# Patient Record
Sex: FEMALE | Race: OTHER | HISPANIC OR LATINO | Employment: FULL TIME | URBAN - METROPOLITAN AREA
[De-identification: names, ages, dates, MRNs, and addresses within clinical notes are randomized per-mention and may not be internally consistent; named-entity substitution may affect disease eponyms.]

---

## 2018-01-17 NOTE — MISCELLANEOUS
Provider Comments  Provider Comments:   PT WAS A NO SHOW      Signatures   Electronically signed by :  Bharathi Ocasio MD; Sep 22 2016  4:14PM EST                       (Author)

## 2018-06-29 PROBLEM — E66.9 OBESITY: Status: ACTIVE | Noted: 2017-07-11

## 2018-08-27 ENCOUNTER — HOSPITAL ENCOUNTER (EMERGENCY)
Facility: HOSPITAL | Age: 33
Discharge: HOME | End: 2018-08-27
Attending: EMERGENCY MEDICINE | Admitting: EMERGENCY MEDICINE
Payer: COMMERCIAL

## 2018-08-27 ENCOUNTER — APPOINTMENT (EMERGENCY)
Dept: RADIOLOGY | Facility: HOSPITAL | Age: 33
End: 2018-08-27
Payer: COMMERCIAL

## 2018-08-27 VITALS
SYSTOLIC BLOOD PRESSURE: 155 MMHG | TEMPERATURE: 97.9 F | RESPIRATION RATE: 16 BRPM | WEIGHT: 188.2 LBS | OXYGEN SATURATION: 99 % | DIASTOLIC BLOOD PRESSURE: 73 MMHG | BODY MASS INDEX: 31.32 KG/M2 | HEART RATE: 65 BPM

## 2018-08-27 DIAGNOSIS — S60.00XA FINGER CONTUSION: Primary | ICD-10-CM

## 2018-08-27 PROCEDURE — 99283 EMERGENCY DEPT VISIT LOW MDM: CPT

## 2018-08-27 PROCEDURE — 73140 X-RAY EXAM OF FINGER(S): CPT

## 2018-08-27 RX ORDER — IBUPROFEN 600 MG/1
600 TABLET ORAL ONCE
Status: COMPLETED | OUTPATIENT
Start: 2018-08-27 | End: 2018-08-27

## 2018-08-27 RX ORDER — MULTIVITAMIN
1 TABLET ORAL DAILY
COMMUNITY

## 2018-08-27 RX ORDER — IBUPROFEN 600 MG/1
600 TABLET ORAL EVERY 6 HOURS PRN
Qty: 30 TABLET | Refills: 0 | Status: SHIPPED | OUTPATIENT
Start: 2018-08-27 | End: 2019-07-22

## 2018-08-27 RX ADMIN — IBUPROFEN 600 MG: 600 TABLET, FILM COATED ORAL at 11:20

## 2018-08-27 NOTE — DISCHARGE INSTRUCTIONS

## 2018-08-27 NOTE — ED PROVIDER NOTES
History  Chief Complaint   Patient presents with    Finger Injury     Pt  reports left index finger pain and swelling x 2 days after jamming it while painting house  Last took motrin yesterday  62-year-old female with past medical history of hypertension, obesity, thyroid, who presents to the emergency department for left finger injury status post crush in a car door 2 days ago  She is right-hand dominant  Complains of 5/10 sharp and aching pain with swelling that is worse with movement and palpation  Denies redness or warmth  Denies numbness, tingling, weakness  Denies fevers or chills  Took Motrin yesterday with minimal relief attained  History provided by:  Patient   used: No        Prior to Admission Medications   Prescriptions Last Dose Informant Patient Reported? Taking? Multiple Vitamin (MULTIVITAMIN) tablet   Yes Yes   Sig: Take 1 tablet by mouth daily   ergocalciferol (ERGOCALCIFEROL) 90661 units capsule   Yes Yes   Sig: take 1 capsule by oral route  every week      Facility-Administered Medications: None       Past Medical History:   Diagnosis Date    Anemia     Hypertension     Obesity     11/14/16    Thyroid disease     Ulcer        Past Surgical History:   Procedure Laterality Date    GASTRIC BYPASS      FOR OBESITY; 11/14/16    TUBAL LIGATION Bilateral     2010       Family History   Problem Relation Age of Onset    Asthma Mother     Coronary artery disease Mother     Diabetes Mother         MELLITUS    Hyperlipidemia Mother     Hypertension Mother      I have reviewed and agree with the history as documented  Social History   Substance Use Topics    Smoking status: Current Every Day Smoker     Types: Cigarettes    Smokeless tobacco: Never Used      Comment: CURRENT SOME DAY SMOKER, TOBACCO USE AS PER [employer]    Alcohol use No        Review of Systems   Constitutional: Negative for chills and fever  HENT: Negative  Eyes: Negative  Respiratory: Negative for cough, chest tightness, shortness of breath and wheezing  Cardiovascular: Negative for chest pain, palpitations and leg swelling  Gastrointestinal: Negative for abdominal pain, constipation, diarrhea, nausea and vomiting  Genitourinary: Negative for dysuria, flank pain, frequency, hematuria and urgency  Musculoskeletal: Positive for arthralgias, joint swelling and myalgias  Negative for back pain, gait problem, neck pain and neck stiffness  Skin: Negative for color change, pallor, rash and wound  Neurological: Negative for dizziness, syncope, weakness, light-headedness, numbness and headaches  Physical Exam  Physical Exam   Constitutional: She is oriented to person, place, and time  She appears well-developed and well-nourished  No distress  HENT:   Head: Normocephalic and atraumatic  Eyes: Conjunctivae and EOM are normal  Pupils are equal, round, and reactive to light  Neck: Normal range of motion  Neck supple  Cardiovascular: Normal rate and intact distal pulses  Pulmonary/Chest: Effort normal  No respiratory distress  Abdominal: Soft  Musculoskeletal: She exhibits edema (Left index finger) and tenderness ( moderate pain with palpation to left index finger  )  Decreased range of motion of left index finger secondary to pain  Neurological: She is alert and oriented to person, place, and time  No cranial nerve deficit or sensory deficit  She exhibits normal muscle tone  Coordination normal    Skin: Skin is warm and dry  Capillary refill takes less than 2 seconds  She is not diaphoretic  Psychiatric: She has a normal mood and affect  Her behavior is normal    Nursing note and vitals reviewed        Vital Signs  ED Triage Vitals [08/27/18 1048]   Temperature Pulse Respirations Blood Pressure SpO2   97 9 °F (36 6 °C) 65 16 155/73 99 %      Temp Source Heart Rate Source Patient Position - Orthostatic VS BP Location FiO2 (%)   Temporal -- -- -- -- Pain Score       5           Vitals:    08/27/18 1048   BP: 155/73   Pulse: 65       Visual Acuity      ED Medications  Medications   ibuprofen (MOTRIN) tablet 600 mg (600 mg Oral Given 8/27/18 1120)       Diagnostic Studies  Results Reviewed     None                 XR finger second digit-index LEFT   ED Interpretation by Kun Buchanan PA-C (08/27 1137)   No acute osseous abnormality  Final Result by Maritza Doss MD (08/27 1159)      No fracture  Workstation performed: [id number]                    Procedures  Orthopedic Injury  Date/Time: 8/27/2018 11:16 AM  Performed by: Christiano Gilbert  Authorized by: Diaz Abrams     Patient Location:  ED  Verbal consent obtained?: Yes    Risks and benefits: Risks, benefits and alternatives were discussed    Consent given by:  Patient  Injury location:  Finger  Location details:  Left index finger  Injury type: Soft tissue  Neurovascular status: Neurovascularly intact    Distal perfusion: normal    Neurological function: normal    Range of motion: reduced    Skeletal traction used?: No    Immobilization:  Splint  Splint type:  Finger splint, static  Supplies used:  Aluminum splint  Neurovascular status: Neurovascularly intact    Distal perfusion: normal    Neurological function: normal    Range of motion: unchanged    Patient tolerance:  Patient tolerated the procedure well with no immediate complications   Splint application: splint was applied, Applied by technician, good position, neurovascular tendon intact, good capillary refill  Evaluated by me prior to discharge  Phone Contacts  ED Phone Contact    ED Course                               MDM  Number of Diagnoses or Management Options  Finger contusion:   Diagnosis management comments: Differential Diagnosis includes but is not limited to: sprain/strain, contusion, fracture/dislocation, musculoskeletal pain  X-ray shows no acute osseous abnormality on my interpretation    Patient placed in finger splint as she experiences increased pain with movement and palpation  Discharge home with primary care and orthopedic follow-up as needed  Motrin given in the emergency department  Amount and/or Complexity of Data Reviewed  Tests in the radiology section of CPT®: ordered and reviewed  Independent visualization of images, tracings, or specimens: yes      CritCare Time    Disposition  Final diagnoses:   Finger contusion     Time reflects when diagnosis was documented in both MDM as applicable and the Disposition within this note     Time User Action Codes Description Comment    8/27/2018 11:38 AM Gonsalo Prashant Add [S60 00XA] Finger contusion       ED Disposition     ED Disposition Condition Comment    Discharge  Adriana Mims discharge to home/self care  Condition at discharge: Good        Follow-up Information     Follow up With Specialties Details Why [address] [facility] [place] [facility] In 1 week As needed [place] [id number]  [phone]          Discharge Medication List as of 8/27/2018 11:39 AM      START taking these medications    Details   ibuprofen (MOTRIN) 600 mg tablet Take 1 tablet (600 mg total) by mouth every 6 (six) hours as needed for mild pain, Starting Mon 8/27/2018, Print         CONTINUE these medications which have NOT CHANGED    Details   ergocalciferol (ERGOCALCIFEROL) 87142 units capsule take 1 capsule by oral route  every week, Historical Med      Multiple Vitamin (MULTIVITAMIN) tablet Take 1 tablet by mouth daily, Historical Med           No discharge procedures on file      ED Provider  Electronically Signed by           Verner Ruby, PA-C  08/27/18 7562

## 2018-11-01 ENCOUNTER — APPOINTMENT (OUTPATIENT)
Dept: URGENT CARE | Age: 33
End: 2018-11-01
Payer: OTHER MISCELLANEOUS

## 2018-11-01 ENCOUNTER — TRANSCRIBE ORDERS (OUTPATIENT)
Dept: URGENT CARE | Age: 33
End: 2018-11-01

## 2018-11-01 ENCOUNTER — APPOINTMENT (OUTPATIENT)
Dept: RADIOLOGY | Age: 33
End: 2018-11-01
Payer: OTHER MISCELLANEOUS

## 2018-11-01 DIAGNOSIS — T14.90XA INJURY: Primary | ICD-10-CM

## 2018-11-01 DIAGNOSIS — T14.90XA INJURY: ICD-10-CM

## 2018-11-01 PROCEDURE — 99283 EMERGENCY DEPT VISIT LOW MDM: CPT

## 2018-11-01 PROCEDURE — G0382 LEV 3 HOSP TYPE B ED VISIT: HCPCS

## 2018-11-01 PROCEDURE — 73140 X-RAY EXAM OF FINGER(S): CPT

## 2018-11-06 PROBLEM — Z98.84 S/P GASTRIC BYPASS: Status: ACTIVE | Noted: 2018-11-06

## 2018-11-06 PROBLEM — K91.2 POSTSURGICAL MALABSORPTION: Status: ACTIVE | Noted: 2018-11-06

## 2018-11-06 NOTE — ASSESSMENT & PLAN NOTE
-At risk for malabsorption of vitamins/minerals secondary to malabsorption and restriction of intake from bariatric surgery  -NOT Currently taking adequate postop bariatric surgery vitamin supplementation: OTC MVI, calcium citrate BID, vitamin D 1000IU, B12  -No recent bariatric labs  -Recommended [facility] bariatrics  -Next set of bariatric labs ordered for approximately 2 weeks  -Patient received education about the importance of adhering to a lifelong supplementation regimen to avoid vitamin/mineral deficiencies

## 2018-11-06 NOTE — PROGRESS NOTES
Assessment/Plan:    S/P gastric bypass  -s/p Jaycee-En-Y Gastric Bypass with Dr Byron Dubon on 11/14/16  · EWL is 83%, she has been maintaining her weight s/p surgery; greater than expected weight loss! · The patient attempts to eat protein first, but is not always getting 60g of protein daily  Willing to f/u with RD for additional support  · Reports following the 30/60 minute rule with liquids  Drinking 64oz  daily  · Ulcer Risk assessment:  · The patient is avoiding NSAIDS, smoking about 2 cigarettes daily, denies alcohol  Discussion about strict AVOIDANCE of alcohol, NSAIDS, and nicotine of all forms  Patient has the patch from PCP and ready to quit  High ulcer risk - see below plan  · Goes to the gym several times a week  Discussion about increasing exercise and physical activity as tolerated  Epigastric pain  -Abdominal pain, mid-epigastric that is worse with food that began about 2 months ago  Reports black/tarry stools for about 1 month  Hx of duodenal ulcers x 2 - unsure of time frame; apparently was in 2015 before surgery   Smokes about 2 cigarettes daily, denies NSAIDS, alcohol    -Absolute smoking cessation - patient ready to quit, has patches  -Protonix BID and carafate QID  -EGD  -Advised patient if develops severe pain, increased bloody stools, fevers, chills, sweats to report to ED    Postsurgical malabsorption  -At risk for malabsorption of vitamins/minerals secondary to malabsorption and restriction of intake from bariatric surgery  -NOT Currently taking adequate postop bariatric surgery vitamin supplementation: OTC MVI, calcium citrate BID, vitamin D 1000IU, B12  -No recent bariatric labs  -Recommended [facility] bariatrics  -Next set of bariatric labs ordered for approximately 2 weeks  -Patient received education about the importance of adhering to a lifelong supplementation regimen to avoid vitamin/mineral deficiencies     Benign essential hypertension  -Slightly elevated BP  -No longer on medications  -Advised her to follow up with PCP       Diagnoses and all orders for this visit:    S/P gastric bypass  -     CBC and differential; Future  -     Comprehensive metabolic panel; Future  -     Copper Level; Future  -     Ferritin; Future  -     Folate; Future  -     Iron; Future  -     Iron Saturation %; Future  -     PTH, intact; Future  -     Vitamin A; Future  -     Vitamin B1, whole blood; Future  -     Vitamin B12; Future  -     Vitamin D 25 hydroxy; Future  -     Zinc; Future  -     Lipid panel; Future  -     pantoprazole (PROTONIX) 40 mg tablet; Take 1 tablet (40 mg total) by mouth daily  -     sucralfate (CARAFATE) 1 g/10 mL suspension; Take 10 mL (1 g total) by mouth 4 (four) times a day    Postsurgical malabsorption  -     CBC and differential; Future  -     Comprehensive metabolic panel; Future  -     Copper Level; Future  -     Ferritin; Future  -     Folate; Future  -     Iron; Future  -     Iron Saturation %; Future  -     PTH, intact; Future  -     Vitamin A; Future  -     Vitamin B1, whole blood; Future  -     Vitamin B12; Future  -     Vitamin D 25 hydroxy; Future  -     Zinc; Future  -     Lipid panel; Future  -     pantoprazole (PROTONIX) 40 mg tablet; Take 1 tablet (40 mg total) by mouth daily  -     sucralfate (CARAFATE) 1 g/10 mL suspension; Take 10 mL (1 g total) by mouth 4 (four) times a day    Benign essential hypertension    Hyperlipidemia, unspecified hyperlipidemia type    Epigastric pain  -     pantoprazole (PROTONIX) 40 mg tablet; Take 1 tablet (40 mg total) by mouth daily  -     sucralfate (CARAFATE) 1 g/10 mL suspension; Take 10 mL (1 g total) by mouth 4 (four) times a day    Other orders  -     cyanocobalamin (VITAMIN B-12) 100 mcg tablet; Take by mouth daily  -     calcium carbonate 1250 MG capsule; Take 1,250 mg by mouth 2 (two) times a day with meals          Subjective:      Patient ID: José Miguel Plasencia is a 29 y o  female      -s/p Jaycee-En-Y Gastric Bypass with   [facility] on 11/14/16  Presents to the office today for routine follow up  C/o mid-epigastric pain that is worse with food and black tarry stools about 2 months ago  Smokes 2 cigarettes daily  Initial: 339 6lbs  Current: 178 5lbs  EWL: 83% (Weight loss is ahead of schedule at this post surgical period )  Isidoro: current  Current BMI is Body mass index is 30 64 kg/m²  The following portions of the patient's history were reviewed and updated as appropriate: allergies, current medications, past family history, past medical history, past social history, past surgical history and problem list     Review of Systems   Constitutional: Negative for chills and fever  Unexpected weight change: planned weight loss  HENT: Negative for trouble swallowing  Respiratory: Negative for cough and shortness of breath  Cardiovascular: Negative for chest pain and palpitations  Gastrointestinal: Positive for abdominal pain and constipation  Negative for diarrhea, nausea and vomiting  Dark/tarry stools   Neurological: Negative for dizziness  Psychiatric/Behavioral:        Denies anxiety and depression         Objective:      /92 (BP Location: Left arm, Patient Position: Sitting, Cuff Size: Large)   Pulse 72   Resp 16   Ht 5' 4" (1 626 m)   Wt 81 kg (178 lb 8 oz)   BMI 30 64 kg/m²          Physical Exam   Constitutional: She is oriented to person, place, and time  She appears well-developed and well-nourished  No distress  HENT:   Head: Normocephalic and atraumatic  Eyes: Pupils are equal, round, and reactive to light  No scleral icterus  Cardiovascular: Normal rate, regular rhythm and normal heart sounds  Pulmonary/Chest: Effort normal and breath sounds normal  No respiratory distress  Abdominal: Soft  Bowel sounds are normal  She exhibits no distension  There is no tenderness  No incisional hernias appreciated   Musculoskeletal: She exhibits no edema     Neurological: She is alert and oriented to person, place, and time  Skin: Skin is warm and dry  Psychiatric: She has a normal mood and affect  Nursing note and vitals reviewed  BARRIERS: none identified    GOALS:   · Maintain healthy weight loss with good nutrition intakes  · Adequate hydration with at least 64oz  fluid intake  · Normal vitamin and mineral levels  · Exercise as tolerated  · Start protonix twice a day and carafate four times a day  · EGD   · STOP SMOKING!  · Follow up RD and keep food records    · Follow-up in 3 months  We kindly ask that your arrive 15 minutes before your scheduled appointment time with your provider to allow our staff to room you, get your vital signs and update your chart  · Follow diet as discussed  · Get lab work done in the next 2 weeks  You have been given a lab slip today  Please call the office if you need a replacement  It is recommended to check with your insurance BEFORE getting labs done to make sure they are covered by your policy  Also, please check with your PCP and other providers before getting labs to avoid duplicate labs  Make sure to HOLD any multivitamins that may contain biotin and any biotin supplements FOR 5 DAYS before any labs since it can affect the results  · Follow vitamin and mineral recommendations as reviewed with you  · Call our office if you have any problems with abdominal pain especially associated with fever, chills, nausea, vomiting or any other concerns  · All  Post-bariatric surgery patients should be aware that very small quantities of any alcohol can cause impairment and it is very possible not to feel the effect  The effect can be in the system for several hours  It is also a stomach irritant  · It is advised to AVOID alcohol, Nonsteroidal antiinflammatory drugs (NSAIDS) and nicotine of all forms   Any of these can cause stomach irritation/pain

## 2018-11-06 NOTE — ASSESSMENT & PLAN NOTE
-s/p Jaycee-En-Y Gastric Bypass with Dr Pedro Pablo Maradiaga on 11/14/16  · EWL is 83%, she has been maintaining her weight s/p surgery; greater than expected weight loss! · The patient attempts to eat protein first, but is not always getting 60g of protein daily  Willing to f/u with RD for additional support  · Reports following the 30/60 minute rule with liquids  Drinking 64oz  daily  · Ulcer Risk assessment:  · The patient is avoiding NSAIDS, smoking about 2 cigarettes daily, denies alcohol  Discussion about strict AVOIDANCE of alcohol, NSAIDS, and nicotine of all forms  Patient has the patch from PCP and ready to quit  High ulcer risk - see below plan  · Goes to the gym several times a week  Discussion about increasing exercise and physical activity as tolerated

## 2018-11-07 ENCOUNTER — OFFICE VISIT (OUTPATIENT)
Dept: BARIATRICS | Facility: CLINIC | Age: 33
End: 2018-11-07
Payer: COMMERCIAL

## 2018-11-07 VITALS
WEIGHT: 178.5 LBS | RESPIRATION RATE: 16 BRPM | HEART RATE: 72 BPM | BODY MASS INDEX: 30.48 KG/M2 | SYSTOLIC BLOOD PRESSURE: 132 MMHG | HEIGHT: 64 IN | DIASTOLIC BLOOD PRESSURE: 92 MMHG

## 2018-11-07 DIAGNOSIS — Z98.84 S/P GASTRIC BYPASS: Primary | ICD-10-CM

## 2018-11-07 DIAGNOSIS — R10.13 EPIGASTRIC PAIN: ICD-10-CM

## 2018-11-07 DIAGNOSIS — K91.2 POSTSURGICAL MALABSORPTION: ICD-10-CM

## 2018-11-07 DIAGNOSIS — Z98.84 S/P GASTRIC BYPASS: ICD-10-CM

## 2018-11-07 DIAGNOSIS — I10 BENIGN ESSENTIAL HYPERTENSION: ICD-10-CM

## 2018-11-07 DIAGNOSIS — E78.5 HYPERLIPIDEMIA, UNSPECIFIED HYPERLIPIDEMIA TYPE: ICD-10-CM

## 2018-11-07 PROCEDURE — 99214 OFFICE O/P EST MOD 30 MIN: CPT | Performed by: PHYSICIAN ASSISTANT

## 2018-11-07 RX ORDER — UBIDECARENONE 75 MG
CAPSULE ORAL DAILY
COMMUNITY
End: 2021-10-27 | Stop reason: ALTCHOICE

## 2018-11-07 RX ORDER — PANTOPRAZOLE SODIUM 40 MG/1
40 TABLET, DELAYED RELEASE ORAL DAILY
Qty: 60 TABLET | Refills: 2 | Status: SHIPPED | OUTPATIENT
Start: 2018-11-07 | End: 2018-11-08 | Stop reason: CLARIF

## 2018-11-07 RX ORDER — SUCRALFATE ORAL 1 G/10ML
1 SUSPENSION ORAL 4 TIMES DAILY
Qty: 1200 ML | Refills: 2 | Status: SHIPPED | OUTPATIENT
Start: 2018-11-07 | End: 2019-07-22

## 2018-11-07 NOTE — ASSESSMENT & PLAN NOTE
-Abdominal pain, mid-epigastric that is worse with food that began about 2 months ago  Reports black/tarry stools for about 1 month  Hx of duodenal ulcers x 2 - unsure of time frame; apparently was in 2015 before surgery   Smokes about 2 cigarettes daily, denies NSAIDS, alcohol    -Absolute smoking cessation - patient ready to quit, has patches  -Protonix BID and carafate QID  -EGD  -Advised patient if develops severe pain, increased bloody stools, fevers, chills, sweats to report to ED

## 2018-11-07 NOTE — PATIENT INSTRUCTIONS
GOALS:   · Maintain healthy weight loss with good nutrition intakes  · Adequate hydration with at least 64oz  fluid intake  · Normal vitamin and mineral levels  · Exercise as tolerated  · Start protonix twice a day and carafate four times a day  · EGD   · STOP SMOKING! · Look into [facility] vitamins  · Follow up with the RD for additional support - keep food records    · Follow-up in 3 months  We kindly ask that your arrive 15 minutes before your scheduled appointment time with your provider to allow our staff to room you, get your vital signs and update your chart  · Follow diet as discussed  · Get lab work done in the next 2 weeks  You have been given a lab slip today  Please call the office if you need a replacement  It is recommended to check with your insurance BEFORE getting labs done to make sure they are covered by your policy  Also, please check with your PCP and other providers before getting labs to avoid duplicate labs  Make sure to HOLD any multivitamins that may contain biotin and any biotin supplements FOR 5 DAYS before any labs since it can affect the results  · Follow vitamin and mineral recommendations as reviewed with you  · Call our office if you have any problems with abdominal pain especially associated with fever, chills, nausea, vomiting or any other concerns  · All  Post-bariatric surgery patients should be aware that very small quantities of any alcohol can cause impairment and it is very possible not to feel the effect  The effect can be in the system for several hours  It is also a stomach irritant  · It is advised to AVOID alcohol, Nonsteroidal antiinflammatory drugs (NSAIDS) and nicotine of all forms   Any of these can cause stomach irritation/pain

## 2018-11-08 RX ORDER — PANTOPRAZOLE SODIUM 40 MG/1
TABLET, DELAYED RELEASE ORAL
Qty: 90 TABLET | Refills: 2 | OUTPATIENT
Start: 2018-11-08

## 2018-11-08 RX ORDER — PANTOPRAZOLE SODIUM 40 MG/1
40 TABLET, DELAYED RELEASE ORAL 2 TIMES DAILY
Qty: 60 TABLET | Refills: 2 | Status: SHIPPED | OUTPATIENT
Start: 2018-11-08 | End: 2019-07-22

## 2018-11-12 ENCOUNTER — TRANSCRIBE ORDERS (OUTPATIENT)
Dept: ADMINISTRATIVE | Facility: HOSPITAL | Age: 33
End: 2018-11-12

## 2018-11-12 ENCOUNTER — APPOINTMENT (OUTPATIENT)
Dept: LAB | Facility: HOSPITAL | Age: 33
End: 2018-11-12
Payer: COMMERCIAL

## 2018-11-12 ENCOUNTER — TELEPHONE (OUTPATIENT)
Dept: BARIATRICS | Facility: CLINIC | Age: 33
End: 2018-11-12

## 2018-11-12 DIAGNOSIS — Z98.84 S/P GASTRIC BYPASS: ICD-10-CM

## 2018-11-12 DIAGNOSIS — D50.9 IRON DEFICIENCY ANEMIA, UNSPECIFIED IRON DEFICIENCY ANEMIA TYPE: ICD-10-CM

## 2018-11-12 DIAGNOSIS — E55.9 VITAMIN D DEFICIENCY: Primary | ICD-10-CM

## 2018-11-12 DIAGNOSIS — K91.2 POSTSURGICAL MALABSORPTION: ICD-10-CM

## 2018-11-12 LAB
25(OH)D3 SERPL-MCNC: 16.4 NG/ML (ref 30–100)
ALBUMIN SERPL BCP-MCNC: 4.5 G/DL (ref 3–5.2)
ALP SERPL-CCNC: 75 U/L (ref 43–122)
ALT SERPL W P-5'-P-CCNC: 26 U/L (ref 9–52)
ANION GAP SERPL CALCULATED.3IONS-SCNC: 9 MMOL/L (ref 5–14)
AST SERPL W P-5'-P-CCNC: 26 U/L (ref 14–36)
BASOPHILS # BLD AUTO: 0 THOUSANDS/ΜL (ref 0–0.1)
BASOPHILS NFR BLD AUTO: 0 % (ref 0–1)
BILIRUB SERPL-MCNC: 0.6 MG/DL
BUN SERPL-MCNC: 13 MG/DL (ref 5–25)
CALCIUM SERPL-MCNC: 9 MG/DL (ref 8.4–10.2)
CHLORIDE SERPL-SCNC: 105 MMOL/L (ref 97–108)
CHOLEST SERPL-MCNC: 168 MG/DL
CO2 SERPL-SCNC: 24 MMOL/L (ref 22–30)
CREAT SERPL-MCNC: 0.52 MG/DL (ref 0.6–1.2)
EOSINOPHIL # BLD AUTO: 0.1 THOUSAND/ΜL (ref 0–0.4)
EOSINOPHIL NFR BLD AUTO: 2 % (ref 0–6)
ERYTHROCYTE [DISTWIDTH] IN BLOOD BY AUTOMATED COUNT: 15.7 %
FERRITIN SERPL-MCNC: 5 NG/ML (ref 8–388)
FOLATE SERPL-MCNC: 15.3 NG/ML (ref 3.1–17.5)
GFR SERPL CREATININE-BSD FRML MDRD: 127 ML/MIN/1.73SQ M
GLUCOSE P FAST SERPL-MCNC: 94 MG/DL (ref 70–99)
HCT VFR BLD AUTO: 33.9 % (ref 36–46)
HDLC SERPL-MCNC: 52 MG/DL (ref 40–59)
HGB BLD-MCNC: 10.9 G/DL (ref 12–16)
IRON SATN MFR SERPL: 23 %
IRON SERPL-MCNC: 118 UG/DL (ref 50–170)
LDLC SERPL CALC-MCNC: 100 MG/DL
LYMPHOCYTES # BLD AUTO: 1.8 THOUSANDS/ΜL (ref 0.5–4)
LYMPHOCYTES NFR BLD AUTO: 33 % (ref 20–50)
MCH RBC QN AUTO: 27.2 PG (ref 26–34)
MCHC RBC AUTO-ENTMCNC: 32.1 G/DL (ref 31–36)
MCV RBC AUTO: 85 FL (ref 80–100)
MONOCYTES # BLD AUTO: 0.4 THOUSAND/ΜL (ref 0.2–0.9)
MONOCYTES NFR BLD AUTO: 7 % (ref 1–10)
NEUTROPHILS # BLD AUTO: 3.1 THOUSANDS/ΜL (ref 1.8–7.8)
NEUTS SEG NFR BLD AUTO: 57 % (ref 45–65)
NONHDLC SERPL-MCNC: 116 MG/DL
PLATELET # BLD AUTO: 286 THOUSANDS/UL (ref 150–450)
PMV BLD AUTO: 9.2 FL (ref 8.9–12.7)
POTASSIUM SERPL-SCNC: 4.1 MMOL/L (ref 3.6–5)
PROT SERPL-MCNC: 8.1 G/DL (ref 5.9–8.4)
PTH-INTACT SERPL-MCNC: 76.4 PG/ML (ref 16.7–78.9)
RBC # BLD AUTO: 4 MILLION/UL (ref 4–5.2)
SODIUM SERPL-SCNC: 138 MMOL/L (ref 137–147)
TIBC SERPL-MCNC: 505 UG/DL (ref 250–450)
TRIGL SERPL-MCNC: 79 MG/DL
VIT B12 SERPL-MCNC: 296 PG/ML (ref 100–900)
WBC # BLD AUTO: 5.5 THOUSAND/UL (ref 4.5–11)

## 2018-11-12 PROCEDURE — 80053 COMPREHEN METABOLIC PANEL: CPT

## 2018-11-12 PROCEDURE — 82306 VITAMIN D 25 HYDROXY: CPT

## 2018-11-12 PROCEDURE — 84630 ASSAY OF ZINC: CPT

## 2018-11-12 PROCEDURE — 84425 ASSAY OF VITAMIN B-1: CPT

## 2018-11-12 PROCEDURE — 83970 ASSAY OF PARATHORMONE: CPT

## 2018-11-12 PROCEDURE — 84590 ASSAY OF VITAMIN A: CPT

## 2018-11-12 PROCEDURE — 36415 COLL VENOUS BLD VENIPUNCTURE: CPT

## 2018-11-12 PROCEDURE — 85025 COMPLETE CBC W/AUTO DIFF WBC: CPT

## 2018-11-12 PROCEDURE — 82607 VITAMIN B-12: CPT

## 2018-11-12 PROCEDURE — 82746 ASSAY OF FOLIC ACID SERUM: CPT

## 2018-11-12 PROCEDURE — 82525 ASSAY OF COPPER: CPT

## 2018-11-12 PROCEDURE — 80061 LIPID PANEL: CPT

## 2018-11-12 PROCEDURE — 83540 ASSAY OF IRON: CPT

## 2018-11-12 PROCEDURE — 83550 IRON BINDING TEST: CPT

## 2018-11-12 PROCEDURE — 82728 ASSAY OF FERRITIN: CPT

## 2018-11-12 RX ORDER — ERGOCALCIFEROL 1.25 MG/1
50000 CAPSULE ORAL
Qty: 24 CAPSULE | Refills: 0 | Status: SHIPPED | OUTPATIENT
Start: 2018-11-12 | End: 2019-11-08

## 2018-11-12 NOTE — Clinical Note
Please notify patient of hematology referral  Have her contact me in the office if possible, thanks!

## 2018-11-13 ENCOUNTER — TELEPHONE (OUTPATIENT)
Dept: BARIATRICS | Facility: CLINIC | Age: 33
End: 2018-11-13

## 2018-11-13 NOTE — TELEPHONE ENCOUNTER
Pt called worried about her blood test results would like to speak to doctor and will be waiting for call  Pt stated picked up vit d and wanted to be sure she is supposed to take 2 twice wkly or is that to much  Please contact patient she will be stopping by office tomorrow to sight consent forms

## 2018-11-14 ENCOUNTER — TELEPHONE (OUTPATIENT)
Dept: BARIATRICS | Facility: CLINIC | Age: 33
End: 2018-11-14

## 2018-11-14 LAB
COPPER SERPL-MCNC: 102 UG/DL (ref 72–166)
ZINC SERPL-MCNC: 75 UG/DL (ref 56–134)

## 2018-11-14 NOTE — TELEPHONE ENCOUNTER
Left message - advised patient to take Vitamin D 2x/week with meals for 12 weeks, 1000mcg B12 daily, hematology referral for iron deficiency anemia  I will contact her when remainder of labs results are completed

## 2018-11-15 DIAGNOSIS — E55.9 VITAMIN D DEFICIENCY: Primary | ICD-10-CM

## 2018-11-15 LAB
VIT A SERPL-MCNC: 30 UG/DL (ref 31.2–89.1)
VIT B1 BLD-SCNC: 101.2 NMOL/L (ref 66.5–200)

## 2018-11-20 ENCOUNTER — TELEPHONE (OUTPATIENT)
Dept: BARIATRICS | Facility: CLINIC | Age: 33
End: 2018-11-20

## 2018-11-27 ENCOUNTER — TELEPHONE (OUTPATIENT)
Dept: BARIATRICS | Facility: CLINIC | Age: 33
End: 2018-11-27

## 2018-11-28 ENCOUNTER — TELEPHONE (OUTPATIENT)
Dept: HEMATOLOGY ONCOLOGY | Facility: CLINIC | Age: 33
End: 2018-11-28

## 2018-11-28 NOTE — TELEPHONE ENCOUNTER
Called new patient as she has missed her apt  Call went to voicemail, and left message to call the office to reschedule

## 2018-11-28 NOTE — TELEPHONE ENCOUNTER
Called patient for a third time regarding missed new pt apt today at 10:20 a m , and call went to voicemail  Left message for patient to call the office to reschedule

## 2018-11-28 NOTE — TELEPHONE ENCOUNTER
Called new patient for a second time regarding missed apt  Left message for patient to call the office to reschedule

## 2018-12-18 PROBLEM — Z98.84 BARIATRIC SURGERY STATUS: Status: ACTIVE | Noted: 2018-12-18

## 2019-06-10 ENCOUNTER — APPOINTMENT (EMERGENCY)
Dept: RADIOLOGY | Facility: HOSPITAL | Age: 34
End: 2019-06-10
Payer: COMMERCIAL

## 2019-06-10 ENCOUNTER — HOSPITAL ENCOUNTER (EMERGENCY)
Facility: HOSPITAL | Age: 34
Discharge: HOME | End: 2019-06-10
Attending: EMERGENCY MEDICINE | Admitting: EMERGENCY MEDICINE
Payer: COMMERCIAL

## 2019-06-10 VITALS
TEMPERATURE: 98.4 F | BODY MASS INDEX: 32.01 KG/M2 | WEIGHT: 186.51 LBS | RESPIRATION RATE: 14 BRPM | SYSTOLIC BLOOD PRESSURE: 139 MMHG | OXYGEN SATURATION: 100 % | HEART RATE: 65 BPM | DIASTOLIC BLOOD PRESSURE: 81 MMHG

## 2019-06-10 DIAGNOSIS — R07.9 CHEST PAIN: Primary | ICD-10-CM

## 2019-06-10 LAB
ANION GAP SERPL CALCULATED.3IONS-SCNC: 8 MMOL/L (ref 4–13)
ATRIAL RATE: 57 BPM
ATRIAL RATE: 69 BPM
BACTERIA UR QL AUTO: ABNORMAL /HPF
BASOPHILS # BLD AUTO: 0.01 THOUSANDS/ΜL (ref 0–0.1)
BASOPHILS NFR BLD AUTO: 0 % (ref 0–1)
BILIRUB UR QL STRIP: NEGATIVE
BUN SERPL-MCNC: 11 MG/DL (ref 5–25)
CALCIUM SERPL-MCNC: 8.5 MG/DL (ref 8.3–10.1)
CHLORIDE SERPL-SCNC: 108 MMOL/L (ref 100–108)
CLARITY UR: CLEAR
CO2 SERPL-SCNC: 26 MMOL/L (ref 21–32)
COLOR UR: YELLOW
COLOR, POC: YELLOW
CREAT SERPL-MCNC: 0.69 MG/DL (ref 0.6–1.3)
EOSINOPHIL # BLD AUTO: 0.07 THOUSAND/ΜL (ref 0–0.61)
EOSINOPHIL NFR BLD AUTO: 1 % (ref 0–6)
ERYTHROCYTE [DISTWIDTH] IN BLOOD BY AUTOMATED COUNT: 16.3 % (ref 11.6–15.1)
EXT PREG TEST URINE: NEGATIVE
GFR SERPL CREATININE-BSD FRML MDRD: 115 ML/MIN/1.73SQ M
GLUCOSE SERPL-MCNC: 94 MG/DL (ref 65–140)
GLUCOSE UR STRIP-MCNC: NEGATIVE MG/DL
HCT VFR BLD AUTO: 28.2 % (ref 34.8–46.1)
HGB BLD-MCNC: 8.5 G/DL (ref 11.5–15.4)
HGB UR QL STRIP.AUTO: NEGATIVE
IMM GRANULOCYTES # BLD AUTO: 0.02 THOUSAND/UL (ref 0–0.2)
IMM GRANULOCYTES NFR BLD AUTO: 0 % (ref 0–2)
KETONES UR STRIP-MCNC: NEGATIVE MG/DL
LEUKOCYTE ESTERASE UR QL STRIP: ABNORMAL
LYMPHOCYTES # BLD AUTO: 1.84 THOUSANDS/ΜL (ref 0.6–4.47)
LYMPHOCYTES NFR BLD AUTO: 32 % (ref 14–44)
MCH RBC QN AUTO: 24.9 PG (ref 26.8–34.3)
MCHC RBC AUTO-ENTMCNC: 30.1 G/DL (ref 31.4–37.4)
MCV RBC AUTO: 83 FL (ref 82–98)
MONOCYTES # BLD AUTO: 0.56 THOUSAND/ΜL (ref 0.17–1.22)
MONOCYTES NFR BLD AUTO: 10 % (ref 4–12)
NEUTROPHILS # BLD AUTO: 3.26 THOUSANDS/ΜL (ref 1.85–7.62)
NEUTS SEG NFR BLD AUTO: 57 % (ref 43–75)
NITRITE UR QL STRIP: NEGATIVE
NON-SQ EPI CELLS URNS QL MICRO: ABNORMAL /HPF
NRBC BLD AUTO-RTO: 0 /100 WBCS
P AXIS: 34 DEGREES
PH UR STRIP.AUTO: 7.5 [PH] (ref 4.5–8)
PLATELET # BLD AUTO: 293 THOUSANDS/UL (ref 149–390)
PMV BLD AUTO: 10.2 FL (ref 8.9–12.7)
POTASSIUM SERPL-SCNC: 4.4 MMOL/L (ref 3.5–5.3)
PR INTERVAL: 126 MS
PROT UR STRIP-MCNC: ABNORMAL MG/DL
QRS AXIS: 55 DEGREES
QRS AXIS: 61 DEGREES
QRSD INTERVAL: 82 MS
QRSD INTERVAL: 88 MS
QT INTERVAL: 390 MS
QT INTERVAL: 424 MS
QTC INTERVAL: 405 MS
QTC INTERVAL: 412 MS
RBC # BLD AUTO: 3.42 MILLION/UL (ref 3.81–5.12)
RBC #/AREA URNS AUTO: ABNORMAL /HPF
SODIUM SERPL-SCNC: 142 MMOL/L (ref 136–145)
SP GR UR STRIP.AUTO: 1.02 (ref 1–1.03)
T WAVE AXIS: 41 DEGREES
T WAVE AXIS: 47 DEGREES
TROPONIN I SERPL-MCNC: 0.02 NG/ML
TROPONIN I SERPL-MCNC: <0.02 NG/ML
UROBILINOGEN UR QL STRIP.AUTO: 0.2 E.U./DL
VENTRICULAR RATE: 57 BPM
VENTRICULAR RATE: 65 BPM
WBC # BLD AUTO: 5.76 THOUSAND/UL (ref 4.31–10.16)
WBC #/AREA URNS AUTO: ABNORMAL /HPF

## 2019-06-10 PROCEDURE — 99285 EMERGENCY DEPT VISIT HI MDM: CPT

## 2019-06-10 PROCEDURE — 93010 ELECTROCARDIOGRAM REPORT: CPT | Performed by: INTERNAL MEDICINE

## 2019-06-10 PROCEDURE — 80048 BASIC METABOLIC PNL TOTAL CA: CPT | Performed by: EMERGENCY MEDICINE

## 2019-06-10 PROCEDURE — 85025 COMPLETE CBC W/AUTO DIFF WBC: CPT | Performed by: EMERGENCY MEDICINE

## 2019-06-10 PROCEDURE — 93005 ELECTROCARDIOGRAM TRACING: CPT

## 2019-06-10 PROCEDURE — 84484 ASSAY OF TROPONIN QUANT: CPT | Performed by: EMERGENCY MEDICINE

## 2019-06-10 PROCEDURE — 99284 EMERGENCY DEPT VISIT MOD MDM: CPT | Performed by: EMERGENCY MEDICINE

## 2019-06-10 PROCEDURE — 71046 X-RAY EXAM CHEST 2 VIEWS: CPT

## 2019-06-10 PROCEDURE — 81001 URINALYSIS AUTO W/SCOPE: CPT

## 2019-06-10 PROCEDURE — 81025 URINE PREGNANCY TEST: CPT | Performed by: EMERGENCY MEDICINE

## 2019-06-10 PROCEDURE — 96376 TX/PRO/DX INJ SAME DRUG ADON: CPT

## 2019-06-10 PROCEDURE — 36415 COLL VENOUS BLD VENIPUNCTURE: CPT | Performed by: EMERGENCY MEDICINE

## 2019-06-10 PROCEDURE — 96374 THER/PROPH/DIAG INJ IV PUSH: CPT

## 2019-06-10 RX ORDER — KETOROLAC TROMETHAMINE 30 MG/ML
15 INJECTION, SOLUTION INTRAMUSCULAR; INTRAVENOUS ONCE
Status: COMPLETED | OUTPATIENT
Start: 2019-06-10 | End: 2019-06-10

## 2019-06-10 RX ADMIN — KETOROLAC TROMETHAMINE 15 MG: 30 INJECTION, SOLUTION INTRAMUSCULAR; INTRAVENOUS at 11:23

## 2019-06-10 RX ADMIN — KETOROLAC TROMETHAMINE 15 MG: 30 INJECTION, SOLUTION INTRAMUSCULAR; INTRAVENOUS at 09:26

## 2019-07-22 ENCOUNTER — APPOINTMENT (EMERGENCY)
Dept: CT IMAGING | Facility: HOSPITAL | Age: 34
End: 2019-07-22
Payer: COMMERCIAL

## 2019-07-22 ENCOUNTER — HOSPITAL ENCOUNTER (EMERGENCY)
Facility: HOSPITAL | Age: 34
Discharge: HOME | End: 2019-07-22
Attending: EMERGENCY MEDICINE | Admitting: EMERGENCY MEDICINE
Payer: COMMERCIAL

## 2019-07-22 VITALS
HEART RATE: 54 BPM | TEMPERATURE: 97.1 F | DIASTOLIC BLOOD PRESSURE: 89 MMHG | RESPIRATION RATE: 16 BRPM | BODY MASS INDEX: 32.79 KG/M2 | SYSTOLIC BLOOD PRESSURE: 124 MMHG | WEIGHT: 191 LBS | OXYGEN SATURATION: 100 %

## 2019-07-22 DIAGNOSIS — R51.9 HEADACHE: Primary | ICD-10-CM

## 2019-07-22 DIAGNOSIS — D64.9 ANEMIA: ICD-10-CM

## 2019-07-22 LAB
ALBUMIN SERPL BCP-MCNC: 4.2 G/DL (ref 3–5.2)
ALP SERPL-CCNC: 76 U/L (ref 43–122)
ALT SERPL W P-5'-P-CCNC: 18 U/L (ref 9–52)
ANION GAP SERPL CALCULATED.3IONS-SCNC: 6 MMOL/L (ref 5–14)
ANISOCYTOSIS BLD QL SMEAR: PRESENT
AST SERPL W P-5'-P-CCNC: 24 U/L (ref 14–36)
ATRIAL RATE: 58 BPM
BACTERIA UR QL AUTO: ABNORMAL /HPF
BASOPHILS # BLD AUTO: 0 THOUSANDS/ΜL (ref 0–0.1)
BASOPHILS NFR BLD AUTO: 1 % (ref 0–1)
BILIRUB SERPL-MCNC: 0.4 MG/DL
BILIRUB UR QL STRIP: NEGATIVE
BUN SERPL-MCNC: 10 MG/DL (ref 5–25)
CALCIUM SERPL-MCNC: 8.9 MG/DL (ref 8.4–10.2)
CHLORIDE SERPL-SCNC: 107 MMOL/L (ref 97–108)
CLARITY UR: CLEAR
CO2 SERPL-SCNC: 28 MMOL/L (ref 22–30)
COLOR UR: YELLOW
CREAT SERPL-MCNC: 0.54 MG/DL (ref 0.6–1.2)
EOSINOPHIL # BLD AUTO: 0.1 THOUSAND/ΜL (ref 0–0.4)
EOSINOPHIL NFR BLD AUTO: 1 % (ref 0–6)
ERYTHROCYTE [DISTWIDTH] IN BLOOD BY AUTOMATED COUNT: 16.7 %
EXT PREG TEST URINE: NEGATIVE
EXT. CONTROL ED NAV: NORMAL
GFR SERPL CREATININE-BSD FRML MDRD: 124 ML/MIN/1.73SQ M
GLUCOSE SERPL-MCNC: 92 MG/DL (ref 70–99)
GLUCOSE UR STRIP-MCNC: NEGATIVE MG/DL
HCT VFR BLD AUTO: 27.3 % (ref 36–46)
HGB BLD-MCNC: 8.5 G/DL (ref 12–16)
HGB UR QL STRIP.AUTO: NEGATIVE
HYPERCHROMIA BLD QL SMEAR: PRESENT
KETONES UR STRIP-MCNC: NEGATIVE MG/DL
LEUKOCYTE ESTERASE UR QL STRIP: 25
LYMPHOCYTES # BLD AUTO: 1.5 THOUSANDS/ΜL (ref 0.5–4)
LYMPHOCYTES NFR BLD AUTO: 22 % (ref 25–45)
MCH RBC QN AUTO: 23.8 PG (ref 26–34)
MCHC RBC AUTO-ENTMCNC: 31 G/DL (ref 31–36)
MCV RBC AUTO: 77 FL (ref 80–100)
MICROCYTES BLD QL AUTO: PRESENT
MONOCYTES # BLD AUTO: 0.5 THOUSAND/ΜL (ref 0.2–0.9)
MONOCYTES NFR BLD AUTO: 7 % (ref 1–10)
NEUTROPHILS # BLD AUTO: 4.7 THOUSANDS/ΜL (ref 1.8–7.8)
NEUTS SEG NFR BLD AUTO: 69 % (ref 45–65)
NITRITE UR QL STRIP: NEGATIVE
NON-SQ EPI CELLS URNS QL MICRO: ABNORMAL /HPF
P AXIS: 14 DEGREES
PH UR STRIP.AUTO: 7 [PH]
PLATELET # BLD AUTO: 353 THOUSANDS/UL (ref 150–450)
PLATELET BLD QL SMEAR: ADEQUATE
PMV BLD AUTO: 8.1 FL (ref 8.9–12.7)
POTASSIUM SERPL-SCNC: 4.2 MMOL/L (ref 3.6–5)
PR INTERVAL: 124 MS
PROT SERPL-MCNC: 7.7 G/DL (ref 5.9–8.4)
PROT UR STRIP-MCNC: NEGATIVE MG/DL
QRS AXIS: 13 DEGREES
QRSD INTERVAL: 90 MS
QT INTERVAL: 410 MS
QTC INTERVAL: 402 MS
RBC # BLD AUTO: 3.56 MILLION/UL (ref 4–5.2)
RBC #/AREA URNS AUTO: ABNORMAL /HPF
RBC MORPH BLD: NORMAL
SCHISTOCYTES BLD QL SMEAR: PRESENT
SODIUM SERPL-SCNC: 141 MMOL/L (ref 137–147)
SP GR UR STRIP.AUTO: 1.01 (ref 1–1.04)
T WAVE AXIS: 22 DEGREES
TARGETS BLD QL SMEAR: PRESENT
UROBILINOGEN UA: NEGATIVE MG/DL
VENTRICULAR RATE: 58 BPM
WBC # BLD AUTO: 6.8 THOUSAND/UL (ref 4.5–11)
WBC #/AREA URNS AUTO: ABNORMAL /HPF

## 2019-07-22 PROCEDURE — 85025 COMPLETE CBC W/AUTO DIFF WBC: CPT | Performed by: EMERGENCY MEDICINE

## 2019-07-22 PROCEDURE — 36415 COLL VENOUS BLD VENIPUNCTURE: CPT | Performed by: EMERGENCY MEDICINE

## 2019-07-22 PROCEDURE — 96374 THER/PROPH/DIAG INJ IV PUSH: CPT

## 2019-07-22 PROCEDURE — 99284 EMERGENCY DEPT VISIT MOD MDM: CPT | Performed by: EMERGENCY MEDICINE

## 2019-07-22 PROCEDURE — 93005 ELECTROCARDIOGRAM TRACING: CPT

## 2019-07-22 PROCEDURE — 96361 HYDRATE IV INFUSION ADD-ON: CPT

## 2019-07-22 PROCEDURE — 81001 URINALYSIS AUTO W/SCOPE: CPT | Performed by: EMERGENCY MEDICINE

## 2019-07-22 PROCEDURE — 93010 ELECTROCARDIOGRAM REPORT: CPT | Performed by: INTERNAL MEDICINE

## 2019-07-22 PROCEDURE — 81025 URINE PREGNANCY TEST: CPT | Performed by: EMERGENCY MEDICINE

## 2019-07-22 PROCEDURE — 80053 COMPREHEN METABOLIC PANEL: CPT | Performed by: EMERGENCY MEDICINE

## 2019-07-22 PROCEDURE — 99284 EMERGENCY DEPT VISIT MOD MDM: CPT

## 2019-07-22 PROCEDURE — 96375 TX/PRO/DX INJ NEW DRUG ADDON: CPT

## 2019-07-22 PROCEDURE — 70450 CT HEAD/BRAIN W/O DYE: CPT

## 2019-07-22 RX ORDER — KETOROLAC TROMETHAMINE 30 MG/ML
30 INJECTION, SOLUTION INTRAMUSCULAR; INTRAVENOUS ONCE
Status: COMPLETED | OUTPATIENT
Start: 2019-07-22 | End: 2019-07-22

## 2019-07-22 RX ORDER — METOCLOPRAMIDE HYDROCHLORIDE 5 MG/ML
10 INJECTION INTRAMUSCULAR; INTRAVENOUS ONCE
Status: COMPLETED | OUTPATIENT
Start: 2019-07-22 | End: 2019-07-22

## 2019-07-22 RX ORDER — DIPHENHYDRAMINE HYDROCHLORIDE 50 MG/ML
25 INJECTION INTRAMUSCULAR; INTRAVENOUS ONCE
Status: COMPLETED | OUTPATIENT
Start: 2019-07-22 | End: 2019-07-22

## 2019-07-22 RX ORDER — FERROUS SULFATE 325(65) MG
325 TABLET ORAL DAILY
COMMUNITY
Start: 2016-02-18 | End: 2019-11-21 | Stop reason: ALTCHOICE

## 2019-07-22 RX ADMIN — DIPHENHYDRAMINE HYDROCHLORIDE 25 MG: 50 INJECTION, SOLUTION INTRAMUSCULAR; INTRAVENOUS at 08:00

## 2019-07-22 RX ADMIN — SODIUM CHLORIDE 1000 ML: 0.9 INJECTION, SOLUTION INTRAVENOUS at 08:00

## 2019-07-22 RX ADMIN — KETOROLAC TROMETHAMINE 30 MG: 30 INJECTION, SOLUTION INTRAMUSCULAR; INTRAVENOUS at 08:01

## 2019-07-22 RX ADMIN — METOCLOPRAMIDE 10 MG: 5 INJECTION, SOLUTION INTRAMUSCULAR; INTRAVENOUS at 08:03

## 2019-07-22 NOTE — ED PROVIDER NOTES
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  This has been ongoing for quite some time and she has follow-up with her eye doctor tomorrow  She is concerned as there is a past history of brain aneurysms in her family and she has never been screened for this  She denies any other acute issues or concerns at this time  Headache   Pain location:  R parietal  Quality:  Dull  Radiates to:  Does not radiate  Onset quality:  Gradual  Timing:  Constant  Chronicity:  Recurrent  Relieved by:  Acetaminophen  Worsened by:  Nothing  Associated symptoms: dizziness    Associated symptoms: no abdominal pain, no back pain, no blurred vision, no congestion, no cough, no diarrhea, no drainage, no ear pain, no eye pain, no facial pain, no fatigue, no fever, no focal weakness, no hearing loss, no loss of balance, no myalgias, no nausea, no near-syncope, no neck pain, no neck stiffness, no numbness, no paresthesias, no photophobia, no seizures, no sinus pressure, no sore throat, no swollen glands, no syncope, no tingling, no URI, no visual change, no vomiting and no weakness        Prior to Admission Medications   Prescriptions Last Dose Informant Patient Reported? Taking?    Multiple Vitamin (MULTIVITAMIN) tablet   Yes No   Sig: Take 1 tablet by mouth daily   calcium carbonate 1250 MG capsule  Self Yes No   Sig: Take 1,250 mg by mouth 2 (two) times a day with meals   cyanocobalamin (VITAMIN B-12) 100 mcg tablet  Self Yes No Sig: Take by mouth daily   ergocalciferol (VITAMIN D2) 50,000 units   No No   Sig: Take 1 capsule (50,000 Units total) by mouth 2 (two) times a week with meals   ferrous sulfate 325 (65 Fe) mg tablet   Yes Yes   Sig: Take 325 mg by mouth daily       Facility-Administered Medications: None       Past Medical History:   Diagnosis Date    Anemia     Hypertension     Obesity     16    Thyroid disease     Ulcer        Past Surgical History:   Procedure Laterality Date    LITO-EN-Y PROCEDURE  2016    Gastric Bypass by Dr Ag Severe Weight 339 6,nw    TUBAL LIGATION Bilateral         WISDOM TOOTH EXTRACTION         Family History   Problem Relation Age of Onset    Asthma Mother     Coronary artery disease Mother     Diabetes Mother         MELLITUS    Hyperlipidemia Mother     Hypertension Mother     Aneurysm Mother         2018 just diagnosed with two    No Known Problems Father          when she was 1 months old    Aneurysm Maternal Grandmother     Aneurysm Maternal Grandfather           of a ruptured abdominal aneurysm     I have reviewed and agree with the history as documented  Social History     Tobacco Use    Smoking status: Former Smoker     Types: Cigarettes    Smokeless tobacco: Never Used    Tobacco comment: CURRENT SOME DAY SMOKER, TOBACCO USE AS PER [employer]   Substance Use Topics    Alcohol use: No    Drug use: No        Review of Systems   Constitutional: Negative for appetite change, chills, fatigue and fever  HENT: Negative for congestion, ear pain, hearing loss, postnasal drip, sinus pressure, sinus pain, sore throat and trouble swallowing  Eyes: Negative for blurred vision, photophobia, pain, redness and itching  Respiratory: Negative for cough, chest tightness, shortness of breath and wheezing  Cardiovascular: Negative for chest pain, leg swelling, syncope and near-syncope     Gastrointestinal: Negative for abdominal pain, constipation, diarrhea, nausea and vomiting  Endocrine: Negative  Genitourinary: Negative for difficulty urinating and dysuria  Musculoskeletal: Negative for back pain, myalgias, neck pain and neck stiffness  Skin: Negative for rash  Allergic/Immunologic: Negative  Neurological: Positive for dizziness and headaches  Negative for focal weakness, seizures, weakness, numbness, paresthesias and loss of balance  Hematological: Negative  Psychiatric/Behavioral: Negative  Physical Exam  Physical Exam   Constitutional: She is oriented to person, place, and time  She appears well-developed and well-nourished  HENT:   Head: Normocephalic and atraumatic  Nose: Nose normal    Mouth/Throat: Oropharynx is clear and moist    Eyes: Pupils are equal, round, and reactive to light  Conjunctivae and EOM are normal    Left eye 20/10; Right eye 20/15   Neck: Normal range of motion  Neck supple  Cardiovascular: Normal rate, regular rhythm and normal heart sounds  Pulmonary/Chest: Effort normal and breath sounds normal  No respiratory distress  She has no wheezes  Abdominal: Soft  Bowel sounds are normal  There is no tenderness  There is no guarding  Musculoskeletal: She exhibits no edema, tenderness or deformity  Neurological: She is alert and oriented to person, place, and time  She has normal strength  No cranial nerve deficit or sensory deficit  She exhibits normal muscle tone  Coordination and gait normal  GCS eye subscore is 4  GCS verbal subscore is 5  GCS motor subscore is 6  Skin: Skin is warm and dry  Capillary refill takes less than 2 seconds  No rash noted  Psychiatric: She has a normal mood and affect  Her behavior is normal    Nursing note and vitals reviewed        Vital Signs  ED Triage Vitals   Temperature Pulse Respirations Blood Pressure SpO2   07/22/19 0721 07/22/19 0721 07/22/19 0721 07/22/19 0721 07/22/19 0721   (!) 97 1 °F (36 2 °C) 62 16 140/82 100 %      Temp Source Heart Rate Source Patient Position - Orthostatic VS BP Location FiO2 (%)   07/22/19 0721 07/22/19 0721 07/22/19 0721 07/22/19 0721 --   Tympanic Monitor Sitting Left arm       Pain Score       07/22/19 0735       5           Vitals:    07/22/19 0721 07/22/19 0830 07/22/19 0945   BP: 140/82 120/69 124/89   Pulse: 62 61 (!) 54   Patient Position - Orthostatic VS: Sitting Lying          Visual Acuity  Visual Acuity      Most Recent Value   L Pupil Size (mm)  3   R Pupil Size (mm)  3          ED Medications  Medications   sodium chloride 0 9 % bolus 1,000 mL (1,000 mL Intravenous New Bag 7/22/19 0800)   diphenhydrAMINE (BENADRYL) injection 25 mg (25 mg Intravenous Given 7/22/19 0800)   metoclopramide (REGLAN) injection 10 mg (10 mg Intravenous Given 7/22/19 0803)   ketorolac (TORADOL) injection 30 mg (30 mg Intravenous Given 7/22/19 0801)       Diagnostic Studies  Results Reviewed     Procedure Component Value Units Date/Time    Urine Microscopic [[id number]]  (Abnormal) Collected:  07/22/19 0752    Lab Status:  Final result Specimen:  Urine, Clean Catch Updated:  07/22/19 0813     RBC, UA 0-1 /hpf      WBC, UA 4-10 /hpf      Epithelial Cells Innumerable /hpf      Bacteria, UA Occasional /hpf     Comprehensive metabolic panel [[id number]]  (Abnormal) Collected:  07/22/19 0752    Lab Status:  Final result Specimen:  Blood from Arm, Left Updated:  07/22/19 0809     Sodium 141 mmol/L      Potassium 4 2 mmol/L      Chloride 107 mmol/L      CO2 28 mmol/L      ANION GAP 6 mmol/L      BUN 10 mg/dL      Creatinine 0 54 mg/dL      Glucose 92 mg/dL      Calcium 8 9 mg/dL      AST 24 U/L      ALT 18 U/L      Alkaline Phosphatase 76 U/L      Total Protein 7 7 g/dL      Albumin 4 2 g/dL      Total Bilirubin 0 40 mg/dL      eGFR 124 ml/min/1 73sq m     Narrative:       Mary guidelines for Chronic Kidney Disease (CKD):     Stage 1 with normal or high GFR (GFR > 90 mL/min/1 73 square meters)    Stage 2 Mild CKD (GFR = 60-89 mL/min/1 73 square meters)    Stage 3A Moderate CKD (GFR = 45-59 mL/min/1 73 square meters)    Stage 3B Moderate CKD (GFR = 30-44 mL/min/1 73 square meters)    Stage 4 Severe CKD (GFR = 15-29 mL/min/1 73 square meters)    Stage 5 End Stage CKD (GFR <15 mL/min/1 73 square meters)  Note: GFR calculation is accurate only with a steady state creatinine    UA w Reflex to Microscopic [[id number]]  (Abnormal) Collected:  07/22/19 0752    Lab Status:  Final result Specimen:  Urine, Clean Catch Updated:  07/22/19 0801     Color, UA Yellow     Clarity, UA Clear     Specific Gravity, UA 1 010     pH, UA 7 0     Leukocytes, UA 25 0     Nitrite, UA Negative     Protein, UA Negative mg/dl      Glucose, UA Negative mg/dl      Ketones, UA Negative mg/dl      Bilirubin, UA Negative     Blood, UA Negative     UROBILINOGEN UA Negative mg/dL     CBC and differential [[id number]]  (Abnormal) Collected:  07/22/19 0752    Lab Status:  Final result Specimen:  Blood from Arm, Left Updated:  07/22/19 0801     WBC 6 80 Thousand/uL      RBC 3 56 Million/uL      Hemoglobin 8 5 g/dL      Hematocrit 27 3 %      MCV 77 fL      MCH 23 8 pg      MCHC 31 0 g/dL      RDW 16 7 %      MPV 8 1 fL      Platelets 071 Thousands/uL      Neutrophils Relative 69 %      Lymphocytes Relative 22 %      Monocytes Relative 7 %      Eosinophils Relative 1 %      Basophils Relative 1 %      Neutrophils Absolute 4 70 Thousands/µL      Lymphocytes Absolute 1 50 Thousands/µL      Monocytes Absolute 0 50 Thousand/µL      Eosinophils Absolute 0 10 Thousand/µL      Basophils Absolute 0 00 Thousands/µL     POCT pregnancy, urine [[id number]]  (Normal) Resulted:  07/22/19 0752    Lab Status:  Final result Updated:  07/22/19 0752     EXT PREG TEST UR (Ref: Negative) Negative     Control Valid                 CT head without contrast   Final Result by Perico Lugo DO (07/22 1005)   No acute intracranial abnormality                    Workstation performed: [id number]                    Procedures  ECG 12 Lead Documentation Only  Date/Time: 7/22/2019 8:14 AM  Performed by: Ha Harley DO  Authorized by: Ha Harley DO     ECG reviewed by me, the ED Provider: yes    Patient location:  ED  Rate:     ECG rate:  58    ECG rate assessment: normal    Rhythm:     Rhythm: sinus bradycardia    Ectopy:     Ectopy: none    QRS:     QRS axis:  Normal  Conduction:     Conduction: normal    ST segments:     ST segments:  Normal  T waves:     T waves: non-specific             ED Course                               MDM  Number of Diagnoses or Management Options  Diagnosis management comments: 61-year-old female presents with complaint of right-sided headache  She has a normal neurological examination  After receiving medications and fluids, her symptoms resolved  Her anemia is at her baseline level which she reports being seven or eight something  She will be following up closely with her primary care clinician  Amount and/or Complexity of Data Reviewed  Clinical lab tests: ordered and reviewed  Tests in the radiology section of CPT®: ordered and reviewed  Tests in the medicine section of CPT®: ordered and reviewed  Independent visualization of images, tracings, or specimens: yes        Disposition  Final diagnoses:   Headache   Anemia     Time reflects when diagnosis was documented in both MDM as applicable and the Disposition within this note     Time User Action Codes Description Comment    7/22/2019 10:18 AM [employer] Add [R51] Headache     7/22/2019 10:18 AM [employer] Add [D64 9] Anemia       ED Disposition     ED Disposition Condition Date/Time Comment    Discharge Stable Mon Jul 22, 2019 10:18 AM Cindy Zhao discharge to home/self care              Follow-up Information     Follow up With Specialties Details Why Contact Info    Ana Cooper MD [facility]   [address]  [address] [facility]  [place] [facility] [address]  Patient's Medications   Discharge Prescriptions    No medications on file     No discharge procedures on file      ED Provider  Electronically Signed by           Bozena Sidhu DO  07/22/19 101

## 2019-07-31 ENCOUNTER — OFFICE VISIT (OUTPATIENT)
Dept: FAMILY MEDICINE CLINIC | Facility: CLINIC | Age: 34
End: 2019-07-31

## 2019-07-31 VITALS
HEART RATE: 80 BPM | RESPIRATION RATE: 16 BRPM | OXYGEN SATURATION: 98 % | TEMPERATURE: 97.3 F | WEIGHT: 187 LBS | BODY MASS INDEX: 32.1 KG/M2 | SYSTOLIC BLOOD PRESSURE: 136 MMHG | DIASTOLIC BLOOD PRESSURE: 92 MMHG

## 2019-07-31 DIAGNOSIS — H10.33 ACUTE BACTERIAL CONJUNCTIVITIS OF BOTH EYES: Primary | ICD-10-CM

## 2019-07-31 PROBLEM — E66.9 OBESITY: Status: RESOLVED | Noted: 2017-07-11 | Resolved: 2019-07-31

## 2019-07-31 PROCEDURE — 99213 OFFICE O/P EST LOW 20 MIN: CPT | Performed by: FAMILY MEDICINE

## 2019-07-31 PROCEDURE — 3725F SCREEN DEPRESSION PERFORMED: CPT | Performed by: FAMILY MEDICINE

## 2019-07-31 RX ORDER — POLYMYXIN B SULFATE AND TRIMETHOPRIM 1; 10000 MG/ML; [USP'U]/ML
1 SOLUTION OPHTHALMIC EVERY 4 HOURS
Qty: 10 ML | Refills: 0 | Status: SHIPPED | OUTPATIENT
Start: 2019-07-31 | End: 2019-08-26

## 2019-07-31 NOTE — PROGRESS NOTES
Assessment/Plan:    Acute bacterial conjunctivitis of both eyes  Normal eye exam   However due to concerning history, will prescribe antibiotic eyedrops  Reassess on next visit  All questions were answered  Diagnoses and all orders for this visit:    Acute bacterial conjunctivitis of both eyes  -     polymyxin b-trimethoprim (POLYTRIM) ophthalmic solution; Administer 1 drop to both eyes every 4 (four) hours          Subjective:      Patient ID: Jocelyn Mohan is a 36 y o  female  34F s/p gastric bypass (2017 done by Dr Diane Guevara), headache (Fhx of brain anneurysms), iron deficiency anemia presents for sick visit  She is reporting 1 day history of itching to both eyes accompanied with greenish discharge  States this morning when she woke up, eyes were stuck  States she visited her friend whose daughter also had similar symptoms 3 days ago  Currently denies any history of allergies, runny nose, cough  Denies photophobia, pain on movement of eyes  Denies changes in vision  Denies wearing contact lenses  The following portions of the patient's history were reviewed and updated as appropriate: allergies, current medications, past family history, past medical history, past social history, past surgical history and problem list     Review of Systems   Constitutional: Negative for chills and fever  Eyes: Positive for discharge and itching  Negative for photophobia, pain, redness and visual disturbance  Respiratory: Negative for cough and shortness of breath  Cardiovascular: Negative for chest pain and palpitations  Endocrine: Negative for polyphagia and polyuria  Musculoskeletal: Negative for back pain  Neurological: Negative for light-headedness, numbness and headaches           Objective:      /92 (BP Location: Right arm, Patient Position: Sitting, Cuff Size: Standard)   Pulse 80   Temp (!) 97 3 °F (36 3 °C) (Temporal)   Resp 16   Wt 84 8 kg (187 lb)   LMP 07/15/2019 (Approximate)   SpO2 98%   BMI 32 10 kg/m²          Physical Exam   Constitutional: She appears well-developed and well-nourished  HENT:   Head: Normocephalic and atraumatic  Eyes: Pupils are equal, round, and reactive to light  Conjunctivae, EOM and lids are normal  Right eye exhibits no chemosis, no discharge, no exudate and no hordeolum  No foreign body present in the right eye  Left eye exhibits no chemosis, no discharge, no exudate and no hordeolum  No foreign body present in the left eye  No scleral icterus  Normal funduscopic exam bilaterally  Minimal swelling noted to the left upper eyelid  Neck: Normal range of motion  Neck supple  Cardiovascular: Normal rate and normal heart sounds  Pulmonary/Chest: Effort normal and breath sounds normal  No respiratory distress  Abdominal: Soft  Bowel sounds are normal  She exhibits no distension  Neurological: She is alert  Skin: Skin is warm and dry  Psychiatric: She has a normal mood and affect

## 2019-07-31 NOTE — ASSESSMENT & PLAN NOTE
Normal eye exam   However due to concerning history, will prescribe antibiotic eyedrops  Reassess on next visit  All questions were answered

## 2019-08-24 NOTE — PROGRESS NOTES
Assessment/Plan:    Postsurgical malabsorption  Discussed with patient that lab work was ordered previously and she needs to go for blood work for us to assess whether or not her anemia is worsening  Blood work ordered again today  In the interim, advised patient to remain compliant with ferrous sulfate 1 tab by mouth every other day along with vitamin-C to assist with absorption  Advised to continue with daily vitamin tablet as well as vitamin-D  Morbid obesity ([address] )  Will screen for diabetes  Lipid panel completed in November, 2018 was within normal limits  Diagnoses and all orders for this visit:    Vitamin D deficiency  -     CBC and differential; Future  -     Vitamin D 25 hydroxy; Future  -     HEMOGLOBIN A1C W/ EAG ESTIMATION; Future    Postsurgical malabsorption  -     CBC and differential; Future  -     Vitamin D 25 hydroxy; Future  -     HEMOGLOBIN A1C W/ EAG ESTIMATION; Future    Morbid obesity (HCC)          Subjective:      Patient ID: Sharan Leal is a 36 y o  female  34F s/p gastric bypass (2017 done by Dr Trish Barrios), headache (Fhx of brain anneurysms), iron deficiency anemia  Reports he is doing well however states that she is feeling a little bit more tired than usual   She is wondering if her iron is low  States she is compliant with ferrous sulfate 325 mg currently takes 1 tab by mouth daily along with vitamin-C  Denies heavy bleeding with periods  Denies chest pain, palpitations, shortness of breath or [place]  Blood work from July, 2019 reviewed with patient in depth  Normal CMP, CBC with hemoglobin of 8 5, hematocrit 27 3 - no significant change in hemoglobin and hematocrit from labs completed in June, 2019  Bariatric labs were ordered however they were not completed by patient      Health Maintenance:  -Cervical cancer screening:   She is not up-to-date with cervical cancer screening   -Smoking cessation: Denies   -Alcohol use: Denies   -Drug use: Denies Review of Systems   Constitutional: Positive for fatigue  Negative for chills and fever  Respiratory: Negative for cough and shortness of breath  Cardiovascular: Negative for chest pain and palpitations  Endocrine: Negative for polyphagia and polyuria  Musculoskeletal: Negative for back pain  Neurological: Negative for light-headedness, numbness and headaches  Objective:      /72 (BP Location: Right arm, Patient Position: Sitting, Cuff Size: Standard)   Pulse 72   Temp 97 6 °F (36 4 °C) (Temporal)   Resp 18   Ht 5' 6" (1 676 m)   Wt 87 5 kg (193 lb)   LMP 08/19/2019   SpO2 97%   BMI 31 15 kg/m²          Physical Exam   Constitutional: She appears well-developed and well-nourished  HENT:   Head: Normocephalic and atraumatic  Eyes: EOM are normal    Neck: Normal range of motion  Neck supple  Cardiovascular: Normal rate and normal heart sounds  Pulmonary/Chest: Effort normal and breath sounds normal  No respiratory distress  Abdominal: Soft  Bowel sounds are normal  She exhibits no distension  Neurological: She is alert  Skin: Skin is warm and dry  Psychiatric: She has a normal mood and affect

## 2019-08-26 ENCOUNTER — TRANSCRIBE ORDERS (OUTPATIENT)
Dept: LAB | Facility: CLINIC | Age: 34
End: 2019-08-26

## 2019-08-26 ENCOUNTER — APPOINTMENT (OUTPATIENT)
Dept: LAB | Facility: CLINIC | Age: 34
End: 2019-08-26
Payer: COMMERCIAL

## 2019-08-26 ENCOUNTER — OFFICE VISIT (OUTPATIENT)
Dept: FAMILY MEDICINE CLINIC | Facility: CLINIC | Age: 34
End: 2019-08-26

## 2019-08-26 VITALS
HEIGHT: 66 IN | DIASTOLIC BLOOD PRESSURE: 72 MMHG | WEIGHT: 193 LBS | BODY MASS INDEX: 31.02 KG/M2 | HEART RATE: 72 BPM | SYSTOLIC BLOOD PRESSURE: 118 MMHG | TEMPERATURE: 97.6 F | OXYGEN SATURATION: 97 % | RESPIRATION RATE: 18 BRPM

## 2019-08-26 DIAGNOSIS — E66.01 MORBID OBESITY (HCC): ICD-10-CM

## 2019-08-26 DIAGNOSIS — K91.2 POSTSURGICAL MALABSORPTION: ICD-10-CM

## 2019-08-26 DIAGNOSIS — E55.9 VITAMIN D DEFICIENCY: ICD-10-CM

## 2019-08-26 DIAGNOSIS — E55.9 VITAMIN D DEFICIENCY: Primary | ICD-10-CM

## 2019-08-26 PROBLEM — Z98.84 BARIATRIC SURGERY STATUS: Status: RESOLVED | Noted: 2018-12-18 | Resolved: 2019-08-26

## 2019-08-26 PROBLEM — R10.13 EPIGASTRIC PAIN: Status: RESOLVED | Noted: 2018-11-07 | Resolved: 2019-08-26

## 2019-08-26 PROBLEM — H10.33 ACUTE BACTERIAL CONJUNCTIVITIS OF BOTH EYES: Status: RESOLVED | Noted: 2019-07-31 | Resolved: 2019-08-26

## 2019-08-26 LAB
25(OH)D3 SERPL-MCNC: 15.2 NG/ML (ref 30–100)
BASOPHILS # BLD AUTO: 0.02 THOUSANDS/ΜL (ref 0–0.1)
BASOPHILS NFR BLD AUTO: 0 % (ref 0–1)
EOSINOPHIL # BLD AUTO: 0.13 THOUSAND/ΜL (ref 0–0.61)
EOSINOPHIL NFR BLD AUTO: 2 % (ref 0–6)
ERYTHROCYTE [DISTWIDTH] IN BLOOD BY AUTOMATED COUNT: 17.2 % (ref 11.6–15.1)
EST. AVERAGE GLUCOSE BLD GHB EST-MCNC: 103 MG/DL
HBA1C MFR BLD: 5.2 % (ref 4.2–6.3)
HCT VFR BLD AUTO: 28.5 % (ref 34.8–46.1)
HGB BLD-MCNC: 8.3 G/DL (ref 11.5–15.4)
IMM GRANULOCYTES # BLD AUTO: 0.03 THOUSAND/UL (ref 0–0.2)
IMM GRANULOCYTES NFR BLD AUTO: 0 % (ref 0–2)
LYMPHOCYTES # BLD AUTO: 2.15 THOUSANDS/ΜL (ref 0.6–4.47)
LYMPHOCYTES NFR BLD AUTO: 30 % (ref 14–44)
MCH RBC QN AUTO: 23.2 PG (ref 26.8–34.3)
MCHC RBC AUTO-ENTMCNC: 29.1 G/DL (ref 31.4–37.4)
MCV RBC AUTO: 80 FL (ref 82–98)
MONOCYTES # BLD AUTO: 0.52 THOUSAND/ΜL (ref 0.17–1.22)
MONOCYTES NFR BLD AUTO: 7 % (ref 4–12)
NEUTROPHILS # BLD AUTO: 4.45 THOUSANDS/ΜL (ref 1.85–7.62)
NEUTS SEG NFR BLD AUTO: 61 % (ref 43–75)
NRBC BLD AUTO-RTO: 0 /100 WBCS
PLATELET # BLD AUTO: 365 THOUSANDS/UL (ref 149–390)
PMV BLD AUTO: 11.2 FL (ref 8.9–12.7)
RBC # BLD AUTO: 3.58 MILLION/UL (ref 3.81–5.12)
WBC # BLD AUTO: 7.3 THOUSAND/UL (ref 4.31–10.16)

## 2019-08-26 PROCEDURE — 99213 OFFICE O/P EST LOW 20 MIN: CPT | Performed by: FAMILY MEDICINE

## 2019-08-26 PROCEDURE — 85025 COMPLETE CBC W/AUTO DIFF WBC: CPT

## 2019-08-26 PROCEDURE — 83036 HEMOGLOBIN GLYCOSYLATED A1C: CPT

## 2019-08-26 PROCEDURE — 36415 COLL VENOUS BLD VENIPUNCTURE: CPT

## 2019-08-26 PROCEDURE — 82306 VITAMIN D 25 HYDROXY: CPT

## 2019-08-26 NOTE — ASSESSMENT & PLAN NOTE
Discussed with patient that lab work was ordered previously and she needs to go for blood work for us to assess whether or not her anemia is worsening  Blood work ordered again today  In the interim, advised patient to remain compliant with ferrous sulfate 1 tab by mouth every other day along with vitamin-C to assist with absorption    Advised to continue with daily vitamin tablet as well as vitamin-D

## 2019-10-11 ENCOUNTER — OFFICE VISIT (OUTPATIENT)
Dept: BARIATRICS | Facility: CLINIC | Age: 34
End: 2019-10-11
Payer: COMMERCIAL

## 2019-10-11 VITALS
WEIGHT: 193 LBS | RESPIRATION RATE: 16 BRPM | BODY MASS INDEX: 32.95 KG/M2 | TEMPERATURE: 98 F | SYSTOLIC BLOOD PRESSURE: 146 MMHG | DIASTOLIC BLOOD PRESSURE: 96 MMHG | HEIGHT: 64 IN | HEART RATE: 77 BPM

## 2019-10-11 DIAGNOSIS — R10.13 EPIGASTRIC PAIN: ICD-10-CM

## 2019-10-11 DIAGNOSIS — Z72.0 TOBACCO USE: ICD-10-CM

## 2019-10-11 DIAGNOSIS — Z98.84 BARIATRIC SURGERY STATUS: ICD-10-CM

## 2019-10-11 DIAGNOSIS — K91.2 POSTSURGICAL MALABSORPTION: ICD-10-CM

## 2019-10-11 DIAGNOSIS — D50.9 IRON DEFICIENCY ANEMIA: ICD-10-CM

## 2019-10-11 DIAGNOSIS — Z98.84 BARIATRIC SURGERY STATUS: Primary | ICD-10-CM

## 2019-10-11 PROCEDURE — 99214 OFFICE O/P EST MOD 30 MIN: CPT | Performed by: PHYSICIAN ASSISTANT

## 2019-10-11 RX ORDER — OMEPRAZOLE 20 MG/1
CAPSULE, DELAYED RELEASE ORAL
Qty: 90 CAPSULE | Refills: 1 | Status: SHIPPED | OUTPATIENT
Start: 2019-10-11 | End: 2020-06-16

## 2019-10-11 RX ORDER — SUCRALFATE 1 G/1
1 TABLET ORAL 4 TIMES DAILY
Qty: 120 TABLET | Refills: 1 | Status: SHIPPED | OUTPATIENT
Start: 2019-10-11 | End: 2019-10-11 | Stop reason: SDUPTHER

## 2019-10-11 RX ORDER — OMEPRAZOLE 20 MG/1
20 CAPSULE, DELAYED RELEASE ORAL DAILY
Qty: 30 CAPSULE | Refills: 1 | Status: SHIPPED | OUTPATIENT
Start: 2019-10-11 | End: 2019-10-11 | Stop reason: SDUPTHER

## 2019-10-11 RX ORDER — SUCRALFATE 1 G/1
TABLET ORAL
Qty: 360 TABLET | Refills: 1 | Status: SHIPPED | OUTPATIENT
Start: 2019-10-11 | End: 2020-06-16

## 2019-10-11 NOTE — H&P (VIEW-ONLY)
Assessment/Plan:  Patient seen at [facility]      Patient ID: Sherin Ganser is a 36 y o  female  Bariatric Surgery Status  - s/p RNYGB with Dr Jeremy Mckeon in 2016  Overall doing very well from a weight loss standpoint with EWL 76%  Tolerating a regular diet  Reports epigastric pain and "burning" however denies N/V  Also with low iron c/o fatigue and pica  · Continued/Maintain healthy weight loss with good nutrition intakes  · Adequate hydration with at least 64oz  fluid intake  · Follow diet as discussed  · Follow vitamin and mineral recommendations as reviewed with you  · Exercise as tolerated  · Follow-up after EGD  We kindly ask that your arrive 15 minutes before your scheduled appointment time with your provider to allow our staff to room you, get your vital signs and update your chart  · Get lab work done prior to next visit  Please call the office if you need a script  It is recommended to check with your insurance BEFORE getting labs done to make sure they are covered by your policy  · Call our office if you have any problems with abdominal pain especially associated with fever, chills, nausea, vomiting or any other concerns  · All  Post-bariatric surgery patients should be aware that very small quantities of any alcohol can cause impairment and it is very possible not to feel the effect  The effect can be in the system for several hours  It is also a stomach irritant  · It is advised to AVOID alcohol, Nonsteroidal antiinflammatory drugs (NSAIDS) and nicotine of all forms   Any of these can cause stomach irritation/pain  · Discussed the effects of alcohol on a bariatric patient and the increased impairment risk  · Keep up the good work! Postsurgical Malabsorption   -At risk for malabsorption of vitamins/minerals secondary to malabsorption and restriction of intake from bariatric surgery  -Currently taking multivitamin, oral iron, calcium daily   Per patient, not taking vitamin D currently as she has completed her last course of 50,000 IU twice weekly   -Last blood work per PCP showed low Hgb as well as vit D  Patient currently taking oral iron however remains symptomatic (fatigue, pica)  -Next set of bariatric labs ordered for approximately 2 weeks   - Patient given samples of Procare at today's visit  Was told she needs to take additional calcium 500-600 mg TID with Procare daily    -Patient received education about the importance of adhering to a lifelong supplementation regimen to avoid vitamin/mineral deficiencies     Epigastric pain  - patient c/o epigastric pain and "burning" for several weeks  Has history of marginal ulcers in the past  Also has restarted smoking, 1-2 cigs daily x2 months    - EGD to r/o ulcers, follow up thereafter  PPI + Carafate for ulcer prophylaxis   -     omeprazole (PriLOSEC) 20 mg delayed release capsule; Take 1 capsule (20 mg total) by mouth daily  -     sucralfate (CARAFATE) 1 g tablet; Take 1 tablet (1 g total) by mouth 4 (four) times a day  · Ulcer Risk assessment:  ? Discussion about strict AVOIDANCE of alcohol, NSAIDS, and nicotine of all forms  Tobacco Use  - Long discussion was had about the importance of smoking cessation  Stressed the risk of developing/recurrent marginal ulcers associated with smoking as well as the risk of those ulcers perforating    - patient states she is trying her best to wean herself off of the cigarettes, will also discuss cessation options with PCP at next visit  She has tried nicotine patches in the past however had to stop using them due to skin reaction    Iron deficiency anemia   - last Hgb in August 8 3; patient admits to continued fatigue, worsening pica   - repeat labs pending  - will refer to hematology for further evaluation/IV iron infusions        Diagnoses and all orders for this visit:    Bariatric surgery status  -     omeprazole (PriLOSEC) 20 mg delayed release capsule;  Take 1 capsule (20 mg total) by mouth daily  -     sucralfate (CARAFATE) 1 g tablet; Take 1 tablet (1 g total) by mouth 4 (four) times a day    Epigastric pain  -     omeprazole (PriLOSEC) 20 mg delayed release capsule; Take 1 capsule (20 mg total) by mouth daily  -     sucralfate (CARAFATE) 1 g tablet; Take 1 tablet (1 g total) by mouth 4 (four) times a day     Postsurgical malabsorption     Iron deficiency anemia     Tobacco use    Subjective:      Patient ID: Beni Lynch is a 36 y o  female  -s/p Jaycee-En-Y Gastric Bypass with Dr Missy Hassan in 2016  Presents to the office today for routine follow up  Tolerating diet without issues; denies N/V, dysphagia, reflux  Overall doing Well  Complains this visit of persistent fatigue and increased pica  Currently taking oral iron as per PCP as last Hgb check indicated Hgb of 8 3 however feels no improvement  Also reports she has restarted smoking 2 months ago due to some life stress, smokes about 1-2 cigs daily  She states she wants to quit and is trying to wean herself off  Reports she has developed epigastric pain and burning since she restarted smoking  She has a history of marginal ulcers in the past      Patient with low vitamin D on last check  Reports she was prescribed 50,000 IU x12 weeks by [employer] of which she has completed the course  Currently not taking additional vitamin D  Initial:339 6  Current:193  EWL: (Weight loss is ahead of schedule at this post surgical period )  Isidoro: 178 5  Current BMI is Body mass index is 33 13 kg/m²      · Tolerating a regular diet-yes  · Eating at least 60 grams of protein per day-yes  · Following 30/60 minute rule with liquids-yes  · Drinking at least 64 ounces of fluid per day-yes  · Drinking carbonated beverages-no  · Sufficient exercise-yes  · Using NSAIDs regularly-no  · Using nicotine-yes, 1-2 cigarettes per day   · Using alcohol-no  · Supplements: calcium ,vit D, iron multivitamin     · EWL is 76%, which places the patient ahead of schedule for expected post surgical weight loss at this time  The following portions of the patient's history were reviewed and updated as appropriate: allergies, current medications, past family history, past medical history, past social history, past surgical history and problem list     Review of Systems   Constitutional: Negative  HENT: Negative  Respiratory: Negative  Cardiovascular: Negative  Gastrointestinal: Positive for abdominal pain (epigastric ) and constipation (due to PO iron )  Negative for blood in stool, nausea and vomiting  Skin: Negative for rash  Neurological: Positive for dizziness and light-headedness  Negative for syncope  Psychiatric/Behavioral: Negative for dysphoric mood  Objective:    /96 (BP Location: Left arm, Patient Position: Sitting, Cuff Size: Large)   Pulse 77   Temp 98 °F (36 7 °C) (Temporal)   Resp 16   Ht 5' 4" (1 626 m)   Wt 87 5 kg (193 lb)   BMI 33 13 kg/m²      Physical Exam   Constitutional: She is oriented to person, place, and time  She appears well-developed and well-nourished  HENT:   Head: Normocephalic and atraumatic  Eyes:   Conjunctival pallor    Neck: Normal range of motion  Cardiovascular: Normal rate and regular rhythm  Pulmonary/Chest: Effort normal and breath sounds normal  No respiratory distress  She has no wheezes  Abdominal: Soft  Bowel sounds are normal  There is tenderness (epigastric )  Musculoskeletal: Normal range of motion  Neurological: She is alert and oriented to person, place, and time  Skin: Skin is warm and dry  Psychiatric: She has a normal mood and affect  Nursing note and vitals reviewed

## 2019-10-11 NOTE — PROGRESS NOTES
Assessment/Plan:  Patient seen at [facility]      Patient ID: Florentino Brian is a 36 y o  female  Bariatric Surgery Status  - s/p RNYGB with Dr Gómez Gonzalez in 2016  Overall doing very well from a weight loss standpoint with EWL 76%  Tolerating a regular diet  Reports epigastric pain and "burning" however denies N/V  Also with low iron c/o fatigue and pica  · Continued/Maintain healthy weight loss with good nutrition intakes  · Adequate hydration with at least 64oz  fluid intake  · Follow diet as discussed  · Follow vitamin and mineral recommendations as reviewed with you  · Exercise as tolerated  · Follow-up after EGD  We kindly ask that your arrive 15 minutes before your scheduled appointment time with your provider to allow our staff to room you, get your vital signs and update your chart  · Get lab work done prior to next visit  Please call the office if you need a script  It is recommended to check with your insurance BEFORE getting labs done to make sure they are covered by your policy  · Call our office if you have any problems with abdominal pain especially associated with fever, chills, nausea, vomiting or any other concerns  · All  Post-bariatric surgery patients should be aware that very small quantities of any alcohol can cause impairment and it is very possible not to feel the effect  The effect can be in the system for several hours  It is also a stomach irritant  · It is advised to AVOID alcohol, Nonsteroidal antiinflammatory drugs (NSAIDS) and nicotine of all forms   Any of these can cause stomach irritation/pain  · Discussed the effects of alcohol on a bariatric patient and the increased impairment risk  · Keep up the good work! Postsurgical Malabsorption   -At risk for malabsorption of vitamins/minerals secondary to malabsorption and restriction of intake from bariatric surgery  -Currently taking multivitamin, oral iron, calcium daily   Per patient, not taking vitamin D currently as she has completed her last course of 50,000 IU twice weekly   -Last blood work per PCP showed low Hgb as well as vit D  Patient currently taking oral iron however remains symptomatic (fatigue, pica)  -Next set of bariatric labs ordered for approximately 2 weeks   - Patient given samples of Procare at today's visit  Was told she needs to take additional calcium 500-600 mg TID with Procare daily    -Patient received education about the importance of adhering to a lifelong supplementation regimen to avoid vitamin/mineral deficiencies     Epigastric pain  - patient c/o epigastric pain and "burning" for several weeks  Has history of marginal ulcers in the past  Also has restarted smoking, 1-2 cigs daily x2 months    - EGD to r/o ulcers, follow up thereafter  PPI + Carafate for ulcer prophylaxis   -     omeprazole (PriLOSEC) 20 mg delayed release capsule; Take 1 capsule (20 mg total) by mouth daily  -     sucralfate (CARAFATE) 1 g tablet; Take 1 tablet (1 g total) by mouth 4 (four) times a day  · Ulcer Risk assessment:  ? Discussion about strict AVOIDANCE of alcohol, NSAIDS, and nicotine of all forms  Tobacco Use  - Long discussion was had about the importance of smoking cessation  Stressed the risk of developing/recurrent marginal ulcers associated with smoking as well as the risk of those ulcers perforating    - patient states she is trying her best to wean herself off of the cigarettes, will also discuss cessation options with PCP at next visit  She has tried nicotine patches in the past however had to stop using them due to skin reaction    Iron deficiency anemia   - last Hgb in August 8 3; patient admits to continued fatigue, worsening pica   - repeat labs pending  - will refer to hematology for further evaluation/IV iron infusions        Diagnoses and all orders for this visit:    Bariatric surgery status  -     omeprazole (PriLOSEC) 20 mg delayed release capsule;  Take 1 capsule (20 mg total) by mouth daily  -     sucralfate (CARAFATE) 1 g tablet; Take 1 tablet (1 g total) by mouth 4 (four) times a day    Epigastric pain  -     omeprazole (PriLOSEC) 20 mg delayed release capsule; Take 1 capsule (20 mg total) by mouth daily  -     sucralfate (CARAFATE) 1 g tablet; Take 1 tablet (1 g total) by mouth 4 (four) times a day     Postsurgical malabsorption     Iron deficiency anemia     Tobacco use    Subjective:      Patient ID: Valentino Moreno is a 36 y o  female  -s/p Jaycee-En-Y Gastric Bypass with Dr Valentin Epperson in 2016  Presents to the office today for routine follow up  Tolerating diet without issues; denies N/V, dysphagia, reflux  Overall doing Well  Complains this visit of persistent fatigue and increased pica  Currently taking oral iron as per PCP as last Hgb check indicated Hgb of 8 3 however feels no improvement  Also reports she has restarted smoking 2 months ago due to some life stress, smokes about 1-2 cigs daily  She states she wants to quit and is trying to wean herself off  Reports she has developed epigastric pain and burning since she restarted smoking  She has a history of marginal ulcers in the past      Patient with low vitamin D on last check  Reports she was prescribed 50,000 IU x12 weeks by [employer] of which she has completed the course  Currently not taking additional vitamin D  Initial:339 6  Current:193  EWL: (Weight loss is ahead of schedule at this post surgical period )  Isidoro: 178 5  Current BMI is Body mass index is 33 13 kg/m²      · Tolerating a regular diet-yes  · Eating at least 60 grams of protein per day-yes  · Following 30/60 minute rule with liquids-yes  · Drinking at least 64 ounces of fluid per day-yes  · Drinking carbonated beverages-no  · Sufficient exercise-yes  · Using NSAIDs regularly-no  · Using nicotine-yes, 1-2 cigarettes per day   · Using alcohol-no  · Supplements: calcium ,vit D, iron multivitamin     · EWL is 76%, which places the patient ahead of schedule for expected post surgical weight loss at this time  The following portions of the patient's history were reviewed and updated as appropriate: allergies, current medications, past family history, past medical history, past social history, past surgical history and problem list     Review of Systems   Constitutional: Negative  HENT: Negative  Respiratory: Negative  Cardiovascular: Negative  Gastrointestinal: Positive for abdominal pain (epigastric ) and constipation (due to PO iron )  Negative for blood in stool, nausea and vomiting  Skin: Negative for rash  Neurological: Positive for dizziness and light-headedness  Negative for syncope  Psychiatric/Behavioral: Negative for dysphoric mood  Objective:    /96 (BP Location: Left arm, Patient Position: Sitting, Cuff Size: Large)   Pulse 77   Temp 98 °F (36 7 °C) (Temporal)   Resp 16   Ht 5' 4" (1 626 m)   Wt 87 5 kg (193 lb)   BMI 33 13 kg/m²      Physical Exam   Constitutional: She is oriented to person, place, and time  She appears well-developed and well-nourished  HENT:   Head: Normocephalic and atraumatic  Eyes:   Conjunctival pallor    Neck: Normal range of motion  Cardiovascular: Normal rate and regular rhythm  Pulmonary/Chest: Effort normal and breath sounds normal  No respiratory distress  She has no wheezes  Abdominal: Soft  Bowel sounds are normal  There is tenderness (epigastric )  Musculoskeletal: Normal range of motion  Neurological: She is alert and oriented to person, place, and time  Skin: Skin is warm and dry  Psychiatric: She has a normal mood and affect  Nursing note and vitals reviewed

## 2019-10-23 ENCOUNTER — DOCUMENTATION (OUTPATIENT)
Dept: FAMILY MEDICINE CLINIC | Facility: CLINIC | Age: 34
End: 2019-10-23

## 2019-10-29 ENCOUNTER — ANESTHESIA EVENT (OUTPATIENT)
Dept: GASTROENTEROLOGY | Facility: HOSPITAL | Age: 34
End: 2019-10-29

## 2019-10-30 ENCOUNTER — ANESTHESIA (OUTPATIENT)
Dept: GASTROENTEROLOGY | Facility: HOSPITAL | Age: 34
End: 2019-10-30

## 2019-10-30 ENCOUNTER — HOSPITAL ENCOUNTER (OUTPATIENT)
Dept: GASTROENTEROLOGY | Facility: HOSPITAL | Age: 34
Setting detail: OUTPATIENT SURGERY
Discharge: HOME | End: 2019-10-30
Attending: SURGERY | Admitting: SURGERY
Payer: COMMERCIAL

## 2019-10-30 VITALS
BODY MASS INDEX: 32.93 KG/M2 | RESPIRATION RATE: 20 BRPM | DIASTOLIC BLOOD PRESSURE: 84 MMHG | HEART RATE: 63 BPM | TEMPERATURE: 99 F | HEIGHT: 64 IN | SYSTOLIC BLOOD PRESSURE: 134 MMHG | WEIGHT: 192.9 LBS | OXYGEN SATURATION: 100 %

## 2019-10-30 DIAGNOSIS — Z98.84 BARIATRIC SURGERY STATUS: ICD-10-CM

## 2019-10-30 LAB
EXT PREGNANCY TEST URINE: NEGATIVE
EXT. CONTROL: NORMAL

## 2019-10-30 PROCEDURE — 81025 URINE PREGNANCY TEST: CPT | Performed by: ANESTHESIOLOGY

## 2019-10-30 PROCEDURE — 43239 EGD BIOPSY SINGLE/MULTIPLE: CPT | Performed by: SURGERY

## 2019-10-30 PROCEDURE — 88305 TISSUE EXAM BY PATHOLOGIST: CPT | Performed by: PATHOLOGY

## 2019-10-30 RX ORDER — PROPOFOL 10 MG/ML
INJECTION, EMULSION INTRAVENOUS AS NEEDED
Status: DISCONTINUED | OUTPATIENT
Start: 2019-10-30 | End: 2019-10-30 | Stop reason: SURG

## 2019-10-30 RX ORDER — SODIUM CHLORIDE 9 MG/ML
125 INJECTION, SOLUTION INTRAVENOUS CONTINUOUS
Status: DISCONTINUED | OUTPATIENT
Start: 2019-10-30 | End: 2019-11-03 | Stop reason: HOSPADM

## 2019-10-30 RX ADMIN — SODIUM CHLORIDE 125 ML/HR: 0.9 INJECTION, SOLUTION INTRAVENOUS at 09:44

## 2019-10-30 RX ADMIN — LIDOCAINE HYDROCHLORIDE 100 MG: 20 INJECTION, SOLUTION INTRAVENOUS at 10:09

## 2019-10-30 RX ADMIN — PROPOFOL 150 MG: 10 INJECTION, EMULSION INTRAVENOUS at 10:09

## 2019-10-30 RX ADMIN — PROPOFOL 20 MG: 10 INJECTION, EMULSION INTRAVENOUS at 10:12

## 2019-10-30 RX ADMIN — PROPOFOL 30 MG: 10 INJECTION, EMULSION INTRAVENOUS at 10:11

## 2019-10-30 NOTE — DISCHARGE INSTRUCTIONS
Upper Endoscopy   WHAT YOU NEED TO KNOW:   An upper endoscopy is also called an upper gastrointestinal (GI) endoscopy, or an esophagogastroduodenoscopy (EGD)  You may feel bloated, gassy, or have some abdominal discomfort after your procedure  Your throat may be sore for 24 to 36 hours  You may burp or pass gas from air that is still inside your body  DISCHARGE INSTRUCTIONS:   Call 911 if:   · You have sudden chest pain or trouble breathing  Seek care immediately if:   · You feel dizzy or faint  · You have trouble swallowing  · You have severe throat pain  · Your bowel movements are very dark or black  · Your abdomen is hard and firm and you have severe pain  · You vomit blood  Contact your healthcare provider if:   · You feel full or bloated and cannot burp or pass gas  · You have not had a bowel movement for 3 days after your procedure  · You have neck pain  · You have a fever or chills  · You have nausea or are vomiting  · You have a rash or hives  · You have questions or concerns about your endoscopy  Relieve a sore throat:  Suck on throat lozenges or crushed ice  Gargle with a small amount of warm salt water  Mix 1 teaspoon of salt and 1 cup of warm water to make salt water  Relieve gas and discomfort from bloating:  Lie on your right side with a heating pad on your abdomen  Take short walks to help pass gas  Eat small meals until bloating is relieved  Rest after your procedure:  Do not drive or make important decisions until the day after your procedure  Return to your normal activity as directed  You can usually return to work the day after your procedure  Follow up with your healthcare provider as directed:  Write down your questions so you remember to ask them during your visits  © 2017 [employer]  Information is for End User's use only and may not be sold, redistributed or otherwise used for commercial purposes   All illustrations and images included in [employer] are the copyrighted property of [employer]  or Rishabh Williamson  The above information is an  only  It is not intended as medical advice for individual conditions or treatments  Talk to your doctor, nurse or pharmacist before following any medical regimen to see if it is safe and effective for you

## 2019-10-30 NOTE — INTERVAL H&P NOTE
H&P reviewed  After examining the patient I find no changes in the patients condition since the H&P had been written      Vitals:    10/30/19 0930   BP: 144/92   Pulse: 72   Resp: 18   Temp: 99 °F (37 2 °C)   SpO2: 100%

## 2019-10-30 NOTE — PROGRESS NOTES
D/C instructions given and pt verbalizes understanding  OOB to ambilate, gait steady denies dizzines

## 2019-10-30 NOTE — ANESTHESIA PREPROCEDURE EVALUATION
Review of Systems/Medical History  Patient summary reviewed  Chart reviewed  No history of anesthetic complications     Cardiovascular  Hyperlipidemia, Hypertension ,    Pulmonary  Smoker ex-smoker  ,        GI/Hepatic    PUD, Bariatric surgery,             Endo/Other  History of thyroid disease , hypothyroidism,   Obesity    GYN       Hematology  Anemia ,     Musculoskeletal       Neurology  Negative neurology ROS      Psychology   Negative psychology ROS              Physical Exam    Airway    Mallampati score: I  TM Distance: >3 FB  Neck ROM: full     Dental   No notable dental hx     Cardiovascular  Rhythm: regular, Rate: normal, Cardiovascular exam normal    Pulmonary  Pulmonary exam normal Breath sounds clear to auscultation,     Other Findings        Anesthesia Plan  ASA Score- 3     Anesthesia Type- general with ASA Monitors  Additional Monitors:   Airway Plan:         Plan Factors-    Induction- intravenous  Postoperative Plan-     Informed Consent- Anesthetic plan and risks discussed with patient

## 2019-11-08 ENCOUNTER — OFFICE VISIT (OUTPATIENT)
Dept: BARIATRICS | Facility: CLINIC | Age: 34
End: 2019-11-08
Payer: COMMERCIAL

## 2019-11-08 ENCOUNTER — APPOINTMENT (OUTPATIENT)
Dept: LAB | Facility: HOSPITAL | Age: 34
End: 2019-11-08
Payer: COMMERCIAL

## 2019-11-08 VITALS
HEIGHT: 64 IN | TEMPERATURE: 98.1 F | HEART RATE: 78 BPM | DIASTOLIC BLOOD PRESSURE: 86 MMHG | WEIGHT: 197.6 LBS | SYSTOLIC BLOOD PRESSURE: 148 MMHG | RESPIRATION RATE: 18 BRPM | BODY MASS INDEX: 33.73 KG/M2

## 2019-11-08 DIAGNOSIS — E55.9 VITAMIN D DEFICIENCY: Primary | ICD-10-CM

## 2019-11-08 DIAGNOSIS — K91.2 POSTSURGICAL MALABSORPTION: ICD-10-CM

## 2019-11-08 DIAGNOSIS — E66.9 OBESITY, CLASS I, BMI 30-34.9: ICD-10-CM

## 2019-11-08 DIAGNOSIS — R10.9 ABDOMINAL PAIN: Primary | ICD-10-CM

## 2019-11-08 DIAGNOSIS — Z98.84 BARIATRIC SURGERY STATUS: ICD-10-CM

## 2019-11-08 DIAGNOSIS — E55.9 VITAMIN D DEFICIENCY: ICD-10-CM

## 2019-11-08 LAB
25(OH)D3 SERPL-MCNC: 17.6 NG/ML (ref 30–100)
ALBUMIN SERPL BCP-MCNC: 4.1 G/DL (ref 3–5.2)
ALP SERPL-CCNC: 79 U/L (ref 43–122)
ALT SERPL W P-5'-P-CCNC: 19 U/L (ref 9–52)
ANION GAP SERPL CALCULATED.3IONS-SCNC: 6 MMOL/L (ref 5–14)
AST SERPL W P-5'-P-CCNC: 25 U/L (ref 14–36)
BILIRUB SERPL-MCNC: 0.3 MG/DL
BUN SERPL-MCNC: 11 MG/DL (ref 5–25)
CALCIUM SERPL-MCNC: 8.5 MG/DL (ref 8.4–10.2)
CHLORIDE SERPL-SCNC: 106 MMOL/L (ref 97–108)
CO2 SERPL-SCNC: 27 MMOL/L (ref 22–30)
CREAT SERPL-MCNC: 0.58 MG/DL (ref 0.6–1.2)
ERYTHROCYTE [DISTWIDTH] IN BLOOD BY AUTOMATED COUNT: 17.8 %
FERRITIN SERPL-MCNC: 3 NG/ML (ref 8–388)
FOLATE SERPL-MCNC: 15.8 NG/ML (ref 3.1–17.5)
GFR SERPL CREATININE-BSD FRML MDRD: 121 ML/MIN/1.73SQ M
GLUCOSE P FAST SERPL-MCNC: 96 MG/DL (ref 70–99)
HCT VFR BLD AUTO: 27.5 % (ref 36–46)
HGB BLD-MCNC: 8.6 G/DL (ref 12–16)
IRON SATN MFR SERPL: 4 %
IRON SERPL-MCNC: 19 UG/DL (ref 50–170)
MCH RBC QN AUTO: 22.6 PG (ref 26–34)
MCHC RBC AUTO-ENTMCNC: 31.1 G/DL (ref 31–36)
MCV RBC AUTO: 73 FL (ref 80–100)
PLATELET # BLD AUTO: 393 THOUSANDS/UL (ref 150–450)
PMV BLD AUTO: 8.4 FL (ref 8.9–12.7)
POTASSIUM SERPL-SCNC: 4.3 MMOL/L (ref 3.6–5)
PROT SERPL-MCNC: 7.6 G/DL (ref 5.9–8.4)
PTH-INTACT SERPL-MCNC: 135.2 PG/ML (ref 16.7–78.9)
RBC # BLD AUTO: 3.79 MILLION/UL (ref 4–5.2)
SODIUM SERPL-SCNC: 139 MMOL/L (ref 137–147)
TIBC SERPL-MCNC: 482 UG/DL (ref 250–450)
VIT B12 SERPL-MCNC: 214 PG/ML (ref 100–900)
WBC # BLD AUTO: 6.1 THOUSAND/UL (ref 4.5–11)

## 2019-11-08 PROCEDURE — 82746 ASSAY OF FOLIC ACID SERUM: CPT

## 2019-11-08 PROCEDURE — 83970 ASSAY OF PARATHORMONE: CPT

## 2019-11-08 PROCEDURE — 84590 ASSAY OF VITAMIN A: CPT

## 2019-11-08 PROCEDURE — 84425 ASSAY OF VITAMIN B-1: CPT

## 2019-11-08 PROCEDURE — 84630 ASSAY OF ZINC: CPT

## 2019-11-08 PROCEDURE — 82525 ASSAY OF COPPER: CPT

## 2019-11-08 PROCEDURE — 80053 COMPREHEN METABOLIC PANEL: CPT

## 2019-11-08 PROCEDURE — 83540 ASSAY OF IRON: CPT

## 2019-11-08 PROCEDURE — 82607 VITAMIN B-12: CPT

## 2019-11-08 PROCEDURE — 82306 VITAMIN D 25 HYDROXY: CPT

## 2019-11-08 PROCEDURE — 36415 COLL VENOUS BLD VENIPUNCTURE: CPT

## 2019-11-08 PROCEDURE — 83550 IRON BINDING TEST: CPT

## 2019-11-08 PROCEDURE — 99214 OFFICE O/P EST MOD 30 MIN: CPT | Performed by: PHYSICIAN ASSISTANT

## 2019-11-08 PROCEDURE — 85027 COMPLETE CBC AUTOMATED: CPT

## 2019-11-08 PROCEDURE — 82728 ASSAY OF FERRITIN: CPT

## 2019-11-08 RX ORDER — ERGOCALCIFEROL 1.25 MG/1
50000 CAPSULE ORAL
Qty: 24 CAPSULE | Refills: 0 | Status: SHIPPED | OUTPATIENT
Start: 2019-11-11 | End: 2019-11-08 | Stop reason: SDUPTHER

## 2019-11-08 RX ORDER — ERGOCALCIFEROL 1.25 MG/1
CAPSULE ORAL
Qty: 25 CAPSULE | Refills: 0 | Status: SHIPPED | OUTPATIENT
Start: 2019-11-08 | End: 2020-06-17 | Stop reason: SDUPTHER

## 2019-11-08 NOTE — PROGRESS NOTES
Assessment/Plan:  Patient seen at [facility]      Diagnoses and all orders for this visit:    Abdominal pain  -Patient reports her abdominal pain has improved and she has been taking PPI and Carafate daily  She states has also stopped smoking 2 weeks ago  She reports she still has occasional abdominal "burning" sensation which improves with food       -Will refer to gastroenterology regarding continued burning complaint given unremarkable from bariatric standpoint  In the meantime, patient may take OTC Pepcid as needed for symptoms and states she still has Prilosec at home if no relief with Pepcid  Obesity, Class I, BMI 30-34 9  - c/o weight regain; has gained 4 lbs since last visit 1 month ago; EWL is 74%   - EGD with unremarkable gastric bypass  - will provide bariatric manual to reinstate the basics   - follow up with dietician; recommend continued diet/lifestyle changes, exercise, following 30/60 min rule, etc  - follow up in 3 months, if continues to gain weight may consider referral to [facility] or back to basics program     Postsurgical malabsorption   -Patient had blood work completed this morning  I will contact her with results as they return  - She was referred to hematology at last visit however states she was not contacted by them  Will provide number for her to call as she continues to complain of fatigue and pica  She denies any rectal bleeding or hematemesis  She reports heavy periods for which I recommended she make appt with her GYN for follow up  - continue daily vitamins and supplements     Bariatric surgery status    Subjective:      Patient ID: Zander Huddleston is a 36 y o  female  HPI: s/p RNYGB with Dr Robert Granda 2016 here for follow up on EGD and abdominal pain       The following portions of the patient's history were reviewed and updated as appropriate: allergies, current medications, past family history, past medical history, past social history, past surgical history and problem list     Review of Systems   Constitutional: Positive for fatigue  HENT: Negative  Respiratory: Negative  Cardiovascular: Negative  Gastrointestinal: Positive for abdominal pain (occasional "burning", improves with food)  Skin: Negative  Neurological: Negative  Psychiatric/Behavioral: Negative  Objective:      /86   Pulse 78   Temp 98 1 °F (36 7 °C)   Resp 18   Ht 5' 4" (1 626 m)   Wt 89 6 kg (197 lb 9 6 oz)   LMP 10/10/2019 (Approximate)   BMI 33 92 kg/m²          Physical Exam   Constitutional: She is oriented to person, place, and time  She appears well-developed and well-nourished  HENT:   Head: Normocephalic and atraumatic  Neck: Normal range of motion  Cardiovascular: Normal rate and regular rhythm  Pulmonary/Chest: Effort normal and breath sounds normal  No respiratory distress  She has no wheezes  Abdominal: Soft  Bowel sounds are normal    Musculoskeletal: Normal range of motion  Neurological: She is alert and oriented to person, place, and time  Skin: Skin is warm and dry  Psychiatric: She has a normal mood and affect  Nursing note and vitals reviewed

## 2019-11-08 NOTE — PROGRESS NOTES
Patient vit D is low at 16  Will prescribe Vitamin D 50,000 IU twice weekly x12 weeks and send to her pharmacy  She verbalizes understanding

## 2019-11-12 LAB
COPPER SERPL-MCNC: 125 UG/DL (ref 72–166)
VIT A SERPL-MCNC: 25.3 UG/DL (ref 18.9–57.3)
VIT B1 BLD-SCNC: 105.5 NMOL/L (ref 66.5–200)
ZINC SERPL-MCNC: 66 UG/DL (ref 56–134)

## 2019-11-14 DIAGNOSIS — Z98.84 BARIATRIC SURGERY STATUS: ICD-10-CM

## 2019-11-14 DIAGNOSIS — K91.2 POSTSURGICAL MALABSORPTION: Primary | ICD-10-CM

## 2019-11-21 ENCOUNTER — OFFICE VISIT (OUTPATIENT)
Dept: FAMILY MEDICINE CLINIC | Facility: CLINIC | Age: 34
End: 2019-11-21

## 2019-11-21 VITALS
HEART RATE: 90 BPM | WEIGHT: 196 LBS | SYSTOLIC BLOOD PRESSURE: 144 MMHG | OXYGEN SATURATION: 100 % | BODY MASS INDEX: 33.64 KG/M2 | TEMPERATURE: 98 F | DIASTOLIC BLOOD PRESSURE: 90 MMHG | RESPIRATION RATE: 16 BRPM

## 2019-11-21 DIAGNOSIS — D50.9 IRON DEFICIENCY ANEMIA, UNSPECIFIED IRON DEFICIENCY ANEMIA TYPE: Primary | ICD-10-CM

## 2019-11-21 DIAGNOSIS — E55.9 VITAMIN D DEFICIENCY: ICD-10-CM

## 2019-11-21 PROCEDURE — 99213 OFFICE O/P EST LOW 20 MIN: CPT | Performed by: FAMILY MEDICINE

## 2019-11-21 PROCEDURE — 1036F TOBACCO NON-USER: CPT | Performed by: FAMILY MEDICINE

## 2019-11-21 RX ORDER — MELATONIN
1000 DAILY
Qty: 30 TABLET | Refills: 0 | Status: SHIPPED | OUTPATIENT
Start: 2019-11-21 | End: 2022-01-03 | Stop reason: SDUPTHER

## 2019-11-21 NOTE — PROGRESS NOTES
Assessment/Plan:    Anemia  Patient has iron-deficiency anemia status post history of bariatric surgery  She is not chair reading over-the-counter iron  Will send for ferrous fumarate along with vitamin C for better absorption  Also to eat her white meds along with the biggest meal for the day  I discussed with her and stressed the need to be compliant with multivitamins in view of her bariatric surgery in the past    Vitamin D deficiency  Patient has low vitamin-D level and has already started taking 03099 units of vitamin-D  She will get the repeat vitamin-D level in 8 weeks and I also gave her a script to continue with 1000 international units every day after that  Patient expressed understanding and will see her PCP for follow-up       Diagnoses and all orders for this visit:    Iron deficiency anemia, unspecified iron deficiency anemia type  -     ferrous [id number] C-folic acid (FOLTRIN) capsule; Take 1 capsule by mouth daily    Vitamin D deficiency  -     cholecalciferol (VITAMIN D3) 1,000 units tablet; Take 1 tablet (1,000 Units total) by mouth daily          Subjective:      Patient ID: Francine Montalvo is a 36 y o  female  Patient is here for follow-up of lab values  She has already started taking 82149 units of vitamin-D  Upon further questioning she still complains of fatigue and some bone pains  This has been going on for last few months however has been worse in the last couple of days  Denies any fever or sore throat   HPI    The following portions of the patient's history were reviewed and updated as appropriate: past family history, past medical history, past social history and past surgical history  Review of Systems   Constitutional: Negative for chills, fatigue and fever  HENT: Negative for hearing loss  Respiratory: Negative for chest tightness and shortness of breath  Cardiovascular: Negative for chest pain, palpitations and leg swelling  Gastrointestinal: Negative for abdominal pain  Endocrine: Negative for polyuria  Musculoskeletal: Negative for arthralgias  Skin: Negative for color change  Neurological: Negative for dizziness  Psychiatric/Behavioral: Negative for agitation  Objective:      /90 (BP Location: Left arm, Patient Position: Sitting, Cuff Size: Standard)   Pulse 90   Temp 98 °F (36 7 °C) (Temporal)   Resp 16   Wt 88 9 kg (196 lb)   SpO2 100%   BMI 33 64 kg/m²          Physical Exam   Constitutional: She is oriented to person, place, and time  She appears well-developed  HENT:   Head: Normocephalic  Eyes: Pupils are equal, round, and reactive to light  Conjunctivae are normal    Neck: Normal range of motion  Neck supple  Cardiovascular: Normal rate, regular rhythm and normal heart sounds  Pulmonary/Chest: Effort normal and breath sounds normal    Abdominal: Soft  Bowel sounds are normal    Musculoskeletal: She exhibits no edema or tenderness  Neurological: She is alert and oriented to person, place, and time  Skin: Skin is warm and dry

## 2019-11-21 NOTE — ASSESSMENT & PLAN NOTE
Patient has iron-deficiency anemia status post history of bariatric surgery  She is not chair reading over-the-counter iron  Will send for ferrous fumarate along with vitamin C for better absorption  Also to eat her white meds along with the biggest meal for the day    I discussed with her and stressed the need to be compliant with multivitamins in view of her bariatric surgery in the past

## 2019-11-21 NOTE — ASSESSMENT & PLAN NOTE
Patient has low vitamin-D level and has already started taking 68789 units of vitamin-D  She will get the repeat vitamin-D level in 8 weeks and I also gave her a script to continue with 1000 international units every day after that    Patient expressed understanding and will see her PCP for follow-up

## 2020-06-11 ENCOUNTER — OFFICE VISIT (OUTPATIENT)
Dept: FAMILY MEDICINE CLINIC | Facility: CLINIC | Age: 35
End: 2020-06-11

## 2020-06-11 VITALS
HEART RATE: 71 BPM | RESPIRATION RATE: 12 BRPM | BODY MASS INDEX: 34.33 KG/M2 | OXYGEN SATURATION: 99 % | TEMPERATURE: 96.6 F | SYSTOLIC BLOOD PRESSURE: 124 MMHG | DIASTOLIC BLOOD PRESSURE: 86 MMHG | WEIGHT: 200 LBS

## 2020-06-11 DIAGNOSIS — D50.9 IRON DEFICIENCY ANEMIA, UNSPECIFIED IRON DEFICIENCY ANEMIA TYPE: ICD-10-CM

## 2020-06-11 DIAGNOSIS — E66.09 CLASS 1 OBESITY DUE TO EXCESS CALORIES WITH SERIOUS COMORBIDITY AND BODY MASS INDEX (BMI) OF 34.0 TO 34.9 IN ADULT: ICD-10-CM

## 2020-06-11 DIAGNOSIS — Z11.4 ENCOUNTER FOR SCREENING FOR HIV: ICD-10-CM

## 2020-06-11 DIAGNOSIS — I72.9 ANEURYSM (HCC): Primary | ICD-10-CM

## 2020-06-11 PROCEDURE — 3074F SYST BP LT 130 MM HG: CPT | Performed by: FAMILY MEDICINE

## 2020-06-11 PROCEDURE — 3079F DIAST BP 80-89 MM HG: CPT | Performed by: FAMILY MEDICINE

## 2020-06-11 PROCEDURE — 1036F TOBACCO NON-USER: CPT | Performed by: FAMILY MEDICINE

## 2020-06-11 PROCEDURE — 99213 OFFICE O/P EST LOW 20 MIN: CPT | Performed by: FAMILY MEDICINE

## 2020-06-12 ENCOUNTER — APPOINTMENT (OUTPATIENT)
Dept: LAB | Facility: CLINIC | Age: 35
End: 2020-06-12
Payer: COMMERCIAL

## 2020-06-12 DIAGNOSIS — Z11.4 ENCOUNTER FOR SCREENING FOR HIV: ICD-10-CM

## 2020-06-12 DIAGNOSIS — D50.9 IRON DEFICIENCY ANEMIA, UNSPECIFIED IRON DEFICIENCY ANEMIA TYPE: ICD-10-CM

## 2020-06-12 DIAGNOSIS — K91.2 POSTSURGICAL MALABSORPTION: ICD-10-CM

## 2020-06-12 DIAGNOSIS — Z98.84 BARIATRIC SURGERY STATUS: ICD-10-CM

## 2020-06-12 DIAGNOSIS — E66.09 CLASS 1 OBESITY DUE TO EXCESS CALORIES WITH SERIOUS COMORBIDITY AND BODY MASS INDEX (BMI) OF 34.0 TO 34.9 IN ADULT: ICD-10-CM

## 2020-06-12 DIAGNOSIS — I72.9 ANEURYSM (HCC): ICD-10-CM

## 2020-06-12 LAB
25(OH)D3 SERPL-MCNC: 21.1 NG/ML (ref 30–100)
ANION GAP SERPL CALCULATED.3IONS-SCNC: 7 MMOL/L (ref 4–13)
BASOPHILS # BLD AUTO: 0.02 THOUSANDS/ΜL (ref 0–0.1)
BASOPHILS NFR BLD AUTO: 0 % (ref 0–1)
BUN SERPL-MCNC: 12 MG/DL (ref 5–25)
CALCIUM SERPL-MCNC: 8.2 MG/DL (ref 8.3–10.1)
CHLORIDE SERPL-SCNC: 108 MMOL/L (ref 100–108)
CHOLEST SERPL-MCNC: 154 MG/DL (ref 50–200)
CO2 SERPL-SCNC: 25 MMOL/L (ref 21–32)
CREAT SERPL-MCNC: 0.59 MG/DL (ref 0.6–1.3)
EOSINOPHIL # BLD AUTO: 0.07 THOUSAND/ΜL (ref 0–0.61)
EOSINOPHIL NFR BLD AUTO: 1 % (ref 0–6)
ERYTHROCYTE [DISTWIDTH] IN BLOOD BY AUTOMATED COUNT: 19.5 % (ref 11.6–15.1)
EST. AVERAGE GLUCOSE BLD GHB EST-MCNC: 114 MG/DL
GFR SERPL CREATININE-BSD FRML MDRD: 138 ML/MIN/1.73SQ M
GLUCOSE P FAST SERPL-MCNC: 87 MG/DL (ref 65–99)
HBA1C MFR BLD: 5.6 %
HCT VFR BLD AUTO: 27.9 % (ref 34.8–46.1)
HDLC SERPL-MCNC: 52 MG/DL
HGB BLD-MCNC: 7.9 G/DL (ref 11.5–15.4)
IMM GRANULOCYTES # BLD AUTO: 0.02 THOUSAND/UL (ref 0–0.2)
IMM GRANULOCYTES NFR BLD AUTO: 0 % (ref 0–2)
LDLC SERPL CALC-MCNC: 90 MG/DL (ref 0–100)
LYMPHOCYTES # BLD AUTO: 1.73 THOUSANDS/ΜL (ref 0.6–4.47)
LYMPHOCYTES NFR BLD AUTO: 27 % (ref 14–44)
MCH RBC QN AUTO: 22 PG (ref 26.8–34.3)
MCHC RBC AUTO-ENTMCNC: 28.3 G/DL (ref 31.4–37.4)
MCV RBC AUTO: 78 FL (ref 82–98)
MONOCYTES # BLD AUTO: 0.59 THOUSAND/ΜL (ref 0.17–1.22)
MONOCYTES NFR BLD AUTO: 9 % (ref 4–12)
NEUTROPHILS # BLD AUTO: 4.04 THOUSANDS/ΜL (ref 1.85–7.62)
NEUTS SEG NFR BLD AUTO: 63 % (ref 43–75)
NRBC BLD AUTO-RTO: 0 /100 WBCS
PLATELET # BLD AUTO: 493 THOUSANDS/UL (ref 149–390)
PMV BLD AUTO: 10.5 FL (ref 8.9–12.7)
POTASSIUM SERPL-SCNC: 4.6 MMOL/L (ref 3.5–5.3)
PTH-INTACT SERPL-MCNC: 130.7 PG/ML (ref 18.4–80.1)
RBC # BLD AUTO: 3.59 MILLION/UL (ref 3.81–5.12)
SODIUM SERPL-SCNC: 140 MMOL/L (ref 136–145)
TRIGL SERPL-MCNC: 61 MG/DL
WBC # BLD AUTO: 6.47 THOUSAND/UL (ref 4.31–10.16)

## 2020-06-12 PROCEDURE — 80061 LIPID PANEL: CPT

## 2020-06-12 PROCEDURE — 36415 COLL VENOUS BLD VENIPUNCTURE: CPT

## 2020-06-12 PROCEDURE — 82306 VITAMIN D 25 HYDROXY: CPT

## 2020-06-12 PROCEDURE — 83970 ASSAY OF PARATHORMONE: CPT

## 2020-06-12 PROCEDURE — 83036 HEMOGLOBIN GLYCOSYLATED A1C: CPT

## 2020-06-12 PROCEDURE — 80048 BASIC METABOLIC PNL TOTAL CA: CPT

## 2020-06-12 PROCEDURE — 85025 COMPLETE CBC W/AUTO DIFF WBC: CPT

## 2020-06-12 PROCEDURE — 87389 HIV-1 AG W/HIV-1&-2 AB AG IA: CPT

## 2020-06-14 LAB — HIV 1+2 AB+HIV1 P24 AG SERPL QL IA: NORMAL

## 2020-06-16 ENCOUNTER — TELEPHONE (OUTPATIENT)
Dept: FAMILY MEDICINE CLINIC | Facility: CLINIC | Age: 35
End: 2020-06-16

## 2020-06-16 ENCOUNTER — TELEMEDICINE (OUTPATIENT)
Dept: BARIATRICS | Facility: CLINIC | Age: 35
End: 2020-06-16
Payer: COMMERCIAL

## 2020-06-16 VITALS — WEIGHT: 200 LBS | BODY MASS INDEX: 34.15 KG/M2 | HEIGHT: 64 IN

## 2020-06-16 DIAGNOSIS — Z98.84 BARIATRIC SURGERY STATUS: ICD-10-CM

## 2020-06-16 DIAGNOSIS — R10.9 ABDOMINAL PAIN: ICD-10-CM

## 2020-06-16 DIAGNOSIS — E66.9 OBESITY, CLASS I, BMI 30-34.9: ICD-10-CM

## 2020-06-16 DIAGNOSIS — D50.9 IRON DEFICIENCY ANEMIA: ICD-10-CM

## 2020-06-16 DIAGNOSIS — K91.2 POSTSURGICAL MALABSORPTION: ICD-10-CM

## 2020-06-16 DIAGNOSIS — Z09 FOLLOW UP: Primary | ICD-10-CM

## 2020-06-16 PROCEDURE — 99214 OFFICE O/P EST MOD 30 MIN: CPT | Performed by: PHYSICIAN ASSISTANT

## 2020-06-16 RX ORDER — SUCRALFATE 1 G/1
1 TABLET ORAL 4 TIMES DAILY
Qty: 360 TABLET | Refills: 0 | Status: SHIPPED | OUTPATIENT
Start: 2020-06-16 | End: 2020-08-10 | Stop reason: SDUPTHER

## 2020-06-16 RX ORDER — OMEPRAZOLE 20 MG/1
20 CAPSULE, DELAYED RELEASE ORAL DAILY
Qty: 30 CAPSULE | Refills: 2 | Status: SHIPPED | OUTPATIENT
Start: 2020-06-16 | End: 2020-06-16

## 2020-06-16 RX ORDER — OMEPRAZOLE 20 MG/1
CAPSULE, DELAYED RELEASE ORAL
Qty: 90 CAPSULE | Refills: 0 | Status: SHIPPED | OUTPATIENT
Start: 2020-06-16 | End: 2020-08-10 | Stop reason: SDUPTHER

## 2020-06-17 DIAGNOSIS — E55.9 VITAMIN D DEFICIENCY: ICD-10-CM

## 2020-06-17 DIAGNOSIS — K91.2 POSTSURGICAL MALABSORPTION: ICD-10-CM

## 2020-06-17 RX ORDER — ERGOCALCIFEROL 1.25 MG/1
50000 CAPSULE ORAL WEEKLY
Qty: 6 CAPSULE | Refills: 0 | Status: SHIPPED | OUTPATIENT
Start: 2020-06-17 | End: 2020-08-20 | Stop reason: ALTCHOICE

## 2020-06-18 ENCOUNTER — TELEPHONE (OUTPATIENT)
Dept: HEMATOLOGY ONCOLOGY | Facility: CLINIC | Age: 35
End: 2020-06-18

## 2020-06-18 ENCOUNTER — TELEPHONE (OUTPATIENT)
Dept: BARIATRICS | Facility: CLINIC | Age: 35
End: 2020-06-18

## 2020-06-18 DIAGNOSIS — R10.9 ABDOMINAL PAIN: ICD-10-CM

## 2020-06-18 DIAGNOSIS — Z98.84 BARIATRIC SURGERY STATUS: Primary | ICD-10-CM

## 2020-06-22 ENCOUNTER — CONSULT (OUTPATIENT)
Dept: HEMATOLOGY ONCOLOGY | Facility: CLINIC | Age: 35
End: 2020-06-22
Payer: COMMERCIAL

## 2020-06-22 VITALS
OXYGEN SATURATION: 99 % | SYSTOLIC BLOOD PRESSURE: 136 MMHG | TEMPERATURE: 98.2 F | BODY MASS INDEX: 33.19 KG/M2 | WEIGHT: 199.2 LBS | HEIGHT: 65 IN | DIASTOLIC BLOOD PRESSURE: 80 MMHG | RESPIRATION RATE: 16 BRPM | HEART RATE: 81 BPM

## 2020-06-22 DIAGNOSIS — E53.8 B12 DEFICIENCY: ICD-10-CM

## 2020-06-22 DIAGNOSIS — Z98.84 BARIATRIC SURGERY STATUS: ICD-10-CM

## 2020-06-22 DIAGNOSIS — K91.2 POSTSURGICAL MALABSORPTION: ICD-10-CM

## 2020-06-22 DIAGNOSIS — D50.9 IRON DEFICIENCY ANEMIA: ICD-10-CM

## 2020-06-22 DIAGNOSIS — D75.838 REACTIVE THROMBOCYTOSIS: ICD-10-CM

## 2020-06-22 DIAGNOSIS — D50.9 MICROCYTIC ANEMIA: Primary | ICD-10-CM

## 2020-06-22 PROBLEM — D50.8 IRON DEFICIENCY ANEMIA SECONDARY TO INADEQUATE DIETARY IRON INTAKE: Status: ACTIVE | Noted: 2020-06-22

## 2020-06-22 PROCEDURE — 3075F SYST BP GE 130 - 139MM HG: CPT | Performed by: NURSE PRACTITIONER

## 2020-06-22 PROCEDURE — 99244 OFF/OP CNSLTJ NEW/EST MOD 40: CPT | Performed by: NURSE PRACTITIONER

## 2020-06-22 PROCEDURE — 3079F DIAST BP 80-89 MM HG: CPT | Performed by: NURSE PRACTITIONER

## 2020-06-22 RX ORDER — CYANOCOBALAMIN 1000 UG/ML
1000 INJECTION INTRAMUSCULAR; SUBCUTANEOUS ONCE
Status: CANCELLED | OUTPATIENT
Start: 2020-06-29

## 2020-06-22 RX ORDER — SODIUM CHLORIDE 9 MG/ML
20 INJECTION, SOLUTION INTRAVENOUS ONCE
Status: CANCELLED | OUTPATIENT
Start: 2020-06-29

## 2020-06-23 ENCOUNTER — TELEPHONE (OUTPATIENT)
Dept: HEMATOLOGY ONCOLOGY | Facility: CLINIC | Age: 35
End: 2020-06-23

## 2020-06-23 ENCOUNTER — APPOINTMENT (OUTPATIENT)
Dept: LAB | Facility: HOSPITAL | Age: 35
End: 2020-06-23
Payer: COMMERCIAL

## 2020-06-23 DIAGNOSIS — D50.9 MICROCYTIC ANEMIA: ICD-10-CM

## 2020-06-23 LAB
ALBUMIN SERPL BCP-MCNC: 4.2 G/DL (ref 3–5.2)
ALP SERPL-CCNC: 84 U/L (ref 43–122)
ALT SERPL W P-5'-P-CCNC: 18 U/L (ref 9–52)
ANION GAP SERPL CALCULATED.3IONS-SCNC: 8 MMOL/L (ref 5–14)
ANISOCYTOSIS BLD QL SMEAR: PRESENT
AST SERPL W P-5'-P-CCNC: 26 U/L (ref 14–36)
BASOPHILS # BLD AUTO: 0 THOUSANDS/ΜL (ref 0–0.1)
BASOPHILS NFR BLD AUTO: 0 % (ref 0–1)
BILIRUB SERPL-MCNC: 0.3 MG/DL
BUN SERPL-MCNC: 11 MG/DL (ref 5–25)
CALCIUM SERPL-MCNC: 8.7 MG/DL (ref 8.4–10.2)
CHLORIDE SERPL-SCNC: 103 MMOL/L (ref 97–108)
CO2 SERPL-SCNC: 26 MMOL/L (ref 22–30)
CREAT SERPL-MCNC: 0.53 MG/DL (ref 0.6–1.2)
CRP SERPL QL: <5 MG/L
DAT POLY-SP REAG RBC QL: NEGATIVE
EOSINOPHIL # BLD AUTO: 0.1 THOUSAND/ΜL (ref 0–0.4)
EOSINOPHIL NFR BLD AUTO: 1 % (ref 0–6)
ERYTHROCYTE [DISTWIDTH] IN BLOOD BY AUTOMATED COUNT: 19.4 %
ERYTHROCYTE [SEDIMENTATION RATE] IN BLOOD: 38 MM/HOUR (ref 1–20)
FERRITIN SERPL-MCNC: 4 NG/ML (ref 8–388)
FOLATE SERPL-MCNC: >20 NG/ML (ref 3.1–17.5)
GFR SERPL CREATININE-BSD FRML MDRD: 143 ML/MIN/1.73SQ M
GLUCOSE P FAST SERPL-MCNC: 91 MG/DL (ref 70–99)
HCT VFR BLD AUTO: 27.2 % (ref 36–46)
HGB BLD-MCNC: 8.5 G/DL (ref 12–16)
HYPERCHROMIA BLD QL SMEAR: PRESENT
IGA SERPL-MCNC: 261 MG/DL (ref 70–400)
IGG SERPL-MCNC: 1400 MG/DL (ref 700–1600)
IGM SERPL-MCNC: 185 MG/DL (ref 40–230)
IRON SATN MFR SERPL: 4 %
IRON SERPL-MCNC: 20 UG/DL (ref 50–170)
LDH SERPL-CCNC: 344 U/L (ref 313–618)
LG PLATELETS BLD QL SMEAR: PRESENT
LYMPHOCYTES # BLD AUTO: 2.1 THOUSANDS/ΜL (ref 0.5–4)
LYMPHOCYTES NFR BLD AUTO: 28 % (ref 25–45)
MCH RBC QN AUTO: 22.9 PG (ref 26–34)
MCHC RBC AUTO-ENTMCNC: 31.2 G/DL (ref 31–36)
MCV RBC AUTO: 73 FL (ref 80–100)
MICROCYTES BLD QL AUTO: PRESENT
MONOCYTES # BLD AUTO: 0.6 THOUSAND/ΜL (ref 0.2–0.9)
MONOCYTES NFR BLD AUTO: 8 % (ref 1–10)
NEUTROPHILS # BLD AUTO: 4.6 THOUSANDS/ΜL (ref 1.8–7.8)
NEUTS SEG NFR BLD AUTO: 62 % (ref 45–65)
PLATELET # BLD AUTO: 389 THOUSANDS/UL (ref 150–450)
PLATELET BLD QL SMEAR: ADEQUATE
PMV BLD AUTO: 8.5 FL (ref 8.9–12.7)
POTASSIUM SERPL-SCNC: 4 MMOL/L (ref 3.6–5)
PROT SERPL-MCNC: 8 G/DL (ref 5.9–8.4)
RBC # BLD AUTO: 3.72 MILLION/UL (ref 4–5.2)
RBC MORPH BLD: NORMAL
RETICS # CALC: 1.18 % (ref 0.87–2.63)
SODIUM SERPL-SCNC: 137 MMOL/L (ref 137–147)
TIBC SERPL-MCNC: 522 UG/DL (ref 250–450)
VIT B12 SERPL-MCNC: 253 PG/ML (ref 100–900)
WBC # BLD AUTO: 7.4 THOUSAND/UL (ref 4.5–11)

## 2020-06-23 PROCEDURE — 85652 RBC SED RATE AUTOMATED: CPT

## 2020-06-23 PROCEDURE — 80053 COMPREHEN METABOLIC PANEL: CPT

## 2020-06-23 PROCEDURE — 82728 ASSAY OF FERRITIN: CPT

## 2020-06-23 PROCEDURE — 83540 ASSAY OF IRON: CPT

## 2020-06-23 PROCEDURE — 82607 VITAMIN B-12: CPT

## 2020-06-23 PROCEDURE — 85025 COMPLETE CBC W/AUTO DIFF WBC: CPT

## 2020-06-23 PROCEDURE — 86140 C-REACTIVE PROTEIN: CPT

## 2020-06-23 PROCEDURE — 83883 ASSAY NEPHELOMETRY NOT SPEC: CPT

## 2020-06-23 PROCEDURE — 83918 ORGANIC ACIDS TOTAL QUANT: CPT

## 2020-06-23 PROCEDURE — 83020 HEMOGLOBIN ELECTROPHORESIS: CPT

## 2020-06-23 PROCEDURE — 83615 LACTATE (LD) (LDH) ENZYME: CPT

## 2020-06-23 PROCEDURE — 82784 ASSAY IGA/IGD/IGG/IGM EACH: CPT

## 2020-06-23 PROCEDURE — 83010 ASSAY OF HAPTOGLOBIN QUANT: CPT

## 2020-06-23 PROCEDURE — 84165 PROTEIN E-PHORESIS SERUM: CPT

## 2020-06-23 PROCEDURE — 86880 COOMBS TEST DIRECT: CPT

## 2020-06-23 PROCEDURE — 85045 AUTOMATED RETICULOCYTE COUNT: CPT

## 2020-06-23 PROCEDURE — 84165 PROTEIN E-PHORESIS SERUM: CPT | Performed by: PATHOLOGY

## 2020-06-23 PROCEDURE — 83550 IRON BINDING TEST: CPT

## 2020-06-23 PROCEDURE — 36415 COLL VENOUS BLD VENIPUNCTURE: CPT

## 2020-06-23 PROCEDURE — 82746 ASSAY OF FOLIC ACID SERUM: CPT

## 2020-06-24 LAB
ALBUMIN SERPL ELPH-MCNC: 4.1 G/DL (ref 3.5–5)
ALBUMIN SERPL ELPH-MCNC: 54.6 % (ref 52–65)
ALPHA1 GLOB SERPL ELPH-MCNC: 0.32 G/DL (ref 0.1–0.4)
ALPHA1 GLOB SERPL ELPH-MCNC: 4.2 % (ref 2.5–5)
ALPHA2 GLOB SERPL ELPH-MCNC: 0.68 G/DL (ref 0.4–1.2)
ALPHA2 GLOB SERPL ELPH-MCNC: 9.1 % (ref 7–13)
BETA GLOB ABNORMAL SERPL ELPH-MCNC: 0.55 G/DL (ref 0.4–0.8)
BETA1 GLOB SERPL ELPH-MCNC: 7.3 % (ref 5–13)
BETA2 GLOB SERPL ELPH-MCNC: 4.9 % (ref 2–8)
BETA2+GAMMA GLOB SERPL ELPH-MCNC: 0.37 G/DL (ref 0.2–0.5)
GAMMA GLOB ABNORMAL SERPL ELPH-MCNC: 1.49 G/DL (ref 0.5–1.6)
GAMMA GLOB SERPL ELPH-MCNC: 19.9 % (ref 12–22)
HAPTOGLOB SERPL-MCNC: 116 MG/DL (ref 33–278)
IGG/ALB SER: 1.2 {RATIO} (ref 1.1–1.8)
KAPPA LC FREE SER-MCNC: 30 MG/L (ref 3.3–19.4)
KAPPA LC FREE/LAMBDA FREE SER: 1.18 {RATIO} (ref 0.26–1.65)
LAMBDA LC FREE SERPL-MCNC: 25.4 MG/L (ref 5.7–26.3)
PROT PATTERN SERPL ELPH-IMP: NORMAL
PROT SERPL-MCNC: 7.5 G/DL (ref 6.4–8.2)

## 2020-06-24 RX ORDER — CYANOCOBALAMIN 1000 UG/ML
1000 INJECTION INTRAMUSCULAR; SUBCUTANEOUS ONCE
Status: CANCELLED | OUTPATIENT
Start: 2020-08-13

## 2020-06-25 ENCOUNTER — HOSPITAL ENCOUNTER (OUTPATIENT)
Dept: INFUSION CENTER | Facility: HOSPITAL | Age: 35
Discharge: HOME | End: 2020-06-25
Attending: INTERNAL MEDICINE

## 2020-06-26 LAB
DEPRECATED HGB OTHER BLD-IMP: 0 %
HGB A MFR BLD: 1.6 % (ref 1.8–3.2)
HGB A MFR BLD: 98.4 % (ref 96.4–98.8)
HGB C MFR BLD: 0 %
HGB F MFR BLD: 0 % (ref 0–2)
HGB FRACT BLD-IMP: ABNORMAL
HGB S BLD QL SOLY: NEGATIVE
HGB S MFR BLD: 0 %

## 2020-06-28 LAB
METHYLMALONATE SERPL-SCNC: 139 NMOL/L (ref 0–378)
SL AMB DISCLAIMER: NORMAL

## 2020-06-29 ENCOUNTER — ANNUAL EXAM (OUTPATIENT)
Dept: FAMILY MEDICINE CLINIC | Facility: CLINIC | Age: 35
End: 2020-06-29

## 2020-06-29 VITALS
DIASTOLIC BLOOD PRESSURE: 86 MMHG | BODY MASS INDEX: 33.49 KG/M2 | RESPIRATION RATE: 16 BRPM | SYSTOLIC BLOOD PRESSURE: 132 MMHG | WEIGHT: 201 LBS | HEART RATE: 80 BPM | OXYGEN SATURATION: 98 % | HEIGHT: 65 IN | TEMPERATURE: 98.6 F

## 2020-06-29 DIAGNOSIS — Z02.4 DRIVER'S PERMIT PE (PHYSICAL EXAMINATION): ICD-10-CM

## 2020-06-29 DIAGNOSIS — D50.9 IRON DEFICIENCY ANEMIA, UNSPECIFIED IRON DEFICIENCY ANEMIA TYPE: ICD-10-CM

## 2020-06-29 DIAGNOSIS — Z01.419 WELL WOMAN EXAM WITH ROUTINE GYNECOLOGICAL EXAM: Primary | ICD-10-CM

## 2020-06-29 PROCEDURE — 87624 HPV HI-RISK TYP POOLED RSLT: CPT | Performed by: FAMILY MEDICINE

## 2020-06-29 PROCEDURE — G0145 SCR C/V CYTO,THINLAYER,RESCR: HCPCS | Performed by: FAMILY MEDICINE

## 2020-06-29 PROCEDURE — 99395 PREV VISIT EST AGE 18-39: CPT | Performed by: FAMILY MEDICINE

## 2020-06-29 PROCEDURE — G0124 SCREEN C/V THIN LAYER BY MD: HCPCS | Performed by: FAMILY MEDICINE

## 2020-06-30 LAB
HPV HR 12 DNA CVX QL NAA+PROBE: NEGATIVE
HPV16 DNA CVX QL NAA+PROBE: NEGATIVE
HPV18 DNA CVX QL NAA+PROBE: NEGATIVE

## 2020-07-02 LAB
LAB AP GYN PRIMARY INTERPRETATION: NORMAL
Lab: NORMAL

## 2020-07-07 ENCOUNTER — HOSPITAL ENCOUNTER (OUTPATIENT)
Dept: INFUSION CENTER | Facility: HOSPITAL | Age: 35
Discharge: HOME | End: 2020-07-07
Attending: INTERNAL MEDICINE
Payer: COMMERCIAL

## 2020-07-07 VITALS
HEART RATE: 65 BPM | SYSTOLIC BLOOD PRESSURE: 139 MMHG | RESPIRATION RATE: 18 BRPM | DIASTOLIC BLOOD PRESSURE: 81 MMHG | TEMPERATURE: 97.8 F

## 2020-07-07 DIAGNOSIS — D50.8 IRON DEFICIENCY ANEMIA SECONDARY TO INADEQUATE DIETARY IRON INTAKE: Primary | ICD-10-CM

## 2020-07-07 DIAGNOSIS — E53.8 B12 DEFICIENCY: ICD-10-CM

## 2020-07-07 DIAGNOSIS — Z98.84 S/P GASTRIC BYPASS: ICD-10-CM

## 2020-07-07 PROCEDURE — 96372 THER/PROPH/DIAG INJ SC/IM: CPT

## 2020-07-07 PROCEDURE — 96365 THER/PROPH/DIAG IV INF INIT: CPT

## 2020-07-07 RX ORDER — SODIUM CHLORIDE 9 MG/ML
20 INJECTION, SOLUTION INTRAVENOUS ONCE
Status: COMPLETED | OUTPATIENT
Start: 2020-07-07 | End: 2020-07-07

## 2020-07-07 RX ORDER — CYANOCOBALAMIN 1000 UG/ML
1000 INJECTION INTRAMUSCULAR; SUBCUTANEOUS ONCE
Status: CANCELLED | OUTPATIENT
Start: 2020-07-07

## 2020-07-07 RX ORDER — CYANOCOBALAMIN 1000 UG/ML
1000 INJECTION INTRAMUSCULAR; SUBCUTANEOUS ONCE
Status: COMPLETED | OUTPATIENT
Start: 2020-07-07 | End: 2020-07-07

## 2020-07-07 RX ORDER — SODIUM CHLORIDE 9 MG/ML
20 INJECTION, SOLUTION INTRAVENOUS ONCE
Status: CANCELLED | OUTPATIENT
Start: 2020-07-07

## 2020-07-07 RX ADMIN — CYANOCOBALAMIN 1000 MCG: 1000 INJECTION INTRAMUSCULAR; SUBCUTANEOUS at 09:25

## 2020-07-07 RX ADMIN — IRON SUCROSE 200 MG: 20 INJECTION, SOLUTION INTRAVENOUS at 09:14

## 2020-07-07 RX ADMIN — SODIUM CHLORIDE 20 ML/HR: 0.9 INJECTION, SOLUTION INTRAVENOUS at 09:13

## 2020-07-07 NOTE — PROGRESS NOTES
Pt tolerated treatment today with no adverse reactions  AVS printed and next apt confirmed  Left unit ambulatory with a steady gait

## 2020-07-09 ENCOUNTER — TELEPHONE (OUTPATIENT)
Dept: HEMATOLOGY ONCOLOGY | Facility: CLINIC | Age: 35
End: 2020-07-09

## 2020-07-09 ENCOUNTER — HOSPITAL ENCOUNTER (OUTPATIENT)
Dept: INFUSION CENTER | Facility: HOSPITAL | Age: 35
Discharge: HOME | End: 2020-07-09
Attending: INTERNAL MEDICINE
Payer: COMMERCIAL

## 2020-07-09 VITALS
RESPIRATION RATE: 18 BRPM | SYSTOLIC BLOOD PRESSURE: 126 MMHG | TEMPERATURE: 98.1 F | HEART RATE: 70 BPM | DIASTOLIC BLOOD PRESSURE: 78 MMHG

## 2020-07-09 DIAGNOSIS — Z98.84 S/P GASTRIC BYPASS: ICD-10-CM

## 2020-07-09 DIAGNOSIS — E53.8 B12 DEFICIENCY: ICD-10-CM

## 2020-07-09 DIAGNOSIS — D50.8 IRON DEFICIENCY ANEMIA SECONDARY TO INADEQUATE DIETARY IRON INTAKE: Primary | ICD-10-CM

## 2020-07-09 PROCEDURE — 96372 THER/PROPH/DIAG INJ SC/IM: CPT

## 2020-07-09 PROCEDURE — 96374 THER/PROPH/DIAG INJ IV PUSH: CPT

## 2020-07-09 PROCEDURE — 96375 TX/PRO/DX INJ NEW DRUG ADDON: CPT

## 2020-07-09 RX ORDER — CYANOCOBALAMIN 1000 UG/ML
1000 INJECTION INTRAMUSCULAR; SUBCUTANEOUS ONCE
Status: CANCELLED | OUTPATIENT
Start: 2020-07-09

## 2020-07-09 RX ORDER — SODIUM CHLORIDE 9 MG/ML
20 INJECTION, SOLUTION INTRAVENOUS ONCE
Status: COMPLETED | OUTPATIENT
Start: 2020-07-09 | End: 2020-07-09

## 2020-07-09 RX ORDER — CYANOCOBALAMIN 1000 UG/ML
1000 INJECTION INTRAMUSCULAR; SUBCUTANEOUS ONCE
Status: COMPLETED | OUTPATIENT
Start: 2020-07-09 | End: 2020-07-09

## 2020-07-09 RX ORDER — SODIUM CHLORIDE 9 MG/ML
20 INJECTION, SOLUTION INTRAVENOUS ONCE
Status: CANCELLED | OUTPATIENT
Start: 2020-07-09

## 2020-07-09 RX ADMIN — CYANOCOBALAMIN 1000 MCG: 1000 INJECTION INTRAMUSCULAR; SUBCUTANEOUS at 09:01

## 2020-07-09 RX ADMIN — DEXAMETHASONE SODIUM PHOSPHATE 8 MG: 10 INJECTION, SOLUTION INTRAMUSCULAR; INTRAVENOUS at 09:44

## 2020-07-09 RX ADMIN — IRON SUCROSE 200 MG: 20 INJECTION, SOLUTION INTRAVENOUS at 09:01

## 2020-07-09 RX ADMIN — SODIUM CHLORIDE 20 ML/HR: 9 INJECTION, SOLUTION INTRAVENOUS at 08:51

## 2020-07-09 NOTE — PROGRESS NOTES
Pt admitted to infusion center for venofer and b12  venofer was infusing for 6 minutes when she stated "ya know, now that I think of it I had a little chest tightness when I layed down to sleep the night of my infusion  Just like when I took a deep breath it felt a little funny " venofer placed on hold and saline opened  Tc to Ramón Kruse RN/Dr Carolina Busch  Orders expected to be placed for benadryl, dex, and pepcid  Pt is aware that we will medicate her prior to restarting venofer  No symptoms during last infusion nor in the time today that venofer was infusing  Call bell in reach

## 2020-07-09 NOTE — PROGRESS NOTES
Return call from Dr Irwin Smith  Pt is to have all venofer cancelled and listed as an allergy  Office will reach out to insurance to see if fereheme is an option for her  Peripheral iv discontinued jelco intact  Vs baseline  No current reactuion symptoms  Pt instructed to go to ED if she re- developes any issues  Agreeable  Discharged ambulatory

## 2020-07-09 NOTE — PROGRESS NOTES
Pt complained of racing heartbeat 7 cc into dexamethasone  HR(*  bp elevated to 158/80  Monitored  Tc to Nav Petersen RN at Dr Hilton Mendez office  Held dexamethasone  nss line opened (new)  Symptoms of elevated Hr now gone but pt states her vision is now blurry

## 2020-07-09 NOTE — PROGRESS NOTES
Pt states her racing heartbeat and blurred vision have now subsided  Her vs are back to baseline (hr 77 bp 122/70)  Pt is now stating she does not want the benadryl or pepcid because she is nervous to react to something else  TC to Bailey Doty RN /Dr Alhaji Menard to see how to procede  Awaiting return call  Pt is resting comfortably and snacking at present

## 2020-07-09 NOTE — TELEPHONE ENCOUNTER
Received a call from Marquise Godinez, RN to report that pt had her first dose of Venofer 6/25 and when she was at home she had chest tightness while lying down at night  Today pt is in for second dose and pre meds added to her plan Decadron 8 mg Benadryl 25 mg and Pepcid 20 mg Venofer was already running for 8 mins  Stopped and then decadron was hung before decadron reached pt she c/o chest tightness, BP went up and had some blurry vision  After 20 mins pts VS back to baseline but she is declining any pre meds and per Dr Castillo Fulling no more Venofer and this to be marked as an allergy  We can try to obtain some feraheme but will require a prior auth as [facility] uses Venofer as their drug of [facility] for IV iron  We will reach out to pt once we get the auth or final decision form her ins company

## 2020-07-10 ENCOUNTER — TELEMEDICINE (OUTPATIENT)
Dept: FAMILY MEDICINE CLINIC | Facility: CLINIC | Age: 35
End: 2020-07-10

## 2020-07-10 DIAGNOSIS — D50.9 IRON DEFICIENCY ANEMIA, UNSPECIFIED IRON DEFICIENCY ANEMIA TYPE: Primary | ICD-10-CM

## 2020-07-10 PROCEDURE — 99213 OFFICE O/P EST LOW 20 MIN: CPT | Performed by: FAMILY MEDICINE

## 2020-07-10 PROCEDURE — 3074F SYST BP LT 130 MM HG: CPT | Performed by: FAMILY MEDICINE

## 2020-07-10 PROCEDURE — 3078F DIAST BP <80 MM HG: CPT | Performed by: FAMILY MEDICINE

## 2020-07-10 PROCEDURE — 1036F TOBACCO NON-USER: CPT | Performed by: FAMILY MEDICINE

## 2020-07-10 NOTE — PROGRESS NOTES
Virtual Brief Visit    Assessment/Plan:    Problem List Items Addressed This Visit        Other    Anemia - Primary     -Hx of bariatric surgery 2016; developed iron deficiency anemia afterwards   -recently evaluated by Hematology, yesterday went to receive 1st iron infusion threapy and developed allergic reaction to the infusion due to which treatment had to be stopped  Pending further iron infusion treatment plan by Hematology   -She has been experiencing dizziness, palpitation since last night, associated with episode of chest pain described as a 'slight pressure' when she went to sleep   -Today, chest pain has resolved  She remains with symptoms of dizzines, tiredness, and slight blurry vision  Had 1 episode of dark stool   -will repeat Hemoglobin and hematocrit, to be done as soon as possible to assess for blood loss   -given strict ED precautions if worsening of chest pain or blood loss  -encouraged to continue daily supplements of iron + vitamin C  Relevant Orders    Hemoglobin and hematocrit, blood                Reason for visit is   Chief Complaint   Patient presents with    Virtual Brief Visit        Encounter provider Rylan Robles MD    Provider located at [address] [place] [id number] [phone]    Recent Visits  No visits were found meeting these conditions  Showing recent visits within past 7 days and meeting all other requirements     Today's Visits  Date Type Provider Dept   07/10/20 Telemedicine Rylan Robles MD  [facility]   Showing today's visits and meeting all other requirements     Future Appointments  Date Type Provider Dept   07/10/20 Telemedicine MD Clarke Guan [facility]   Showing future appointments within next 150 days and meeting all other requirements        After connecting through telephone, the patient was identified by name and date of birth   Robert Phalen was informed that this is a telemedicine visit and that the visit is being conducted through telephone  My office door was closed  No one else was in the room  She acknowledged consent and understanding of privacy and security of the platform  The patient has agreed to participate and understands she can discontinue the visit at any time  Patient is aware this is a billable service  It was my intent to perform this visit via video technology but the patient was not able to do a video connection so the visit was completed via audio telephone only  Subjective    Giulia Morrison is a 30 y o  female who was evaluated via televisit due to dizziness  Case of 31 y/o female who was called via televisit due to symptoms of dizziness  Patient states that yesterday she went to receive her 1st iron infusion treatment for iron deficiency anemia and she developed an allergic reaction to the infusion, associated with tachycardia and elevated blood pressure due to which the infusion had to be stopped  Last night, she started with symptoms of dizziness, tiredness, sleepiness associated with chest pain described as 'slight pressure' which relieved spontaneously  Today, she has not had recurrence of chest pain but still remains with dizziness, tiredness and sleepiness   She also had a bowel movement today which she notices it was more 'dark colored' than usual         Past Medical History:   Diagnosis Date    Anemia     Dizziness     Fatigue     Obesity     11/14/16    Thyroid disease     Ulcer        Past Surgical History:   Procedure Laterality Date    LITO-EN-Y PROCEDURE  11/14/2016    Gastric Bypass by Dr Lizz Granda Weight 339 6,nw    TUBAL LIGATION Bilateral     2010    WISDOM TOOTH EXTRACTION         Current Outpatient Medications   Medication Sig Dispense Refill    calcium carbonate 1250 MG capsule Take 1,250 mg by mouth 2 (two) times a day with meals      cholecalciferol (VITAMIN D3) 1,000 units tablet Take 1 tablet (1,000 Units total) by mouth daily 30 tablet 0    cyanocobalamin (VITAMIN B-12) 100 mcg tablet Take by mouth daily      ergocalciferol (VITAMIN D2) 50,000 units Take 1 capsule (50,000 Units total) by mouth once a week for 6 doses 6 capsule 0    ferrous [id number] C-folic acid (FOLTRIN) capsule Take 1 capsule by mouth daily 60 capsule 0    Multiple Vitamin (MULTIVITAMIN) tablet Take 1 tablet by mouth daily      omeprazole (PriLOSEC) 20 mg delayed release capsule TAKE ONE CAPSULE BY MOUTH EVERY DAY (Patient not taking: Reported on 6/22/2020) 90 capsule 0    sucralfate (CARAFATE) 1 g tablet Take 1 tablet (1 g total) by mouth 4 (four) times a day (Patient not taking: Reported on 6/29/2020) 360 tablet 0     No current facility-administered medications for this visit  Facility-Administered Medications Ordered in Other Visits   Medication Dose Route Frequency Provider Last Rate Last Dose    diphenhydrAMINE (BENADRYL) 25 mg in sodium chloride 0 9 % 50 mL IVPB  25 mg Intravenous Once Sivan Mcclure MD        famotidine (PEPCID) 20 mg in sodium chloride 0 9 % 50 mL IVPB  20 mg Intravenous Once Sivan Mcclure MD            Allergies   Allergen Reactions    [place] Oil Shortness Of Breath    Venofer [Iron Sucrose]      Chest pressure after 1st dose  Tachycardia and blurred vision after 8 minutes of dose #2  Review of Systems   Constitutional: Positive for fatigue  Negative for appetite change and fever  Eyes: Positive for visual disturbance  Respiratory: Negative for cough and shortness of breath  Cardiovascular: Positive for chest pain and palpitations  Gastrointestinal: Negative for nausea and vomiting  Dark colored stool   Neurological: Positive for dizziness and weakness  Negative for headaches  Psychiatric/Behavioral: Negative for confusion  The patient is not nervous/anxious  There were no vitals filed for this visit        I spent 20 minutes directly with the patient during this visit    VIRTUAL VISIT DISCLAIMER    Joannelucy Tavares acknowledges that she has consented to an online visit or consultation  She understands that the online visit is based solely on information provided by her, and that, in the absence of a face-to-face physical evaluation by the physician, the diagnosis she receives is both limited and provisional in terms of accuracy and completeness  This is not intended to replace a full medical face-to-face evaluation by the physician  Joanne Tavares understands and accepts these terms

## 2020-07-10 NOTE — ASSESSMENT & PLAN NOTE
-Hx of bariatric surgery 2016; developed iron deficiency anemia afterwards   -recently evaluated by Hematology, yesterday went to receive 1st iron infusion threapy and developed allergic reaction to the infusion due to which treatment had to be stopped  Pending further iron infusion treatment plan by Hematology   -She has been experiencing dizziness, palpitation since last night, associated with episode of chest pain described as a 'slight pressure' when she went to sleep   -Today, chest pain has resolved  She remains with symptoms of dizzines, tiredness, and slight blurry vision  Had 1 episode of dark stool   -will repeat Hemoglobin and hematocrit, to be done as soon as possible to assess for blood loss   -given strict ED precautions if worsening of chest pain or blood loss     -encouraged to continue daily supplements of iron + vitamin C

## 2020-07-11 ENCOUNTER — APPOINTMENT (OUTPATIENT)
Dept: LAB | Facility: HOSPITAL | Age: 35
End: 2020-07-11
Payer: COMMERCIAL

## 2020-07-11 DIAGNOSIS — D50.9 IRON DEFICIENCY ANEMIA, UNSPECIFIED IRON DEFICIENCY ANEMIA TYPE: ICD-10-CM

## 2020-07-11 LAB
HCT VFR BLD AUTO: 28 % (ref 34.8–46.1)
HGB BLD-MCNC: 8.1 G/DL (ref 11.5–15.4)

## 2020-07-11 PROCEDURE — 85018 HEMOGLOBIN: CPT

## 2020-07-11 PROCEDURE — 85014 HEMATOCRIT: CPT

## 2020-07-11 PROCEDURE — 36415 COLL VENOUS BLD VENIPUNCTURE: CPT

## 2020-07-13 ENCOUNTER — TELEPHONE (OUTPATIENT)
Dept: OTHER | Facility: HOSPITAL | Age: 35
End: 2020-07-13

## 2020-07-13 ENCOUNTER — TRANSCRIBE ORDERS (OUTPATIENT)
Dept: URGENT CARE | Facility: MEDICAL CENTER | Age: 35
End: 2020-07-13

## 2020-07-13 ENCOUNTER — APPOINTMENT (OUTPATIENT)
Dept: URGENT CARE | Facility: MEDICAL CENTER | Age: 35
End: 2020-07-13

## 2020-07-13 ENCOUNTER — APPOINTMENT (OUTPATIENT)
Dept: LAB | Facility: MEDICAL CENTER | Age: 35
End: 2020-07-13

## 2020-07-13 DIAGNOSIS — Z11.1 ENCOUNTER FOR PPD TEST: ICD-10-CM

## 2020-07-13 DIAGNOSIS — Z11.1 ENCOUNTER FOR PPD SKIN TEST READING: Primary | ICD-10-CM

## 2020-07-13 PROCEDURE — 36415 COLL VENOUS BLD VENIPUNCTURE: CPT

## 2020-07-13 PROCEDURE — 86480 TB TEST CELL IMMUN MEASURE: CPT

## 2020-07-13 NOTE — TELEPHONE ENCOUNTER
Called patient to notify about hemoglobin lab results  Discussed with patient lab results of Hgb 8 1 on 7/11/2020, her baseline 8 3-8 5 and lowest recorded on chart 7 9  She states that she has been continuously feeling tired over the weekend and that she took a nap today and woke up with headache for which she just took at Tylenol prior to speaking with me on the phone  She says to be feeling tired and occasionally with some mild shortness of breath either while sitting or moving at home  She is still awaiting for Hematology arrangements for other options for alteranative iron infusions therapy upon approval of her medical insurance  I discussed with the patient to continue taking her iron and vitamin C supplements at home, to reach out to Hematology office to let them know about recent lab results and provided strict ED precautions if worsening of symptoms  Patient verbalized understanding and all questions were answered

## 2020-07-15 LAB
GAMMA INTERFERON BACKGROUND BLD IA-ACNC: 0.02 IU/ML
M TB IFN-G BLD-IMP: NEGATIVE
M TB IFN-G CD4+ BCKGRND COR BLD-ACNC: 0 IU/ML
M TB IFN-G CD4+ BCKGRND COR BLD-ACNC: 0.01 IU/ML
MITOGEN IGNF BCKGRD COR BLD-ACNC: >10 IU/ML

## 2020-07-24 ENCOUNTER — TELEPHONE (OUTPATIENT)
Dept: BARIATRICS | Facility: CLINIC | Age: 35
End: 2020-07-24

## 2020-08-04 ENCOUNTER — ANESTHESIA EVENT (OUTPATIENT)
Dept: GASTROENTEROLOGY | Facility: HOSPITAL | Age: 35
End: 2020-08-04

## 2020-08-05 ENCOUNTER — ANESTHESIA (OUTPATIENT)
Dept: GASTROENTEROLOGY | Facility: HOSPITAL | Age: 35
End: 2020-08-05

## 2020-08-05 ENCOUNTER — HOSPITAL ENCOUNTER (OUTPATIENT)
Dept: GASTROENTEROLOGY | Facility: HOSPITAL | Age: 35
Setting detail: OUTPATIENT SURGERY
Discharge: HOME | End: 2020-08-05
Attending: SURGERY | Admitting: SURGERY
Payer: COMMERCIAL

## 2020-08-05 VITALS
OXYGEN SATURATION: 100 % | SYSTOLIC BLOOD PRESSURE: 127 MMHG | TEMPERATURE: 97.9 F | RESPIRATION RATE: 16 BRPM | WEIGHT: 201 LBS | HEIGHT: 65 IN | HEART RATE: 90 BPM | DIASTOLIC BLOOD PRESSURE: 61 MMHG | BODY MASS INDEX: 33.49 KG/M2

## 2020-08-05 DIAGNOSIS — Z98.84 BARIATRIC SURGERY STATUS: ICD-10-CM

## 2020-08-05 LAB
EXT PREGNANCY TEST URINE: NEGATIVE
EXT. CONTROL: NORMAL

## 2020-08-05 PROCEDURE — 81025 URINE PREGNANCY TEST: CPT | Performed by: ANESTHESIOLOGY

## 2020-08-05 PROCEDURE — 43235 EGD DIAGNOSTIC BRUSH WASH: CPT | Performed by: SURGERY

## 2020-08-05 RX ORDER — LIDOCAINE HYDROCHLORIDE 20 MG/ML
INJECTION, SOLUTION EPIDURAL; INFILTRATION; INTRACAUDAL; PERINEURAL AS NEEDED
Status: DISCONTINUED | OUTPATIENT
Start: 2020-08-05 | End: 2020-08-05

## 2020-08-05 RX ORDER — SODIUM CHLORIDE 9 MG/ML
125 INJECTION, SOLUTION INTRAVENOUS CONTINUOUS
Status: DISCONTINUED | OUTPATIENT
Start: 2020-08-05 | End: 2020-08-09 | Stop reason: HOSPADM

## 2020-08-05 RX ORDER — PROPOFOL 10 MG/ML
INJECTION, EMULSION INTRAVENOUS AS NEEDED
Status: DISCONTINUED | OUTPATIENT
Start: 2020-08-05 | End: 2020-08-05

## 2020-08-05 RX ADMIN — PROPOFOL 200 MG: 10 INJECTION, EMULSION INTRAVENOUS at 11:00

## 2020-08-05 RX ADMIN — SODIUM CHLORIDE 125 ML/HR: 0.9 INJECTION, SOLUTION INTRAVENOUS at 10:43

## 2020-08-05 RX ADMIN — LIDOCAINE HYDROCHLORIDE 5 ML: 20 INJECTION, SOLUTION EPIDURAL; INFILTRATION; INTRACAUDAL; PERINEURAL at 11:00

## 2020-08-05 NOTE — ANESTHESIA PREPROCEDURE EVALUATION
Review of Systems/Medical History  Patient summary reviewed  Chart reviewed  No history of anesthetic complications     Cardiovascular  Hyperlipidemia, Hypertension controlled,    Pulmonary  Smoker ex-smoker  ,        GI/Hepatic    PUD, Bariatric surgery,             Endo/Other  History of thyroid disease , hypothyroidism,   Obesity    GYN       Hematology  Anemia ,     Musculoskeletal       Neurology  Negative neurology ROS      Psychology   Negative psychology ROS              Physical Exam    Airway    Mallampati score: I  TM Distance: >3 FB  Neck ROM: full     Dental   No notable dental hx     Cardiovascular  Rhythm: regular, Rate: normal, Cardiovascular exam normal    Pulmonary  Pulmonary exam normal Breath sounds clear to auscultation,     Other Findings        Anesthesia Plan  ASA Score- 2     Anesthesia Type- general with ASA Monitors  Additional Monitors:   Airway Plan:           Plan Factors-    Chart reviewed  Patient summary reviewed  Induction- intravenous  Postoperative Plan-     Informed Consent- Anesthetic plan and risks discussed with patient

## 2020-08-05 NOTE — H&P
H&P EXAM - Outpatient Endoscopy  [facility] [address] [place] [address] GI LAB INTRA   Paulino Zhao 30 y o  female MRN: [id number]  Unit/Bed#:  Encounter: [id number]        Impression: Morbid obesity with epigastric pain and burning s/p Jaycee en Y Gastric Bypass    Plan:Upper endoscopy rule out marginal ulcer    Chief Complaint: pain and burning in epigastric region    Physical Exam: Normal not in acute distress   Chest: Clear to auscultation   Heart: Normal S1 and S2

## 2020-08-08 ENCOUNTER — HOSPITAL ENCOUNTER (EMERGENCY)
Facility: HOSPITAL | Age: 35
Discharge: HOME | End: 2020-08-09
Attending: EMERGENCY MEDICINE | Admitting: EMERGENCY MEDICINE
Payer: COMMERCIAL

## 2020-08-08 VITALS
RESPIRATION RATE: 20 BRPM | HEART RATE: 86 BPM | OXYGEN SATURATION: 100 % | TEMPERATURE: 98.6 F | DIASTOLIC BLOOD PRESSURE: 86 MMHG | SYSTOLIC BLOOD PRESSURE: 141 MMHG

## 2020-08-08 DIAGNOSIS — R00.2 PALPITATIONS WITH REGULAR CARDIAC RHYTHM: Primary | ICD-10-CM

## 2020-08-08 LAB
BASOPHILS # BLD AUTO: 0.03 THOUSANDS/ΜL (ref 0–0.1)
BASOPHILS NFR BLD AUTO: 0 % (ref 0–1)
EOSINOPHIL # BLD AUTO: 0.09 THOUSAND/ΜL (ref 0–0.61)
EOSINOPHIL NFR BLD AUTO: 1 % (ref 0–6)
ERYTHROCYTE [DISTWIDTH] IN BLOOD BY AUTOMATED COUNT: 20.7 % (ref 11.6–15.1)
HCT VFR BLD AUTO: 30.7 % (ref 34.8–46.1)
HGB BLD-MCNC: 9 G/DL (ref 11.5–15.4)
IMM GRANULOCYTES # BLD AUTO: 0.03 THOUSAND/UL (ref 0–0.2)
IMM GRANULOCYTES NFR BLD AUTO: 0 % (ref 0–2)
LYMPHOCYTES # BLD AUTO: 2.3 THOUSANDS/ΜL (ref 0.6–4.47)
LYMPHOCYTES NFR BLD AUTO: 24 % (ref 14–44)
MCH RBC QN AUTO: 23.7 PG (ref 26.8–34.3)
MCHC RBC AUTO-ENTMCNC: 29.3 G/DL (ref 31.4–37.4)
MCV RBC AUTO: 81 FL (ref 82–98)
MONOCYTES # BLD AUTO: 0.77 THOUSAND/ΜL (ref 0.17–1.22)
MONOCYTES NFR BLD AUTO: 8 % (ref 4–12)
NEUTROPHILS # BLD AUTO: 6.44 THOUSANDS/ΜL (ref 1.85–7.62)
NEUTS SEG NFR BLD AUTO: 67 % (ref 43–75)
NRBC BLD AUTO-RTO: 0 /100 WBCS
PLATELET # BLD AUTO: 341 THOUSANDS/UL (ref 149–390)
PMV BLD AUTO: 9.9 FL (ref 8.9–12.7)
RBC # BLD AUTO: 3.8 MILLION/UL (ref 3.81–5.12)
WBC # BLD AUTO: 9.66 THOUSAND/UL (ref 4.31–10.16)

## 2020-08-08 PROCEDURE — 80048 BASIC METABOLIC PNL TOTAL CA: CPT | Performed by: EMERGENCY MEDICINE

## 2020-08-08 PROCEDURE — 99285 EMERGENCY DEPT VISIT HI MDM: CPT

## 2020-08-08 PROCEDURE — 93005 ELECTROCARDIOGRAM TRACING: CPT

## 2020-08-08 PROCEDURE — 99285 EMERGENCY DEPT VISIT HI MDM: CPT | Performed by: EMERGENCY MEDICINE

## 2020-08-08 PROCEDURE — 96360 HYDRATION IV INFUSION INIT: CPT

## 2020-08-08 PROCEDURE — 84484 ASSAY OF TROPONIN QUANT: CPT | Performed by: EMERGENCY MEDICINE

## 2020-08-08 PROCEDURE — 85025 COMPLETE CBC W/AUTO DIFF WBC: CPT | Performed by: EMERGENCY MEDICINE

## 2020-08-08 PROCEDURE — 36415 COLL VENOUS BLD VENIPUNCTURE: CPT | Performed by: EMERGENCY MEDICINE

## 2020-08-08 RX ADMIN — SODIUM CHLORIDE 500 ML: 0.9 INJECTION, SOLUTION INTRAVENOUS at 23:46

## 2020-08-09 LAB
ANION GAP SERPL CALCULATED.3IONS-SCNC: 8 MMOL/L (ref 4–13)
ATRIAL RATE: 80 BPM
BUN SERPL-MCNC: 14 MG/DL (ref 5–25)
CALCIUM SERPL-MCNC: 8.3 MG/DL (ref 8.3–10.1)
CHLORIDE SERPL-SCNC: 107 MMOL/L (ref 100–108)
CO2 SERPL-SCNC: 26 MMOL/L (ref 21–32)
CREAT SERPL-MCNC: 0.86 MG/DL (ref 0.6–1.3)
GFR SERPL CREATININE-BSD FRML MDRD: 102 ML/MIN/1.73SQ M
GLUCOSE SERPL-MCNC: 110 MG/DL (ref 65–140)
P AXIS: 51 DEGREES
POTASSIUM SERPL-SCNC: 3.9 MMOL/L (ref 3.5–5.3)
PR INTERVAL: 108 MS
QRS AXIS: 56 DEGREES
QRSD INTERVAL: 84 MS
QT INTERVAL: 354 MS
QTC INTERVAL: 408 MS
SODIUM SERPL-SCNC: 141 MMOL/L (ref 136–145)
T WAVE AXIS: 12 DEGREES
TROPONIN I SERPL-MCNC: <0.02 NG/ML
VENTRICULAR RATE: 80 BPM

## 2020-08-09 PROCEDURE — 93010 ELECTROCARDIOGRAM REPORT: CPT | Performed by: INTERNAL MEDICINE

## 2020-08-10 ENCOUNTER — TELEMEDICINE (OUTPATIENT)
Dept: FAMILY MEDICINE CLINIC | Facility: CLINIC | Age: 35
End: 2020-08-10

## 2020-08-10 DIAGNOSIS — K44.9 HIATAL HERNIA: ICD-10-CM

## 2020-08-10 PROCEDURE — 99213 OFFICE O/P EST LOW 20 MIN: CPT | Performed by: FAMILY MEDICINE

## 2020-08-10 PROCEDURE — 1036F TOBACCO NON-USER: CPT | Performed by: FAMILY MEDICINE

## 2020-08-10 RX ORDER — SUCRALFATE 1 G/1
1 TABLET ORAL 4 TIMES DAILY
Qty: 360 TABLET | Refills: 0 | Status: SHIPPED | OUTPATIENT
Start: 2020-08-10 | End: 2020-12-08 | Stop reason: ALTCHOICE

## 2020-08-10 RX ORDER — OMEPRAZOLE 40 MG/1
40 CAPSULE, DELAYED RELEASE ORAL DAILY
Qty: 90 CAPSULE | Refills: 1 | Status: SHIPPED | OUTPATIENT
Start: 2020-08-10 | End: 2020-12-08 | Stop reason: ALTCHOICE

## 2020-08-10 NOTE — PROGRESS NOTES
Virtual Brief Visit    Assessment/Plan:    Problem List Items Addressed This Visit        Other    Hiatal hernia     - Small hiatal hernia with accompanying reflux disease   - At this time will increase Omeprazole to 40 mg daily to see if this will help patient's symptoms and continue Carafate 1g 4 times daily  If symptoms persist may consider adding a H2 blocker to patient's medication regimen   - Patient advised to keep follow up appointment with bariatric surgery scheduled 8/20    - Reviewed the causes of heartburn and discussed the importance of diet and lifestyle modifications to control GERD symptoms     - Counseled on the importance of avoiding things which worsen heartburn such as caffeine, tomato based products, spicy foods, tobacco, alcohol, obesity, tight fitting clothing   - Discussed importance of eating small, frequent meals instead of large meals; elevating the head of the bed and not laying down 2-3 hours following a meal                 Relevant Medications    omeprazole (PriLOSEC) 40 MG capsule    sucralfate (CARAFATE) 1 g tablet                Reason for visit is   Chief Complaint   Patient presents with    Virtual Brief Visit        Encounter provider Quang Bates MD    Provider located at [address] [place] [id number] [phone]    Recent Visits  No visits were found meeting these conditions  Showing recent visits within past 7 days and meeting all other requirements     Today's Visits  Date Type Provider Dept   08/10/20 Telemedicine Quang Bates MD [facility]   Showing today's visits and meeting all other requirements     Future Appointments  No visits were found meeting these conditions  Showing future appointments within next 150 days and meeting all other requirements        After connecting through telephone, the patient was identified by name and date of birth   Chiquis Kendrick was informed that this is a telemedicine visit and that the visit is being conducted through telephone  My office door was closed  No one else was in the room  She acknowledged consent and understanding of privacy and security of the platform  The patient has agreed to participate and understands she can discontinue the visit at any time  It was my intent to perform this visit via video technology but the patient was not able to do a video connection so the visit was completed via audio telephone only  Patient is aware this is a billable service  Subjective  HPI     Bam Tovar is a 30 y o  female with a past medical history of obesity with history of shahriar-en-y procedure in 2016 and iron deficiency anemia who presents today for a telemedicine visit for evaluation of a hernia  Patient has been following with bariatric surgery and recently underwent an EGD which revealed a small hiatal hernia  Patient is currently on Omeprazole 20 mg and Carafate which has only been providing minimal relief  Patient denies any nausea, vomiting or diarrhea  Patient does have an upcoming appointment with bariatrics scheduled 8/20       Past Medical History:   Diagnosis Date    Anemia     Dizziness     Fatigue     Obesity     11/14/16    Thyroid disease     Ulcer        Past Surgical History:   Procedure Laterality Date    SHAHRIAR-EN-Y PROCEDURE  11/14/2016    Gastric Bypass by Dr Sarahi Calhoun Weight 339 6,nw    TUBAL LIGATION Bilateral     2010    WISDOM TOOTH EXTRACTION         Current Outpatient Medications   Medication Sig Dispense Refill    calcium carbonate 1250 MG capsule Take 1,250 mg by mouth 2 (two) times a day with meals      cholecalciferol (VITAMIN D3) 1,000 units tablet Take 1 tablet (1,000 Units total) by mouth daily 30 tablet 0    cyanocobalamin (VITAMIN B-12) 100 mcg tablet Take by mouth daily      ergocalciferol (VITAMIN D2) 50,000 units Take 1 capsule (50,000 Units total) by mouth once a week for 6 doses 6 capsule 0    ferrous [id number] C-folic acid (FOLTRIN) capsule Take 1 capsule by mouth daily 60 capsule 0    Multiple Vitamin (MULTIVITAMIN) tablet Take 1 tablet by mouth daily      omeprazole (PriLOSEC) 40 MG capsule Take 1 capsule (40 mg total) by mouth daily 90 capsule 1    sucralfate (CARAFATE) 1 g tablet Take 1 tablet (1 g total) by mouth 4 (four) times a day 360 tablet 0     No current facility-administered medications for this visit  Allergies   Allergen Reactions    [place] Oil Shortness Of Breath    Venofer [Iron Sucrose]      Chest pressure after 1st dose  Tachycardia and blurred vision after 8 minutes of dose #2  Review of Systems   Constitutional: Negative  HENT: Negative  Eyes: Negative  Respiratory: Negative  Cardiovascular: Negative  Gastrointestinal: Positive for abdominal pain  Genitourinary: Negative  Musculoskeletal: Negative  Skin: Negative  Neurological: Negative  Psychiatric/Behavioral: Negative  There were no vitals filed for this visit  I spent 15 minutes directly with the patient during this visit    VIRTUAL VISIT DISCLAIMER    Robert Phalen acknowledges that she has consented to an online visit or consultation  She understands that the online visit is based solely on information provided by her, and that, in the absence of a face-to-face physical evaluation by the physician, the diagnosis she receives is both limited and provisional in terms of accuracy and completeness  This is not intended to replace a full medical face-to-face evaluation by the physician  Robert Phalen understands and accepts these terms

## 2020-08-10 NOTE — ASSESSMENT & PLAN NOTE
- Small hiatal hernia with accompanying reflux disease   - At this time will increase Omeprazole to 40 mg daily to see if this will help patient's symptoms and continue Carafate 1g 4 times daily   If symptoms persist may consider adding a H2 blocker to patient's medication regimen   - Patient advised to keep follow up appointment with bariatric surgery scheduled 8/20    - Reviewed the causes of heartburn and discussed the importance of diet and lifestyle modifications to control GERD symptoms     - Counseled on the importance of avoiding things which worsen heartburn such as caffeine, tomato based products, spicy foods, tobacco, alcohol, obesity, tight fitting clothing   - Discussed importance of eating small, frequent meals instead of large meals; elevating the head of the bed and not laying down 2-3 hours following a meal

## 2020-08-20 ENCOUNTER — TELEMEDICINE (OUTPATIENT)
Dept: BARIATRICS | Facility: CLINIC | Age: 35
End: 2020-08-20
Payer: COMMERCIAL

## 2020-08-20 VITALS — BODY MASS INDEX: 33.66 KG/M2 | WEIGHT: 202 LBS | HEIGHT: 65 IN

## 2020-08-20 DIAGNOSIS — K31.1 GASTRIC OUTLET OBSTRUCTION: Primary | ICD-10-CM

## 2020-08-20 PROCEDURE — 3008F BODY MASS INDEX DOCD: CPT | Performed by: FAMILY MEDICINE

## 2020-08-20 PROCEDURE — 3008F BODY MASS INDEX DOCD: CPT | Performed by: SURGERY

## 2020-08-20 PROCEDURE — 3077F SYST BP >= 140 MM HG: CPT | Performed by: SURGERY

## 2020-08-20 PROCEDURE — 1036F TOBACCO NON-USER: CPT | Performed by: SURGERY

## 2020-08-20 PROCEDURE — 3079F DIAST BP 80-89 MM HG: CPT | Performed by: SURGERY

## 2020-08-20 PROCEDURE — 99214 OFFICE O/P EST MOD 30 MIN: CPT | Performed by: SURGERY

## 2020-08-20 NOTE — PROGRESS NOTES
Virtual Regular Visit      Assessment/Plan:    Problem List Items Addressed This Visit     None               Reason for visit is   Chief Complaint   Patient presents with    Virtual Regular Visit        Encounter provider Ben Billy MD    Provider located at [address] [place] [id number]      Recent Visits  No visits were found meeting these conditions  Showing recent visits within past 7 days and meeting all other requirements     Future Appointments  No visits were found meeting these conditions  Showing future appointments within next 150 days and meeting all other requirements        The patient was identified by name and date of birth  Robert Phalen was informed that this is a telemedicine visit and that the visit is being conducted through [employer]  My office door was closed  No one else was in the room  She acknowledged consent and understanding of privacy and security of the video platform  The patient has agreed to participate and understands they can discontinue the visit at any time  Patient is aware this is a billable service  Subjective  Robert Phalen is a 30 y o  female status post laparoscopic Jaycee-en-Y gastric bypass at an outside institution with chronic nausea and vomiting   A recent upper endoscopy showed evidence of what appears to be sharp angulation and 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 which explains her chronic recurrent symptoms, after long discussion with the patient regarding the findings I recommended that she undergoes a revision of her anastomosis which time the gastrojejunostomy will be taken down and done again  Risks and benefits of the procedure including the increased perioperative complication rates given the revision nature of the operation were explained to the patient in details    I will schedule the patient to meet with my dietitian and  before proceeding      HPI     Past Medical History:   Diagnosis Date    Anemia     Dizziness     Fatigue     Obesity     11/14/16    Thyroid disease     Ulcer        Past Surgical History:   Procedure Laterality Date    LITO-EN-Y PROCEDURE  11/14/2016    Gastric Bypass by Dr Abdon Rodrigues Weight 339 6,nw    TUBAL LIGATION Bilateral     2010    WISDOM TOOTH EXTRACTION         Current Outpatient Medications   Medication Sig Dispense Refill    calcium carbonate 1250 MG capsule Take 1,250 mg by mouth 2 (two) times a day with meals      cholecalciferol (VITAMIN D3) 1,000 units tablet Take 1 tablet (1,000 Units total) by mouth daily 30 tablet 0    cyanocobalamin (VITAMIN B-12) 100 mcg tablet Take by mouth daily      ergocalciferol (VITAMIN D2) 50,000 units Take 1 capsule (50,000 Units total) by mouth once a week for 6 doses 6 capsule 0    ferrous [id number] C-folic acid (FOLTRIN) capsule Take 1 capsule by mouth daily 60 capsule 0    Multiple Vitamin (MULTIVITAMIN) tablet Take 1 tablet by mouth daily      omeprazole (PriLOSEC) 40 MG capsule Take 1 capsule (40 mg total) by mouth daily 90 capsule 1    sucralfate (CARAFATE) 1 g tablet Take 1 tablet (1 g total) by mouth 4 (four) times a day 360 tablet 0     No current facility-administered medications for this visit  Allergies   Allergen Reactions    [address] Oil Shortness Of Breath    Venofer [Iron Sucrose]      Chest pressure after 1st dose  Tachycardia and blurred vision after 8 minutes of dose #2  Review of Systems    Video Exam    There were no vitals filed for this visit  Physical Exam     I spent 20 minutes directly with the patient during this visit      VIRTUAL VISIT DISCLAIMER    Krysta Long acknowledges that she has consented to an online visit or consultation   She understands that the online visit is based solely on information provided by her, and that, in the absence of a face-to-face physical evaluation by the physician, the diagnosis she receives is both limited and provisional in terms of accuracy and completeness  This is not intended to replace a full medical face-to-face evaluation by the physician  Senthil Southgate understands and accepts these terms

## 2020-08-22 ENCOUNTER — TELEPHONE (OUTPATIENT)
Dept: OTHER | Facility: OTHER | Age: 35
End: 2020-08-22

## 2020-08-22 NOTE — TELEPHONE ENCOUNTER
I called the patient back  She has been recently complaining of abdominal pain, nausea and vomiting  A recent EGD showed a sharp angulation and her GJ  Dr Geovanni Willis recommended a revision of her anastomosis  She called today because of her pain was worse than usual and associated with one episode of diarrhea  It happened after eating  I told her to take tylenol and continue her PPI and carafate  I told her to come to the ED tomorrow if the pain does not improve

## 2020-08-28 ENCOUNTER — TELEPHONE (OUTPATIENT)
Dept: FAMILY MEDICINE CLINIC | Facility: CLINIC | Age: 35
End: 2020-08-28

## 2020-08-28 DIAGNOSIS — K44.9 HIATAL HERNIA: ICD-10-CM

## 2020-08-28 DIAGNOSIS — K44.9 HIATAL HERNIA: Primary | ICD-10-CM

## 2020-08-28 RX ORDER — FAMOTIDINE 20 MG/1
20 TABLET, FILM COATED ORAL 2 TIMES DAILY
Qty: 60 TABLET | Refills: 1 | Status: SHIPPED | OUTPATIENT
Start: 2020-08-28 | End: 2020-08-28 | Stop reason: SDUPTHER

## 2020-08-28 RX ORDER — FAMOTIDINE 20 MG/1
TABLET, FILM COATED ORAL
Qty: 180 TABLET | Refills: 0 | Status: SHIPPED | OUTPATIENT
Start: 2020-08-28 | End: 2020-12-08 | Stop reason: ALTCHOICE

## 2020-08-28 RX ORDER — ONDANSETRON 4 MG/1
4 TABLET, ORALLY DISINTEGRATING ORAL EVERY 6 HOURS PRN
Qty: 30 TABLET | Refills: 1 | Status: SHIPPED | OUTPATIENT
Start: 2020-08-28 | End: 2020-12-08 | Stop reason: ALTCHOICE

## 2020-08-28 NOTE — TELEPHONE ENCOUNTER
I did recommend she call them but her pain isn't the concern from what I gathered from our conversation, it's that she's not eating on top of taking Tylenol for pain

## 2020-08-28 NOTE — TELEPHONE ENCOUNTER
Pt states that she is in pain that she was told that she was told by the gastro doc that she needs surgery  She would like to get a call back from provider    Please advise

## 2020-08-28 NOTE — TELEPHONE ENCOUNTER
Pt tells me that she went to specialist and they told her she needs surgery  However, she has not been eating all this time and she tells me they gave her no instructions other than to take tylenol for the pain   I put her on the schedule for virtual f/u Monday

## 2020-08-28 NOTE — TELEPHONE ENCOUNTER
Has she called and spoken to the specialist about this? I saw she called them on the 22nd and they told her to come to the office the next day if the pain didn't improve

## 2020-08-31 ENCOUNTER — TELEPHONE (OUTPATIENT)
Dept: FAMILY MEDICINE CLINIC | Facility: CLINIC | Age: 35
End: 2020-08-31

## 2020-08-31 DIAGNOSIS — K59.09 OTHER CONSTIPATION: Primary | ICD-10-CM

## 2020-08-31 RX ORDER — SENNA AND DOCUSATE SODIUM 50; 8.6 MG/1; MG/1
1 TABLET, FILM COATED ORAL DAILY
Qty: 7 TABLET | Refills: 0 | Status: SHIPPED | OUTPATIENT
Start: 2020-08-31 | End: 2020-12-08 | Stop reason: ALTCHOICE

## 2020-09-01 NOTE — TELEPHONE ENCOUNTER
Called and spoke with patient who is having persistent abdominal pain and is not able to eat due to the pain  Patient has only been able to tolerate water and soup  Patient does have an earlier appointment with bariatrics scheduled however as she is in so much pain patient advised to the go to the Emergency department for further evaluation  Patient also states that she is constipated and has not had a bowel movement in 4 days, Senokot-S sent to the pharmacy

## 2020-09-03 ENCOUNTER — TELEPHONE (OUTPATIENT)
Dept: BARIATRICS | Facility: CLINIC | Age: 35
End: 2020-09-03

## 2020-09-09 ENCOUNTER — TELEMEDICINE (OUTPATIENT)
Dept: BARIATRICS | Facility: CLINIC | Age: 35
End: 2020-09-09
Payer: COMMERCIAL

## 2020-09-09 VITALS — HEIGHT: 64 IN | WEIGHT: 202 LBS | BODY MASS INDEX: 34.49 KG/M2

## 2020-09-09 DIAGNOSIS — K31.1 GASTRIC OUTLET OBSTRUCTION: Primary | ICD-10-CM

## 2020-09-09 PROCEDURE — G2012 BRIEF CHECK IN BY MD/QHP: HCPCS | Performed by: SURGERY

## 2020-09-09 NOTE — PROGRESS NOTES
Virtual Brief Visit    Assessment/Plan:    Problem List Items Addressed This Visit     None                Reason for visit is   Chief Complaint   Patient presents with    Follow-up     abd pain     Virtual Brief Visit        Encounter provider Krystin Trimble MD    Provider located at [address] [place] [address] [address] [facility] [id number]    Recent Visits  Date Type Provider Dept   09/03/20 Telephone [address] [place] [address] [facility]   Showing recent visits within past 7 days and meeting all other requirements     Today's Visits  Date Type Provider Dept   09/09/20 Telemedicine Krystin Trimble MD [facility]   Showing today's visits and meeting all other requirements     Future Appointments  No visits were found meeting these conditions  Showing future appointments within next 150 days and meeting all other requirements        After connecting through telephone, the patient was identified by name and date of birth  Alberto Montesinos was informed that this is a telemedicine visit and that the visit is being conducted through telephone  My office door was closed  No one else was in the room  She acknowledged consent and understanding of privacy and security of the platform  The patient has agreed to participate and understands she can discontinue the visit at any time  Patient is aware this is a billable service       Subjective    Alberto Montesinos is a 30 y o  female  S/P LRYGB with 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, I will schedule the patient to be seen by my dietitian and  and then submit the case for pre-approval for a revision of the gastrojejunostomy anastomosis  HPI     Past Medical History:   Diagnosis Date    Anemia     Dizziness     Fatigue     Obesity     11/14/16    Thyroid disease     Ulcer        Past Surgical History:   Procedure Laterality Date    LITO-EN-Y PROCEDURE 11/14/2016    Gastric Bypass by Dr Ulysses Picking Weight 339 6,nw    TUBAL LIGATION Bilateral     2010    WISDOM TOOTH EXTRACTION         Current Outpatient Medications   Medication Sig Dispense Refill    calcium carbonate 1250 MG capsule Take 1,250 mg by mouth 2 (two) times a day with meals      cholecalciferol (VITAMIN D3) 1,000 units tablet Take 1 tablet (1,000 Units total) by mouth daily 30 tablet 0    cyanocobalamin (VITAMIN B-12) 100 mcg tablet Take by mouth daily      famotidine (PEPCID) 20 mg tablet TAKE 1 TABLET BY MOUTH TWICE DAILY 180 tablet 0    ferrous [id number] C-folic acid (FOLTRIN) capsule Take 1 capsule by mouth daily 60 capsule 0    Multiple Vitamin (MULTIVITAMIN) tablet Take 1 tablet by mouth daily      omeprazole (PriLOSEC) 40 MG capsule Take 1 capsule (40 mg total) by mouth daily 90 capsule 1    ondansetron (ZOFRAN-ODT) 4 mg disintegrating tablet Take 1 tablet (4 mg total) by mouth every 6 (six) hours as needed for nausea or vomiting 30 tablet 1    senna-docusate sodium (SENOKOT-S) 8 6-50 mg per tablet Take 1 tablet by mouth daily 7 tablet 0    sucralfate (CARAFATE) 1 g tablet Take 1 tablet (1 g total) by mouth 4 (four) times a day 360 tablet 0     No current facility-administered medications for this visit  Allergies   Allergen Reactions    [place] Oil Shortness Of Breath    Venofer [Iron Sucrose]      Chest pressure after 1st dose  Tachycardia and blurred vision after 8 minutes of dose #2  Review of Systems    Vitals:    09/09/20 1425   Weight: 91 6 kg (202 lb)   Height: 5' 4" (1 626 m)         I spent 15 minutes directly with the patient during this visit    VIRTUAL VISIT DISCLAIMER    Kiah Ribeiro acknowledges that she has consented to an online visit or consultation   She understands that the online visit is based solely on information provided by her, and that, in the absence of a face-to-face physical evaluation by the physician, the diagnosis she receives is both limited and provisional in terms of accuracy and completeness  This is not intended to replace a full medical face-to-face evaluation by the physician  Lee Cavanaugh understands and accepts these terms

## 2020-09-14 ENCOUNTER — TELEPHONE (OUTPATIENT)
Dept: BARIATRICS | Facility: CLINIC | Age: 35
End: 2020-09-14

## 2020-09-14 NOTE — TELEPHONE ENCOUNTER
Patient called stated she was in pain and she saw blood when she went to the bathroom and that she would be going to the ER

## 2020-09-15 ENCOUNTER — TELEPHONE (OUTPATIENT)
Dept: HEMATOLOGY ONCOLOGY | Facility: CLINIC | Age: 35
End: 2020-09-15

## 2020-09-18 ENCOUNTER — TELEPHONE (OUTPATIENT)
Dept: HEMATOLOGY ONCOLOGY | Facility: CLINIC | Age: 35
End: 2020-09-18

## 2020-09-18 NOTE — TELEPHONE ENCOUNTER
LVM to remind the patient to have labs done prior to appt on 9/21/2020  This is the 2nd message that I left

## 2020-09-21 ENCOUNTER — TELEPHONE (OUTPATIENT)
Dept: HEMATOLOGY ONCOLOGY | Facility: CLINIC | Age: 35
End: 2020-09-21

## 2020-09-21 NOTE — TELEPHONE ENCOUNTER
Patient missed 11:20 a m  f/u apt today Monday 9/21/2020 w/Dr Demetrice Loera at [address] [facility]  Called pt and left message asking pt to please complete lab work first, and then call the office to reschedule apt, [phone]  Following labs must be completed for apt to be scheduled- CBC w/Diff, Iron Panel, Vitamin B12, Ferritin, and Occult Blood  If labs are done at [facility] Ferritin doesn't need to be completed as it is included in the Iron Panel

## 2020-10-06 ENCOUNTER — CLINICAL SUPPORT (OUTPATIENT)
Dept: BARIATRICS | Facility: CLINIC | Age: 35
End: 2020-10-06

## 2020-10-06 VITALS — WEIGHT: 204.5 LBS | TEMPERATURE: 98.1 F | HEIGHT: 64 IN | BODY MASS INDEX: 34.91 KG/M2

## 2020-10-06 DIAGNOSIS — K31.1 GASTRIC OUTLET OBSTRUCTION: ICD-10-CM

## 2020-10-06 DIAGNOSIS — Z98.84 S/P GASTRIC BYPASS: Primary | ICD-10-CM

## 2020-10-06 DIAGNOSIS — K91.2 POSTSURGICAL MALABSORPTION: ICD-10-CM

## 2020-10-06 DIAGNOSIS — K31.1 GASTRIC OUTLET OBSTRUCTION: Primary | ICD-10-CM

## 2020-10-06 PROCEDURE — RECHECK

## 2020-10-14 ENCOUNTER — HOSPITAL ENCOUNTER (EMERGENCY)
Facility: HOSPITAL | Age: 35
Discharge: HOME | End: 2020-10-15
Attending: EMERGENCY MEDICINE
Payer: COMMERCIAL

## 2020-10-14 VITALS
DIASTOLIC BLOOD PRESSURE: 82 MMHG | WEIGHT: 210.76 LBS | RESPIRATION RATE: 18 BRPM | BODY MASS INDEX: 36.18 KG/M2 | TEMPERATURE: 98 F | OXYGEN SATURATION: 100 % | SYSTOLIC BLOOD PRESSURE: 135 MMHG | HEART RATE: 65 BPM

## 2020-10-14 DIAGNOSIS — R10.9 ABDOMINAL PAIN: Primary | ICD-10-CM

## 2020-10-14 DIAGNOSIS — R11.2 NAUSEA AND VOMITING: ICD-10-CM

## 2020-10-14 LAB
ALBUMIN SERPL BCP-MCNC: 3.4 G/DL (ref 3.5–5)
ALP SERPL-CCNC: 87 U/L (ref 46–116)
ALT SERPL W P-5'-P-CCNC: 24 U/L (ref 12–78)
ANION GAP SERPL CALCULATED.3IONS-SCNC: 7 MMOL/L (ref 4–13)
AST SERPL W P-5'-P-CCNC: 18 U/L (ref 5–45)
BACTERIA UR QL AUTO: ABNORMAL /HPF
BASOPHILS # BLD AUTO: 0.02 THOUSANDS/ΜL (ref 0–0.1)
BASOPHILS NFR BLD AUTO: 0 % (ref 0–1)
BILIRUB SERPL-MCNC: 0.19 MG/DL (ref 0.2–1)
BILIRUB UR QL STRIP: NEGATIVE
BUN SERPL-MCNC: 17 MG/DL (ref 5–25)
CALCIUM ALBUM COR SERPL-MCNC: 8.9 MG/DL (ref 8.3–10.1)
CALCIUM SERPL-MCNC: 8.4 MG/DL (ref 8.3–10.1)
CHLORIDE SERPL-SCNC: 108 MMOL/L (ref 100–108)
CLARITY UR: CLEAR
CLARITY, POC: CLEAR
CO2 SERPL-SCNC: 24 MMOL/L (ref 21–32)
COLOR UR: YELLOW
COLOR, POC: YELLOW
CREAT SERPL-MCNC: 0.74 MG/DL (ref 0.6–1.3)
EOSINOPHIL # BLD AUTO: 0.13 THOUSAND/ΜL (ref 0–0.61)
EOSINOPHIL NFR BLD AUTO: 2 % (ref 0–6)
ERYTHROCYTE [DISTWIDTH] IN BLOOD BY AUTOMATED COUNT: 19.4 % (ref 11.6–15.1)
EXT PREG TEST URINE: NEGATIVE
EXT. CONTROL ED NAV: NORMAL
GFR SERPL CREATININE-BSD FRML MDRD: 106 ML/MIN/1.73SQ M
GLUCOSE SERPL-MCNC: 103 MG/DL (ref 65–140)
GLUCOSE UR STRIP-MCNC: NEGATIVE MG/DL
HCT VFR BLD AUTO: 29.9 % (ref 34.8–46.1)
HGB BLD-MCNC: 9 G/DL (ref 11.5–15.4)
HGB UR QL STRIP.AUTO: NEGATIVE
IMM GRANULOCYTES # BLD AUTO: 0.03 THOUSAND/UL (ref 0–0.2)
IMM GRANULOCYTES NFR BLD AUTO: 0 % (ref 0–2)
KETONES UR STRIP-MCNC: NEGATIVE MG/DL
LACTATE SERPL-SCNC: 0.4 MMOL/L (ref 0.5–2)
LEUKOCYTE ESTERASE UR QL STRIP: ABNORMAL
LIPASE SERPL-CCNC: 93 U/L (ref 73–393)
LYMPHOCYTES # BLD AUTO: 1.88 THOUSANDS/ΜL (ref 0.6–4.47)
LYMPHOCYTES NFR BLD AUTO: 22 % (ref 14–44)
MCH RBC QN AUTO: 24.3 PG (ref 26.8–34.3)
MCHC RBC AUTO-ENTMCNC: 30.1 G/DL (ref 31.4–37.4)
MCV RBC AUTO: 81 FL (ref 82–98)
MONOCYTES # BLD AUTO: 0.62 THOUSAND/ΜL (ref 0.17–1.22)
MONOCYTES NFR BLD AUTO: 7 % (ref 4–12)
NEUTROPHILS # BLD AUTO: 5.99 THOUSANDS/ΜL (ref 1.85–7.62)
NEUTS SEG NFR BLD AUTO: 69 % (ref 43–75)
NITRITE UR QL STRIP: NEGATIVE
NON-SQ EPI CELLS URNS QL MICRO: ABNORMAL /HPF
NRBC BLD AUTO-RTO: 0 /100 WBCS
PH UR STRIP.AUTO: 5.5 [PH] (ref 4.5–8)
PLATELET # BLD AUTO: 371 THOUSANDS/UL (ref 149–390)
PMV BLD AUTO: 9.7 FL (ref 8.9–12.7)
POTASSIUM SERPL-SCNC: 4.1 MMOL/L (ref 3.5–5.3)
PROT SERPL-MCNC: 7.3 G/DL (ref 6.4–8.2)
PROT UR STRIP-MCNC: NEGATIVE MG/DL
RBC # BLD AUTO: 3.7 MILLION/UL (ref 3.81–5.12)
RBC #/AREA URNS AUTO: ABNORMAL /HPF
SODIUM SERPL-SCNC: 139 MMOL/L (ref 136–145)
SP GR UR STRIP.AUTO: 1.01 (ref 1–1.03)
UROBILINOGEN UR QL STRIP.AUTO: 0.2 E.U./DL
WBC # BLD AUTO: 8.67 THOUSAND/UL (ref 4.31–10.16)
WBC #/AREA URNS AUTO: ABNORMAL /HPF

## 2020-10-14 PROCEDURE — 83605 ASSAY OF LACTIC ACID: CPT | Performed by: EMERGENCY MEDICINE

## 2020-10-14 PROCEDURE — 80053 COMPREHEN METABOLIC PANEL: CPT | Performed by: EMERGENCY MEDICINE

## 2020-10-14 PROCEDURE — 81025 URINE PREGNANCY TEST: CPT | Performed by: EMERGENCY MEDICINE

## 2020-10-14 PROCEDURE — 99285 EMERGENCY DEPT VISIT HI MDM: CPT | Performed by: EMERGENCY MEDICINE

## 2020-10-14 PROCEDURE — 36415 COLL VENOUS BLD VENIPUNCTURE: CPT | Performed by: EMERGENCY MEDICINE

## 2020-10-14 PROCEDURE — 99284 EMERGENCY DEPT VISIT MOD MDM: CPT

## 2020-10-14 PROCEDURE — 96361 HYDRATE IV INFUSION ADD-ON: CPT

## 2020-10-14 PROCEDURE — 96374 THER/PROPH/DIAG INJ IV PUSH: CPT

## 2020-10-14 PROCEDURE — 81001 URINALYSIS AUTO W/SCOPE: CPT

## 2020-10-14 PROCEDURE — 83690 ASSAY OF LIPASE: CPT | Performed by: EMERGENCY MEDICINE

## 2020-10-14 PROCEDURE — 85025 COMPLETE CBC W/AUTO DIFF WBC: CPT | Performed by: EMERGENCY MEDICINE

## 2020-10-14 RX ORDER — MORPHINE SULFATE 4 MG/ML
4 INJECTION, SOLUTION INTRAMUSCULAR; INTRAVENOUS ONCE
Status: COMPLETED | OUTPATIENT
Start: 2020-10-14 | End: 2020-10-14

## 2020-10-14 RX ADMIN — SODIUM CHLORIDE 1000 ML: 0.9 INJECTION, SOLUTION INTRAVENOUS at 22:23

## 2020-10-14 RX ADMIN — MORPHINE SULFATE 4 MG: 4 INJECTION INTRAVENOUS at 22:24

## 2020-10-15 ENCOUNTER — APPOINTMENT (EMERGENCY)
Dept: CT IMAGING | Facility: HOSPITAL | Age: 35
End: 2020-10-15
Payer: COMMERCIAL

## 2020-10-15 PROCEDURE — 74177 CT ABD & PELVIS W/CONTRAST: CPT

## 2020-10-15 PROCEDURE — G1004 CDSM NDSC: HCPCS

## 2020-10-15 RX ORDER — DICYCLOMINE HCL 20 MG
20 TABLET ORAL 2 TIMES DAILY
Qty: 20 TABLET | Refills: 0 | Status: SHIPPED | OUTPATIENT
Start: 2020-10-15 | End: 2020-12-08 | Stop reason: ALTCHOICE

## 2020-10-15 RX ORDER — ONDANSETRON 4 MG/1
4 TABLET, ORALLY DISINTEGRATING ORAL EVERY 8 HOURS PRN
Qty: 20 TABLET | Refills: 0 | Status: SHIPPED | OUTPATIENT
Start: 2020-10-15 | End: 2020-12-08 | Stop reason: ALTCHOICE

## 2020-10-15 RX ADMIN — IOHEXOL 10 ML: 240 INJECTION, SOLUTION INTRATHECAL; INTRAVASCULAR; INTRAVENOUS; ORAL at 00:20

## 2020-10-15 RX ADMIN — IOHEXOL 100 ML: 350 INJECTION, SOLUTION INTRAVENOUS at 00:20

## 2020-10-16 ENCOUNTER — OFFICE VISIT (OUTPATIENT)
Dept: BARIATRICS | Facility: CLINIC | Age: 35
End: 2020-10-16
Payer: COMMERCIAL

## 2020-10-16 VITALS
TEMPERATURE: 97.3 F | BODY MASS INDEX: 35.77 KG/M2 | SYSTOLIC BLOOD PRESSURE: 124 MMHG | HEART RATE: 66 BPM | WEIGHT: 209.5 LBS | DIASTOLIC BLOOD PRESSURE: 70 MMHG | HEIGHT: 64 IN

## 2020-10-16 DIAGNOSIS — Z98.84 S/P GASTRIC BYPASS: Primary | ICD-10-CM

## 2020-10-16 PROCEDURE — 3078F DIAST BP <80 MM HG: CPT | Performed by: SURGERY

## 2020-10-16 PROCEDURE — 1036F TOBACCO NON-USER: CPT | Performed by: SURGERY

## 2020-10-16 PROCEDURE — 99213 OFFICE O/P EST LOW 20 MIN: CPT | Performed by: SURGERY

## 2020-10-16 PROCEDURE — 3074F SYST BP LT 130 MM HG: CPT | Performed by: SURGERY

## 2020-10-27 ENCOUNTER — CONSULT (OUTPATIENT)
Dept: CARDIOLOGY CLINIC | Facility: CLINIC | Age: 35
End: 2020-10-27
Payer: COMMERCIAL

## 2020-10-27 VITALS — DIASTOLIC BLOOD PRESSURE: 89 MMHG | SYSTOLIC BLOOD PRESSURE: 138 MMHG | HEART RATE: 78 BPM

## 2020-10-27 DIAGNOSIS — Z01.810 PREOPERATIVE CARDIOVASCULAR EXAMINATION: Primary | ICD-10-CM

## 2020-10-27 DIAGNOSIS — E78.5 HYPERLIPIDEMIA, UNSPECIFIED HYPERLIPIDEMIA TYPE: ICD-10-CM

## 2020-10-27 DIAGNOSIS — K31.1 GASTRIC OUTLET OBSTRUCTION: ICD-10-CM

## 2020-10-27 DIAGNOSIS — D50.8 IRON DEFICIENCY ANEMIA SECONDARY TO INADEQUATE DIETARY IRON INTAKE: ICD-10-CM

## 2020-10-27 PROCEDURE — 99243 OFF/OP CNSLTJ NEW/EST LOW 30: CPT | Performed by: INTERNAL MEDICINE

## 2020-10-27 PROCEDURE — 93000 ELECTROCARDIOGRAM COMPLETE: CPT | Performed by: INTERNAL MEDICINE

## 2020-11-01 ENCOUNTER — HOSPITAL ENCOUNTER (EMERGENCY)
Facility: HOSPITAL | Age: 35
Discharge: HOME | End: 2020-11-01
Attending: EMERGENCY MEDICINE
Payer: COMMERCIAL

## 2020-11-01 VITALS
WEIGHT: 204.81 LBS | RESPIRATION RATE: 18 BRPM | OXYGEN SATURATION: 100 % | BODY MASS INDEX: 35.16 KG/M2 | DIASTOLIC BLOOD PRESSURE: 86 MMHG | TEMPERATURE: 97.7 F | HEART RATE: 90 BPM | SYSTOLIC BLOOD PRESSURE: 159 MMHG

## 2020-11-01 DIAGNOSIS — R19.7 DIARRHEA: Primary | ICD-10-CM

## 2020-11-01 LAB
ALBUMIN SERPL BCP-MCNC: 4.1 G/DL (ref 3–5.2)
ALP SERPL-CCNC: 59 U/L (ref 43–122)
ALT SERPL W P-5'-P-CCNC: 16 U/L (ref 9–52)
ANION GAP SERPL CALCULATED.3IONS-SCNC: 7 MMOL/L (ref 5–14)
ANISOCYTOSIS BLD QL SMEAR: PRESENT
AST SERPL W P-5'-P-CCNC: 21 U/L (ref 14–36)
ATRIAL RATE: 71 BPM
BASOPHILS # BLD AUTO: 0 THOUSANDS/ΜL (ref 0–0.1)
BASOPHILS NFR BLD AUTO: 0 % (ref 0–1)
BILIRUB SERPL-MCNC: 0.5 MG/DL
BUN SERPL-MCNC: 13 MG/DL (ref 5–25)
CALCIUM SERPL-MCNC: 8.5 MG/DL (ref 8.4–10.2)
CHLORIDE SERPL-SCNC: 107 MMOL/L (ref 97–108)
CO2 SERPL-SCNC: 23 MMOL/L (ref 22–30)
CREAT SERPL-MCNC: 0.62 MG/DL (ref 0.6–1.2)
EOSINOPHIL # BLD AUTO: 0.1 THOUSAND/ΜL (ref 0–0.4)
EOSINOPHIL NFR BLD AUTO: 1 % (ref 0–6)
ERYTHROCYTE [DISTWIDTH] IN BLOOD BY AUTOMATED COUNT: 19.9 %
GFR SERPL CREATININE-BSD FRML MDRD: 118 ML/MIN/1.73SQ M
GLUCOSE SERPL-MCNC: 96 MG/DL (ref 70–99)
HCT VFR BLD AUTO: 31.8 % (ref 36–46)
HGB BLD-MCNC: 10.2 G/DL (ref 12–16)
HYPERCHROMIA BLD QL SMEAR: PRESENT
LG PLATELETS BLD QL SMEAR: PRESENT
LYMPHOCYTES # BLD AUTO: 1.4 THOUSANDS/ΜL (ref 0.5–4)
LYMPHOCYTES NFR BLD AUTO: 16 % (ref 25–45)
MCH RBC QN AUTO: 25.5 PG (ref 26–34)
MCHC RBC AUTO-ENTMCNC: 32.1 G/DL (ref 31–36)
MCV RBC AUTO: 79 FL (ref 80–100)
MONOCYTES # BLD AUTO: 0.9 THOUSAND/ΜL (ref 0.2–0.9)
MONOCYTES NFR BLD AUTO: 11 % (ref 1–10)
NEUTROPHILS # BLD AUTO: 6.3 THOUSANDS/ΜL (ref 1.8–7.8)
NEUTS SEG NFR BLD AUTO: 72 % (ref 45–65)
P AXIS: 27 DEGREES
PLATELET # BLD AUTO: 258 THOUSANDS/UL (ref 150–450)
PLATELET BLD QL SMEAR: ADEQUATE
PMV BLD AUTO: 8.2 FL (ref 8.9–12.7)
POTASSIUM SERPL-SCNC: 4.3 MMOL/L (ref 3.6–5)
PR INTERVAL: 126 MS
PROT SERPL-MCNC: 7.7 G/DL (ref 5.9–8.4)
QRS AXIS: 14 DEGREES
QRSD INTERVAL: 84 MS
QT INTERVAL: 376 MS
QTC INTERVAL: 408 MS
RBC # BLD AUTO: 4.02 MILLION/UL (ref 4–5.2)
RBC MORPH BLD: NORMAL
SODIUM SERPL-SCNC: 137 MMOL/L (ref 137–147)
T WAVE AXIS: 12 DEGREES
VENTRICULAR RATE: 71 BPM
WBC # BLD AUTO: 8.7 THOUSAND/UL (ref 4.5–11)

## 2020-11-01 PROCEDURE — 93010 ELECTROCARDIOGRAM REPORT: CPT

## 2020-11-01 PROCEDURE — 93005 ELECTROCARDIOGRAM TRACING: CPT

## 2020-11-01 PROCEDURE — 85025 COMPLETE CBC W/AUTO DIFF WBC: CPT | Performed by: EMERGENCY MEDICINE

## 2020-11-01 PROCEDURE — 36415 COLL VENOUS BLD VENIPUNCTURE: CPT | Performed by: EMERGENCY MEDICINE

## 2020-11-01 PROCEDURE — 80053 COMPREHEN METABOLIC PANEL: CPT | Performed by: EMERGENCY MEDICINE

## 2020-11-01 PROCEDURE — 99284 EMERGENCY DEPT VISIT MOD MDM: CPT

## 2020-11-01 PROCEDURE — 99285 EMERGENCY DEPT VISIT HI MDM: CPT | Performed by: EMERGENCY MEDICINE

## 2020-11-05 ENCOUNTER — TELEPHONE (OUTPATIENT)
Dept: OTHER | Facility: OTHER | Age: 35
End: 2020-11-05

## 2020-11-21 ENCOUNTER — HOSPITAL ENCOUNTER (EMERGENCY)
Facility: HOSPITAL | Age: 35
Discharge: HOME | End: 2020-11-21
Attending: EMERGENCY MEDICINE | Admitting: EMERGENCY MEDICINE
Payer: COMMERCIAL

## 2020-11-21 ENCOUNTER — APPOINTMENT (EMERGENCY)
Dept: CT IMAGING | Facility: HOSPITAL | Age: 35
End: 2020-11-21
Payer: COMMERCIAL

## 2020-11-21 VITALS
HEART RATE: 60 BPM | WEIGHT: 211.42 LBS | BODY MASS INDEX: 36.29 KG/M2 | RESPIRATION RATE: 18 BRPM | TEMPERATURE: 98.2 F | OXYGEN SATURATION: 100 % | SYSTOLIC BLOOD PRESSURE: 148 MMHG | DIASTOLIC BLOOD PRESSURE: 81 MMHG

## 2020-11-21 DIAGNOSIS — R10.2 PELVIC PAIN: Primary | ICD-10-CM

## 2020-11-21 DIAGNOSIS — R51.9 HEADACHE: ICD-10-CM

## 2020-11-21 LAB
ALBUMIN SERPL BCP-MCNC: 3.3 G/DL (ref 3.5–5)
ALP SERPL-CCNC: 67 U/L (ref 46–116)
ALT SERPL W P-5'-P-CCNC: 14 U/L (ref 12–78)
ANION GAP SERPL CALCULATED.3IONS-SCNC: 3 MMOL/L (ref 4–13)
AST SERPL W P-5'-P-CCNC: 11 U/L (ref 5–45)
BASOPHILS # BLD AUTO: 0.02 THOUSANDS/ΜL (ref 0–0.1)
BASOPHILS NFR BLD AUTO: 0 % (ref 0–1)
BILIRUB SERPL-MCNC: 0.29 MG/DL (ref 0.2–1)
BILIRUB UR QL STRIP: NEGATIVE
BUN SERPL-MCNC: 8 MG/DL (ref 5–25)
CALCIUM ALBUM COR SERPL-MCNC: 9.1 MG/DL (ref 8.3–10.1)
CALCIUM SERPL-MCNC: 8.5 MG/DL (ref 8.3–10.1)
CHLORIDE SERPL-SCNC: 106 MMOL/L (ref 100–108)
CLARITY UR: CLEAR
CLARITY, POC: CLEAR
CO2 SERPL-SCNC: 28 MMOL/L (ref 21–32)
COLOR UR: YELLOW
COLOR, POC: YELLOW
CREAT SERPL-MCNC: 0.61 MG/DL (ref 0.6–1.3)
EOSINOPHIL # BLD AUTO: 0.1 THOUSAND/ΜL (ref 0–0.61)
EOSINOPHIL NFR BLD AUTO: 2 % (ref 0–6)
ERYTHROCYTE [DISTWIDTH] IN BLOOD BY AUTOMATED COUNT: 18.2 % (ref 11.6–15.1)
EXT PREG TEST URINE: NEGATIVE
EXT. CONTROL ED NAV: NORMAL
GFR SERPL CREATININE-BSD FRML MDRD: 119 ML/MIN/1.73SQ M
GLUCOSE SERPL-MCNC: 82 MG/DL (ref 65–140)
GLUCOSE UR STRIP-MCNC: NEGATIVE MG/DL
HCT VFR BLD AUTO: 31.9 % (ref 34.8–46.1)
HGB BLD-MCNC: 9.7 G/DL (ref 11.5–15.4)
HGB UR QL STRIP.AUTO: NEGATIVE
IMM GRANULOCYTES # BLD AUTO: 0.02 THOUSAND/UL (ref 0–0.2)
IMM GRANULOCYTES NFR BLD AUTO: 0 % (ref 0–2)
KETONES UR STRIP-MCNC: NEGATIVE MG/DL
LEUKOCYTE ESTERASE UR QL STRIP: NEGATIVE
LIPASE SERPL-CCNC: 79 U/L (ref 73–393)
LYMPHOCYTES # BLD AUTO: 1.41 THOUSANDS/ΜL (ref 0.6–4.47)
LYMPHOCYTES NFR BLD AUTO: 24 % (ref 14–44)
MAGNESIUM SERPL-MCNC: 2.2 MG/DL (ref 1.6–2.6)
MCH RBC QN AUTO: 25.6 PG (ref 26.8–34.3)
MCHC RBC AUTO-ENTMCNC: 30.4 G/DL (ref 31.4–37.4)
MCV RBC AUTO: 84 FL (ref 82–98)
MONOCYTES # BLD AUTO: 0.61 THOUSAND/ΜL (ref 0.17–1.22)
MONOCYTES NFR BLD AUTO: 10 % (ref 4–12)
NEUTROPHILS # BLD AUTO: 3.71 THOUSANDS/ΜL (ref 1.85–7.62)
NEUTS SEG NFR BLD AUTO: 64 % (ref 43–75)
NITRITE UR QL STRIP: NEGATIVE
NRBC BLD AUTO-RTO: 0 /100 WBCS
PH UR STRIP.AUTO: 7 [PH] (ref 4.5–8)
PLATELET # BLD AUTO: 329 THOUSANDS/UL (ref 149–390)
PMV BLD AUTO: 10.4 FL (ref 8.9–12.7)
POTASSIUM SERPL-SCNC: 4.6 MMOL/L (ref 3.5–5.3)
PROT SERPL-MCNC: 7.2 G/DL (ref 6.4–8.2)
PROT UR STRIP-MCNC: NEGATIVE MG/DL
RBC # BLD AUTO: 3.79 MILLION/UL (ref 3.81–5.12)
SODIUM SERPL-SCNC: 137 MMOL/L (ref 136–145)
SP GR UR STRIP.AUTO: 1.02 (ref 1–1.03)
UROBILINOGEN UR QL STRIP.AUTO: 0.2 E.U./DL
WBC # BLD AUTO: 5.87 THOUSAND/UL (ref 4.31–10.16)

## 2020-11-21 PROCEDURE — 81003 URINALYSIS AUTO W/O SCOPE: CPT

## 2020-11-21 PROCEDURE — 74177 CT ABD & PELVIS W/CONTRAST: CPT

## 2020-11-21 PROCEDURE — 80053 COMPREHEN METABOLIC PANEL: CPT | Performed by: EMERGENCY MEDICINE

## 2020-11-21 PROCEDURE — 70496 CT ANGIOGRAPHY HEAD: CPT

## 2020-11-21 PROCEDURE — 96361 HYDRATE IV INFUSION ADD-ON: CPT

## 2020-11-21 PROCEDURE — 36415 COLL VENOUS BLD VENIPUNCTURE: CPT | Performed by: EMERGENCY MEDICINE

## 2020-11-21 PROCEDURE — 99284 EMERGENCY DEPT VISIT MOD MDM: CPT

## 2020-11-21 PROCEDURE — 83690 ASSAY OF LIPASE: CPT | Performed by: EMERGENCY MEDICINE

## 2020-11-21 PROCEDURE — 96375 TX/PRO/DX INJ NEW DRUG ADDON: CPT

## 2020-11-21 PROCEDURE — 83735 ASSAY OF MAGNESIUM: CPT | Performed by: EMERGENCY MEDICINE

## 2020-11-21 PROCEDURE — 96374 THER/PROPH/DIAG INJ IV PUSH: CPT

## 2020-11-21 PROCEDURE — 85025 COMPLETE CBC W/AUTO DIFF WBC: CPT | Performed by: EMERGENCY MEDICINE

## 2020-11-21 PROCEDURE — G1004 CDSM NDSC: HCPCS

## 2020-11-21 PROCEDURE — 99284 EMERGENCY DEPT VISIT MOD MDM: CPT | Performed by: EMERGENCY MEDICINE

## 2020-11-21 PROCEDURE — 81025 URINE PREGNANCY TEST: CPT | Performed by: EMERGENCY MEDICINE

## 2020-11-21 RX ORDER — METOCLOPRAMIDE HYDROCHLORIDE 5 MG/ML
10 INJECTION INTRAMUSCULAR; INTRAVENOUS ONCE
Status: COMPLETED | OUTPATIENT
Start: 2020-11-21 | End: 2020-11-21

## 2020-11-21 RX ORDER — DIPHENHYDRAMINE HYDROCHLORIDE 50 MG/ML
12.5 INJECTION INTRAMUSCULAR; INTRAVENOUS ONCE
Status: COMPLETED | OUTPATIENT
Start: 2020-11-21 | End: 2020-11-21

## 2020-11-21 RX ORDER — KETOROLAC TROMETHAMINE 30 MG/ML
15 INJECTION, SOLUTION INTRAMUSCULAR; INTRAVENOUS ONCE
Status: COMPLETED | OUTPATIENT
Start: 2020-11-21 | End: 2020-11-21

## 2020-11-21 RX ADMIN — METOCLOPRAMIDE 10 MG: 5 INJECTION, SOLUTION INTRAMUSCULAR; INTRAVENOUS at 10:54

## 2020-11-21 RX ADMIN — DIPHENHYDRAMINE HYDROCHLORIDE 12.5 MG: 50 INJECTION, SOLUTION INTRAMUSCULAR; INTRAVENOUS at 10:54

## 2020-11-21 RX ADMIN — KETOROLAC TROMETHAMINE 15 MG: 30 INJECTION, SOLUTION INTRAMUSCULAR at 10:54

## 2020-11-21 RX ADMIN — SODIUM CHLORIDE 1000 ML: 0.9 INJECTION, SOLUTION INTRAVENOUS at 10:54

## 2020-11-21 RX ADMIN — IOHEXOL 50 ML: 240 INJECTION, SOLUTION INTRATHECAL; INTRAVASCULAR; INTRAVENOUS; ORAL at 12:25

## 2020-11-21 RX ADMIN — IOHEXOL 100 ML: 350 INJECTION, SOLUTION INTRAVENOUS at 12:25

## 2020-11-24 ENCOUNTER — OFFICE VISIT (OUTPATIENT)
Dept: OBGYN CLINIC | Facility: CLINIC | Age: 35
End: 2020-11-24

## 2020-11-24 VITALS — WEIGHT: 214.2 LBS | HEART RATE: 69 BPM | BODY MASS INDEX: 36.57 KG/M2 | HEIGHT: 64 IN

## 2020-11-24 DIAGNOSIS — N64.4 BREAST PAIN, RIGHT: Primary | ICD-10-CM

## 2020-11-24 PROCEDURE — 99202 OFFICE O/P NEW SF 15 MIN: CPT | Performed by: OBSTETRICS & GYNECOLOGY

## 2020-11-24 PROCEDURE — 3008F BODY MASS INDEX DOCD: CPT | Performed by: INTERNAL MEDICINE

## 2020-11-24 PROCEDURE — 1036F TOBACCO NON-USER: CPT | Performed by: OBSTETRICS & GYNECOLOGY

## 2020-11-24 PROCEDURE — 3008F BODY MASS INDEX DOCD: CPT | Performed by: OBSTETRICS & GYNECOLOGY

## 2020-12-03 ENCOUNTER — OFFICE VISIT (OUTPATIENT)
Dept: BARIATRICS | Facility: CLINIC | Age: 35
End: 2020-12-03
Payer: COMMERCIAL

## 2020-12-03 ENCOUNTER — CLINICAL SUPPORT (OUTPATIENT)
Dept: BARIATRICS | Facility: CLINIC | Age: 35
End: 2020-12-03

## 2020-12-03 VITALS
BODY MASS INDEX: 35.51 KG/M2 | WEIGHT: 208 LBS | RESPIRATION RATE: 20 BRPM | HEIGHT: 64 IN | SYSTOLIC BLOOD PRESSURE: 138 MMHG | DIASTOLIC BLOOD PRESSURE: 86 MMHG | TEMPERATURE: 98.2 F | HEART RATE: 74 BPM

## 2020-12-03 DIAGNOSIS — Z98.84 S/P GASTRIC BYPASS: ICD-10-CM

## 2020-12-03 DIAGNOSIS — K91.2 POSTSURGICAL MALABSORPTION: Primary | ICD-10-CM

## 2020-12-03 DIAGNOSIS — E66.01 MORBID OBESITY (HCC): ICD-10-CM

## 2020-12-03 DIAGNOSIS — R10.13 EPIGASTRIC PAIN: Primary | ICD-10-CM

## 2020-12-03 PROCEDURE — RECHECK: Performed by: DIETITIAN, REGISTERED

## 2020-12-03 PROCEDURE — 99214 OFFICE O/P EST MOD 30 MIN: CPT | Performed by: SURGERY

## 2020-12-04 DIAGNOSIS — E66.01 MORBID OBESITY (HCC): Primary | ICD-10-CM

## 2020-12-04 RX ORDER — OXYCODONE HYDROCHLORIDE 5 MG/1
5 TABLET ORAL EVERY 4 HOURS PRN
Qty: 10 TABLET | Refills: 0 | Status: SHIPPED | OUTPATIENT
Start: 2020-12-15 | End: 2020-12-08 | Stop reason: ALTCHOICE

## 2020-12-04 RX ORDER — OMEPRAZOLE 20 MG/1
20 CAPSULE, DELAYED RELEASE ORAL DAILY
Qty: 30 CAPSULE | Refills: 3 | Status: SHIPPED | OUTPATIENT
Start: 2020-12-04 | End: 2020-12-08 | Stop reason: ALTCHOICE

## 2020-12-08 ENCOUNTER — OFFICE VISIT (OUTPATIENT)
Dept: FAMILY MEDICINE CLINIC | Facility: CLINIC | Age: 35
End: 2020-12-08
Payer: COMMERCIAL

## 2020-12-08 VITALS
WEIGHT: 205 LBS | HEART RATE: 93 BPM | BODY MASS INDEX: 36.32 KG/M2 | SYSTOLIC BLOOD PRESSURE: 138 MMHG | DIASTOLIC BLOOD PRESSURE: 84 MMHG | HEIGHT: 63 IN | OXYGEN SATURATION: 100 %

## 2020-12-08 DIAGNOSIS — E53.8 B12 DEFICIENCY: ICD-10-CM

## 2020-12-08 DIAGNOSIS — D51.8 OTHER VITAMIN B12 DEFICIENCY ANEMIA: ICD-10-CM

## 2020-12-08 DIAGNOSIS — R10.2 PELVIC PAIN: Primary | ICD-10-CM

## 2020-12-08 DIAGNOSIS — Z11.59 SPECIAL SCREENING EXAMINATION FOR VIRAL DISEASE: ICD-10-CM

## 2020-12-08 DIAGNOSIS — E55.9 VITAMIN D DEFICIENCY: ICD-10-CM

## 2020-12-08 DIAGNOSIS — R71.0 DROP IN HEMATOCRIT: ICD-10-CM

## 2020-12-08 DIAGNOSIS — G44.89 OTHER HEADACHE SYNDROME: ICD-10-CM

## 2020-12-08 DIAGNOSIS — R71.0 DROP IN HEMOGLOBIN: ICD-10-CM

## 2020-12-08 DIAGNOSIS — E78.2 MIXED HYPERLIPIDEMIA: ICD-10-CM

## 2020-12-08 DIAGNOSIS — D50.8 IRON DEFICIENCY ANEMIA SECONDARY TO INADEQUATE DIETARY IRON INTAKE: ICD-10-CM

## 2020-12-08 LAB
BACTERIA UR QL AUTO: ABNORMAL /HPF
BILIRUB UR QL STRIP: NEGATIVE
CLARITY UR: CLEAR
COLOR UR: YELLOW
GLUCOSE UR STRIP-MCNC: NEGATIVE MG/DL
HGB UR QL STRIP.AUTO: NEGATIVE
HYALINE CASTS #/AREA URNS LPF: ABNORMAL /LPF
KETONES UR STRIP-MCNC: NEGATIVE MG/DL
LEUKOCYTE ESTERASE UR QL STRIP: ABNORMAL
NITRITE UR QL STRIP: NEGATIVE
NON-SQ EPI CELLS URNS QL MICRO: ABNORMAL /HPF
PH UR STRIP.AUTO: 6 [PH]
PROT UR STRIP-MCNC: NEGATIVE MG/DL
RBC #/AREA URNS AUTO: ABNORMAL /HPF
SL AMB POCT URINE HCG: NEGATIVE
SP GR UR STRIP.AUTO: 1.02 (ref 1–1.03)
UROBILINOGEN UR QL STRIP.AUTO: 1 E.U./DL
WBC #/AREA URNS AUTO: ABNORMAL /HPF

## 2020-12-08 PROCEDURE — 81001 URINALYSIS AUTO W/SCOPE: CPT | Performed by: FAMILY MEDICINE

## 2020-12-08 PROCEDURE — 3008F BODY MASS INDEX DOCD: CPT | Performed by: FAMILY MEDICINE

## 2020-12-08 PROCEDURE — 99204 OFFICE O/P NEW MOD 45 MIN: CPT | Performed by: FAMILY MEDICINE

## 2020-12-08 PROCEDURE — 87086 URINE CULTURE/COLONY COUNT: CPT | Performed by: FAMILY MEDICINE

## 2020-12-08 PROCEDURE — U0003 INFECTIOUS AGENT DETECTION BY NUCLEIC ACID (DNA OR RNA); SEVERE ACUTE RESPIRATORY SYNDROME CORONAVIRUS 2 (SARS-COV-2) (CORONAVIRUS DISEASE [COVID-19]), AMPLIFIED PROBE TECHNIQUE, MAKING USE OF HIGH THROUGHPUT TECHNOLOGIES AS DESCRIBED BY CMS-2020-01-R: HCPCS | Performed by: SURGERY

## 2020-12-08 PROCEDURE — 1036F TOBACCO NON-USER: CPT | Performed by: FAMILY MEDICINE

## 2020-12-08 PROCEDURE — 81025 URINE PREGNANCY TEST: CPT | Performed by: FAMILY MEDICINE

## 2020-12-08 PROCEDURE — 87210 SMEAR WET MOUNT SALINE/INK: CPT | Performed by: FAMILY MEDICINE

## 2020-12-08 PROCEDURE — 3008F BODY MASS INDEX DOCD: CPT | Performed by: OBSTETRICS & GYNECOLOGY

## 2020-12-09 ENCOUNTER — TELEPHONE (OUTPATIENT)
Dept: BARIATRICS | Facility: CLINIC | Age: 35
End: 2020-12-09

## 2020-12-09 LAB
BACTERIA UR CULT: NORMAL
SARS-COV-2 RNA SPEC QL NAA+PROBE: NOT DETECTED

## 2020-12-10 LAB
BV WHIFF TEST VAG QL: NORMAL
CLUE CELLS SPEC QL WET PREP: NORMAL
PH SMN: NORMAL [PH]
SL AMB POCT WET MOUNT: NORMAL
T VAGINALIS VAG QL WET PREP: NORMAL
YEAST VAG QL WET PREP: NORMAL

## 2020-12-14 ENCOUNTER — ANESTHESIA EVENT (OUTPATIENT)
Dept: PERIOP | Facility: HOSPITAL | Age: 35
DRG: 220 | End: 2020-12-14
Payer: COMMERCIAL

## 2020-12-15 ENCOUNTER — HOSPITAL ENCOUNTER (INPATIENT)
Facility: HOSPITAL | Age: 35
LOS: 1 days | Discharge: HOME | DRG: 220 | End: 2020-12-16
Attending: SURGERY | Admitting: SURGERY
Payer: COMMERCIAL

## 2020-12-15 ENCOUNTER — ANESTHESIA (OUTPATIENT)
Dept: PERIOP | Facility: HOSPITAL | Age: 35
DRG: 220 | End: 2020-12-15
Payer: COMMERCIAL

## 2020-12-15 VITALS — HEART RATE: 89 BPM

## 2020-12-15 DIAGNOSIS — K31.1 GASTRIC OUTLET OBSTRUCTION: ICD-10-CM

## 2020-12-15 DIAGNOSIS — E66.01 MORBID OBESITY (HCC): Primary | ICD-10-CM

## 2020-12-15 DIAGNOSIS — Z98.84 STATUS POST BARIATRIC SURGERY: ICD-10-CM

## 2020-12-15 DIAGNOSIS — R11.2 NAUSEA AND VOMITING: ICD-10-CM

## 2020-12-15 DIAGNOSIS — R10.9 ABDOMINAL PAIN: ICD-10-CM

## 2020-12-15 LAB
EXT PREGNANCY TEST URINE: NEGATIVE
EXT. CONTROL: NORMAL

## 2020-12-15 PROCEDURE — 0BQT4ZZ REPAIR DIAPHRAGM, PERCUTANEOUS ENDOSCOPIC APPROACH: ICD-10-PCS | Performed by: SURGERY

## 2020-12-15 PROCEDURE — 0FN24ZZ RELEASE LEFT LOBE LIVER, PERCUTANEOUS ENDOSCOPIC APPROACH: ICD-10-PCS | Performed by: SURGERY

## 2020-12-15 PROCEDURE — 0DJ08ZZ INSPECTION OF UPPER INTESTINAL TRACT, VIA NATURAL OR ARTIFICIAL OPENING ENDOSCOPIC: ICD-10-PCS | Performed by: SURGERY

## 2020-12-15 PROCEDURE — 88305 TISSUE EXAM BY PATHOLOGIST: CPT | Performed by: PATHOLOGY

## 2020-12-15 PROCEDURE — 88307 TISSUE EXAM BY PATHOLOGIST: CPT | Performed by: PATHOLOGY

## 2020-12-15 PROCEDURE — 43282 LAP PARAESOPH HER RPR W/MESH: CPT | Performed by: STUDENT IN AN ORGANIZED HEALTH CARE EDUCATION/TRAINING PROGRAM

## 2020-12-15 PROCEDURE — 43865 REVJ GSTR/JJ ANAST W/VGTMY: CPT | Performed by: SURGERY

## 2020-12-15 PROCEDURE — 43282 LAP PARAESOPH HER RPR W/MESH: CPT | Performed by: SURGERY

## 2020-12-15 PROCEDURE — 008Q4ZZ DIVISION OF VAGUS NERVE, PERCUTANEOUS ENDOSCOPIC APPROACH: ICD-10-PCS | Performed by: SURGERY

## 2020-12-15 PROCEDURE — C9290 INJ, BUPIVACAINE LIPOSOME: HCPCS | Performed by: STUDENT IN AN ORGANIZED HEALTH CARE EDUCATION/TRAINING PROGRAM

## 2020-12-15 PROCEDURE — 0D164ZA BYPASS STOMACH TO JEJUNUM, PERCUTANEOUS ENDOSCOPIC APPROACH: ICD-10-PCS | Performed by: SURGERY

## 2020-12-15 PROCEDURE — 8E0W4CZ ROBOTIC ASSISTED PROCEDURE OF TRUNK REGION, PERCUTANEOUS ENDOSCOPIC APPROACH: ICD-10-PCS | Performed by: SURGERY

## 2020-12-15 PROCEDURE — 81025 URINE PREGNANCY TEST: CPT | Performed by: STUDENT IN AN ORGANIZED HEALTH CARE EDUCATION/TRAINING PROGRAM

## 2020-12-15 PROCEDURE — C1781 MESH (IMPLANTABLE): HCPCS | Performed by: SURGERY

## 2020-12-15 PROCEDURE — 43865 REVJ GSTR/JJ ANAST W/VGTMY: CPT | Performed by: STUDENT IN AN ORGANIZED HEALTH CARE EDUCATION/TRAINING PROGRAM

## 2020-12-15 DEVICE — SEAMGUARD STPL REINF ENDO GIA ULTRA UNV 60 BLACK: Type: IMPLANTABLE DEVICE | Site: ABDOMEN | Status: FUNCTIONAL

## 2020-12-15 RX ORDER — HEPARIN SODIUM 5000 [USP'U]/ML
5000 INJECTION, SOLUTION INTRAVENOUS; SUBCUTANEOUS
Status: COMPLETED | OUTPATIENT
Start: 2020-12-15 | End: 2020-12-15

## 2020-12-15 RX ORDER — SODIUM CHLORIDE, SODIUM LACTATE, POTASSIUM CHLORIDE, CALCIUM CHLORIDE 600; 310; 30; 20 MG/100ML; MG/100ML; MG/100ML; MG/100ML
125 INJECTION, SOLUTION INTRAVENOUS CONTINUOUS
Status: DISCONTINUED | OUTPATIENT
Start: 2020-12-15 | End: 2020-12-16 | Stop reason: HOSPADM

## 2020-12-15 RX ORDER — FENTANYL CITRATE/PF 50 MCG/ML
25 SYRINGE (ML) INJECTION
Status: COMPLETED | OUTPATIENT
Start: 2020-12-15 | End: 2020-12-15

## 2020-12-15 RX ORDER — OXYCODONE HCL 5 MG/5 ML
10 SOLUTION, ORAL ORAL EVERY 4 HOURS PRN
Status: DISCONTINUED | OUTPATIENT
Start: 2020-12-15 | End: 2020-12-16 | Stop reason: HOSPADM

## 2020-12-15 RX ORDER — METOCLOPRAMIDE HYDROCHLORIDE 5 MG/ML
10 INJECTION INTRAMUSCULAR; INTRAVENOUS EVERY 6 HOURS PRN
Status: DISCONTINUED | OUTPATIENT
Start: 2020-12-15 | End: 2020-12-16 | Stop reason: HOSPADM

## 2020-12-15 RX ORDER — MIDAZOLAM HYDROCHLORIDE 2 MG/2ML
INJECTION, SOLUTION INTRAMUSCULAR; INTRAVENOUS AS NEEDED
Status: DISCONTINUED | OUTPATIENT
Start: 2020-12-15 | End: 2020-12-15

## 2020-12-15 RX ORDER — SCOLOPAMINE TRANSDERMAL SYSTEM 1 MG/1
1 PATCH, EXTENDED RELEASE TRANSDERMAL ONCE
Status: DISCONTINUED | OUTPATIENT
Start: 2020-12-15 | End: 2020-12-16 | Stop reason: HOSPADM

## 2020-12-15 RX ORDER — DEXAMETHASONE SODIUM PHOSPHATE 4 MG/ML
INJECTION, SOLUTION INTRA-ARTICULAR; INTRALESIONAL; INTRAMUSCULAR; INTRAVENOUS; SOFT TISSUE AS NEEDED
Status: DISCONTINUED | OUTPATIENT
Start: 2020-12-15 | End: 2020-12-15

## 2020-12-15 RX ORDER — ROCURONIUM BROMIDE 10 MG/ML
INJECTION, SOLUTION INTRAVENOUS AS NEEDED
Status: DISCONTINUED | OUTPATIENT
Start: 2020-12-15 | End: 2020-12-15

## 2020-12-15 RX ORDER — GABAPENTIN 300 MG/1
600 CAPSULE ORAL ONCE
Status: COMPLETED | OUTPATIENT
Start: 2020-12-15 | End: 2020-12-15

## 2020-12-15 RX ORDER — GABAPENTIN 300 MG/1
300 CAPSULE ORAL EVERY 8 HOURS
Status: DISCONTINUED | OUTPATIENT
Start: 2020-12-15 | End: 2020-12-16 | Stop reason: HOSPADM

## 2020-12-15 RX ORDER — MAGNESIUM HYDROXIDE 1200 MG/15ML
LIQUID ORAL AS NEEDED
Status: DISCONTINUED | OUTPATIENT
Start: 2020-12-15 | End: 2020-12-15 | Stop reason: HOSPADM

## 2020-12-15 RX ORDER — ONDANSETRON 2 MG/ML
4 INJECTION INTRAMUSCULAR; INTRAVENOUS ONCE AS NEEDED
Status: DISCONTINUED | OUTPATIENT
Start: 2020-12-15 | End: 2020-12-15 | Stop reason: HOSPADM

## 2020-12-15 RX ORDER — ONDANSETRON 2 MG/ML
4 INJECTION INTRAMUSCULAR; INTRAVENOUS EVERY 6 HOURS PRN
Status: DISCONTINUED | OUTPATIENT
Start: 2020-12-15 | End: 2020-12-16 | Stop reason: HOSPADM

## 2020-12-15 RX ORDER — HYDROMORPHONE HCL/PF 1 MG/ML
SYRINGE (ML) INJECTION AS NEEDED
Status: DISCONTINUED | OUTPATIENT
Start: 2020-12-15 | End: 2020-12-15

## 2020-12-15 RX ORDER — OXYCODONE HCL 5 MG/5 ML
5 SOLUTION, ORAL ORAL EVERY 4 HOURS PRN
Status: DISCONTINUED | OUTPATIENT
Start: 2020-12-15 | End: 2020-12-16 | Stop reason: HOSPADM

## 2020-12-15 RX ORDER — SODIUM CHLORIDE 9 MG/ML
125 INJECTION, SOLUTION INTRAVENOUS CONTINUOUS
Status: DISCONTINUED | OUTPATIENT
Start: 2020-12-15 | End: 2020-12-16 | Stop reason: HOSPADM

## 2020-12-15 RX ORDER — ACETAMINOPHEN 325 MG/1
975 TABLET ORAL ONCE
Status: COMPLETED | OUTPATIENT
Start: 2020-12-15 | End: 2020-12-15

## 2020-12-15 RX ORDER — MORPHINE SULFATE 4 MG/ML
4 INJECTION, SOLUTION INTRAMUSCULAR; INTRAVENOUS EVERY 4 HOURS PRN
Status: DISCONTINUED | OUTPATIENT
Start: 2020-12-15 | End: 2020-12-16 | Stop reason: HOSPADM

## 2020-12-15 RX ORDER — PROPOFOL 10 MG/ML
INJECTION, EMULSION INTRAVENOUS AS NEEDED
Status: DISCONTINUED | OUTPATIENT
Start: 2020-12-15 | End: 2020-12-15

## 2020-12-15 RX ORDER — HYDROMORPHONE HCL/PF 1 MG/ML
0.5 SYRINGE (ML) INJECTION
Status: DISCONTINUED | OUTPATIENT
Start: 2020-12-15 | End: 2020-12-15 | Stop reason: HOSPADM

## 2020-12-15 RX ORDER — PROMETHAZINE HYDROCHLORIDE 25 MG/ML
25 INJECTION, SOLUTION INTRAMUSCULAR; INTRAVENOUS EVERY 6 HOURS PRN
Status: DISCONTINUED | OUTPATIENT
Start: 2020-12-15 | End: 2020-12-16 | Stop reason: HOSPADM

## 2020-12-15 RX ORDER — FENTANYL CITRATE 50 UG/ML
INJECTION, SOLUTION INTRAMUSCULAR; INTRAVENOUS AS NEEDED
Status: DISCONTINUED | OUTPATIENT
Start: 2020-12-15 | End: 2020-12-15

## 2020-12-15 RX ORDER — CELECOXIB 200 MG/1
200 CAPSULE ORAL ONCE
Status: COMPLETED | OUTPATIENT
Start: 2020-12-15 | End: 2020-12-15

## 2020-12-15 RX ORDER — INDOCYANINE GREEN AND WATER 25 MG
KIT INJECTION AS NEEDED
Status: DISCONTINUED | OUTPATIENT
Start: 2020-12-15 | End: 2020-12-15

## 2020-12-15 RX ORDER — ACETAMINOPHEN 160 MG/5ML
975 SUSPENSION, ORAL (FINAL DOSE FORM) ORAL EVERY 8 HOURS
Status: DISCONTINUED | OUTPATIENT
Start: 2020-12-15 | End: 2020-12-16 | Stop reason: HOSPADM

## 2020-12-15 RX ORDER — NEOSTIGMINE METHYLSULFATE 1 MG/ML
INJECTION INTRAVENOUS AS NEEDED
Status: DISCONTINUED | OUTPATIENT
Start: 2020-12-15 | End: 2020-12-15

## 2020-12-15 RX ORDER — ONDANSETRON 2 MG/ML
INJECTION INTRAMUSCULAR; INTRAVENOUS AS NEEDED
Status: DISCONTINUED | OUTPATIENT
Start: 2020-12-15 | End: 2020-12-15

## 2020-12-15 RX ORDER — SIMETHICONE 80 MG
80 TABLET,CHEWABLE ORAL 4 TIMES DAILY PRN
Status: DISCONTINUED | OUTPATIENT
Start: 2020-12-15 | End: 2020-12-16 | Stop reason: HOSPADM

## 2020-12-15 RX ORDER — BUPIVACAINE HYDROCHLORIDE 5 MG/ML
INJECTION, SOLUTION PERINEURAL AS NEEDED
Status: DISCONTINUED | OUTPATIENT
Start: 2020-12-15 | End: 2020-12-15 | Stop reason: HOSPADM

## 2020-12-15 RX ORDER — CEFAZOLIN SODIUM 2 G/50ML
2000 SOLUTION INTRAVENOUS ONCE
Status: COMPLETED | OUTPATIENT
Start: 2020-12-15 | End: 2020-12-15

## 2020-12-15 RX ORDER — ACETAMINOPHEN 325 MG/1
975 TABLET ORAL EVERY 8 HOURS
Status: DISCONTINUED | OUTPATIENT
Start: 2020-12-15 | End: 2020-12-16 | Stop reason: HOSPADM

## 2020-12-15 RX ORDER — CEFAZOLIN SODIUM 2 G/50ML
2000 SOLUTION INTRAVENOUS EVERY 8 HOURS
Status: COMPLETED | OUTPATIENT
Start: 2020-12-15 | End: 2020-12-16

## 2020-12-15 RX ADMIN — FENTANYL CITRATE 25 MCG: 50 INJECTION INTRAMUSCULAR; INTRAVENOUS at 14:53

## 2020-12-15 RX ADMIN — FENTANYL CITRATE 25 MCG: 50 INJECTION INTRAMUSCULAR; INTRAVENOUS at 15:08

## 2020-12-15 RX ADMIN — HEPARIN SODIUM 5000 UNITS: 5000 INJECTION INTRAVENOUS; SUBCUTANEOUS at 10:25

## 2020-12-15 RX ADMIN — SCOPALAMINE 1 PATCH: 1 PATCH, EXTENDED RELEASE TRANSDERMAL at 10:25

## 2020-12-15 RX ADMIN — FENTANYL CITRATE 25 MCG: 50 INJECTION INTRAMUSCULAR; INTRAVENOUS at 14:12

## 2020-12-15 RX ADMIN — DEXAMETHASONE SODIUM PHOSPHATE 4 MG: 4 INJECTION INTRA-ARTICULAR; INTRALESIONAL; INTRAMUSCULAR; INTRAVENOUS; SOFT TISSUE at 11:27

## 2020-12-15 RX ADMIN — CEFAZOLIN SODIUM 2000 MG: 2 SOLUTION INTRAVENOUS at 10:55

## 2020-12-15 RX ADMIN — ROCURONIUM BROMIDE 20 MG: 10 INJECTION, SOLUTION INTRAVENOUS at 11:58

## 2020-12-15 RX ADMIN — INDOCYANINE GREEN AND WATER 12.5 MG: KIT at 12:42

## 2020-12-15 RX ADMIN — ONDANSETRON 4 MG: 2 INJECTION INTRAMUSCULAR; INTRAVENOUS at 13:24

## 2020-12-15 RX ADMIN — OXYCODONE HYDROCHLORIDE 5 MG: 5 SOLUTION ORAL at 20:20

## 2020-12-15 RX ADMIN — ROCURONIUM BROMIDE 50 MG: 10 INJECTION, SOLUTION INTRAVENOUS at 11:15

## 2020-12-15 RX ADMIN — ACETAMINOPHEN 975 MG: 325 SUSPENSION ORAL at 16:11

## 2020-12-15 RX ADMIN — NEOSTIGMINE METHYLSULFATE 3 MG: 1 INJECTION, SOLUTION INTRAVENOUS at 13:30

## 2020-12-15 RX ADMIN — HYDROMORPHONE HYDROCHLORIDE 0.5 MG: 1 INJECTION, SOLUTION INTRAMUSCULAR; INTRAVENOUS; SUBCUTANEOUS at 12:14

## 2020-12-15 RX ADMIN — ROCURONIUM BROMIDE 15 MG: 10 INJECTION, SOLUTION INTRAVENOUS at 12:22

## 2020-12-15 RX ADMIN — ACETAMINOPHEN 975 MG: 325 SUSPENSION ORAL at 23:23

## 2020-12-15 RX ADMIN — GABAPENTIN 300 MG: 300 CAPSULE ORAL at 16:16

## 2020-12-15 RX ADMIN — SODIUM CHLORIDE, SODIUM LACTATE, POTASSIUM CHLORIDE, AND CALCIUM CHLORIDE 125 ML/HR: .6; .31; .03; .02 INJECTION, SOLUTION INTRAVENOUS at 15:19

## 2020-12-15 RX ADMIN — FENTANYL CITRATE 25 MCG: 50 INJECTION INTRAMUSCULAR; INTRAVENOUS at 14:29

## 2020-12-15 RX ADMIN — FAMOTIDINE 20 MG: 10 INJECTION INTRAVENOUS at 16:16

## 2020-12-15 RX ADMIN — MIDAZOLAM 2 MG: 1 INJECTION INTRAMUSCULAR; INTRAVENOUS at 11:10

## 2020-12-15 RX ADMIN — SODIUM CHLORIDE 125 ML/HR: 0.9 INJECTION, SOLUTION INTRAVENOUS at 10:27

## 2020-12-15 RX ADMIN — GABAPENTIN 600 MG: 300 CAPSULE ORAL at 10:24

## 2020-12-15 RX ADMIN — SODIUM CHLORIDE, SODIUM LACTATE, POTASSIUM CHLORIDE, AND CALCIUM CHLORIDE 125 ML/HR: .6; .31; .03; .02 INJECTION, SOLUTION INTRAVENOUS at 23:24

## 2020-12-15 RX ADMIN — HYDROMORPHONE HYDROCHLORIDE 0.5 MG: 1 INJECTION, SOLUTION INTRAMUSCULAR; INTRAVENOUS; SUBCUTANEOUS at 12:07

## 2020-12-15 RX ADMIN — PHENYLEPHRINE HYDROCHLORIDE 100 MCG: 10 INJECTION INTRAVENOUS at 11:56

## 2020-12-15 RX ADMIN — ACETAMINOPHEN 975 MG: 325 TABLET ORAL at 10:25

## 2020-12-15 RX ADMIN — FENTANYL CITRATE 50 MCG: 50 INJECTION, SOLUTION INTRAMUSCULAR; INTRAVENOUS at 11:42

## 2020-12-15 RX ADMIN — FAMOTIDINE 20 MG: 10 INJECTION INTRAVENOUS at 20:13

## 2020-12-15 RX ADMIN — SODIUM CHLORIDE: 0.9 INJECTION, SOLUTION INTRAVENOUS at 11:38

## 2020-12-15 RX ADMIN — CELECOXIB 200 MG: 200 CAPSULE ORAL at 10:25

## 2020-12-15 RX ADMIN — MORPHINE SULFATE 2 MG: 2 INJECTION, SOLUTION INTRAMUSCULAR; INTRAVENOUS at 17:30

## 2020-12-15 RX ADMIN — SODIUM CHLORIDE: 0.9 INJECTION, SOLUTION INTRAVENOUS at 13:27

## 2020-12-15 RX ADMIN — FENTANYL CITRATE 50 MCG: 50 INJECTION, SOLUTION INTRAMUSCULAR; INTRAVENOUS at 11:13

## 2020-12-15 RX ADMIN — CEFAZOLIN SODIUM 2000 MG: 2 SOLUTION INTRAVENOUS at 20:04

## 2020-12-15 RX ADMIN — SODIUM CHLORIDE: 0.9 INJECTION, SOLUTION INTRAVENOUS at 11:28

## 2020-12-15 RX ADMIN — PROPOFOL 200 MG: 10 INJECTION, EMULSION INTRAVENOUS at 11:13

## 2020-12-16 ENCOUNTER — APPOINTMENT (INPATIENT)
Dept: RADIOLOGY | Facility: HOSPITAL | Age: 35
DRG: 220 | End: 2020-12-16
Payer: COMMERCIAL

## 2020-12-16 VITALS
DIASTOLIC BLOOD PRESSURE: 91 MMHG | BODY MASS INDEX: 36.52 KG/M2 | HEART RATE: 82 BPM | OXYGEN SATURATION: 97 % | HEIGHT: 63 IN | RESPIRATION RATE: 18 BRPM | SYSTOLIC BLOOD PRESSURE: 141 MMHG | WEIGHT: 206.13 LBS | TEMPERATURE: 98.5 F

## 2020-12-16 LAB
ANION GAP SERPL CALCULATED.3IONS-SCNC: 6 MMOL/L (ref 4–13)
BUN SERPL-MCNC: 8 MG/DL (ref 5–25)
CALCIUM SERPL-MCNC: 8 MG/DL (ref 8.3–10.1)
CHLORIDE SERPL-SCNC: 108 MMOL/L (ref 100–108)
CO2 SERPL-SCNC: 27 MMOL/L (ref 21–32)
CREAT SERPL-MCNC: 0.64 MG/DL (ref 0.6–1.3)
ERYTHROCYTE [DISTWIDTH] IN BLOOD BY AUTOMATED COUNT: 18.3 % (ref 11.6–15.1)
GFR SERPL CREATININE-BSD FRML MDRD: 117 ML/MIN/1.73SQ M
GLUCOSE SERPL-MCNC: 101 MG/DL (ref 65–140)
HCT VFR BLD AUTO: 27.7 % (ref 34.8–46.1)
HCT VFR BLD AUTO: 28.1 % (ref 34.8–46.1)
HGB BLD-MCNC: 8.3 G/DL (ref 11.5–15.4)
HGB BLD-MCNC: 8.6 G/DL (ref 11.5–15.4)
MCH RBC QN AUTO: 26.1 PG (ref 26.8–34.3)
MCHC RBC AUTO-ENTMCNC: 30 G/DL (ref 31.4–37.4)
MCV RBC AUTO: 87 FL (ref 82–98)
PLATELET # BLD AUTO: 238 THOUSANDS/UL (ref 149–390)
PMV BLD AUTO: 11 FL (ref 8.9–12.7)
POTASSIUM SERPL-SCNC: 4.7 MMOL/L (ref 3.5–5.3)
RBC # BLD AUTO: 3.18 MILLION/UL (ref 3.81–5.12)
SODIUM SERPL-SCNC: 141 MMOL/L (ref 136–145)
WBC # BLD AUTO: 6.29 THOUSAND/UL (ref 4.31–10.16)

## 2020-12-16 PROCEDURE — 85027 COMPLETE CBC AUTOMATED: CPT | Performed by: STUDENT IN AN ORGANIZED HEALTH CARE EDUCATION/TRAINING PROGRAM

## 2020-12-16 PROCEDURE — 80048 BASIC METABOLIC PNL TOTAL CA: CPT | Performed by: STUDENT IN AN ORGANIZED HEALTH CARE EDUCATION/TRAINING PROGRAM

## 2020-12-16 PROCEDURE — 99024 POSTOP FOLLOW-UP VISIT: CPT | Performed by: STUDENT IN AN ORGANIZED HEALTH CARE EDUCATION/TRAINING PROGRAM

## 2020-12-16 PROCEDURE — 85018 HEMOGLOBIN: CPT | Performed by: STUDENT IN AN ORGANIZED HEALTH CARE EDUCATION/TRAINING PROGRAM

## 2020-12-16 PROCEDURE — 85014 HEMATOCRIT: CPT | Performed by: STUDENT IN AN ORGANIZED HEALTH CARE EDUCATION/TRAINING PROGRAM

## 2020-12-16 PROCEDURE — 74240 X-RAY XM UPR GI TRC 1CNTRST: CPT

## 2020-12-16 PROCEDURE — NC001 PR NO CHARGE: Performed by: STUDENT IN AN ORGANIZED HEALTH CARE EDUCATION/TRAINING PROGRAM

## 2020-12-16 RX ORDER — ACETAMINOPHEN 325 MG/1
975 TABLET ORAL EVERY 8 HOURS
Qty: 63 TABLET | Refills: 0 | Status: SHIPPED | OUTPATIENT
Start: 2020-12-16 | End: 2020-12-21 | Stop reason: ALTCHOICE

## 2020-12-16 RX ORDER — OMEPRAZOLE 20 MG/1
20 CAPSULE, DELAYED RELEASE ORAL DAILY
Qty: 60 CAPSULE | Refills: 1 | Status: SHIPPED | OUTPATIENT
Start: 2020-12-16 | End: 2021-01-15 | Stop reason: SDUPTHER

## 2020-12-16 RX ORDER — OXYCODONE HYDROCHLORIDE 5 MG/1
5 TABLET ORAL EVERY 4 HOURS PRN
Qty: 12 TABLET | Refills: 0 | Status: SHIPPED | OUTPATIENT
Start: 2020-12-16 | End: 2020-12-21 | Stop reason: ALTCHOICE

## 2020-12-16 RX ORDER — SIMETHICONE 80 MG
80 TABLET,CHEWABLE ORAL 4 TIMES DAILY PRN
Qty: 30 TABLET | Refills: 0 | Status: SHIPPED | OUTPATIENT
Start: 2020-12-16 | End: 2021-01-15 | Stop reason: SDUPTHER

## 2020-12-16 RX ADMIN — IOHEXOL 30 ML: 350 INJECTION, SOLUTION INTRAVENOUS at 11:35

## 2020-12-16 RX ADMIN — MORPHINE SULFATE 2 MG: 2 INJECTION, SOLUTION INTRAMUSCULAR; INTRAVENOUS at 05:50

## 2020-12-16 RX ADMIN — OXYCODONE HYDROCHLORIDE 10 MG: 5 SOLUTION ORAL at 07:48

## 2020-12-16 RX ADMIN — GABAPENTIN 300 MG: 300 CAPSULE ORAL at 09:40

## 2020-12-16 RX ADMIN — ACETAMINOPHEN 975 MG: 325 TABLET ORAL at 09:40

## 2020-12-16 RX ADMIN — OXYCODONE HYDROCHLORIDE 10 MG: 5 SOLUTION ORAL at 13:02

## 2020-12-16 RX ADMIN — FAMOTIDINE 20 MG: 10 INJECTION INTRAVENOUS at 09:40

## 2020-12-16 RX ADMIN — CEFAZOLIN SODIUM 2000 MG: 2 SOLUTION INTRAVENOUS at 02:33

## 2020-12-17 ENCOUNTER — TRANSITIONAL CARE MANAGEMENT (OUTPATIENT)
Dept: FAMILY MEDICINE CLINIC | Facility: CLINIC | Age: 35
End: 2020-12-17

## 2020-12-17 ENCOUNTER — TELEPHONE (OUTPATIENT)
Dept: BARIATRICS | Facility: CLINIC | Age: 35
End: 2020-12-17

## 2020-12-17 ENCOUNTER — TELEPHONE (OUTPATIENT)
Dept: FAMILY MEDICINE CLINIC | Facility: CLINIC | Age: 35
End: 2020-12-17

## 2020-12-18 ENCOUNTER — TELEPHONE (OUTPATIENT)
Dept: BARIATRICS | Facility: CLINIC | Age: 35
End: 2020-12-18

## 2020-12-21 ENCOUNTER — TELEMEDICINE (OUTPATIENT)
Dept: FAMILY MEDICINE CLINIC | Facility: CLINIC | Age: 35
End: 2020-12-21
Payer: COMMERCIAL

## 2020-12-21 DIAGNOSIS — R05.9 COUGH: Primary | ICD-10-CM

## 2020-12-21 DIAGNOSIS — R05.9 COUGH: ICD-10-CM

## 2020-12-21 PROCEDURE — U0003 INFECTIOUS AGENT DETECTION BY NUCLEIC ACID (DNA OR RNA); SEVERE ACUTE RESPIRATORY SYNDROME CORONAVIRUS 2 (SARS-COV-2) (CORONAVIRUS DISEASE [COVID-19]), AMPLIFIED PROBE TECHNIQUE, MAKING USE OF HIGH THROUGHPUT TECHNOLOGIES AS DESCRIBED BY CMS-2020-01-R: HCPCS | Performed by: NURSE PRACTITIONER

## 2020-12-21 PROCEDURE — 99214 OFFICE O/P EST MOD 30 MIN: CPT | Performed by: NURSE PRACTITIONER

## 2020-12-21 PROCEDURE — 3725F SCREEN DEPRESSION PERFORMED: CPT | Performed by: NURSE PRACTITIONER

## 2020-12-21 PROCEDURE — 1111F DSCHRG MED/CURRENT MED MERGE: CPT | Performed by: NURSE PRACTITIONER

## 2020-12-23 ENCOUNTER — TELEMEDICINE (OUTPATIENT)
Dept: FAMILY MEDICINE CLINIC | Facility: CLINIC | Age: 35
End: 2020-12-23
Payer: COMMERCIAL

## 2020-12-23 ENCOUNTER — TELEPHONE (OUTPATIENT)
Dept: BARIATRICS | Facility: CLINIC | Age: 35
End: 2020-12-23

## 2020-12-23 ENCOUNTER — TELEPHONE (OUTPATIENT)
Dept: FAMILY MEDICINE CLINIC | Facility: CLINIC | Age: 35
End: 2020-12-23

## 2020-12-23 ENCOUNTER — TELEMEDICINE (OUTPATIENT)
Dept: BARIATRICS | Facility: CLINIC | Age: 35
End: 2020-12-23

## 2020-12-23 ENCOUNTER — OFFICE VISIT (OUTPATIENT)
Dept: BARIATRICS | Facility: CLINIC | Age: 35
End: 2020-12-23

## 2020-12-23 VITALS — BODY MASS INDEX: 34.55 KG/M2 | WEIGHT: 195 LBS | HEIGHT: 63 IN

## 2020-12-23 VITALS — BODY MASS INDEX: 33.66 KG/M2 | WEIGHT: 190 LBS | HEIGHT: 63 IN

## 2020-12-23 VITALS — TEMPERATURE: 98.4 F

## 2020-12-23 DIAGNOSIS — U07.1 COVID-19 VIRUS INFECTION: Primary | ICD-10-CM

## 2020-12-23 DIAGNOSIS — Z98.84 BARIATRIC SURGERY STATUS: Primary | ICD-10-CM

## 2020-12-23 DIAGNOSIS — E66.9 OBESITY, CLASS II, BMI 35-39.9: Primary | ICD-10-CM

## 2020-12-23 PROBLEM — E66.812 OBESITY, CLASS II, BMI 35-39.9: Status: ACTIVE | Noted: 2020-12-23

## 2020-12-23 LAB — SARS-COV-2 RNA SPEC QL NAA+PROBE: DETECTED

## 2020-12-23 PROCEDURE — 99024 POSTOP FOLLOW-UP VISIT: CPT | Performed by: SURGERY

## 2020-12-23 PROCEDURE — RECHECK: Performed by: DIETITIAN, REGISTERED

## 2020-12-23 PROCEDURE — 99213 OFFICE O/P EST LOW 20 MIN: CPT | Performed by: NURSE PRACTITIONER

## 2020-12-23 PROCEDURE — 1111F DSCHRG MED/CURRENT MED MERGE: CPT | Performed by: NURSE PRACTITIONER

## 2020-12-23 PROCEDURE — 3008F BODY MASS INDEX DOCD: CPT | Performed by: OBSTETRICS & GYNECOLOGY

## 2020-12-23 PROCEDURE — 3008F BODY MASS INDEX DOCD: CPT | Performed by: NURSE PRACTITIONER

## 2020-12-24 ENCOUNTER — TELEPHONE (OUTPATIENT)
Dept: OTHER | Facility: OTHER | Age: 35
End: 2020-12-24

## 2020-12-26 ENCOUNTER — TELEMEDICINE (OUTPATIENT)
Dept: FAMILY MEDICINE CLINIC | Facility: CLINIC | Age: 35
End: 2020-12-26
Payer: COMMERCIAL

## 2020-12-26 DIAGNOSIS — R05.9 COUGH: ICD-10-CM

## 2020-12-26 DIAGNOSIS — U07.1 COVID-19 VIRUS INFECTION: ICD-10-CM

## 2020-12-26 DIAGNOSIS — R06.02 SHORTNESS OF BREATH: Primary | ICD-10-CM

## 2020-12-26 PROCEDURE — 99214 OFFICE O/P EST MOD 30 MIN: CPT | Performed by: FAMILY MEDICINE

## 2020-12-26 PROCEDURE — 1111F DSCHRG MED/CURRENT MED MERGE: CPT | Performed by: FAMILY MEDICINE

## 2020-12-28 ENCOUNTER — TELEPHONE (OUTPATIENT)
Dept: BARIATRICS | Facility: CLINIC | Age: 35
End: 2020-12-28

## 2020-12-28 ENCOUNTER — HOSPITAL ENCOUNTER (EMERGENCY)
Facility: HOSPITAL | Age: 35
Discharge: HOME | End: 2020-12-28
Attending: EMERGENCY MEDICINE
Payer: COMMERCIAL

## 2020-12-28 ENCOUNTER — APPOINTMENT (EMERGENCY)
Dept: RADIOLOGY | Facility: HOSPITAL | Age: 35
End: 2020-12-28
Payer: COMMERCIAL

## 2020-12-28 ENCOUNTER — TELEMEDICINE (OUTPATIENT)
Dept: FAMILY MEDICINE CLINIC | Facility: CLINIC | Age: 35
End: 2020-12-28
Payer: COMMERCIAL

## 2020-12-28 VITALS
RESPIRATION RATE: 16 BRPM | DIASTOLIC BLOOD PRESSURE: 90 MMHG | BODY MASS INDEX: 32.1 KG/M2 | TEMPERATURE: 97 F | OXYGEN SATURATION: 100 % | HEART RATE: 93 BPM | SYSTOLIC BLOOD PRESSURE: 156 MMHG | WEIGHT: 181.22 LBS

## 2020-12-28 VITALS — TEMPERATURE: 97.1 F

## 2020-12-28 DIAGNOSIS — K44.9 HIATAL HERNIA: ICD-10-CM

## 2020-12-28 DIAGNOSIS — K31.1 GASTRIC OUTLET OBSTRUCTION: Primary | ICD-10-CM

## 2020-12-28 DIAGNOSIS — Z98.84 S/P GASTRIC BYPASS: ICD-10-CM

## 2020-12-28 DIAGNOSIS — K91.2 POSTSURGICAL MALABSORPTION: ICD-10-CM

## 2020-12-28 DIAGNOSIS — M54.50 LOW BACK PAIN: ICD-10-CM

## 2020-12-28 DIAGNOSIS — U07.1 COVID-19: Primary | ICD-10-CM

## 2020-12-28 LAB
BACTERIA UR QL AUTO: ABNORMAL /HPF
BILIRUB UR QL STRIP: NEGATIVE
CLARITY UR: CLEAR
COLOR UR: ABNORMAL
EXT PREG TEST URINE: NEGATIVE
EXT. CONTROL ED NAV: NORMAL
GLUCOSE UR STRIP-MCNC: NEGATIVE MG/DL
HGB UR QL STRIP.AUTO: NEGATIVE
KETONES UR STRIP-MCNC: ABNORMAL MG/DL
LEUKOCYTE ESTERASE UR QL STRIP: NEGATIVE
MUCOUS THREADS UR QL AUTO: ABNORMAL
NITRITE UR QL STRIP: NEGATIVE
NON-SQ EPI CELLS URNS QL MICRO: ABNORMAL /HPF
PH UR STRIP.AUTO: 5 [PH]
PROT UR STRIP-MCNC: ABNORMAL MG/DL
RBC #/AREA URNS AUTO: ABNORMAL /HPF
SP GR UR STRIP.AUTO: 1.02 (ref 1–1.04)
UROBILINOGEN UA: NEGATIVE MG/DL
WBC #/AREA URNS AUTO: ABNORMAL /HPF

## 2020-12-28 PROCEDURE — 81001 URINALYSIS AUTO W/SCOPE: CPT | Performed by: PHYSICIAN ASSISTANT

## 2020-12-28 PROCEDURE — 99284 EMERGENCY DEPT VISIT MOD MDM: CPT

## 2020-12-28 PROCEDURE — 81025 URINE PREGNANCY TEST: CPT | Performed by: PHYSICIAN ASSISTANT

## 2020-12-28 PROCEDURE — 99284 EMERGENCY DEPT VISIT MOD MDM: CPT | Performed by: PHYSICIAN ASSISTANT

## 2020-12-28 PROCEDURE — 99495 TRANSJ CARE MGMT MOD F2F 14D: CPT | Performed by: FAMILY MEDICINE

## 2020-12-28 PROCEDURE — 71045 X-RAY EXAM CHEST 1 VIEW: CPT

## 2020-12-28 PROCEDURE — 1111F DSCHRG MED/CURRENT MED MERGE: CPT | Performed by: FAMILY MEDICINE

## 2020-12-28 RX ORDER — METHOCARBAMOL 500 MG/1
500 TABLET, FILM COATED ORAL 2 TIMES DAILY
Qty: 20 TABLET | Refills: 0 | Status: SHIPPED | OUTPATIENT
Start: 2020-12-28 | End: 2021-01-12 | Stop reason: ALTCHOICE

## 2020-12-28 RX ORDER — FLUTICASONE PROPIONATE 50 MCG
1 SPRAY, SUSPENSION (ML) NASAL DAILY
Status: DISCONTINUED | OUTPATIENT
Start: 2020-12-28 | End: 2020-12-28 | Stop reason: HOSPADM

## 2020-12-28 RX ORDER — ALBUTEROL SULFATE 90 UG/1
2 AEROSOL, METERED RESPIRATORY (INHALATION) EVERY 4 HOURS PRN
Qty: 1 INHALER | Refills: 0 | Status: SHIPPED | OUTPATIENT
Start: 2020-12-28 | End: 2021-01-12 | Stop reason: SDUPTHER

## 2020-12-28 RX ORDER — KETOROLAC TROMETHAMINE 30 MG/ML
15 INJECTION, SOLUTION INTRAMUSCULAR; INTRAVENOUS ONCE
Status: DISCONTINUED | OUTPATIENT
Start: 2020-12-28 | End: 2020-12-28 | Stop reason: HOSPADM

## 2020-12-28 RX ORDER — LIDOCAINE 50 MG/G
1 PATCH TOPICAL ONCE
Status: DISCONTINUED | OUTPATIENT
Start: 2020-12-28 | End: 2020-12-28 | Stop reason: HOSPADM

## 2020-12-28 RX ORDER — GUAIFENESIN/DEXTROMETHORPHAN 100-10MG/5
10 SYRUP ORAL ONCE
Status: COMPLETED | OUTPATIENT
Start: 2020-12-28 | End: 2020-12-28

## 2020-12-28 RX ORDER — GUAIFENESIN/DEXTROMETHORPHAN 100-10MG/5
5 SYRUP ORAL 3 TIMES DAILY PRN
Qty: 118 ML | Refills: 0 | Status: SHIPPED | OUTPATIENT
Start: 2020-12-28 | End: 2021-01-07

## 2020-12-28 RX ORDER — ACETAMINOPHEN 500 MG
500 TABLET ORAL EVERY 6 HOURS PRN
Qty: 30 TABLET | Refills: 0 | Status: SHIPPED | OUTPATIENT
Start: 2020-12-28 | End: 2022-01-18 | Stop reason: ALTCHOICE

## 2020-12-28 RX ORDER — FLUTICASONE PROPIONATE 50 MCG
1 SPRAY, SUSPENSION (ML) NASAL DAILY
Qty: 16 G | Refills: 0 | Status: SHIPPED | OUTPATIENT
Start: 2020-12-28 | End: 2021-03-15 | Stop reason: ALTCHOICE

## 2020-12-28 RX ADMIN — LIDOCAINE 1 PATCH: 50 PATCH TOPICAL at 10:09

## 2020-12-28 RX ADMIN — FLUTICASONE PROPIONATE 1 SPRAY: 50 SPRAY, METERED NASAL at 09:44

## 2020-12-28 RX ADMIN — GUAIFENESIN AND DEXTROMETHORPHAN 10 ML: 100; 10 SYRUP ORAL at 09:43

## 2020-12-29 ENCOUNTER — TELEMEDICINE (OUTPATIENT)
Dept: FAMILY MEDICINE CLINIC | Facility: CLINIC | Age: 35
End: 2020-12-29
Payer: COMMERCIAL

## 2020-12-29 VITALS — TEMPERATURE: 97.2 F

## 2020-12-29 DIAGNOSIS — U07.1 COVID-19 VIRUS INFECTION: Primary | ICD-10-CM

## 2020-12-29 PROCEDURE — 99213 OFFICE O/P EST LOW 20 MIN: CPT | Performed by: FAMILY MEDICINE

## 2020-12-30 ENCOUNTER — TELEMEDICINE (OUTPATIENT)
Dept: FAMILY MEDICINE CLINIC | Facility: CLINIC | Age: 35
End: 2020-12-30
Payer: COMMERCIAL

## 2020-12-30 ENCOUNTER — TELEPHONE (OUTPATIENT)
Dept: BARIATRICS | Facility: CLINIC | Age: 35
End: 2020-12-30

## 2020-12-30 DIAGNOSIS — R19.7 DIARRHEA, UNSPECIFIED TYPE: Primary | ICD-10-CM

## 2020-12-30 PROCEDURE — 1111F DSCHRG MED/CURRENT MED MERGE: CPT | Performed by: NURSE PRACTITIONER

## 2020-12-30 PROCEDURE — 1036F TOBACCO NON-USER: CPT | Performed by: NURSE PRACTITIONER

## 2020-12-30 PROCEDURE — 99213 OFFICE O/P EST LOW 20 MIN: CPT | Performed by: NURSE PRACTITIONER

## 2020-12-31 PROBLEM — K31.1 GASTRIC OUTLET OBSTRUCTION: Status: ACTIVE | Noted: 2020-12-28

## 2020-12-31 PROBLEM — K31.1 GASTRIC OUTLET OBSTRUCTION: Status: ACTIVE | Noted: 2020-12-31

## 2021-01-04 ENCOUNTER — TELEPHONE (OUTPATIENT)
Dept: BARIATRICS | Facility: CLINIC | Age: 36
End: 2021-01-04

## 2021-01-04 NOTE — TELEPHONE ENCOUNTER
Returned phone call and reviewed soft diet with patient  She is starting to feel better from her Covid infection and wants to eat  Patient also purchased a protein supplement  Patient will start soft diet today  Patient also requested one on one session for 5 /6 week follow up appointment    Scheduled one on one with HARJEET

## 2021-01-11 ENCOUNTER — NURSE TRIAGE (OUTPATIENT)
Dept: OTHER | Facility: OTHER | Age: 36
End: 2021-01-11

## 2021-01-11 ENCOUNTER — HOSPITAL ENCOUNTER (EMERGENCY)
Facility: HOSPITAL | Age: 36
Discharge: HOME | End: 2021-01-11
Attending: EMERGENCY MEDICINE | Admitting: EMERGENCY MEDICINE
Payer: COMMERCIAL

## 2021-01-11 VITALS
DIASTOLIC BLOOD PRESSURE: 78 MMHG | RESPIRATION RATE: 20 BRPM | OXYGEN SATURATION: 100 % | SYSTOLIC BLOOD PRESSURE: 153 MMHG | TEMPERATURE: 98.9 F | WEIGHT: 181.22 LBS | HEART RATE: 75 BPM | BODY MASS INDEX: 32.1 KG/M2

## 2021-01-11 DIAGNOSIS — R42 VERTIGO: Primary | ICD-10-CM

## 2021-01-11 LAB
AMORPH URATE CRY URNS QL MICRO: ABNORMAL /HPF
ANION GAP SERPL CALCULATED.3IONS-SCNC: 7 MMOL/L (ref 4–13)
BACTERIA UR QL AUTO: ABNORMAL /HPF
BASOPHILS # BLD AUTO: 0.03 THOUSANDS/ΜL (ref 0–0.1)
BASOPHILS NFR BLD AUTO: 0 % (ref 0–1)
BILIRUB UR QL STRIP: NEGATIVE
BUN SERPL-MCNC: 16 MG/DL (ref 5–25)
CALCIUM SERPL-MCNC: 9.1 MG/DL (ref 8.3–10.1)
CHLORIDE SERPL-SCNC: 106 MMOL/L (ref 100–108)
CLARITY UR: CLEAR
CO2 SERPL-SCNC: 27 MMOL/L (ref 21–32)
COLOR UR: YELLOW
CREAT SERPL-MCNC: 0.58 MG/DL (ref 0.6–1.3)
EOSINOPHIL # BLD AUTO: 0.1 THOUSAND/ΜL (ref 0–0.61)
EOSINOPHIL NFR BLD AUTO: 1 % (ref 0–6)
ERYTHROCYTE [DISTWIDTH] IN BLOOD BY AUTOMATED COUNT: 18.7 % (ref 11.6–15.1)
EXT PREG TEST URINE: NEGATIVE
EXT. CONTROL ED NAV: NORMAL
GFR SERPL CREATININE-BSD FRML MDRD: 121 ML/MIN/1.73SQ M
GLUCOSE SERPL-MCNC: 100 MG/DL (ref 65–140)
GLUCOSE UR STRIP-MCNC: NEGATIVE MG/DL
HCT VFR BLD AUTO: 33.7 % (ref 34.8–46.1)
HGB BLD-MCNC: 10.4 G/DL (ref 11.5–15.4)
HGB UR QL STRIP.AUTO: NEGATIVE
IMM GRANULOCYTES # BLD AUTO: 0.05 THOUSAND/UL (ref 0–0.2)
IMM GRANULOCYTES NFR BLD AUTO: 0 % (ref 0–2)
KETONES UR STRIP-MCNC: NEGATIVE MG/DL
LEUKOCYTE ESTERASE UR QL STRIP: ABNORMAL
LYMPHOCYTES # BLD AUTO: 1.53 THOUSANDS/ΜL (ref 0.6–4.47)
LYMPHOCYTES NFR BLD AUTO: 13 % (ref 14–44)
MCH RBC QN AUTO: 27.4 PG (ref 26.8–34.3)
MCHC RBC AUTO-ENTMCNC: 30.9 G/DL (ref 31.4–37.4)
MCV RBC AUTO: 89 FL (ref 82–98)
MONOCYTES # BLD AUTO: 0.77 THOUSAND/ΜL (ref 0.17–1.22)
MONOCYTES NFR BLD AUTO: 7 % (ref 4–12)
NEUTROPHILS # BLD AUTO: 9.32 THOUSANDS/ΜL (ref 1.85–7.62)
NEUTS SEG NFR BLD AUTO: 79 % (ref 43–75)
NITRITE UR QL STRIP: NEGATIVE
NON-SQ EPI CELLS URNS QL MICRO: ABNORMAL /HPF
NRBC BLD AUTO-RTO: 0 /100 WBCS
PH UR STRIP.AUTO: 5.5 [PH] (ref 4.5–8)
PLATELET # BLD AUTO: 361 THOUSANDS/UL (ref 149–390)
PMV BLD AUTO: 10.9 FL (ref 8.9–12.7)
POTASSIUM SERPL-SCNC: 4.5 MMOL/L (ref 3.5–5.3)
PROT UR STRIP-MCNC: NEGATIVE MG/DL
RBC # BLD AUTO: 3.8 MILLION/UL (ref 3.81–5.12)
RBC #/AREA URNS AUTO: ABNORMAL /HPF
SODIUM SERPL-SCNC: 140 MMOL/L (ref 136–145)
SP GR UR STRIP.AUTO: 1.01 (ref 1–1.03)
UROBILINOGEN UR QL STRIP.AUTO: 0.2 E.U./DL
WBC # BLD AUTO: 11.8 THOUSAND/UL (ref 4.31–10.16)
WBC #/AREA URNS AUTO: ABNORMAL /HPF

## 2021-01-11 PROCEDURE — 81025 URINE PREGNANCY TEST: CPT | Performed by: EMERGENCY MEDICINE

## 2021-01-11 PROCEDURE — 99284 EMERGENCY DEPT VISIT MOD MDM: CPT

## 2021-01-11 PROCEDURE — 85025 COMPLETE CBC W/AUTO DIFF WBC: CPT | Performed by: EMERGENCY MEDICINE

## 2021-01-11 PROCEDURE — 81001 URINALYSIS AUTO W/SCOPE: CPT

## 2021-01-11 PROCEDURE — 99282 EMERGENCY DEPT VISIT SF MDM: CPT | Performed by: EMERGENCY MEDICINE

## 2021-01-11 PROCEDURE — 36415 COLL VENOUS BLD VENIPUNCTURE: CPT | Performed by: EMERGENCY MEDICINE

## 2021-01-11 PROCEDURE — 80048 BASIC METABOLIC PNL TOTAL CA: CPT | Performed by: EMERGENCY MEDICINE

## 2021-01-11 RX ORDER — MECLIZINE HYDROCHLORIDE 25 MG/1
25 TABLET ORAL EVERY 8 HOURS PRN
Qty: 15 TABLET | Refills: 0 | Status: SHIPPED | OUTPATIENT
Start: 2021-01-11 | End: 2021-03-15 | Stop reason: ALTCHOICE

## 2021-01-11 RX ORDER — ONDANSETRON 4 MG/1
4 TABLET, ORALLY DISINTEGRATING ORAL EVERY 8 HOURS PRN
Qty: 10 TABLET | Refills: 0 | Status: SHIPPED | OUTPATIENT
Start: 2021-01-11 | End: 2021-01-12 | Stop reason: ALTCHOICE

## 2021-01-11 NOTE — ED PROVIDER NOTES
History  Chief Complaint   Patient presents with    Dizziness     pt reports that she dizziness that started today; patient recenrtly had gastric bypass on 12/15/20; patient states that she has hx of anemia;      67-year-old female with history of anemia, hypertension, gastric ulcers presents with dizziness that started today  Patient describes it as everything is spinning when she closes her eyes  She also feels pressure in both of her ears and complains of photophobia  No headache  She has occasional nausea  No trouble with ambulation  She is recently recovered from COVID-19 infection  She had redo of her Jaycee-en-Y bariatric surgery in December and has been recovering well from that standpoint  History provided by:  Patient   used: No    Dizziness  Quality:  Vertigo  Severity:  Unable to specify  Onset quality:  Gradual  Duration:  1 day  Timing:  Intermittent  Chronicity:  New  Context: head movement and inactivity    Context: not with ear pain, not with eye movement and not with loss of consciousness    Relieved by:  Being still  Worsened by:  Closing eyes and turning head  Ineffective treatments:  None tried  Associated symptoms: nausea    Associated symptoms: no blood in stool, no chest pain, no diarrhea, no headaches, no hearing loss, no palpitations, no shortness of breath, no syncope, no tinnitus, no vision changes, no vomiting and no weakness    Risk factors: anemia    Risk factors: no heart disease, no hx of stroke, no hx of vertigo, no Meniere's disease, no multiple medications and no new medications        Prior to Admission Medications   Prescriptions Last Dose Informant Patient Reported? Taking?    Multiple Vitamin (MULTIVITAMIN) tablet  Self Yes No   Sig: Take 1 tablet by mouth daily   acetaminophen (TYLENOL) 500 mg tablet   No No   Sig: Take 1 tablet (500 mg total) by mouth every 6 (six) hours as needed for mild pain   albuterol (PROVENTIL HFA,VENTOLIN HFA) 90 mcg/act inhaler   No No   Sig: Inhale 2 puffs every 4 (four) hours as needed for wheezing   calcium carbonate 1250 MG capsule  Self Yes No   Sig: Take 1,250 mg by mouth 2 (two) times a day with meals   cholecalciferol (VITAMIN D3) 1,000 units tablet  Self No No   Sig: Take 1 tablet (1,000 Units total) by mouth daily   cyanocobalamin (VITAMIN B-12) 100 mcg tablet  Self Yes No   Sig: Take by mouth daily   fluticasone (FLONASE) 50 mcg/act nasal spray   No No   Si spray into each nostril daily   menthol-cetylpyridinium (CEPACOL) 3 MG lozenge   No No   Sig: Take 1 lozenge (3 mg total) by mouth as needed for sore throat   methocarbamol (ROBAXIN) 500 mg tablet   No No   Sig: Take 1 tablet (500 mg total) by mouth 2 (two) times a day   omeprazole (PriLOSEC) 20 mg delayed release capsule  Self No No   Sig: Take 1 capsule (20 mg total) by mouth daily   simethicone (MYLICON) 80 mg chewable tablet  Self No No   Sig: Chew 1 tablet (80 mg total) 4 (four) times a day as needed for flatulence      Facility-Administered Medications: None       Past Medical History:   Diagnosis Date    Anemia     Disease of thyroid gland     Dizziness     due to anemia, last episode 2020, no falls    Fatigue     History of transfusion     anemic - loss of blood due to bleeding ulcers, no reaction    Hyperlipidemia     Hypertension     Multiple gastric ulcers     Obesity     16    Thyroid disease     took synthroid but not currently taking    Ulcer        Past Surgical History:   Procedure Laterality Date    GASTRIC BYPASS      GASTRIC BYPASS LAPAROSCOPIC N/A 12/15/2020    Procedure: Robotic lysis of adhesions, small-bowel resection, partial gastrectomy, paraesophageal hernia repair, bilateral truncal vagotomy; Robotic gastrojejunostomy anastomosis with intraop endoscopy;  Surgeon: Autumn Cobb MD;  Location: [facility];  Service: Bariatrics    LITO-EN-Y PROCEDURE  2016    Gastric Bypass by Dr Bria Chance Weight 339 6,nw    TUBAL LIGATION Bilateral         WISDOM TOOTH EXTRACTION         Family History   Problem Relation Age of Onset    Asthma Mother     Coronary artery disease Mother     Diabetes Mother         MELLITUS    Hyperlipidemia Mother     Hypertension Mother     Aneurysm Mother         2018 just diagnosed with two    No Known Problems Father          when she was 1 months old    Aneurysm Maternal Grandmother     Aneurysm Maternal Grandfather           of a ruptured abdominal aneurysm     I have reviewed and agree with the history as documented  E-Cigarette/Vaping    E-Cigarette Use Never User      E-Cigarette/Vaping Substances    Nicotine No     THC No     CBD No     Flavoring No     Other No     Unknown No      Social History     Tobacco Use    Smoking status: Former Smoker     Types: Cigarettes     Quit date:      Years since quitting: 3 0    Smokeless tobacco: Former User    Tobacco comment: former   Substance Use Topics    Alcohol use: No    Drug use: No       Review of Systems   Constitutional: Negative  Negative for chills, diaphoresis, fatigue and fever  HENT: Negative  Negative for congestion, ear pain, hearing loss, rhinorrhea, sore throat and tinnitus  Eyes: Negative  Negative for discharge, redness and itching  Respiratory: Negative  Negative for apnea, cough, chest tightness, shortness of breath and wheezing  Cardiovascular: Negative for chest pain, palpitations, leg swelling and syncope  Gastrointestinal: Positive for nausea  Negative for abdominal pain, blood in stool, diarrhea and vomiting  Endocrine: Negative  Genitourinary: Negative  Negative for flank pain, frequency and urgency  Musculoskeletal: Negative  Negative for back pain  Skin: Negative  Allergic/Immunologic: Negative  Neurological: Positive for dizziness   Negative for tremors, seizures, syncope, facial asymmetry, speech difficulty, weakness, light-headedness, numbness and headaches  Hematological: Negative  All other systems reviewed and are negative  Physical Exam  Physical Exam  Vitals signs and nursing note reviewed  Constitutional:       General: She is awake  She is not in acute distress  Appearance: Normal appearance  She is well-developed and overweight  She is not ill-appearing, toxic-appearing or diaphoretic  Comments: Observed patient ambulating from triage to room 27 without difficulty   HENT:      Head: Normocephalic and atraumatic  Right Ear: Tympanic membrane, ear canal and external ear normal       Left Ear: Tympanic membrane, ear canal and external ear normal       Nose: Nose normal       Mouth/Throat:      Pharynx: No oropharyngeal exudate  Eyes:      General: Lids are normal  No scleral icterus  Right eye: No discharge  Left eye: No discharge  Extraocular Movements: Extraocular movements intact  Right eye: No nystagmus  Left eye: No nystagmus  Conjunctiva/sclera: Conjunctivae normal       Pupils: Pupils are equal, round, and reactive to light  Neck:      Musculoskeletal: Normal range of motion and neck supple  Thyroid: No thyromegaly  Vascular: No JVD  Trachea: No tracheal deviation  Cardiovascular:      Rate and Rhythm: Normal rate and regular rhythm  Heart sounds: Normal heart sounds  No murmur  No friction rub  No gallop  Pulmonary:      Effort: Pulmonary effort is normal  No respiratory distress  Breath sounds: Normal breath sounds  No stridor  No wheezing, rhonchi or rales  Chest:      Chest wall: No tenderness  Abdominal:      General: Bowel sounds are normal  There is no distension  Palpations: Abdomen is soft  There is no mass  Tenderness: There is no abdominal tenderness  Hernia: No hernia is present  Musculoskeletal: Normal range of motion  General: No tenderness or deformity     Lymphadenopathy: Cervical: No cervical adenopathy  Skin:     General: Skin is warm and dry  Coloration: Skin is not jaundiced or pale  Findings: No bruising, erythema, lesion or rash  Neurological:      General: No focal deficit present  Mental Status: She is alert and oriented to person, place, and time  Cranial Nerves: No cranial nerve deficit  Motor: No weakness or abnormal muscle tone  Coordination: Coordination normal       Gait: Gait normal       Deep Tendon Reflexes: Reflexes are normal and symmetric  Reflexes normal    Psychiatric:         Mood and Affect: Mood normal          Behavior: Behavior is cooperative  Vital Signs  ED Triage Vitals [01/11/21 1814]   Temperature Pulse Respirations Blood Pressure SpO2   98 9 °F (37 2 °C) 75 20 153/78 100 %      Temp src Heart Rate Source Patient Position - Orthostatic VS BP Location FiO2 (%)   -- Monitor -- -- --      Pain Score       --           Vitals:    01/11/21 1814   BP: 153/78   Pulse: 75         Visual Acuity      ED Medications  Medications - No data to display    Diagnostic Studies  Results Reviewed     Procedure Component Value Units Date/Time    CBC and differential [[id number]]     Lab Status: No result Specimen: Blood     Basic metabolic panel [[id number]]     Lab Status: No result Specimen: Blood     POCT pregnancy, urine [[id number]]     Lab Status: No result     POCT urinalysis dipstick [[id number]]     Lab Status: No result Specimen: Urine                  No orders to display              Procedures  Procedures         ED Course                                           MDM  Number of Diagnoses or Management Options  Diagnosis management comments: 60-year-old female presents with a 1 day history of intermittent episodes of vertigo described as everything spinning when she closes her eyes  She sometimes gets nausea and photophobia  No headaches or trouble ambulating  She has recently recovered from COVID-19 infection    She also had redo of her Jaycee-en-Y gastric bypass in December and has been recovering well  On exam she is alert and ambulates normally  She is sitting upright in the bed without difficulty  Her exam is otherwise normal   She is concerned because she has a history of anemia so will check CBC, BMP to rule out any electrolyte abnormality  Will check urine to rule out UTI or pregnancy  I suspect her vertigo is peripheral in nature and most likely secondary to recent URI  Amount and/or Complexity of Data Reviewed  Clinical lab tests: ordered and reviewed        Disposition  Final diagnoses:   None     ED Disposition     None      Follow-up Information    None         Patient's Medications   Discharge Prescriptions    No medications on file     No discharge procedures on file      PDMP Review     None          ED Provider  Electronically Signed by           Lula Britt DO  01/11/21 7800

## 2021-01-11 NOTE — Clinical Note
Ben Madison was seen and treated in our emergency department on 1/11/2021  Diagnosis:     Martín Graff  may return to work on return date  She may return on this date: 01/12/2021         If you have any questions or concerns, please don't hesitate to call        Truong Galarza RN    ______________________________           _______________          _______________  Hospital Representative                              Date                                Time

## 2021-01-11 NOTE — TELEPHONE ENCOUNTER
Regarding: Anemia - Dizziness and lightheaded   ----- Message from Dioni Thomson sent at 1/11/2021  5:04 PM EST -----  "I had a surgery 12/15 and I'm feeling really dizzy and lightheaded now  And I am anemic as well"

## 2021-01-11 NOTE — TELEPHONE ENCOUNTER
Reason for Disposition   [1] Dizziness (vertigo) present now AND [2] one or more stroke risk factors (i e , hypertension, diabetes, prior stroke/TIA, heart attack)  (Exception: prior physician evaluation for this AND no different/worse than usual)    Answer Assessment - Initial Assessment Questions  1  DESCRIPTION: "Describe your dizziness "      Patient stated that she "feels like everything is spinning around and patient hears popping sound when swallowing"  2  VERTIGO: "Do you feel like either you or the room is spinning or tilting?"        Yes   3  LIGHTHEADED: "Do you feel lightheaded?" (e g , somewhat faint, woozy, weak upon standing)      Patient feels lightheaded   4  SEVERITY: "How bad is it?"  "Can you walk?"    - MILD - Feels unsteady but walking normally  - MODERATE - Feels very unsteady when walking, but not falling; interferes with normal activities (e g , school, work)   - SEVERE - Unable to walk without falling (requires assistance)  Moderate   5  ONSET:  "When did the dizziness begin?"      Today  Half an hour ago   6  AGGRAVATING FACTORS: "Does anything make it worse?" (e g , standing, change in head position)       Standing up, changing the head position   7  CAUSE: "What do you think is causing the dizziness?"      Patient has some pressure in her sinus area, patient was diagnosed with anemia   8  RECURRENT SYMPTOM: "Have you had dizziness before?" If so, ask: "When was the last time?" "What happened that time?"      Recurrent, but today the dizziness is worse   9  OTHER SYMPTOMS: "Do you have any other symptoms?" (e g , headache, weakness, numbness, vomiting, earache)       Mild earache, patient stated that "her veins are pumping out and she feels very thirsty even she has been drinking water and Gatorade all day"    10  PREGNANCY: "Is there any chance you are pregnant?" "When was your last menstrual period?"         LMP a month ago      Protocols used: DIZZINESS - VERTIGO-ADULT-AH

## 2021-01-12 ENCOUNTER — OFFICE VISIT (OUTPATIENT)
Dept: FAMILY MEDICINE CLINIC | Facility: CLINIC | Age: 36
End: 2021-01-12
Payer: COMMERCIAL

## 2021-01-12 VITALS
WEIGHT: 192 LBS | TEMPERATURE: 98.2 F | SYSTOLIC BLOOD PRESSURE: 142 MMHG | OXYGEN SATURATION: 100 % | BODY MASS INDEX: 32.78 KG/M2 | DIASTOLIC BLOOD PRESSURE: 89 MMHG | HEART RATE: 70 BPM | HEIGHT: 64 IN

## 2021-01-12 DIAGNOSIS — U07.1 COVID-19: ICD-10-CM

## 2021-01-12 DIAGNOSIS — I10 ESSENTIAL HYPERTENSION: ICD-10-CM

## 2021-01-12 DIAGNOSIS — R42 VERTIGO: Primary | ICD-10-CM

## 2021-01-12 DIAGNOSIS — D72.828 OTHER ELEVATED WHITE BLOOD CELL (WBC) COUNT: ICD-10-CM

## 2021-01-12 PROBLEM — D72.829 LEUCOCYTOSIS: Status: ACTIVE | Noted: 2021-01-12

## 2021-01-12 PROCEDURE — 3725F SCREEN DEPRESSION PERFORMED: CPT | Performed by: FAMILY MEDICINE

## 2021-01-12 PROCEDURE — 93000 ELECTROCARDIOGRAM COMPLETE: CPT | Performed by: FAMILY MEDICINE

## 2021-01-12 PROCEDURE — 1036F TOBACCO NON-USER: CPT | Performed by: FAMILY MEDICINE

## 2021-01-12 PROCEDURE — 99214 OFFICE O/P EST MOD 30 MIN: CPT | Performed by: FAMILY MEDICINE

## 2021-01-12 PROCEDURE — 1111F DSCHRG MED/CURRENT MED MERGE: CPT | Performed by: FAMILY MEDICINE

## 2021-01-12 RX ORDER — ALBUTEROL SULFATE 90 UG/1
2 AEROSOL, METERED RESPIRATORY (INHALATION) EVERY 4 HOURS PRN
Qty: 1 INHALER | Refills: 2 | Status: SHIPPED | OUTPATIENT
Start: 2021-01-12

## 2021-01-12 RX ORDER — AMLODIPINE BESYLATE 5 MG/1
5 TABLET ORAL DAILY
Qty: 90 TABLET | Refills: 0 | Status: SHIPPED | OUTPATIENT
Start: 2021-01-12 | End: 2021-03-23 | Stop reason: SDUPTHER

## 2021-01-12 RX ORDER — AMLODIPINE BESYLATE 5 MG/1
5 TABLET ORAL DAILY
Qty: 30 TABLET | Refills: 2 | Status: SHIPPED | OUTPATIENT
Start: 2021-01-12 | End: 2021-01-12 | Stop reason: SDUPTHER

## 2021-01-12 NOTE — ASSESSMENT & PLAN NOTE
New diagnosis recommend  Meclizine 25 mg po q hs ,proper use and possible side effect discuss with patient   Low salt low caffeine diet review with patient

## 2021-01-12 NOTE — ASSESSMENT & PLAN NOTE
Uncontrol Asymptomatic plan to start Norvasc 5 mg po/day proper use discuss and possible side effect discuss with patient   Low salt diet and important lose weight

## 2021-01-12 NOTE — PROGRESS NOTES
BMI Counseling: Body mass index is 32 96 kg/m²  The BMI is above normal  Nutrition recommendations include decreasing portion sizes, consuming healthier snacks and limiting drinks that contain sugar  Exercise recommendations include exercising 3-5 times per week  Subjective:   Chief Complaint   Patient presents with    Dizziness        Patient ID: Mumtaz Lewis is a 30 y o  female  Dizziness  This is a new problem  The current episode started in the past 7 days  The problem occurs intermittently  The problem has been unchanged  Associated symptoms include nausea and vertigo  Pertinent negatives include no abdominal pain, arthralgias, change in bowel habit, chest pain, congestion, coughing, fatigue, fever, headaches, joint swelling, neck pain, numbness, rash, sore throat, swollen glands, urinary symptoms, visual change or weakness  The symptoms are aggravated by twisting  She has tried nothing for the symptoms  The treatment provided no relief  patient was in ER yesterday record review with patient    The following portions of the patient's history were reviewed and updated as appropriate: allergies, current medications, past family history, past medical history, past social history, past surgical history and problem list     Review of Systems   Constitutional: Negative for activity change, appetite change, fatigue and fever  HENT: Negative for congestion, ear pain, sinus pressure, sinus pain and sore throat  Eyes: Negative for pain, discharge, redness and itching  Respiratory: Negative for cough, chest tightness, shortness of breath and stridor  Cardiovascular: Negative for chest pain, palpitations and leg swelling  Gastrointestinal: Positive for nausea  Negative for abdominal pain, blood in stool, change in bowel habit, constipation and diarrhea  Genitourinary: Negative for dysuria, flank pain, frequency and hematuria     Musculoskeletal: Negative for arthralgias, back pain, joint swelling and neck pain  Skin: Negative for pallor and rash  Neurological: Positive for dizziness and vertigo  Negative for tremors, syncope, speech difficulty, weakness, light-headedness, numbness and headaches  Hematological: Does not bruise/bleed easily  Objective:  Vitals:    01/12/21 0905   BP: 142/89   Pulse: 70   Temp: 98 2 °F (36 8 °C)   TempSrc: Tympanic   SpO2: 100%   Weight: 87 1 kg (192 lb)   Height: 5' 4" (1 626 m)      Physical Exam  Vitals signs and nursing note reviewed  Constitutional:       General: She is not in acute distress  Appearance: She is well-developed  She is not diaphoretic  HENT:      Head: Normocephalic  Right Ear: Tympanic membrane, ear canal and external ear normal       Left Ear: Tympanic membrane, ear canal and external ear normal       Nose: Nose normal  No congestion or rhinorrhea  Mouth/Throat:      Mouth: Mucous membranes are moist       Pharynx: Oropharynx is clear  No oropharyngeal exudate or posterior oropharyngeal erythema  Eyes:      General:         Right eye: No discharge  Left eye: No discharge  Conjunctiva/sclera: Conjunctivae normal       Pupils: Pupils are equal, round, and reactive to light  Neck:      Musculoskeletal: Normal range of motion and neck supple  Vascular: No JVD  Cardiovascular:      Rate and Rhythm: Normal rate and regular rhythm  Heart sounds: Normal heart sounds  No murmur  No gallop  Pulmonary:      Effort: Pulmonary effort is normal  No respiratory distress  Breath sounds: Normal breath sounds  No stridor  No wheezing or rales  Chest:      Chest wall: No tenderness  Abdominal:      General: There is no distension  Palpations: Abdomen is soft  There is no mass  Tenderness: There is no abdominal tenderness  There is no rebound  Musculoskeletal:         General: No tenderness  Lymphadenopathy:      Cervical: No cervical adenopathy     Skin:     General: Skin is warm  Findings: No erythema or rash  Neurological:      Mental Status: She is alert and oriented to person, place, and time  Sensory: No sensory deficit  Motor: No weakness  Gait: Gait normal       Deep Tendon Reflexes: Reflexes normal    Psychiatric:         Mood and Affect: Mood normal            Assessment/Plan:    Vertigo  New diagnosis recommend  Meclizine 25 mg po q hs ,proper use and possible side effect discuss with patient   Low salt low caffeine diet review with patient    Leucocytosis  New finding on recent blood work in ER recomened to repeat CBC in 4 w    BMI 32 0-32 9,adult  S/P gastric bypass ,recomened to continue Bariatric diet   Increase physical activity    Essential hypertension  Uncontrol Asymptomatic plan to start Norvasc 5 mg po/day proper use discuss and possible side effect discuss with patient   Low salt diet and important lose weight       Diagnoses and all orders for this visit:    Vertigo  -     POCT ECG    Essential hypertension  -     Discontinue: amLODIPine (NORVASC) 5 mg tablet; Take 1 tablet (5 mg total) by mouth daily  -     POCT ECG    Other elevated white blood cell (WBC) count  -     CBC and differential; Future    BMI 32 0-32 9,adult    COVID-19  -     albuterol (PROVENTIL HFA,VENTOLIN HFA) 90 mcg/act inhaler;  Inhale 2 puffs every 4 (four) hours as needed for wheezing

## 2021-01-14 DIAGNOSIS — Z23 ENCOUNTER FOR IMMUNIZATION: ICD-10-CM

## 2021-01-15 ENCOUNTER — TELEPHONE (OUTPATIENT)
Dept: BARIATRICS | Facility: CLINIC | Age: 36
End: 2021-01-15

## 2021-01-15 DIAGNOSIS — E66.01 MORBID OBESITY (HCC): ICD-10-CM

## 2021-01-15 DIAGNOSIS — R10.9 ABDOMINAL PAIN: ICD-10-CM

## 2021-01-15 DIAGNOSIS — R11.10 VOMITING: ICD-10-CM

## 2021-01-15 DIAGNOSIS — Z98.84 BARIATRIC SURGERY STATUS: Primary | ICD-10-CM

## 2021-01-15 DIAGNOSIS — Z98.84 BARIATRIC SURGERY STATUS: ICD-10-CM

## 2021-01-15 RX ORDER — SIMETHICONE 80 MG
80 TABLET,CHEWABLE ORAL 4 TIMES DAILY PRN
Qty: 30 TABLET | Refills: 0 | Status: SHIPPED | OUTPATIENT
Start: 2021-01-15 | End: 2021-03-13

## 2021-01-15 RX ORDER — SUCRALFATE ORAL 1 G/10ML
1 SUSPENSION ORAL
Qty: 1200 ML | Refills: 0 | Status: SHIPPED | OUTPATIENT
Start: 2021-01-15 | End: 2021-01-18

## 2021-01-15 RX ORDER — OMEPRAZOLE 20 MG/1
20 CAPSULE, DELAYED RELEASE ORAL 2 TIMES DAILY
Qty: 60 CAPSULE | Refills: 1 | Status: SHIPPED | OUTPATIENT
Start: 2021-01-15 | End: 2021-01-18

## 2021-01-15 NOTE — TELEPHONE ENCOUNTER
Will refill and also send for carafate 4x daily with meals and at bedtime  If patient can be schedule with dr Quynh Mcintyre in the next week or so otherwise with me or shelby  Please tell her if symptoms worsen or if she develops new symptoms or cannot tolerate fluids needs to be seen in ER

## 2021-01-15 NOTE — TELEPHONE ENCOUNTER
PAIN LEFT SIDE in the back and is vomiting since she switch to soft foods she did eat broccolli and shrimp  Sunday was soft, and was fine is there something she can take she has 2nd po with fern in march   Seeing RD in January please refill omeprazole 20mg 2times daily she is taking and a refill for MYLICON chewable 73ZC for gas No

## 2021-01-15 NOTE — TELEPHONE ENCOUNTER
Returned patient's phone call  Reports that she is vomiting twice per day, even soft food items such as mashed potatoes  Instructed patient to go back to liquids with protein shakes  Transferred to  to be scheduled with a provider for follow up

## 2021-01-18 RX ORDER — OMEPRAZOLE 20 MG/1
CAPSULE, DELAYED RELEASE ORAL
Qty: 180 CAPSULE | Refills: 0 | Status: SHIPPED | OUTPATIENT
Start: 2021-01-18 | End: 2021-05-12 | Stop reason: ALTCHOICE

## 2021-01-18 RX ORDER — SUCRALFATE ORAL 1 G/10ML
1 SUSPENSION ORAL
Qty: 3600 ML | Refills: 0 | Status: SHIPPED | OUTPATIENT
Start: 2021-01-18 | End: 2021-03-15 | Stop reason: ALTCHOICE

## 2021-01-20 ENCOUNTER — OFFICE VISIT (OUTPATIENT)
Dept: BARIATRICS | Facility: CLINIC | Age: 36
End: 2021-01-20

## 2021-01-20 VITALS
RESPIRATION RATE: 18 BRPM | HEIGHT: 64 IN | SYSTOLIC BLOOD PRESSURE: 120 MMHG | HEART RATE: 70 BPM | TEMPERATURE: 97.4 F | BODY MASS INDEX: 32.87 KG/M2 | DIASTOLIC BLOOD PRESSURE: 86 MMHG | WEIGHT: 192.5 LBS

## 2021-01-20 DIAGNOSIS — R10.9 ABDOMINAL PAIN: Primary | ICD-10-CM

## 2021-01-20 PROCEDURE — 99024 POSTOP FOLLOW-UP VISIT: CPT | Performed by: SURGERY

## 2021-01-20 NOTE — PROGRESS NOTES
POST OP UP VISIT - BARIATRIC SURGERY  Vern Zhao 30 y o  female MRN: [id number]  Unit/Bed#:  Encounter: [id number]      HPI: Florentino Brian is a 30 y o  female s/p Robotic lysis of adhesions  Robotic small-bowel resection, Robotic partial gastrectomy, Robotic paraesophageal hernia repair, Robotic bilateral truncal vagotomy and Robotic gastrojejunostomy anastomosis with intraop endoscopy 12/15/2020 by Anne Marie Blanton  Patient reports left flank pain since the time of surgery, usually comes after eating, sharp in nature, no radiation, decrease with Carafate and omeprazole  She also reports nausea and vomiting  She reports improvement of her nausea after increasing the dose of omeprazole to twice daily and starting Carafate  She has been on Carafate tablet without crushing them  Patient has no issue with the bowel movement  She denies any epigastric or lower abdominal pain  Patient was recently diagnosed with COVID after being discharged from hospital   She also has a concern about not losing any weight  She denies pain during micturition or hematuria  Review of Systems   Constitutional: Positive for fatigue  Negative for chills  HENT: Negative for ear pain  Eyes: Negative for pain  Respiratory: Negative for cough, shortness of breath and wheezing  Cardiovascular: Negative for chest pain  Gastrointestinal: Positive for nausea and vomiting  Negative for abdominal distention and abdominal pain  Endocrine: Negative for polydipsia  Genitourinary: Positive for flank pain  Musculoskeletal: Negative for arthralgias and back pain  Skin: Negative for pallor  Allergic/Immunologic: Negative for food allergies  Neurological: Negative for weakness and headaches  Hematological: Does not bruise/bleed easily  Psychiatric/Behavioral: Negative for confusion         Historical Information   Past Medical History:   Diagnosis Date    Anemia     BMI 36 0-36 9,adult 12/8/2020    Disease of thyroid gland     Dizziness     due to anemia, last episode August 2020, no falls    Fatigue     History of transfusion 2016    anemic - loss of blood due to bleeding ulcers, no reaction    Hyperlipidemia     Hypertension     Multiple gastric ulcers     Obesity     11/14/16    Thyroid disease     took synthroid but not currently taking    Ulcer      Past Surgical History:   Procedure Laterality Date    GASTRIC BYPASS  2014    GASTRIC BYPASS LAPAROSCOPIC N/A 12/15/2020    Procedure: Robotic lysis of adhesions, small-bowel resection, partial gastrectomy, paraesophageal hernia repair, bilateral truncal vagotomy; Robotic gastrojejunostomy anastomosis with intraop endoscopy;  Surgeon: Smita Brown MD;  Location: [facility];  Service: Bariatrics    LITO-EN-Y PROCEDURE  11/14/2016    Gastric Bypass by Dr Elana Mcgovern Weight 339 6,nw    TUBAL LIGATION Bilateral     2010    WISDOM TOOTH EXTRACTION       Social History   Social History     Substance and Sexual Activity   Alcohol Use No     Social History     Substance and Sexual Activity   Drug Use No     Social History     Tobacco Use   Smoking Status Former Smoker    Types: Cigarettes    Quit date: 2018    Years since quitting: 3 0   Smokeless Tobacco Former User   Tobacco Comment    former     Family History: non-contributory    Meds/Allergies   all medications and allergies reviewed  Allergies   Allergen Reactions    [place] Oil Shortness Of Breath    Venofer [Iron Sucrose]      Chest pressure after 1st dose  Tachycardia and blurred vision after 8 minutes of dose #2          Objective       Current Vitals:   Blood Pressure: 120/86 (01/20/21 1048)  Pulse: 70 (01/20/21 1048)  Temperature: (!) 97 4 °F (36 3 °C) (01/20/21 1048)  Respirations: 18 (01/20/21 1048)  Height: 5' 4" (162 6 cm) (01/20/21 1048)  Weight - Scale: 87 3 kg (192 lb 8 oz) (01/20/21 1048)      Invasive Devices     None                 Physical Exam  Constitutional: Appearance: Normal appearance  HENT:      Head: Normocephalic  Mouth/Throat:      Mouth: Mucous membranes are moist    Eyes:      Conjunctiva/sclera: Conjunctivae normal       Pupils: Pupils are equal, round, and reactive to light  Neck:      Musculoskeletal: Normal range of motion  Cardiovascular:      Rate and Rhythm: Normal rate and regular rhythm  Pulmonary:      Effort: Pulmonary effort is normal    Abdominal:      General: Abdomen is flat  There is no distension  Tenderness: There is no abdominal tenderness  Comments: Left flank tenderness  All laparoscopic incisions are healing well, with no signs of infection  There are no palpable incisions hernias  Deep palpation is not painful in all 4 quadrants, and there are no peritoneal findings  Musculoskeletal: Normal range of motion  Skin:     General: Skin is warm  Capillary Refill: Capillary refill takes less than 2 seconds  Neurological:      General: No focal deficit present  Mental Status: She is alert  Psychiatric:         Mood and Affect: Mood normal              Pathology, and Other Studies: I have personally reviewed pertinent reports  Assessment/PLAN:  Cathy Dobson is a 30 y o  female s/p Robotic lysis of adhesions  Robotic small-bowel resection, Robotic partial gastrectomy, Robotic paraesophageal hernia repair, Robotic bilateral truncal vagotomy and Robotic gastrojejunostomy anastomosis with intraop endoscopy 12/15/2020 by Kalia Dominguez  Patient reports left flank pain since the time of surgery, usually comes after eating, sharp in nature, no radiation, decrease with Carafate and omeprazole  She also reports nausea and vomiting  She reports improvement of her nausea after increasing the dose of omeprazole to twice daily and starting Carafate  She has been on Carafate tablet without crushing them  Patient has no issue with the bowel movement    She denies any epigastric or lower abdominal pain   Patient was recently diagnosed with COVID after being discharged from hospital   She also has a concern about not losing any weight  She denies pain during micturition or hematuria  Patient to continue omeprazole twice daily, continue Carafate 4 times daily, instructed to crush time  Will order CT scan of abdomen pelvis with IV and oral contrast bariatric protocol to rule out any acute intra-abdominal process    If CT scan is negative for schedule her for upper GI endoscopy           Blank Lacy MD  1/20/2021  11:04 AM

## 2021-01-21 ENCOUNTER — TELEPHONE (OUTPATIENT)
Dept: BARIATRICS | Facility: CLINIC | Age: 36
End: 2021-01-21

## 2021-01-28 ENCOUNTER — HOSPITAL ENCOUNTER (OUTPATIENT)
Dept: MAMMOGRAPHY | Facility: CLINIC | Age: 36
Discharge: HOME | End: 2021-01-28
Payer: COMMERCIAL

## 2021-01-28 ENCOUNTER — OFFICE VISIT (OUTPATIENT)
Dept: BARIATRICS | Facility: CLINIC | Age: 36
End: 2021-01-28

## 2021-01-28 VITALS — HEIGHT: 64 IN | BODY MASS INDEX: 32.85 KG/M2 | WEIGHT: 192.4 LBS

## 2021-01-28 VITALS — HEIGHT: 64 IN | WEIGHT: 192 LBS | BODY MASS INDEX: 32.78 KG/M2

## 2021-01-28 DIAGNOSIS — E66.9 OBESITY, CLASS II, BMI 35-39.9: Primary | ICD-10-CM

## 2021-01-28 DIAGNOSIS — N64.4 BREAST PAIN, RIGHT: ICD-10-CM

## 2021-01-28 PROCEDURE — RECHECK

## 2021-01-28 PROCEDURE — 76642 ULTRASOUND BREAST LIMITED: CPT

## 2021-01-28 PROCEDURE — 3008F BODY MASS INDEX DOCD: CPT | Performed by: FAMILY MEDICINE

## 2021-01-28 NOTE — PROGRESS NOTES
Weight Management Nutrition Class     Diagnosis: Obesity    Bariatric Surgeon: Dr Ashley Fernandez    Surgery: small bowel resection, partial gastrectomy, hernia repair, anastomosis    Class: 5 week post op     Topics discussed today include:     fluid goals post op, protein goals post op, constipation, chew food well, exercise, avoidance of alcohol, PPI use, diet progression, hypoglycemia, dumping syndrome, protein supplems, vitamin/mineral supplements and calcium supplements    Patient was able to verbalize basic diet (protein, fluid, vitamin and mineral) recommendations and possible nutrition-related complications   Yes

## 2021-01-31 ENCOUNTER — HOSPITAL ENCOUNTER (OUTPATIENT)
Dept: CT IMAGING | Facility: HOSPITAL | Age: 36
Discharge: HOME | End: 2021-01-31
Payer: COMMERCIAL

## 2021-01-31 DIAGNOSIS — R10.9 ABDOMINAL PAIN: ICD-10-CM

## 2021-01-31 PROCEDURE — G1004 CDSM NDSC: HCPCS

## 2021-01-31 PROCEDURE — 74177 CT ABD & PELVIS W/CONTRAST: CPT

## 2021-01-31 RX ADMIN — IOHEXOL 100 ML: 350 INJECTION, SOLUTION INTRAVENOUS at 11:07

## 2021-02-04 ENCOUNTER — TELEMEDICINE (OUTPATIENT)
Dept: BARIATRICS | Facility: CLINIC | Age: 36
End: 2021-02-04
Payer: COMMERCIAL

## 2021-02-04 VITALS — WEIGHT: 190 LBS | BODY MASS INDEX: 32.44 KG/M2 | HEIGHT: 64 IN

## 2021-02-04 DIAGNOSIS — E66.9 OBESITY, CLASS II, BMI 35-39.9: Primary | ICD-10-CM

## 2021-02-04 PROCEDURE — RECHECK: Performed by: SURGERY

## 2021-02-04 NOTE — PROGRESS NOTES
Virtual Regular Visit      Assessment/Plan:    Problem List Items Addressed This Visit     None               Reason for visit is   Chief Complaint   Patient presents with    Follow-up     Review test results    Virtual Regular Visit        Encounter provider Alisa Blake MD    Provider located at [facility] [place] [id number]      Recent Visits  No visits were found meeting these conditions  Showing recent visits within past 7 days and meeting all other requirements     Today's Visits  Date Type Provider Dept   02/04/21 Telemedicine Alisa Blake MD [facility]   Showing today's visits and meeting all other requirements     Future Appointments  No visits were found meeting these conditions  Showing future appointments within next 150 days and meeting all other requirements        The patient was identified by name and date of birth  Isidoro Man was informed that this is a telemedicine visit and that the visit is being conducted through [facility] and patient was informed that this is not a secure, HIPAA-compliant platform  She agrees to proceed     My office door was closed  No one else was in the room  She acknowledged consent and understanding of privacy and security of the video platform  The patient has agreed to participate and understands they can discontinue the visit at any time  Patient is aware this is a billable service  Subjective  Isidoro Man is a 30 y o  female s/p RYGB revision  She is doing well, no issues, nausea and vomiting completely resolved, recent CT A/P was within normal limits  Follow up in 3 months        HPI     Past Medical History:   Diagnosis Date    Anemia     BMI 36 0-36 9,adult 12/8/2020    Disease of thyroid gland     Dizziness     due to anemia, last episode August 2020, no falls    Fatigue     History of transfusion 2016    anemic - loss of blood due to bleeding ulcers, no reaction    Hyperlipidemia     Hypertension     Multiple gastric ulcers     Obesity     11/14/16    Thyroid disease     took synthroid but not currently taking    Ulcer        Past Surgical History:   Procedure Laterality Date    GASTRIC BYPASS  2014    GASTRIC BYPASS LAPAROSCOPIC N/A 12/15/2020    Procedure: Robotic lysis of adhesions, small-bowel resection, partial gastrectomy, paraesophageal hernia repair, bilateral truncal vagotomy; Robotic gastrojejunostomy anastomosis with intraop endoscopy;  Surgeon: Alisa Blake MD;  Location: [facility];  Service: Bariatrics    LITO-EN-Y PROCEDURE  11/14/2016    Gastric Bypass by Dr Rigo Muñoz Weight 339 6,nw    TUBAL LIGATION Bilateral     2010    WISDOM TOOTH EXTRACTION         Current Outpatient Medications   Medication Sig Dispense Refill    acetaminophen (TYLENOL) 500 mg tablet Take 1 tablet (500 mg total) by mouth every 6 (six) hours as needed for mild pain 30 tablet 0    albuterol (PROVENTIL HFA,VENTOLIN HFA) 90 mcg/act inhaler Inhale 2 puffs every 4 (four) hours as needed for wheezing 1 Inhaler 2    amLODIPine (NORVASC) 5 mg tablet Take 1 tablet (5 mg total) by mouth daily 90 tablet 0    calcium carbonate 1250 MG capsule Take 1,250 mg by mouth 2 (two) times a day with meals      cholecalciferol (VITAMIN D3) 1,000 units tablet Take 1 tablet (1,000 Units total) by mouth daily 30 tablet 0    cyanocobalamin (VITAMIN B-12) 100 mcg tablet Take by mouth daily      fluticasone (FLONASE) 50 mcg/act nasal spray 1 spray into each nostril daily 16 g 0    meclizine (ANTIVERT) 25 mg tablet Take 1 tablet (25 mg total) by mouth every 8 (eight) hours as needed for dizziness 15 tablet 0    Multiple Vitamin (MULTIVITAMIN) tablet Take 1 tablet by mouth daily      omeprazole (PriLOSEC) 20 mg delayed release capsule TAKE 1 CAPSULE(20 MG) BY MOUTH TWICE DAILY 180 capsule 0    simethicone (MYLICON) 80 mg chewable tablet Chew 1 tablet (80 mg total) 4 (four) times a day as needed for flatulence 30 tablet 0    sucralfate (CARAFATE) 1 g/10 mL suspension Take 10 mL (1 g total) by mouth 4 (four) times a day (with meals and at bedtime) 3600 mL 0     No current facility-administered medications for this visit  Allergies   Allergen Reactions    [place] Oil Shortness Of Breath    Venofer [Iron Sucrose]      Chest pressure after 1st dose  Tachycardia and blurred vision after 8 minutes of dose #2  Review of Systems    Video Exam    Vitals:    02/04/21 1044   Weight: 86 2 kg (190 lb)   Height: 5' 4" (1 626 m)       Physical Exam     I spent 10 minutes directly with the patient during this visit      VIRTUAL VISIT DISCLAIMER    Gladys Kenny acknowledges that she has consented to an online visit or consultation  She understands that the online visit is based solely on information provided by her, and that, in the absence of a face-to-face physical evaluation by the physician, the diagnosis she receives is both limited and provisional in terms of accuracy and completeness  This is not intended to replace a full medical face-to-face evaluation by the physician  Gladys Kenny understands and accepts these terms

## 2021-02-12 DIAGNOSIS — R10.9 ABDOMINAL PAIN: ICD-10-CM

## 2021-02-12 DIAGNOSIS — R11.10 VOMITING: ICD-10-CM

## 2021-02-12 DIAGNOSIS — E66.01 MORBID OBESITY (HCC): ICD-10-CM

## 2021-02-12 DIAGNOSIS — Z98.84 BARIATRIC SURGERY STATUS: ICD-10-CM

## 2021-02-12 RX ORDER — OMEPRAZOLE 20 MG/1
CAPSULE, DELAYED RELEASE ORAL
Qty: 180 CAPSULE | Refills: 0 | OUTPATIENT
Start: 2021-02-12

## 2021-03-13 ENCOUNTER — APPOINTMENT (EMERGENCY)
Dept: CT IMAGING | Facility: HOSPITAL | Age: 36
End: 2021-03-13
Payer: COMMERCIAL

## 2021-03-13 ENCOUNTER — HOSPITAL ENCOUNTER (EMERGENCY)
Facility: HOSPITAL | Age: 36
Discharge: HOME | End: 2021-03-13
Attending: EMERGENCY MEDICINE
Payer: COMMERCIAL

## 2021-03-13 ENCOUNTER — APPOINTMENT (EMERGENCY)
Dept: RADIOLOGY | Facility: HOSPITAL | Age: 36
End: 2021-03-13
Payer: COMMERCIAL

## 2021-03-13 ENCOUNTER — NURSE TRIAGE (OUTPATIENT)
Dept: OTHER | Facility: OTHER | Age: 36
End: 2021-03-13

## 2021-03-13 VITALS
HEART RATE: 74 BPM | TEMPERATURE: 98.2 F | DIASTOLIC BLOOD PRESSURE: 80 MMHG | SYSTOLIC BLOOD PRESSURE: 136 MMHG | BODY MASS INDEX: 34.81 KG/M2 | OXYGEN SATURATION: 100 % | WEIGHT: 202.82 LBS | RESPIRATION RATE: 12 BRPM

## 2021-03-13 DIAGNOSIS — R06.02 SOB (SHORTNESS OF BREATH): ICD-10-CM

## 2021-03-13 DIAGNOSIS — R07.9 CHEST PAIN: Primary | ICD-10-CM

## 2021-03-13 DIAGNOSIS — D64.9 ANEMIA: ICD-10-CM

## 2021-03-13 DIAGNOSIS — N92.0 MENORRHAGIA WITH REGULAR CYCLE: ICD-10-CM

## 2021-03-13 DIAGNOSIS — E07.9 THYROID MASS: ICD-10-CM

## 2021-03-13 DIAGNOSIS — R51.9 HEADACHE: ICD-10-CM

## 2021-03-13 LAB
ALBUMIN SERPL BCP-MCNC: 4.1 G/DL (ref 3–5.2)
ALP SERPL-CCNC: 69 U/L (ref 43–122)
ALT SERPL W P-5'-P-CCNC: 21 U/L (ref 9–52)
ANION GAP SERPL CALCULATED.3IONS-SCNC: 7 MMOL/L (ref 5–14)
AST SERPL W P-5'-P-CCNC: 36 U/L (ref 14–36)
ATRIAL RATE: 99 BPM
BACTERIA UR QL AUTO: ABNORMAL /HPF
BASOPHILS # BLD AUTO: 0 THOUSANDS/ΜL (ref 0–0.1)
BASOPHILS NFR BLD AUTO: 1 % (ref 0–1)
BILIRUB SERPL-MCNC: 0.5 MG/DL
BILIRUB UR QL STRIP: NEGATIVE
BUN SERPL-MCNC: 12 MG/DL (ref 5–25)
CALCIUM SERPL-MCNC: 8.4 MG/DL (ref 8.4–10.2)
CHLORIDE SERPL-SCNC: 109 MMOL/L (ref 97–108)
CLARITY UR: ABNORMAL
CO2 SERPL-SCNC: 21 MMOL/L (ref 22–30)
COLOR UR: ABNORMAL
CREAT SERPL-MCNC: 0.6 MG/DL (ref 0.6–1.2)
D DIMER PPP FEU-MCNC: 0.36 UG/ML FEU
EOSINOPHIL # BLD AUTO: 0.1 THOUSAND/ΜL (ref 0–0.4)
EOSINOPHIL NFR BLD AUTO: 1 % (ref 0–6)
ERYTHROCYTE [DISTWIDTH] IN BLOOD BY AUTOMATED COUNT: 16.2 %
EXT PREG TEST URINE: NEGATIVE
EXT. CONTROL ED NAV: NORMAL
GFR SERPL CREATININE-BSD FRML MDRD: 119 ML/MIN/1.73SQ M
GLUCOSE SERPL-MCNC: 145 MG/DL (ref 70–99)
GLUCOSE UR STRIP-MCNC: NEGATIVE MG/DL
HCT VFR BLD AUTO: 36.3 % (ref 36–46)
HGB BLD-MCNC: 11.6 G/DL (ref 12–16)
HGB UR QL STRIP.AUTO: NEGATIVE
KETONES UR STRIP-MCNC: NEGATIVE MG/DL
LEUKOCYTE ESTERASE UR QL STRIP: 500
LYMPHOCYTES # BLD AUTO: 1.6 THOUSANDS/ΜL (ref 0.5–4)
LYMPHOCYTES NFR BLD AUTO: 26 % (ref 25–45)
MCH RBC QN AUTO: 29 PG (ref 26–34)
MCHC RBC AUTO-ENTMCNC: 32 G/DL (ref 31–36)
MCV RBC AUTO: 91 FL (ref 80–100)
MONOCYTES # BLD AUTO: 0.4 THOUSAND/ΜL (ref 0.2–0.9)
MONOCYTES NFR BLD AUTO: 7 % (ref 1–10)
NEUTROPHILS # BLD AUTO: 4.1 THOUSANDS/ΜL (ref 1.8–7.8)
NEUTS SEG NFR BLD AUTO: 66 % (ref 45–65)
NITRITE UR QL STRIP: NEGATIVE
NON-SQ EPI CELLS URNS QL MICRO: ABNORMAL /HPF
NT-PROBNP SERPL-MCNC: 35.3 PG/ML (ref 0–299)
P AXIS: 37 DEGREES
PH UR STRIP.AUTO: 6 [PH]
PLATELET # BLD AUTO: 292 THOUSANDS/UL (ref 150–450)
PMV BLD AUTO: 8.6 FL (ref 8.9–12.7)
POTASSIUM SERPL-SCNC: 3.9 MMOL/L (ref 3.6–5)
PR INTERVAL: 124 MS
PROT SERPL-MCNC: 7.6 G/DL (ref 5.9–8.4)
PROT UR STRIP-MCNC: NEGATIVE MG/DL
QRS AXIS: 20 DEGREES
QRSD INTERVAL: 84 MS
QT INTERVAL: 348 MS
QTC INTERVAL: 446 MS
RBC # BLD AUTO: 4.01 MILLION/UL (ref 4–5.2)
RBC #/AREA URNS AUTO: ABNORMAL /HPF
SODIUM SERPL-SCNC: 137 MMOL/L (ref 137–147)
SP GR UR STRIP.AUTO: 1.02 (ref 1–1.04)
T WAVE AXIS: 41 DEGREES
TROPONIN I SERPL-MCNC: <0.01 NG/ML (ref 0–0.03)
UROBILINOGEN UA: NEGATIVE MG/DL
VENTRICULAR RATE: 99 BPM
WBC # BLD AUTO: 6.2 THOUSAND/UL (ref 4.5–11)
WBC #/AREA URNS AUTO: ABNORMAL /HPF

## 2021-03-13 PROCEDURE — 71045 X-RAY EXAM CHEST 1 VIEW: CPT

## 2021-03-13 PROCEDURE — 83001 ASSAY OF GONADOTROPIN (FSH): CPT | Performed by: FAMILY MEDICINE

## 2021-03-13 PROCEDURE — 80053 COMPREHEN METABOLIC PANEL: CPT | Performed by: PHYSICIAN ASSISTANT

## 2021-03-13 PROCEDURE — 83880 ASSAY OF NATRIURETIC PEPTIDE: CPT | Performed by: PHYSICIAN ASSISTANT

## 2021-03-13 PROCEDURE — 96360 HYDRATION IV INFUSION INIT: CPT

## 2021-03-13 PROCEDURE — 85379 FIBRIN DEGRADATION QUANT: CPT | Performed by: PHYSICIAN ASSISTANT

## 2021-03-13 PROCEDURE — 84146 ASSAY OF PROLACTIN: CPT | Performed by: FAMILY MEDICINE

## 2021-03-13 PROCEDURE — 85025 COMPLETE CBC W/AUTO DIFF WBC: CPT | Performed by: PHYSICIAN ASSISTANT

## 2021-03-13 PROCEDURE — 84443 ASSAY THYROID STIM HORMONE: CPT | Performed by: FAMILY MEDICINE

## 2021-03-13 PROCEDURE — 99285 EMERGENCY DEPT VISIT HI MDM: CPT | Performed by: PHYSICIAN ASSISTANT

## 2021-03-13 PROCEDURE — 70498 CT ANGIOGRAPHY NECK: CPT

## 2021-03-13 PROCEDURE — 93005 ELECTROCARDIOGRAM TRACING: CPT

## 2021-03-13 PROCEDURE — 93010 ELECTROCARDIOGRAM REPORT: CPT | Performed by: INTERNAL MEDICINE

## 2021-03-13 PROCEDURE — 81001 URINALYSIS AUTO W/SCOPE: CPT | Performed by: PHYSICIAN ASSISTANT

## 2021-03-13 PROCEDURE — 36415 COLL VENOUS BLD VENIPUNCTURE: CPT | Performed by: PHYSICIAN ASSISTANT

## 2021-03-13 PROCEDURE — 83002 ASSAY OF GONADOTROPIN (LH): CPT | Performed by: FAMILY MEDICINE

## 2021-03-13 PROCEDURE — 96361 HYDRATE IV INFUSION ADD-ON: CPT

## 2021-03-13 PROCEDURE — 99285 EMERGENCY DEPT VISIT HI MDM: CPT

## 2021-03-13 PROCEDURE — 84484 ASSAY OF TROPONIN QUANT: CPT | Performed by: PHYSICIAN ASSISTANT

## 2021-03-13 PROCEDURE — 81025 URINE PREGNANCY TEST: CPT | Performed by: PHYSICIAN ASSISTANT

## 2021-03-13 PROCEDURE — 81003 URINALYSIS AUTO W/O SCOPE: CPT | Performed by: PHYSICIAN ASSISTANT

## 2021-03-13 PROCEDURE — 70496 CT ANGIOGRAPHY HEAD: CPT

## 2021-03-13 PROCEDURE — 84702 CHORIONIC GONADOTROPIN TEST: CPT | Performed by: FAMILY MEDICINE

## 2021-03-13 RX ORDER — ACETAMINOPHEN 325 MG/1
650 TABLET ORAL ONCE
Status: COMPLETED | OUTPATIENT
Start: 2021-03-13 | End: 2021-03-13

## 2021-03-13 RX ADMIN — ACETAMINOPHEN 650 MG: 325 TABLET, FILM COATED ORAL at 11:33

## 2021-03-13 RX ADMIN — SODIUM CHLORIDE 1000 ML: 0.9 INJECTION, SOLUTION INTRAVENOUS at 10:40

## 2021-03-13 RX ADMIN — IOHEXOL 100 ML: 350 INJECTION, SOLUTION INTRAVENOUS at 12:16

## 2021-03-13 NOTE — TELEPHONE ENCOUNTER
Regarding: PRESSURE IN CHEST   ----- Message from Kaleb Jay sent at 3/13/2021  9:42 AM EST -----  Patient calling because she "just hasn't been feeling good"  She states she has had chest pressure for about 3 days now  Her forehead feels numb  Patient doesn't know what to do

## 2021-03-13 NOTE — TELEPHONE ENCOUNTER
Spoke with patient  Patient refused to call an ambulance  Patient stated that she lives "very close to [facility] " She would call her step-father to see if he could drive her now  Advised her that calling 911 was in her best interest but patient still did not want to call

## 2021-03-13 NOTE — ED PROVIDER NOTES
History  Chief Complaint   Patient presents with    Chest Pain     pr reports 2 days of chest pressure and sweating and progessing to left arm pain, left sided finger numbness and head numbness       Patient is a 29-year-old female with history of anemia, hypertension, hyperlipidemia, gastric ulcers, presents emergency department for evaluation of chest pain and shortness of breath  Patient states 2 days ago she began with left-sided pressure sensation was radiating pain into jaw and left hand with numbness/tingling sensation in fingers and left side of face with headache  Patient states she does feel short of breath  Patient does report surgery in December for gastric bypass as well as history of coronavirus in January  Patient states she did take Tylenol p m  Last night with mild improvement in pain  Patient denies fever, chills, vision changes, focal weakness, dizziness/lightheadedness, facial asymmetry, speech changes, recent travel, sick contacts, leg swelling, hemoptysis, abdominal pain, vomiting, diarrhea  Prior to Admission Medications   Prescriptions Last Dose Informant Patient Reported? Taking?    Multiple Vitamin (MULTIVITAMIN) tablet  Self Yes No   Sig: Take 1 tablet by mouth daily   acetaminophen (TYLENOL) 500 mg tablet  Self No No   Sig: Take 1 tablet (500 mg total) by mouth every 6 (six) hours as needed for mild pain   albuterol (PROVENTIL HFA,VENTOLIN HFA) 90 mcg/act inhaler   No No   Sig: Inhale 2 puffs every 4 (four) hours as needed for wheezing   amLODIPine (NORVASC) 5 mg tablet   No No   Sig: Take 1 tablet (5 mg total) by mouth daily   calcium carbonate 1250 MG capsule  Self Yes No   Sig: Take 1,250 mg by mouth 2 (two) times a day with meals   cholecalciferol (VITAMIN D3) 1,000 units tablet  Self No No   Sig: Take 1 tablet (1,000 Units total) by mouth daily   cyanocobalamin (VITAMIN B-12) 100 mcg tablet  Self Yes No   Sig: Take by mouth daily   fluticasone (FLONASE) 50 mcg/act nasal spray  Self No No   Si spray into each nostril daily   meclizine (ANTIVERT) 25 mg tablet  Self No No   Sig: Take 1 tablet (25 mg total) by mouth every 8 (eight) hours as needed for dizziness   omeprazole (PriLOSEC) 20 mg delayed release capsule   No No   Sig: TAKE 1 CAPSULE(20 MG) BY MOUTH TWICE DAILY   sucralfate (CARAFATE) 1 g/10 mL suspension   No No   Sig: Take 10 mL (1 g total) by mouth 4 (four) times a day (with meals and at bedtime)      Facility-Administered Medications: None       Past Medical History:   Diagnosis Date    Anemia     BMI 36 0-36 9,adult 2020    Disease of thyroid gland     Dizziness     due to anemia, last episode 2020, no falls    Fatigue     History of transfusion     anemic - loss of blood due to bleeding ulcers, no reaction    Hyperlipidemia     Hypertension     Multiple gastric ulcers     Obesity     16    Thyroid disease     took synthroid but not currently taking    Ulcer        Past Surgical History:   Procedure Laterality Date    GASTRIC BYPASS      GASTRIC BYPASS LAPAROSCOPIC N/A 12/15/2020    Procedure: Robotic lysis of adhesions, small-bowel resection, partial gastrectomy, paraesophageal hernia repair, bilateral truncal vagotomy; Robotic gastrojejunostomy anastomosis with intraop endoscopy;  Surgeon: Reena Tse MD;  Location: [facility];  Service: Bariatrics    LITO-EN-Y PROCEDURE  2016    Gastric Bypass by Dr Mcclure Jeremy Weight 339 6,nw    TUBAL LIGATION Bilateral         WISDOM TOOTH EXTRACTION         Family History   Problem Relation Age of Onset    Asthma Mother     Coronary artery disease Mother     Diabetes Mother         MELLITUS    Hyperlipidemia Mother     Hypertension Mother     Aneurysm Mother         2018 just diagnosed with two    No Known Problems Father          when she was 1 months old    Aneurysm Maternal Grandmother     Aneurysm Maternal Grandfather           of a ruptured abdominal aneurysm    No Known Problems Sister     No Known Problems Daughter     No Known Problems Paternal Grandmother     No Known Problems Paternal Grandfather     No Known Problems Daughter     No Known Problems Maternal Aunt     No Known Problems Maternal Aunt     No Known Problems Maternal Aunt      I have reviewed and agree with the history as documented  E-Cigarette/Vaping    E-Cigarette Use Never User      E-Cigarette/Vaping Substances    Nicotine No     THC No     CBD No     Flavoring No     Other No     Unknown No      Social History     Tobacco Use    Smoking status: Former Smoker     Types: Cigarettes     Quit date: 2018     Years since quitting: 3 2    Smokeless tobacco: Former User    Tobacco comment: former   Substance Use Topics    Alcohol use: No    Drug use: No       Review of Systems   Constitutional: Negative for chills and fever  HENT: Negative for ear pain and sore throat  Eyes: Negative for visual disturbance  Respiratory: Positive for shortness of breath  Negative for cough  Cardiovascular: Positive for chest pain  Negative for palpitations and leg swelling  Gastrointestinal: Negative for abdominal pain, diarrhea, nausea and vomiting  Genitourinary: Negative for dysuria and hematuria  Musculoskeletal: Negative for back pain and neck pain  Skin: Negative for rash  Neurological: Positive for numbness and headaches  Negative for dizziness, tremors, seizures, syncope, facial asymmetry, speech difficulty, weakness and light-headedness  Psychiatric/Behavioral: Negative for confusion  The patient is nervous/anxious  Physical Exam  Physical Exam  Constitutional:       General: She is not in acute distress  Appearance: She is well-developed  She is not ill-appearing, toxic-appearing or diaphoretic  HENT:      Head: Normocephalic and atraumatic        Right Ear: External ear normal       Left Ear: External ear normal       Nose: Nose normal  Mouth/Throat:      Lips: Pink  Mouth: Mucous membranes are moist    Eyes:      Extraocular Movements: Extraocular movements intact  Conjunctiva/sclera: Conjunctivae normal       Pupils: Pupils are equal, round, and reactive to light  Neck:      Musculoskeletal: Normal range of motion and neck supple  Cardiovascular:      Rate and Rhythm: Regular rhythm  Tachycardia present  Heart sounds: No murmur  No friction rub  No gallop  Pulmonary:      Effort: Pulmonary effort is normal  No tachypnea or respiratory distress  Breath sounds: Normal breath sounds  No decreased breath sounds, wheezing, rhonchi or rales  Musculoskeletal:      Right lower leg: Normal  She exhibits no tenderness, no bony tenderness and no swelling  No edema  Left lower leg: Normal  She exhibits no tenderness, no bony tenderness and no swelling  No edema  Skin:     General: Skin is warm and dry  Capillary Refill: Capillary refill takes less than 2 seconds  Neurological:      Mental Status: She is alert and oriented to person, place, and time  GCS: GCS eye subscore is 4  GCS verbal subscore is 5  GCS motor subscore is 6  Sensory: Sensation is intact  Motor: Motor function is intact  Gait: Gait is intact  Psychiatric:         Mood and Affect: Affect normal  Mood is anxious           Speech: Speech normal          Vital Signs  ED Triage Vitals   Temperature Pulse Respirations Blood Pressure SpO2   03/13/21 1021 03/13/21 1021 03/13/21 1021 03/13/21 1021 03/13/21 1021   98 2 °F (36 8 °C) 104 20 153/85 100 %      Temp Source Heart Rate Source Patient Position - Orthostatic VS BP Location FiO2 (%)   03/13/21 1021 03/13/21 1021 03/13/21 1021 03/13/21 1021 --   Tympanic Monitor Sitting Left arm       Pain Score       03/13/21 1033       4           Vitals:    03/13/21 1130 03/13/21 1145 03/13/21 1224 03/13/21 1324   BP: 148/84  136/81 136/80   Pulse: 83 83 76 74   Patient Position - Orthostatic VS: Lying  Lying Lying         Visual Acuity  Visual Acuity      Most Recent Value   L Pupil Size (mm)  3   R Pupil Size (mm)  3          ED Medications  Medications   sodium chloride 0 9 % bolus 1,000 mL (0 mL Intravenous Stopped 3/13/21 1305)   acetaminophen (TYLENOL) tablet 650 mg (650 mg Oral Given 3/13/21 1133)   iohexol (OMNIPAQUE) 350 MG/ML injection (MULTI-DOSE) 100 mL (100 mL Intravenous Given 3/13/21 1216)       Diagnostic Studies  Results Reviewed     Procedure Component Value Units Date/Time    D-Dimer [[id number]]  (Normal) Collected: 03/13/21 1033    Lab Status: Final result Specimen: Blood from Arm, Right Updated: 03/13/21 1123     D-Dimer, Quant 0 36 ug/ml FEU     Troponin I [[id number]]  (Normal) Collected: 03/13/21 1033    Lab Status: Final result Specimen: Blood from Arm, Right Updated: 03/13/21 1104     Troponin I <0 01 ng/mL     NT-BNP PRO [[id number]]  (Normal) Collected: 03/13/21 1033    Lab Status: Final result Specimen: Blood from Arm, Right Updated: 03/13/21 1103     NT-proBNP 35 3 pg/mL     Urine Microscopic [[id number]]  (Abnormal) Collected: 03/13/21 1038    Lab Status: Final result Specimen: Urine, Clean Catch Updated: 03/13/21 1100     RBC, UA None Seen /hpf      WBC, UA 4-10 /hpf      Epithelial Cells Moderate /hpf      Bacteria, UA Moderate /hpf     Comprehensive metabolic panel [[id number]]  (Abnormal) Collected: 03/13/21 1033    Lab Status: Final result Specimen: Blood from Arm, Right Updated: 03/13/21 1055     Sodium 137 mmol/L      Potassium 3 9 mmol/L      Chloride 109 mmol/L      CO2 21 mmol/L      ANION GAP 7 mmol/L      BUN 12 mg/dL      Creatinine 0 60 mg/dL      Glucose 145 mg/dL      Calcium 8 4 mg/dL      AST 36 U/L      ALT 21 U/L      Alkaline Phosphatase 69 U/L      Total Protein 7 6 g/dL      Albumin 4 1 g/dL      Total Bilirubin 0 50 mg/dL      eGFR 119 ml/min/1 73sq m     Narrative:      [place] guidelines for Chronic Kidney Disease (CKD):    Stage 1 with normal or high GFR (GFR > 90 mL/min/1 73 square meters)    Stage 2 Mild CKD (GFR = 60-89 mL/min/1 73 square meters)    Stage 3A Moderate CKD (GFR = 45-59 mL/min/1 73 square meters)    Stage 3B Moderate CKD (GFR = 30-44 mL/min/1 73 square meters)    Stage 4 Severe CKD (GFR = 15-29 mL/min/1 73 square meters)    Stage 5 End Stage CKD (GFR <15 mL/min/1 73 square meters)  Note: GFR calculation is accurate only with a steady state creatinine    UA w Reflex to Microscopic w Reflex to Culture [[id number]]  (Abnormal) Collected: 03/13/21 1038    Lab Status: Final result Specimen: Urine, Clean Catch Updated: 03/13/21 1050     Color, UA Petra     Clarity, UA Slightly Cloudy     Specific Gravity, UA 1 025     pH, UA 6 0     Leukocytes,  0     Nitrite, UA Negative     Protein, UA Negative mg/dl      Glucose, UA Negative mg/dl      Ketones, UA Negative mg/dl      Bilirubin, UA Negative     Blood, UA Negative     UROBILINOGEN UA Negative mg/dL     CBC and differential [[id number]]  (Abnormal) Collected: 03/13/21 1033    Lab Status: Final result Specimen: Blood from Arm, Right Updated: 03/13/21 1047     WBC 6 20 Thousand/uL      RBC 4 01 Million/uL      Hemoglobin 11 6 g/dL      Hematocrit 36 3 %      MCV 91 fL      MCH 29 0 pg      MCHC 32 0 g/dL      RDW 16 2 %      MPV 8 6 fL      Platelets 411 Thousands/uL      Neutrophils Relative 66 %      Lymphocytes Relative 26 %      Monocytes Relative 7 %      Eosinophils Relative 1 %      Basophils Relative 1 %      Neutrophils Absolute 4 10 Thousands/µL      Lymphocytes Absolute 1 60 Thousands/µL      Monocytes Absolute 0 40 Thousand/µL      Eosinophils Absolute 0 10 Thousand/µL      Basophils Absolute 0 00 Thousands/µL     POCT pregnancy, urine [[id number]]  (Normal) Resulted: 03/13/21 1041    Lab Status: Final result Updated: 03/13/21 1042     EXT PREG TEST UR (Ref: Negative) negative     Control valid                 CTA head and neck with and without contrast Final Result by Ariela Greene MD (03/13 1251)   No acute intracranial disease  No large vessel flow restrictive disease within the head or neck  No indication of intracranial aneurysm  Workstation performed: [id number]      XR chest 1 view portable   ED Interpretation by Carmen Lu PA-C (03/13 1102)   No acute cardiopulmonary disease seen by me      Final Result by Danilo Hood MD (03/14 0105)   No acute cardiopulmonary disease  Workstation performed: [id number]                 Procedures  ECG 12 Lead Documentation Only    Date/Time: 3/13/2021 10:27 AM  Performed by: Carmen Lu PA-C  Authorized by: Carmen Lu PA-C     Indications / Diagnosis:  Cp  ECG reviewed by me, the ED Provider: yes    Patient location:  ED  Previous ECG:     Previous ECG:  Compared to current    Comparison ECG info:  1/12/21    Similarity:  No change    Comparison to cardiac monitor: Yes    Interpretation:     Interpretation: normal    Quality:     Tracing quality:  Limited by artifact  Rate:     ECG rate:  99    ECG rate assessment: normal    Rhythm:     Rhythm: sinus rhythm    Ectopy:     Ectopy: none    QRS:     QRS axis:  Normal    QRS intervals:  Normal  Conduction:     Conduction: normal    ST segments:     ST segments:  Normal  T waves:     T waves: normal    Comments:      QT/QTc: 348/446  No STEMI             ED Course  ED Course as of Mar 14 0821   Sat Mar 13, 2021   1048 Stable anemia   Hemoglobin(!): 11 6   1106 Troponin I: <0 01   1126 D-Dimer, Quant: 0 36   1257 No acute intracranial disease  No large vessel flow restrictive disease within the head or neck  No indication of intracranial aneurysm     CTA head and neck with and without contrast             HEART Risk Score      Most Recent Value   Heart Score Risk Calculator   History  0 Filed at: 03/13/2021 1029   ECG  0 Filed at: 03/13/2021 1029   Age  0 Filed at: 03/13/2021 1029   Risk Factors  1 Filed at: 03/13/2021 1029   Troponin  0 Filed at: 03/13/2021 1029   HEART Score  1 Filed at: 03/13/2021 1029              PERC Rule for PE      Most Recent Value   PERC Rule for PE   Age >=50  0 Filed at: 03/13/2021 1029   HR >=100  1 Filed at: 03/13/2021 1029   O2 Sat on room air < 95%  0 Filed at: 03/13/2021 1029   History of PE or DVT  0 Filed at: 03/13/2021 1029   Recent trauma or surgery  1 Filed at: 03/13/2021 1029   Hemoptysis  0 Filed at: 03/13/2021 1029   Exogenous estrogen  0 Filed at: 03/13/2021 1029   Unilateral leg swelling  0 Filed at: 03/13/2021 1029   PERC Rule for PE Results  2 Filed at: 03/13/2021 1029              SBIRT 22yo+      Most Recent Value   SBIRT (25 yo +)   In order to provide better care to our patients, we are screening all of our patients for alcohol and drug use  Would it be okay to ask you these screening questions? Yes Filed at: 03/13/2021 1046   Initial Alcohol Screen: US AUDIT-C    1  How often do you have a drink containing alcohol?  0 Filed at: 03/13/2021 1046   2  How many drinks containing alcohol do you have on a typical day you are drinking? 0 Filed at: 03/13/2021 1046   3a  Male UNDER 65: How often do you have five or more drinks on one occasion? 0 Filed at: 03/13/2021 1046   3b  FEMALE Any Age, or MALE 65+: How often do you have 4 or more drinks on one occassion? 0 Filed at: 03/13/2021 1046   Audit-C Score  0 Filed at: 03/13/2021 1046   JUAREZ: How many times in the past year have you    Used an illegal drug or used a prescription medication for non-medical reasons?   Never Filed at: 03/13/2021 1046                    MDM  Number of Diagnoses or Management Options  Anemia: established and improving  Chest pain: new and does not require workup  Headache: new and does not require workup  SOB (shortness of breath): new and does not require workup  Thyroid mass:   Diagnosis management comments: Patient is a 81-year-old female with history of anemia, hypertension, hyperlipidemia, gastric ulcers, presents emergency department for evaluation of chest pain and shortness of breath  Patient states 2 days ago she began with left-sided pressure sensation was radiating pain into jaw and left hand with numbness/tingling sensation in fingers  Patient states she does feel short of breath  Patient does report surgery in December for gastric bypass as well as history of coronavirus in January  EKG shows no acute ST abnormality  CXR shows no acute cardiopulmonary disease  Troponin negative, HEART score = 1  D-dimer negative  Stable anemia with hemoglobin of 11 6  UA shows infection, patient with no urinary complaints, culture sent  Given patients complaint of headache, left sided facial numbness sensation, and concerned given fmhx of aneurysms, CTA head and neck obtained  CTA head and neck show No acute intracranial disease  No large vessel flow restrictive disease within the head or neck  No indication of intracranial aneurysm  Thyroid mass noted again, stable  Discussed all results with patient including CTA (thyroid mass - patient aware already), recommended f/u with PCP  Patient verbalizes understanding and agrees with plan  The management plan was discussed in detail with the patient at bedside and all questions were answered  Prior to discharge, I provided both verbal and written instructions  I discussed with the patient the signs and symptoms for which to return to the emergency department  All questions were answered and patient was comfortable with the plan of care and discharged to home  The patient agrees to return to the Emergency Department for concerns and/or progression of illness          Disposition  Final diagnoses:   Anemia   Thyroid mass   Chest pain   SOB (shortness of breath)   Headache     Time reflects when diagnosis was documented in both MDM as applicable and the Disposition within this note     Time User Action Codes Description Comment    3/13/2021 10:52 AM Valorie Lane Add [D64 9] Anemia 3/13/2021 12:57 PM Atul Smart Add [E07 9] Thyroid mass     3/13/2021 12:58 PM Atul Smart Add [R07 9] Chest pain     3/13/2021 12:58 PM Atul Smart Add [R06 02] SOB (shortness of breath)     3/13/2021 12:58 PM Atul Smart Add [R51 9] Headache     3/13/2021 12:58 PM Atul Smart Modify [D64 9] Anemia     3/13/2021 12:58 PM Atul Smart Modify [R07 9] Chest pain       ED Disposition     ED Disposition Condition Date/Time Comment    Discharge Stable Sat Mar 13, 2021  1:00 PM Fuentes Zhao discharge to home/self care  Follow-up Information     Follow up With Specialties Details Why Steph Ann MD Family Medicine Schedule an appointment as soon as possible for a visit   [address]    [address]            Discharge Medication List as of 3/13/2021  1:00 PM      CONTINUE these medications which have NOT CHANGED    Details   acetaminophen (TYLENOL) 500 mg tablet Take 1 tablet (500 mg total) by mouth every 6 (six) hours as needed for mild pain, Starting Mon 12/28/2020, Normal      albuterol (PROVENTIL HFA,VENTOLIN HFA) 90 mcg/act inhaler Inhale 2 puffs every 4 (four) hours as needed for wheezing, Starting Tue 1/12/2021, Normal      amLODIPine (NORVASC) 5 mg tablet Take 1 tablet (5 mg total) by mouth daily, Starting Tue 1/12/2021, Normal      calcium carbonate 1250 MG capsule Take 1,250 mg by mouth 2 (two) times a day with meals, Historical Med      cholecalciferol (VITAMIN D3) 1,000 units tablet Take 1 tablet (1,000 Units total) by mouth daily, Starting Thu 11/21/2019, Normal      cyanocobalamin (VITAMIN B-12) 100 mcg tablet Take by mouth daily, Historical Med      fluticasone (FLONASE) 50 mcg/act nasal spray 1 spray into each nostril daily, Starting Mon 12/28/2020, Normal      meclizine (ANTIVERT) 25 mg tablet Take 1 tablet (25 mg total) by mouth every 8 (eight) hours as needed for dizziness, Starting Mon 1/11/2021, Print Multiple Vitamin (MULTIVITAMIN) tablet Take 1 tablet by mouth daily, Historical Med      omeprazole (PriLOSEC) 20 mg delayed release capsule TAKE 1 CAPSULE(20 MG) BY MOUTH TWICE DAILY, Normal      sucralfate (CARAFATE) 1 g/10 mL suspension Take 10 mL (1 g total) by mouth 4 (four) times a day (with meals and at bedtime), Starting Mon 1/18/2021, Until Sun 4/18/2021, Normal           No discharge procedures on file      PDMP Review     None          ED Provider  Electronically Signed by           Samanta Beltran PA-C  03/14/21 [id number]

## 2021-03-13 NOTE — TELEPHONE ENCOUNTER
Reason for Disposition   [1] Chest pain lasts > 5 minutes AND [2] age > 27 AND [3] one or more cardiac risk factors (e g , diabetes, high blood pressure, high cholesterol, smoker, or strong family history of heart disease)    Answer Assessment - Initial Assessment Questions  1  LOCATION: "Where does it hurt?"        Center of chest  2  RADIATION: "Does the pain go anywhere else?" (e g , into neck, jaw, arms, back)      Radiates to back to left side  3  ONSET: "When did the chest pain begin?" (Minutes, hours or days)       "3 days ago but last night forehead feels numb"  4  PATTERN "Does the pain come and go, or has it been constant since it started?"  "Does it get worse with exertion?"       Constant and does not get worse with exertion  5  DURATION: "How long does it last" (e g , seconds, minutes, hours)      Constant  6  SEVERITY: "How bad is the pain?"  (e g , Scale 1-10; mild, moderate, or severe)     - MILD (1-3): doesn't interfere with normal activities      - MODERATE (4-7): interferes with normal activities or awakens from sleep     - SEVERE (8-10): excruciating pain, unable to do any normal activities        7 out of 10 feels like she has to breathe really hard,  7  CARDIAC RISK FACTORS: "Do you have any history of heart problems or risk factors for heart disease?" (e g , angina, prior heart attack; diabetes, high blood pressure, high cholesterol, smoker, or strong family history of heart disease)      HTN,  Mother have hx of blockages  8  PULMONARY RISK FACTORS: "Do you have any history of lung disease?"  (e g , blood clots in lung, asthma, emphysema, birth control pills)      Denies  9  CAUSE: "What do you think is causing the chest pain?"      "I don't know "  10  OTHER SYMPTOMS: "Do you have any other symptoms?" (e g , dizziness, nausea, vomiting, sweating, fever, difficulty breathing, cough)        Does feel dizzy, occasional nausea, sweating last night, "I have to breathe hard "  11   PREGNANCY: "Is there any chance you are pregnant?" "When was your last menstrual period?"        N/A    Protocols used: CHEST PAIN-ADULT-AH

## 2021-03-14 ENCOUNTER — NURSE TRIAGE (OUTPATIENT)
Dept: OTHER | Facility: OTHER | Age: 36
End: 2021-03-14

## 2021-03-14 ENCOUNTER — TELEPHONE (OUTPATIENT)
Dept: FAMILY MEDICINE CLINIC | Facility: CLINIC | Age: 36
End: 2021-03-14

## 2021-03-14 NOTE — TELEPHONE ENCOUNTER
Patient called with concerns that she was at the ED yesterday for chest pain/left arm numbness and this morning she woke up with severe left sided abdominal pain and severe bleeding  Patient reports that she is feeling dizzy/lightheaded and close to the point where she might pass out  Patient rates her abdominal pain 7/10 and reports bleeding through a pad every hour  Patient had negative pregnancy test at ED yesterday and has a PMH of tubal ligation  Patient reports that her she had a normal menstrual cycle approximately two weeks ago and is always regular  Patient verbalized that she is on her way to the ED on [address]

## 2021-03-14 NOTE — TELEPHONE ENCOUNTER
Regarding: Vaginal bleeding + cramping  ----- Message from Jim Kellogg MA sent at 3/14/2021  5:47 PM EDT -----  "I spoke to the nurses yesterday, I went to the ER everything was fine but this morning I woke up bleeding and am cramping as if I am on my period but I already had my period this month "

## 2021-03-14 NOTE — TELEPHONE ENCOUNTER
TC to on-call, "Pt called yesterday and went to ED for Chest pain rasheeda  Was cleared and discharged  This morning woke with Moderate vaginal bleeding (pad/hour) and L sided abdominal pain 6/10  Pt states she also has some cramping  She had her period 2 weeks ago and had tubal ligation 10 years ago  She feels dizzy  How should I advise?"    Provider reached out to pt directly  Reason for Disposition   MODERATE vaginal bleeding (i e , soaking 1 pad or tampon per hour and present > 6 hours; 1 menstrual cup every 6 hours)    Answer Assessment - Initial Assessment Questions  1  AMOUNT: "Describe the bleeding that you are having "     - SPOTTING: spotting, or pinkish / brownish mucous discharge; does not fill panti-liner or pad     - MILD:  less than 1 pad / hour; less than patient's usual menstrual bleeding    - MODERATE: 1-2 pads / hour; 1 menstrual cup every 6 hours; small-medium blood clots (e g , pea, grape, small coin)    - SEVERE: soaking 2 or more pads/hour for 2 or more hours; 1 menstrual cup every 2 hours; bleeding not contained by pads or continuous red blood from vagina; large blood clots (e g , golf ball, large coin)       Moderate- pad an hour  2  ONSET: "When did the bleeding begin?" "Is it continuing now?"      This morning  3  MENSTRUAL PERIOD: "When was the last normal menstrual period?" "How is this different than your period?"      2 weeks ago  4  REGULARITY: "How regular are your periods?"      Regular  5  ABDOMINAL PAIN: "Do you have any pain?" "How bad is the pain?"  (e g , Scale 1-10; mild, moderate, or severe)    - MILD (1-3): doesn't interfere with normal activities, abdomen soft and not tender to touch     - MODERATE (4-7): interferes with normal activities or awakens from sleep, tender to touch     - SEVERE (8-10): excruciating pain, doubled over, unable to do any normal activities       Left abdomen tender  6   PREGNANCY: "Could you be pregnant?" "Are you sexually active?" "Did you recently give birth?"      Denies  8  HORMONES: "Are you taking any hormone medications, prescription or OTC?" (e g , birth control pills, estrogen)      Tubal ligation 10 years ago  5  BLOOD THINNERS: "Do you take any blood thinners?" (e g , Coumadin/warfarin, Pradaxa/dabigatran, aspirin)      Denies  10  CAUSE: "What do you think is causing the bleeding?" (e g , recent gyn surgery, recent gyn procedure; known bleeding disorder, cervical cancer, polycystic ovarian disease, fibroids)          Denies  11  HEMODYNAMIC STATUS: "Are you weak or feeling lightheaded?" If so, ask: "Can you stand and walk normally?"         Lightheaded  12   OTHER SYMPTOMS: "What other symptoms are you having with the bleeding?" (e g , passed tissue, vaginal discharge, fever, menstrual-type cramps)        Cramping 6/10    Protocols used: VAGINAL BLEEDING - ABNORMAL-ADULT-

## 2021-03-15 ENCOUNTER — OFFICE VISIT (OUTPATIENT)
Dept: FAMILY MEDICINE CLINIC | Facility: CLINIC | Age: 36
End: 2021-03-15
Payer: COMMERCIAL

## 2021-03-15 VITALS
SYSTOLIC BLOOD PRESSURE: 130 MMHG | HEIGHT: 64 IN | HEART RATE: 65 BPM | TEMPERATURE: 98.8 F | BODY MASS INDEX: 34.49 KG/M2 | DIASTOLIC BLOOD PRESSURE: 90 MMHG | OXYGEN SATURATION: 100 % | WEIGHT: 202 LBS

## 2021-03-15 DIAGNOSIS — E07.9 THYROID MASS: ICD-10-CM

## 2021-03-15 DIAGNOSIS — N60.01 CYST OF RIGHT BREAST: ICD-10-CM

## 2021-03-15 DIAGNOSIS — N92.0 MENORRHAGIA WITH REGULAR CYCLE: Primary | ICD-10-CM

## 2021-03-15 LAB
B-HCG SERPL-ACNC: <3 MIU/ML
TSH SERPL DL<=0.05 MIU/L-ACNC: 0.77 UIU/ML (ref 0.47–4.68)

## 2021-03-15 PROCEDURE — 99214 OFFICE O/P EST MOD 30 MIN: CPT | Performed by: FAMILY MEDICINE

## 2021-03-15 PROCEDURE — 1036F TOBACCO NON-USER: CPT | Performed by: FAMILY MEDICINE

## 2021-03-15 PROCEDURE — 3725F SCREEN DEPRESSION PERFORMED: CPT | Performed by: FAMILY MEDICINE

## 2021-03-15 RX ORDER — FERROUS SULFATE 325(65) MG
325 TABLET ORAL
COMMUNITY
End: 2021-10-04

## 2021-03-16 PROBLEM — E07.9 THYROID MASS: Status: ACTIVE | Noted: 2021-03-16

## 2021-03-16 PROBLEM — N92.0 MENORRHAGIA WITH REGULAR CYCLE: Status: ACTIVE | Noted: 2021-03-15

## 2021-03-16 PROBLEM — N92.0 MENORRHAGIA WITH REGULAR CYCLE: Status: ACTIVE | Noted: 2021-03-16

## 2021-03-16 PROBLEM — N60.01 CYST OF RIGHT BREAST: Status: ACTIVE | Noted: 2021-03-15

## 2021-03-16 PROBLEM — E07.9 THYROID MASS: Status: ACTIVE | Noted: 2021-03-15

## 2021-03-16 PROBLEM — N60.01 CYST OF RIGHT BREAST: Status: ACTIVE | Noted: 2021-03-16

## 2021-03-16 LAB
FSH SERPL-ACNC: 4.9 MIU/ML
LH SERPL-ACNC: 5 MIU/ML
PROLACTIN SERPL-MCNC: 14.2 NG/ML

## 2021-03-16 NOTE — ASSESSMENT & PLAN NOTE
A patient recently was in the emergency room for chest pain on March 13, 2021 ER order CT scan of the neck incidental finding 1 7 cm mass in the right thyroid the patient deny any difficulty swallowing the plan to check TSH and ultrasound of the thyroid

## 2021-03-16 NOTE — ASSESSMENT & PLAN NOTE
Finding on Rich oz the of the right breast secondary to breast pain cyst and patient pain is improved plan to re-evaluate in 6 months

## 2021-03-16 NOTE — ASSESSMENT & PLAN NOTE
New diagnosis symptomatic patient was in the emergency room urine pregnancy test is negative    workup discussed with the patient including blood work which check TSH  [facility] prolactin level CBC with diff the also plan to do transvaginal ultrasound and try to do serum pregnancy

## 2021-03-16 NOTE — PROGRESS NOTES
Subjective:   Chief Complaint   Patient presents with    Follow-up     ER    Other     vaginal bleeding- already had this month        Patient ID: Adina Mckeon is a 30 y o  female  Patient concerned about the and heavy vaginal bleeding patient who is G3L3 she start her menstruation at age 7 and usually her menstrual cycle regular q30  And she had tubal ligation as contraceptive for the last 2 months she been having heavy menstrual cycle  Lasting for more than 10 days and she change a pad the mood than 8 times per day a deny any abdomen pain no flank pain no vaginal discharge no weight change no discharge from the nipple      The following portions of the patient's history were reviewed and updated as appropriate: allergies, current medications, past family history, past medical history, past social history, past surgical history and problem list     Review of Systems   Constitutional: Negative for activity change, appetite change, fatigue and fever  HENT: Negative for congestion, ear pain, sinus pressure, sinus pain and sore throat  Eyes: Negative for pain, discharge, redness and itching  Respiratory: Negative for cough, chest tightness, shortness of breath and stridor  Cardiovascular: Negative for chest pain, palpitations and leg swelling  Gastrointestinal: Negative for abdominal pain, blood in stool, constipation, diarrhea and nausea  Genitourinary: Positive for menstrual problem  Negative for dysuria, flank pain, frequency and hematuria  Musculoskeletal: Negative for back pain, joint swelling and neck pain  Skin: Negative for pallor and rash  Neurological: Negative for dizziness, tremors, weakness, numbness and headaches  Hematological: Does not bruise/bleed easily               Objective:  Vitals:    03/15/21 1326   BP: 130/90   Pulse: 65   Temp: 98 8 °F (37 1 °C)   TempSrc: Tympanic   SpO2: 100%   Weight: 91 6 kg (202 lb)   Height: 5' 3 5" (1 613 m)      Physical Exam  Vitals signs and nursing note reviewed  Constitutional:       General: She is not in acute distress  Appearance: She is well-developed  She is not diaphoretic  HENT:      Head: Normocephalic  Right Ear: Tympanic membrane, ear canal and external ear normal       Left Ear: Tympanic membrane, ear canal and external ear normal       Nose: Nose normal  No congestion or rhinorrhea  Mouth/Throat:      Mouth: Mucous membranes are moist       Pharynx: Oropharynx is clear  No oropharyngeal exudate or posterior oropharyngeal erythema  Eyes:      General:         Right eye: No discharge  Left eye: No discharge  Conjunctiva/sclera: Conjunctivae normal       Pupils: Pupils are equal, round, and reactive to light  Neck:      Musculoskeletal: Normal range of motion and neck supple  Vascular: No JVD  Cardiovascular:      Rate and Rhythm: Normal rate and regular rhythm  Heart sounds: Normal heart sounds  No murmur  No gallop  Pulmonary:      Effort: Pulmonary effort is normal  No respiratory distress  Breath sounds: Normal breath sounds  No stridor  No wheezing or rales  Chest:      Chest wall: No tenderness  Abdominal:      General: There is no distension  Palpations: Abdomen is soft  There is no mass  Tenderness: There is no abdominal tenderness  There is no rebound  Musculoskeletal:         General: No tenderness  Lymphadenopathy:      Cervical: No cervical adenopathy  Skin:     General: Skin is warm  Findings: No erythema or rash  Neurological:      Mental Status: She is alert and oriented to person, place, and time  Motor: No weakness        Gait: Gait normal            Assessment/Plan:    Menorrhagia with regular cycle   New diagnosis symptomatic patient was in the emergency room urine pregnancy test is negative    workup discussed with the patient including blood work which check TSH  [facility] prolactin level CBC with diff the also plan to do transvaginal ultrasound and try to do serum pregnancy    Thyroid mass   A patient recently was in the emergency room for chest pain on March 13, 2021 ER order CT scan of the neck incidental finding 1 7 cm mass in the right thyroid the patient deny any difficulty swallowing the plan to check TSH and ultrasound of the thyroid    Cyst of right breast   Finding on Rich oz the of the right breast secondary to breast pain cyst and patient pain is improved plan to re-evaluate in 6 months       Diagnoses and all orders for this visit:    Menorrhagia with regular cycle  -     Luteinizing hormone; Future  -     Follicle stimulating hormone; Future  -     Prolactin; Future  -     hCG, quantitative; Future  -     Cancel: Pregnancy Test (HCG Qualitative)  -     US pelvis complete w transvaginal; Future    Cyst of right breast    Thyroid mass  -     TSH, 3rd generation with Free T4 reflex; Future  -     US thyroid; Future    Other orders  -     ferrous sulfate 325 (65 Fe) mg tablet;  Take 325 mg by mouth daily with breakfast

## 2021-03-18 ENCOUNTER — OFFICE VISIT (OUTPATIENT)
Dept: BARIATRICS | Facility: CLINIC | Age: 36
End: 2021-03-18

## 2021-03-18 VITALS — HEIGHT: 64 IN | BODY MASS INDEX: 34.83 KG/M2 | WEIGHT: 204 LBS

## 2021-03-18 DIAGNOSIS — E66.9 OBESITY, CLASS II, BMI 35-39.9: Primary | ICD-10-CM

## 2021-03-18 PROCEDURE — 3008F BODY MASS INDEX DOCD: CPT | Performed by: FAMILY MEDICINE

## 2021-03-18 PROCEDURE — RECHECK

## 2021-03-18 NOTE — PROGRESS NOTES
Pt states she has difficulty swallowing  Has new thyroid mass  Was hospitalized 3/13 & 14  Had chest pain and numb left arm  Is gaining weight  B-Cinnamon toast Crunch cereal with skim milk or protein shake  L-skips  S-potatoes    States eggs and meats are too heavy to eat, vomits if she eats  Not drinking enough water  States she is chewing enough  Following 30/60 rule  Patient is gaining weight  Was going to gym every day for a month for an hour doing cardio      Pt to drink 2 protein shakes daily, increase water to 64 ounces per day,include fish and other protein foods that are tolerated, return to gym

## 2021-03-19 ENCOUNTER — HOSPITAL ENCOUNTER (OUTPATIENT)
Dept: ULTRASOUND IMAGING | Facility: HOSPITAL | Age: 36
Discharge: HOME | End: 2021-03-19
Payer: COMMERCIAL

## 2021-03-19 DIAGNOSIS — E07.9 THYROID MASS: ICD-10-CM

## 2021-03-19 DIAGNOSIS — N92.0 MENORRHAGIA WITH REGULAR CYCLE: ICD-10-CM

## 2021-03-19 PROCEDURE — 76856 US EXAM PELVIC COMPLETE: CPT

## 2021-03-19 PROCEDURE — 76536 US EXAM OF HEAD AND NECK: CPT

## 2021-03-19 PROCEDURE — 76830 TRANSVAGINAL US NON-OB: CPT

## 2021-03-23 DIAGNOSIS — I10 ESSENTIAL HYPERTENSION: ICD-10-CM

## 2021-03-23 RX ORDER — AMLODIPINE BESYLATE 5 MG/1
5 TABLET ORAL DAILY
Qty: 90 TABLET | Refills: 0 | Status: SHIPPED | OUTPATIENT
Start: 2021-03-23 | End: 2021-06-29

## 2021-03-26 ENCOUNTER — TELEPHONE (OUTPATIENT)
Dept: FAMILY MEDICINE CLINIC | Facility: CLINIC | Age: 36
End: 2021-03-26

## 2021-03-26 DIAGNOSIS — E07.9 THYROID MASS: Primary | ICD-10-CM

## 2021-03-26 NOTE — TELEPHONE ENCOUNTER
----- Message from Ezio Rubio MD sent at 3/26/2021 10:23 AM EDT -----  Nodule ib right lobe ,n recommendation for biopsy   recommend to repeat US of thyroid in one year to order it from now

## 2021-04-21 ENCOUNTER — APPOINTMENT (EMERGENCY)
Dept: RADIOLOGY | Facility: HOSPITAL | Age: 36
End: 2021-04-21
Payer: COMMERCIAL

## 2021-04-21 ENCOUNTER — HOSPITAL ENCOUNTER (EMERGENCY)
Facility: HOSPITAL | Age: 36
Discharge: HOME | End: 2021-04-21
Attending: EMERGENCY MEDICINE
Payer: COMMERCIAL

## 2021-04-21 VITALS
SYSTOLIC BLOOD PRESSURE: 145 MMHG | HEART RATE: 80 BPM | DIASTOLIC BLOOD PRESSURE: 82 MMHG | RESPIRATION RATE: 18 BRPM | TEMPERATURE: 98.6 F | OXYGEN SATURATION: 99 %

## 2021-04-21 DIAGNOSIS — M25.561 RIGHT KNEE PAIN: Primary | ICD-10-CM

## 2021-04-21 PROCEDURE — 73564 X-RAY EXAM KNEE 4 OR MORE: CPT

## 2021-04-21 PROCEDURE — 73630 X-RAY EXAM OF FOOT: CPT

## 2021-04-21 PROCEDURE — 99283 EMERGENCY DEPT VISIT LOW MDM: CPT

## 2021-04-21 PROCEDURE — 99284 EMERGENCY DEPT VISIT MOD MDM: CPT | Performed by: PHYSICIAN ASSISTANT

## 2021-04-21 PROCEDURE — 73610 X-RAY EXAM OF ANKLE: CPT

## 2021-04-21 RX ORDER — TRAMADOL HYDROCHLORIDE 50 MG/1
50 TABLET ORAL EVERY 6 HOURS PRN
Qty: 10 TABLET | Refills: 0 | Status: SHIPPED | OUTPATIENT
Start: 2021-04-21 | End: 2021-05-12 | Stop reason: ALTCHOICE

## 2021-04-21 RX ORDER — ACETAMINOPHEN 325 MG/1
650 TABLET ORAL ONCE
Status: COMPLETED | OUTPATIENT
Start: 2021-04-21 | End: 2021-04-21

## 2021-04-21 RX ADMIN — ACETAMINOPHEN 650 MG: 325 TABLET ORAL at 09:29

## 2021-04-21 NOTE — ED PROVIDER NOTES
History  Chief Complaint   Patient presents with    Knee Pain     Pt  reports playing soccer yesterday and falling  Pt  reports right knee pain  Patient presents to the ER for evaluation of right knee pain  The patient states that last night she was playing soccer when she slipped and fell directly on her right knee  The patient states that since then she has had persistent pain and swelling and difficulty walking  States the pain is worse with movement and improved with rest  States she took tylenol last night for pain with mild relief, denies any medication today  States that she also had pain in her right ankle and foot from the fall  Denies any other injuries in the incident  Denies any numbness, tingling, weakness, or any other concerning symptoms  Prior to Admission Medications   Prescriptions Last Dose Informant Patient Reported? Taking?    Multiple Vitamin (MULTIVITAMIN) tablet  Self Yes No   Sig: Take 1 tablet by mouth daily   acetaminophen (TYLENOL) 500 mg tablet  Self No No   Sig: Take 1 tablet (500 mg total) by mouth every 6 (six) hours as needed for mild pain   albuterol (PROVENTIL HFA,VENTOLIN HFA) 90 mcg/act inhaler  Self No No   Sig: Inhale 2 puffs every 4 (four) hours as needed for wheezing   amLODIPine (NORVASC) 5 mg tablet   No No   Sig: Take 1 tablet (5 mg total) by mouth daily   calcium carbonate 1250 MG capsule  Self Yes No   Sig: Take 1,250 mg by mouth 2 (two) times a day with meals   cholecalciferol (VITAMIN D3) 1,000 units tablet  Self No No   Sig: Take 1 tablet (1,000 Units total) by mouth daily   cyanocobalamin (VITAMIN B-12) 100 mcg tablet  Self Yes No   Sig: Take by mouth daily   ferrous sulfate 325 (65 Fe) mg tablet  Self Yes No   Sig: Take 325 mg by mouth daily with breakfast   omeprazole (PriLOSEC) 20 mg delayed release capsule  Self No No   Sig: TAKE 1 CAPSULE(20 MG) BY MOUTH TWICE DAILY      Facility-Administered Medications: None       Past Medical History:   Diagnosis Date    Anemia     BMI 36 0-36 9,adult 2020    Disease of thyroid gland     Dizziness     due to anemia, last episode 2020, no falls    Fatigue     History of transfusion     anemic - loss of blood due to bleeding ulcers, no reaction    Hyperlipidemia     Hypertension     Multiple gastric ulcers     Obesity     16    Thyroid disease     took synthroid but not currently taking    Ulcer        Past Surgical History:   Procedure Laterality Date    GASTRIC BYPASS      GASTRIC BYPASS LAPAROSCOPIC N/A 12/15/2020    Procedure: Robotic lysis of adhesions, small-bowel resection, partial gastrectomy, paraesophageal hernia repair, bilateral truncal vagotomy; Robotic gastrojejunostomy anastomosis with intraop endoscopy;  Surgeon: Nubia Argueta MD;  Location: [facility];  Service: Bariatrics    LITO-EN-Y PROCEDURE  2016    Gastric Bypass by Dr Sher Coats Weight 339 6,nw    TUBAL LIGATION Bilateral         WISDOM TOOTH EXTRACTION         Family History   Problem Relation Age of Onset    Asthma Mother     Coronary artery disease Mother     Diabetes Mother         MELLITUS    Hyperlipidemia Mother     Hypertension Mother     Aneurysm Mother         2018 just diagnosed with two    No Known Problems Father          when she was 1 months old    Aneurysm Maternal Grandmother     Aneurysm Maternal Grandfather           of a ruptured abdominal aneurysm    No Known Problems Sister     No Known Problems Daughter     No Known Problems Paternal Grandmother     No Known Problems Paternal Grandfather     No Known Problems Daughter     No Known Problems Maternal Aunt     No Known Problems Maternal Aunt     No Known Problems Maternal Aunt      I have reviewed and agree with the history as documented      E-Cigarette/Vaping    E-Cigarette Use Never User      E-Cigarette/Vaping Substances    Nicotine No     THC No     CBD No     Flavoring No     Other No  Unknown No      Social History     Tobacco Use    Smoking status: Former Smoker     Types: Cigarettes     Quit date: 2018     Years since quitting: 3 3    Smokeless tobacco: Former User    Tobacco comment: former   Substance Use Topics    Alcohol use: No    Drug use: No       Review of Systems   Constitutional: Negative for fever  HENT: Negative for congestion, rhinorrhea and sore throat  Respiratory: Negative for shortness of breath  Cardiovascular: Negative for chest pain  Gastrointestinal: Negative for abdominal pain, nausea and vomiting  Genitourinary: Negative for dysuria  Musculoskeletal: Negative for back pain and neck pain  Skin: Negative for rash  Neurological: Negative for weakness, numbness and headaches  All other systems reviewed and are negative  Physical Exam  Physical Exam  Constitutional:       Appearance: She is well-developed  HENT:      Head: Normocephalic and atraumatic  Nose: Nose normal    Eyes:      Conjunctiva/sclera: Conjunctivae normal    Neck:      Musculoskeletal: Normal range of motion  Cardiovascular:      Rate and Rhythm: Normal rate  Pulmonary:      Effort: Pulmonary effort is normal    Abdominal:      Palpations: Abdomen is soft  Musculoskeletal:      Comments: Mild swelling of right knee  Decreased flexion and extension of right knee  Mild TTP of right lateral malleolus  Normal flexion and extension of right ankle with mild pain  Mild TTP of dorsal right foot  Skin:     General: Skin is warm  Capillary Refill: Capillary refill takes less than 2 seconds  Neurological:      Mental Status: She is alert and oriented to person, place, and time           Vital Signs  ED Triage Vitals   Temperature Pulse Respirations Blood Pressure SpO2   04/21/21 0832 04/21/21 0831 04/21/21 0831 04/21/21 0832 04/21/21 0831   98 6 °F (37 °C) 78 18 148/82 100 %      Temp Source Heart Rate Source Patient Position - Orthostatic VS BP Location FiO2 (%) 04/21/21 7686 04/21/21 0831 04/21/21 0832 04/21/21 0832 --   Oral Monitor Sitting Right arm       Pain Score       04/21/21 0929       8           Vitals:    04/21/21 0831 04/21/21 0832 04/21/21 1116   BP:  148/82 145/82   Pulse: 78  80   Patient Position - Orthostatic VS:  Sitting Lying         Visual Acuity      ED Medications  Medications   acetaminophen (TYLENOL) tablet 650 mg (650 mg Oral Given 4/21/21 0124)       Diagnostic Studies  Results Reviewed     None                 XR knee 4+ views Right injury   Final Result by Alexandru Giron MD (04/21 1044)      No acute osseous abnormality  Workstation performed: [id number]         XR ankle 3+ views RIGHT   Final Result by Alexandru Giron MD (04/21 1045)   Mild degenerative arthritis, heel spurs      No acute osseous abnormality  Workstation performed: [id number]         XR foot 3+ views RIGHT   Final Result by Alexandru Giron MD (04/21 1231)      No acute osseous abnormality  Workstation performed: [id number]                    Procedures  Procedures         ED Course  ED Course as of Apr 22 0858 Wed Apr 21, 2021   1013 Blood Pressure: 148/82   1013 Temperature: 98 6 °F (37 °C)   1013 Pulse: 78   1013 Respirations: 18   1013 SpO2: 100 %                                           MDM     X-rays negative for any acute osseous abnormality  Discussed with patient that soft tissues cannot be seen on x-ray and therefore injury to these cannot be ruled out  Discussed symptomatic treatment and follow-up with orthopedics for further evaluation  Patient given crutches in the ER  Strict return instructions given  Patient in no acute distress throughout ER stay  Vitals stable and reassuring  Patient stable for discharge at this time  Reviewed plan with patient/family  Reviewed red flag symptoms and strict return instructions  Patient/family voiced understanding and agreement to plan    Patient/family had opportunity to ask questions and all questions were answered at bedside  Disposition  Final diagnoses:   Right knee pain     Time reflects when diagnosis was documented in both MDM as applicable and the Disposition within this note     Time User Action Codes Description Comment    4/21/2021 11:09 AM Fredo Verdin Add [M25 561] Right knee pain       ED Disposition     ED Disposition Condition Date/Time Comment    Discharge Stable Wed Apr 21, 2021 11:09 AM Duarte Munguiamilton discharge to home/self care              Follow-up Information     Follow up With Specialties Details Why [address] [facility] Emergency Department Emergency Medicine  If symptoms worsen [facility] [address]  [address] [facility] Emergency Department, [address] [place], [place], [address] Orthopedic Surgery  Follow up with orthopedics for your injury [address] [facility]  [address] [facility] [address]  [address] beds at the [facility], [address] [facility], [address] [facility], [place], [place], [address] [phone]          Discharge Medication List as of 4/21/2021 11:11 AM      START taking these medications    Details   traMADol (ULTRAM) 50 mg tablet Take 1 tablet (50 mg total) by mouth every 6 (six) hours as needed for moderate pain, Starting Wed 4/21/2021, Normal         CONTINUE these medications which have NOT CHANGED    Details   acetaminophen (TYLENOL) 500 mg tablet Take 1 tablet (500 mg total) by mouth every 6 (six) hours as needed for mild pain, Starting Mon 12/28/2020, Normal      albuterol (PROVENTIL HFA,VENTOLIN HFA) 90 mcg/act inhaler Inhale 2 puffs every 4 (four) hours as needed for wheezing, Starting Tue 1/12/2021, Normal      amLODIPine (NORVASC) 5 mg tablet Take 1 tablet (5 mg total) by mouth daily, Starting Tue 3/23/2021, Normal      calcium carbonate 1250 MG capsule Take 1,250 mg by mouth 2 (two) times a day with meals, Historical Med      cholecalciferol (VITAMIN D3) 1,000 units tablet Take 1 tablet (1,000 Units total) by mouth daily, Starting Thu 11/21/2019, Normal      cyanocobalamin (VITAMIN B-12) 100 mcg tablet Take by mouth daily, Historical Med      ferrous sulfate 325 (65 Fe) mg tablet Take 325 mg by mouth daily with breakfast, Historical Med      Multiple Vitamin (MULTIVITAMIN) tablet Take 1 tablet by mouth daily, Historical Med      omeprazole (PriLOSEC) 20 mg delayed release capsule TAKE 1 CAPSULE(20 MG) BY MOUTH TWICE DAILY, Normal           No discharge procedures on file      PDMP Review     None          ED Provider  Electronically Signed by           Waldo Stanton PA-C  04/22/21 0858

## 2021-04-21 NOTE — Clinical Note
Laura Urban was seen and treated in our emergency department on 4/21/2021  Limited use of right leg until cleared    Diagnosis:     Gaetano George  may return to work on return date  She may return on this date: 04/23/2021         If you have any questions or concerns, please don't hesitate to call        Napoleon Leigh PA-C    ______________________________           _______________          _______________  Hospital Representative                              Date                                Time

## 2021-04-21 NOTE — Clinical Note
Merlin Sear was seen and treated in our emergency department on 4/21/2021  Limited use of right leg until cleared    Diagnosis:     Corky Culp  may return to work on return date  She may return on this date: 04/23/2021         If you have any questions or concerns, please don't hesitate to call        Jackson Mars 24, DO    ______________________________           _______________          _______________  Hospital Representative                              Date                                Time

## 2021-04-21 NOTE — DISCHARGE INSTRUCTIONS
Return to the ER with any new or worsening of symptoms such as but not limited to increased pain, increased swelling, fevers, or any other concerning symptoms    Do not take the tramadol before driving or with alcohol    Thank you for allowing us to be part of your care today

## 2021-05-03 ENCOUNTER — OFFICE VISIT (OUTPATIENT)
Dept: OBGYN CLINIC | Facility: MEDICAL CENTER | Age: 36
End: 2021-05-03
Payer: COMMERCIAL

## 2021-05-03 VITALS
WEIGHT: 210 LBS | HEART RATE: 79 BPM | BODY MASS INDEX: 34.99 KG/M2 | DIASTOLIC BLOOD PRESSURE: 81 MMHG | HEIGHT: 65 IN | SYSTOLIC BLOOD PRESSURE: 124 MMHG

## 2021-05-03 DIAGNOSIS — S83.411A SPRAIN OF MEDIAL COLLATERAL LIGAMENT OF RIGHT KNEE, INITIAL ENCOUNTER: Primary | ICD-10-CM

## 2021-05-03 DIAGNOSIS — M25.561 ACUTE PAIN OF RIGHT KNEE: ICD-10-CM

## 2021-05-03 DIAGNOSIS — S89.91XA INJURY OF RIGHT KNEE, INITIAL ENCOUNTER: ICD-10-CM

## 2021-05-03 DIAGNOSIS — M25.461 EFFUSION OF RIGHT KNEE: ICD-10-CM

## 2021-05-03 PROCEDURE — 3008F BODY MASS INDEX DOCD: CPT | Performed by: EMERGENCY MEDICINE

## 2021-05-03 PROCEDURE — 1036F TOBACCO NON-USER: CPT | Performed by: EMERGENCY MEDICINE

## 2021-05-03 PROCEDURE — 99214 OFFICE O/P EST MOD 30 MIN: CPT | Performed by: EMERGENCY MEDICINE

## 2021-05-03 NOTE — PROGRESS NOTES
Assessment/Plan:    Diagnoses and all orders for this visit:    Sprain of medial collateral ligament of right knee, initial encounter  -     Walker  -     Knee Immobilizer  -     MRI knee right  wo contrast; Future    Injury of right knee, initial encounter  -     Walker  -     Knee Immobilizer  -     MRI knee right  wo contrast; Future    Effusion of right knee  -     Walker  -     Knee Immobilizer  -     MRI knee right  wo contrast; Future    Acute pain of right knee  -     Walker  -     Knee Immobilizer  -     MRI knee right  wo contrast; Future      MRI of the right knee to evaluate for MCL injury as well as ACL injury after or a traumatic twisting mechanism with an audible pop and subsequent swelling  She does have an effusion on exam and + Lachmans concerning for intra-articular derangement  We have provided a walker she's unable to use crutches  Knee immobilizer    No follow-ups on file  Chief Complaint:     Chief Complaint   Patient presents with    Right Knee - Pain       Subjective:   Patient ID: Filomena Nance is a 29 y o  female  DOI 4/20  NP presents for right knee injury while playing soccer slipped   And fell twisting her knee and landing on her knee as well with the ball stuck behind her knee sandwiched between her calf and thigh  She heard a POP, subsequent swelling  Eval in ER Xrays wnl provided crutches which she is having a difficult time using she is having a difficult time ambulating as well  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      Review of Systems    The following portions of the patient's chart were reviewed and updated as appropriate: Allergy:    Allergies   Allergen Reactions    [place] [employer] - Food Allergy Shortness Of Breath    Venofer [Iron Sucrose]      Chest pressure after 1st dose  Tachycardia and blurred vision after 8 minutes of dose #2            Past Medical History:   Diagnosis Date    Anemia     BMI 36 0-36 9,adult 12/8/2020    Disease of thyroid gland     Dizziness     due to anemia, last episode August 2020, no falls    Fatigue     History of transfusion 2016    anemic - loss of blood due to bleeding ulcers, no reaction    Hyperlipidemia     Hypertension     Multiple gastric ulcers     Obesity     11/14/16    Thyroid disease     took synthroid but not currently taking    Ulcer        Past Surgical History:   Procedure Laterality Date    GASTRIC BYPASS  2014    GASTRIC BYPASS LAPAROSCOPIC N/A 12/15/2020    Procedure: Robotic lysis of adhesions, small-bowel resection, partial gastrectomy, paraesophageal hernia repair, bilateral truncal vagotomy; Robotic gastrojejunostomy anastomosis with intraop endoscopy;  Surgeon: Manny Hargrove MD;  Location: [facility];  Service: Bariatrics    LITO-EN-Y PROCEDURE  11/14/2016    Gastric Bypass by Dr Paige Stallings Weight 339 6,nw    TUBAL LIGATION Bilateral     2010    WISDOM TOOTH EXTRACTION         Social History     Socioeconomic History    Marital status: Single     Spouse name: Not on file    Number of children: Not on file    Years of education: Not on file    Highest education level: Not on file   Occupational History    Not on file   Social Needs    Financial resource strain: Not hard at all    Food insecurity     Worry: Never true     Inability: Never true    Transportation needs     Medical: No     Non-medical: No   Tobacco Use    Smoking status: Former Smoker     Types: Cigarettes     Quit date: 2018     Years since quitting: 3 3    Smokeless tobacco: Former User    Tobacco comment: former   Substance and Sexual Activity    Alcohol use: No    Drug use: No    Sexual activity: Yes     Partners: Male     Birth control/protection: Female Sterilization   Lifestyle    Physical activity     Days per week: 7 days     Minutes per session: 30 min    Stress:  Only a little   Relationships    Social connections     Talks on phone: More than three times a week     Gets together: Never Attends Religion service: More than 4 times per year     Active member of club or organization: Yes     Attends meetings of clubs or organizations: More than 4 times per year     Relationship status:     Intimate partner violence     Fear of current or ex partner: No     Emotionally abused: No     Physically abused: No     Forced sexual activity: No   Other Topics Concern    Not on file   Social History Narrative    fhx not asked in triage       Family History   Problem Relation Age of Onset    Asthma Mother     Coronary artery disease Mother     Diabetes Mother         MELLITUS    Hyperlipidemia Mother     Hypertension Mother     Aneurysm Mother         2018 just diagnosed with two    No Known Problems Father          when she was 1 months old    Aneurysm Maternal Grandmother     Aneurysm Maternal Grandfather           of a ruptured abdominal aneurysm    No Known Problems Sister     No Known Problems Daughter     No Known Problems Paternal Grandmother     No Known Problems Paternal Grandfather     No Known Problems Daughter     No Known Problems Maternal Aunt     No Known Problems Maternal Aunt     No Known Problems Maternal Aunt        Medications:    Current Outpatient Medications:     acetaminophen (TYLENOL) 500 mg tablet, Take 1 tablet (500 mg total) by mouth every 6 (six) hours as needed for mild pain, Disp: 30 tablet, Rfl: 0    albuterol (PROVENTIL HFA,VENTOLIN HFA) 90 mcg/act inhaler, Inhale 2 puffs every 4 (four) hours as needed for wheezing, Disp: 1 Inhaler, Rfl: 2    amLODIPine (NORVASC) 5 mg tablet, Take 1 tablet (5 mg total) by mouth daily, Disp: 90 tablet, Rfl: 0    calcium carbonate 1250 MG capsule, Take 1,250 mg by mouth 2 (two) times a day with meals, Disp: , Rfl:     cholecalciferol (VITAMIN D3) 1,000 units tablet, Take 1 tablet (1,000 Units total) by mouth daily, Disp: 30 tablet, Rfl: 0    cyanocobalamin (VITAMIN B-12) 100 mcg tablet, Take by mouth daily, Disp: , Rfl:     ferrous sulfate 325 (65 Fe) mg tablet, Take 325 mg by mouth daily with breakfast, Disp: , Rfl:     Multiple Vitamin (MULTIVITAMIN) tablet, Take 1 tablet by mouth daily, Disp: , Rfl:     omeprazole (PriLOSEC) 20 mg delayed release capsule, TAKE 1 CAPSULE(20 MG) BY MOUTH TWICE DAILY, Disp: 180 capsule, Rfl: 0    traMADol (ULTRAM) 50 mg tablet, Take 1 tablet (50 mg total) by mouth every 6 (six) hours as needed for moderate pain, Disp: 10 tablet, Rfl: 0    Patient Active Problem List   Diagnosis    Acute duodenal ulcer    Anemia    Essential hypertension    Hyperinsulinemia    Hyperlipidemia    Morbid obesity (HCC)    Vitamin D deficiency    S/P gastric bypass    Postsurgical malabsorption    Iron deficiency anemia secondary to inadequate dietary iron intake    B12 deficiency    Hiatal hernia    Preoperative cardiovascular examination    Pelvic pain    Drop in hemoglobin    Other headache syndrome    Cough    COVID-19 virus infection    BMI 32 0-32 9,adult    Shortness of breath    Diarrhea    Gastric outlet obstruction    Vertigo    Leucocytosis    Menorrhagia with regular cycle    Cyst of right breast    Thyroid mass       Objective:  /81   Pulse 79   Ht 5' 5" (1 651 m)   Wt 95 3 kg (210 lb)   BMI 34 95 kg/m²     Right Knee Exam     Tenderness   The patient is experiencing tenderness in the MCL and patellar tendon  Range of Motion   Extension: abnormal   Flexion: abnormal     Tests   Lachman:  Anterior - positive        Other   Erythema: absent  Sensation: normal  Pulse: present  Effusion: effusion present    Comments:  Pain with valgus stress no significant laxity  Unable to test mcmurrays due to pain swelling and apprehension          Observations     Right Knee   Positive for effusion  Physical Exam  Musculoskeletal:      Right knee: She exhibits effusion             Neurologic Exam    Procedures    I have personally reviewed pertinent films in PACS  and I have personally reviewed the written report of the pertinent studies  RIGHT KNEE  INDICATION:   pain s/p fall  COMPARISON:  None     VIEWS:  XR KNEE 4+ VW RIGHT INJURY   Images: 5     FINDINGS:     There is no acute fracture or dislocation      There is no joint effusion      Mild patellofemoral degenerative arthritis     No lytic or blastic osseous lesion      Soft tissues are unremarkable      IMPRESSION:     No acute osseous abnormality

## 2021-05-03 NOTE — PATIENT INSTRUCTIONS
You may use Advil (ibuprofen) 600mg every 6 hours OR Aleve (naproxen) 250-500mg every 12 hours as needed for pain and inflammation  You may also take Tylenol 500mg every 4-6 hours as needed  Check with your primary care physician to see if these medications are safe to take and to make sure they do not interfere with your other medications and medical issues  Knee Sprain, Ambulatory Care   GENERAL INFORMATION:   A knee sprain  is caused by a stretched or torn ligament in your knee  Ligaments are tough tissues that connect bones  A knee sprain usually occurs during exercise or sports  Common symptoms include the following:   · Bruising or changes in skin color    · Inability to put weight on your leg    · Pain, tenderness, and swelling    · Stiffness and decreased knee and leg movement  Seek immediate care for the following symptoms:   · Cold or numbness below the injury, such as in your toes    · Decreased or loss of movement in your injured leg    · Increased pain, even after taking pain medicine  Treatment for a knee sprain  may include a support device, such as a brace  A brace helps limit movement and protect your knee  Treatment may also include pain medicine, physical therapy, or surgery if the ligament does not heal   Care for a knee sprain:   · Rest  your knee and limit movement for as long as directed  Use crutches as directed to take weight off your knee while it heals  · Apply ice  on your injured knee for 15 to 20 minutes every hour or as directed  Use an ice pack, or put crushed ice in a plastic bag  Cover it with a towel  Ice helps prevent tissue damage and decreases swelling and pain  · Compress  your injured knee as directed with an elastic bandage or brace to support your foot  Wear your brace for as many days as directed  · Elevate  your knee by lying down and resting it on pillows that are higher than your heart   This should be done as often as you can to help reduce swelling  · Exercise  your knee and leg as directed to improve your strength and help decrease stiffness  The exercises and physical therapy can help restore strength and increase the range of motion in your leg  Ask your healthcare provider when you can return to your normal activities or play sports  Prevent another knee sprain:   · Warm up and stretch before you exercise  · Do not exercise when you are tired or in pain  · Wear shoes that fit well and run on flat surfaces to prevent falls  · Wear equipment to protect yourself when you play sports  Follow up with your healthcare provider as directed:  Write down your questions so you remember to ask them during your visits  CARE AGREEMENT:   You have the right to help plan your care  Learn about your health condition and how it may be treated  Discuss treatment options with your caregivers to decide what care you want to receive  You always have the right to refuse treatment  The above information is an  only  It is not intended as medical advice for individual conditions or treatments  Talk to your doctor, nurse or pharmacist before following any medical regimen to see if it is safe and effective for you  © 2014 [address] is for End User's use only and may not be sold, redistributed or otherwise used for commercial purposes  All illustrations and images included in CareNotes® are the copyrighted property of [employer]  or Rishabh Williamson

## 2021-05-12 ENCOUNTER — TELEPHONE (OUTPATIENT)
Dept: FAMILY MEDICINE CLINIC | Facility: CLINIC | Age: 36
End: 2021-05-12

## 2021-05-12 ENCOUNTER — HOSPITAL ENCOUNTER (EMERGENCY)
Facility: HOSPITAL | Age: 36
Discharge: HOME | End: 2021-05-12
Attending: EMERGENCY MEDICINE | Admitting: EMERGENCY MEDICINE
Payer: COMMERCIAL

## 2021-05-12 ENCOUNTER — APPOINTMENT (EMERGENCY)
Dept: CT IMAGING | Facility: HOSPITAL | Age: 36
End: 2021-05-12
Payer: COMMERCIAL

## 2021-05-12 ENCOUNTER — NURSE TRIAGE (OUTPATIENT)
Dept: OTHER | Facility: OTHER | Age: 36
End: 2021-05-12

## 2021-05-12 ENCOUNTER — APPOINTMENT (EMERGENCY)
Dept: RADIOLOGY | Facility: HOSPITAL | Age: 36
End: 2021-05-12
Payer: COMMERCIAL

## 2021-05-12 VITALS
WEIGHT: 213.85 LBS | SYSTOLIC BLOOD PRESSURE: 131 MMHG | BODY MASS INDEX: 35.59 KG/M2 | OXYGEN SATURATION: 100 % | DIASTOLIC BLOOD PRESSURE: 71 MMHG | TEMPERATURE: 98.3 F | HEART RATE: 79 BPM | RESPIRATION RATE: 18 BRPM

## 2021-05-12 DIAGNOSIS — R51.9 HEADACHE: ICD-10-CM

## 2021-05-12 DIAGNOSIS — R20.0 LEFT ARM NUMBNESS: ICD-10-CM

## 2021-05-12 DIAGNOSIS — R94.31 ABNORMAL EKG: Primary | ICD-10-CM

## 2021-05-12 DIAGNOSIS — R20.0 RIGHT FACIAL NUMBNESS: ICD-10-CM

## 2021-05-12 DIAGNOSIS — R20.0 LEFT LEG NUMBNESS: ICD-10-CM

## 2021-05-12 LAB
ALBUMIN SERPL BCP-MCNC: 3.6 G/DL (ref 3.5–5)
ALP SERPL-CCNC: 84 U/L (ref 46–116)
ALT SERPL W P-5'-P-CCNC: 30 U/L (ref 12–78)
ANION GAP SERPL CALCULATED.3IONS-SCNC: 7 MMOL/L (ref 4–13)
AST SERPL W P-5'-P-CCNC: 18 U/L (ref 5–45)
ATRIAL RATE: 66 BPM
BASOPHILS # BLD AUTO: 0.02 THOUSANDS/ΜL (ref 0–0.1)
BASOPHILS NFR BLD AUTO: 0 % (ref 0–1)
BILIRUB SERPL-MCNC: 0.14 MG/DL (ref 0.2–1)
BUN SERPL-MCNC: 14 MG/DL (ref 5–25)
CALCIUM SERPL-MCNC: 8.7 MG/DL (ref 8.3–10.1)
CHLORIDE SERPL-SCNC: 105 MMOL/L (ref 100–108)
CO2 SERPL-SCNC: 26 MMOL/L (ref 21–32)
CREAT SERPL-MCNC: 0.66 MG/DL (ref 0.6–1.3)
EOSINOPHIL # BLD AUTO: 0.11 THOUSAND/ΜL (ref 0–0.61)
EOSINOPHIL NFR BLD AUTO: 2 % (ref 0–6)
ERYTHROCYTE [DISTWIDTH] IN BLOOD BY AUTOMATED COUNT: 14 % (ref 11.6–15.1)
GFR SERPL CREATININE-BSD FRML MDRD: 115 ML/MIN/1.73SQ M
GLUCOSE SERPL-MCNC: 104 MG/DL (ref 65–140)
HCT VFR BLD AUTO: 36 % (ref 34.8–46.1)
HGB BLD-MCNC: 11.6 G/DL (ref 11.5–15.4)
IMM GRANULOCYTES # BLD AUTO: 0.02 THOUSAND/UL (ref 0–0.2)
IMM GRANULOCYTES NFR BLD AUTO: 0 % (ref 0–2)
LYMPHOCYTES # BLD AUTO: 2 THOUSANDS/ΜL (ref 0.6–4.47)
LYMPHOCYTES NFR BLD AUTO: 27 % (ref 14–44)
MCH RBC QN AUTO: 29.7 PG (ref 26.8–34.3)
MCHC RBC AUTO-ENTMCNC: 32.2 G/DL (ref 31.4–37.4)
MCV RBC AUTO: 92 FL (ref 82–98)
MONOCYTES # BLD AUTO: 0.72 THOUSAND/ΜL (ref 0.17–1.22)
MONOCYTES NFR BLD AUTO: 10 % (ref 4–12)
NEUTROPHILS # BLD AUTO: 4.48 THOUSANDS/ΜL (ref 1.85–7.62)
NEUTS SEG NFR BLD AUTO: 61 % (ref 43–75)
NRBC BLD AUTO-RTO: 0 /100 WBCS
P AXIS: 23 DEGREES
PLATELET # BLD AUTO: 300 THOUSANDS/UL (ref 149–390)
PMV BLD AUTO: 10 FL (ref 8.9–12.7)
POTASSIUM SERPL-SCNC: 5 MMOL/L (ref 3.5–5.3)
PR INTERVAL: 120 MS
PROT SERPL-MCNC: 7.7 G/DL (ref 6.4–8.2)
QRS AXIS: 46 DEGREES
QRSD INTERVAL: 88 MS
QT INTERVAL: 386 MS
QTC INTERVAL: 404 MS
RBC # BLD AUTO: 3.9 MILLION/UL (ref 3.81–5.12)
SODIUM SERPL-SCNC: 138 MMOL/L (ref 136–145)
T WAVE AXIS: -12 DEGREES
TROPONIN I SERPL-MCNC: <0.02 NG/ML
VENTRICULAR RATE: 66 BPM
WBC # BLD AUTO: 7.35 THOUSAND/UL (ref 4.31–10.16)

## 2021-05-12 PROCEDURE — 80053 COMPREHEN METABOLIC PANEL: CPT | Performed by: EMERGENCY MEDICINE

## 2021-05-12 PROCEDURE — 84484 ASSAY OF TROPONIN QUANT: CPT | Performed by: EMERGENCY MEDICINE

## 2021-05-12 PROCEDURE — 71045 X-RAY EXAM CHEST 1 VIEW: CPT

## 2021-05-12 PROCEDURE — 36415 COLL VENOUS BLD VENIPUNCTURE: CPT | Performed by: EMERGENCY MEDICINE

## 2021-05-12 PROCEDURE — 99285 EMERGENCY DEPT VISIT HI MDM: CPT | Performed by: EMERGENCY MEDICINE

## 2021-05-12 PROCEDURE — 93010 ELECTROCARDIOGRAM REPORT: CPT | Performed by: INTERNAL MEDICINE

## 2021-05-12 PROCEDURE — 85025 COMPLETE CBC W/AUTO DIFF WBC: CPT | Performed by: EMERGENCY MEDICINE

## 2021-05-12 PROCEDURE — 93005 ELECTROCARDIOGRAM TRACING: CPT

## 2021-05-12 PROCEDURE — 70496 CT ANGIOGRAPHY HEAD: CPT

## 2021-05-12 PROCEDURE — 99285 EMERGENCY DEPT VISIT HI MDM: CPT

## 2021-05-12 RX ADMIN — IOHEXOL 85 ML: 350 INJECTION, SOLUTION INTRAVENOUS at 15:59

## 2021-05-12 NOTE — TELEPHONE ENCOUNTER
patient c/o severe headache with left side face numbness advised patient needs to go to nearest er for evaluation

## 2021-05-12 NOTE — Clinical Note
accompanied Christiano Nation to the emergency department on 5/12/2021  Return date if applicable: If you have any questions or concerns, please don't hesitate to call        Yandy Winkler MD

## 2021-05-12 NOTE — DISCHARGE INSTRUCTIONS
We recommend you follow-up with your primary care provider regarding changes on your EKG  If you have any persistent, worsening chest pain, particularly if you have associated shortness of breath, sweatiness, palpitations, if you feel like you are going to pass out, return immediately to the emergency department  Your imaging today was normal   We have provided information to follow-up with neurology if you have persistent episodes of numbness

## 2021-05-12 NOTE — Clinical Note
Bushra Rivera was seen and treated in our emergency department on 5/12/2021  Diagnosis:     Thuy Siena    She may return on this date: If you have any questions or concerns, please don't hesitate to call        Jorge Goldberg MD    ______________________________           _______________          _______________  Hospital Representative                              Date                                Time

## 2021-05-13 ENCOUNTER — OFFICE VISIT (OUTPATIENT)
Dept: FAMILY MEDICINE CLINIC | Facility: CLINIC | Age: 36
End: 2021-05-13
Payer: COMMERCIAL

## 2021-05-13 VITALS
HEIGHT: 65 IN | WEIGHT: 213.8 LBS | HEART RATE: 80 BPM | TEMPERATURE: 99.9 F | SYSTOLIC BLOOD PRESSURE: 130 MMHG | BODY MASS INDEX: 35.62 KG/M2 | DIASTOLIC BLOOD PRESSURE: 82 MMHG | OXYGEN SATURATION: 98 %

## 2021-05-13 DIAGNOSIS — R94.31 EKG, ABNORMAL: ICD-10-CM

## 2021-05-13 DIAGNOSIS — R20.0 NUMBNESS: Primary | ICD-10-CM

## 2021-05-13 DIAGNOSIS — E66.01 SEVERE OBESITY (BMI 35.0-39.9) WITH COMORBIDITY (HCC): ICD-10-CM

## 2021-05-13 DIAGNOSIS — F41.8 SITUATIONAL ANXIETY: ICD-10-CM

## 2021-05-13 DIAGNOSIS — R07.89 OTHER CHEST PAIN: ICD-10-CM

## 2021-05-13 PROCEDURE — 99214 OFFICE O/P EST MOD 30 MIN: CPT | Performed by: FAMILY MEDICINE

## 2021-05-13 RX ORDER — DIAZEPAM 5 MG/1
5 TABLET ORAL EVERY 6 HOURS PRN
Qty: 1 TABLET | Refills: 0 | Status: SHIPPED | OUTPATIENT
Start: 2021-05-13 | End: 2021-09-18 | Stop reason: ALTCHOICE

## 2021-05-13 NOTE — ED PROVIDER NOTES
History  Chief Complaint   Patient presents with    Numbness     Pt reports left side of face, arm and leg numbness x2 days  Pt also reports intermittent blurry vision and headache  HPI  Patient is a 28-year-old female history of thyroid disease, hypertension, hyperlipidemia presenting for evaluation of numbness in her left arm, left leg, and the right corner of her mouth, intermittent for the last day, denies associated motor weakness, gait abnormality, word-finding difficulty, confusion, states that she has had a minor generalized headache since the time of initial onset with some radiation down her neck, denies sudden or maximal component, states intermittent blurry vision, denies nausea, vomiting, fevers, chills, cough, states occasional intermittent shortness of breath, denies dyspnea on exertion, chest pain, palpitations  Patient denies prior similar episodes, states that she has had headaches in the past however denies significant migraine history or history of migraine with aura  Prior to Admission Medications   Prescriptions Last Dose Informant Patient Reported? Taking?    Multiple Vitamin (MULTIVITAMIN) tablet 5/12/2021 at 0 Self Yes Yes   Sig: Take 1 tablet by mouth daily   acetaminophen (TYLENOL) 500 mg tablet  Self No Yes   Sig: Take 1 tablet (500 mg total) by mouth every 6 (six) hours as needed for mild pain   albuterol (PROVENTIL HFA,VENTOLIN HFA) 90 mcg/act inhaler  Self No Yes   Sig: Inhale 2 puffs every 4 (four) hours as needed for wheezing   amLODIPine (NORVASC) 5 mg tablet 5/12/2021 at Unknown time  No Yes   Sig: Take 1 tablet (5 mg total) by mouth daily   calcium carbonate 1250 MG capsule 5/12/2021 at Unknown time Self Yes Yes   Sig: Take 1,250 mg by mouth 2 (two) times a day with meals   cholecalciferol (VITAMIN D3) 1,000 units tablet 5/12/2021 at Unknown time Self No Yes   Sig: Take 1 tablet (1,000 Units total) by mouth daily   cyanocobalamin (VITAMIN B-12) 100 mcg tablet 5/12/2021 at Unknown time Self Yes Yes   Sig: Take by mouth daily   ferrous sulfate 325 (65 Fe) mg tablet 2021 at Unknown time Self Yes Yes   Sig: Take 325 mg by mouth daily with breakfast      Facility-Administered Medications: None       Past Medical History:   Diagnosis Date    Anemia     BMI 36 0-36 9,adult 2020    Disease of thyroid gland     Dizziness     due to anemia, last episode 2020, no falls    Fatigue     History of transfusion     anemic - loss of blood due to bleeding ulcers, no reaction    Hyperlipidemia     Hypertension     Multiple gastric ulcers     Obesity     16    Thyroid disease     took synthroid but not currently taking    Ulcer        Past Surgical History:   Procedure Laterality Date    GASTRIC BYPASS      GASTRIC BYPASS LAPAROSCOPIC N/A 12/15/2020    Procedure: Robotic lysis of adhesions, small-bowel resection, partial gastrectomy, paraesophageal hernia repair, bilateral truncal vagotomy; Robotic gastrojejunostomy anastomosis with intraop endoscopy;  Surgeon: Marla Llanos MD;  Location: [facility];  Service: Bariatrics    LITO-EN-Y PROCEDURE  2016    Gastric Bypass by Dr Tere Campuzano Weight 339 6,nw    TUBAL LIGATION Bilateral         WISDOM TOOTH EXTRACTION         Family History   Problem Relation Age of Onset    Asthma Mother     Coronary artery disease Mother     Diabetes Mother         MELLITUS    Hyperlipidemia Mother     Hypertension Mother     Aneurysm Mother         2018 just diagnosed with two    No Known Problems Father          when she was 1 months old    Aneurysm Maternal Grandmother     Aneurysm Maternal Grandfather           of a ruptured abdominal aneurysm    No Known Problems Sister     No Known Problems Daughter     No Known Problems Paternal Grandmother     No Known Problems Paternal Grandfather     No Known Problems Daughter     No Known Problems Maternal Aunt     No Known Problems Maternal Aunt     No Known Problems Maternal Aunt      I have reviewed and agree with the history as documented  E-Cigarette/Vaping    E-Cigarette Use Never User      E-Cigarette/Vaping Substances    Nicotine No     THC No     CBD No     Flavoring No     Other No     Unknown No      Social History     Tobacco Use    Smoking status: Former Smoker     Types: Cigarettes     Quit date: 2018     Years since quitting: 3 3    Smokeless tobacco: Former User    Tobacco comment: former   Substance Use Topics    Alcohol use: No    Drug use: No        Review of Systems   Constitutional: Negative for chills, fatigue and fever  HENT: Negative for hearing loss  Eyes: Negative for visual disturbance  Respiratory: Positive for shortness of breath  Negative for cough and chest tightness  Cardiovascular: Negative for chest pain and palpitations  Gastrointestinal: Negative for abdominal distention, abdominal pain, constipation, diarrhea, nausea and vomiting  Endocrine: Negative for polydipsia and polyuria  Genitourinary: Negative for dysuria and hematuria  Musculoskeletal: Negative for arthralgias and myalgias  Skin: Negative for color change and rash  Neurological: Positive for seizures and headaches  Negative for dizziness, facial asymmetry and weakness  Psychiatric/Behavioral: Negative for confusion         Physical Exam  ED Triage Vitals   Temperature Pulse Respirations Blood Pressure SpO2   05/12/21 1340 05/12/21 1340 05/12/21 1340 05/12/21 1340 05/12/21 1340   98 3 °F (36 8 °C) 73 16 (!) 172/74 100 %      Temp Source Heart Rate Source Patient Position - Orthostatic VS BP Location FiO2 (%)   05/12/21 1340 05/12/21 1340 05/12/21 1340 05/12/21 1340 --   Oral Monitor Sitting Right arm       Pain Score       05/12/21 1452       (S) 5             Orthostatic Vital Signs  Vitals:    05/12/21 1340 05/12/21 1621   BP: (!) 172/74 131/71   Pulse: 73 79   Patient Position - Orthostatic VS: Sitting Lying Physical Exam  Vitals signs reviewed  Constitutional:       General: She is not in acute distress  Appearance: She is well-developed  She is not diaphoretic  HENT:      Head: Normocephalic and atraumatic  Right Ear: External ear normal       Left Ear: External ear normal       Nose: Nose normal       Mouth/Throat:      Pharynx: No oropharyngeal exudate  Eyes:      Pupils: Pupils are equal, round, and reactive to light  Neck:      Musculoskeletal: Normal range of motion  Cardiovascular:      Rate and Rhythm: Normal rate and regular rhythm  Heart sounds: Normal heart sounds  No murmur  No friction rub  No gallop  Pulmonary:      Effort: Pulmonary effort is normal  No respiratory distress  Breath sounds: Normal breath sounds  No wheezing  Chest:      Chest wall: No tenderness  Abdominal:      General: Bowel sounds are normal  There is no distension  Palpations: Abdomen is soft  There is no mass  Tenderness: There is no abdominal tenderness  There is no guarding  Musculoskeletal: Normal range of motion  General: No deformity  Lymphadenopathy:      Cervical: No cervical adenopathy  Skin:     General: Skin is warm and dry  Capillary Refill: Capillary refill takes less than 2 seconds  Neurological:      Mental Status: She is alert and oriented to person, place, and time  Comments: Subjective diminished sensation in left anterior forearm, left shin, right area around corner of mouth, otherwise cranial nerves 2-12 intact, full strength and sensation bilaterally in upper and lower extremities, no pronator drift, normal finger-to-nose and normal gait           ED Medications  Medications   iohexol (OMNIPAQUE) 350 MG/ML injection (SINGLE-DOSE) 85 mL (85 mL Intravenous Given 5/12/21 8199)       Diagnostic Studies  Results Reviewed     Procedure Component Value Units Date/Time    Troponin I [[id number]]  (Normal) Collected: 05/12/21 1500    Lab Status: Final result Specimen: Blood from Arm, Left Updated: 05/12/21 1528     Troponin I <0 02 ng/mL     Comprehensive metabolic panel [[id number]]  (Abnormal) Collected: 05/12/21 1500    Lab Status: Final result Specimen: Blood from Arm, Left Updated: 05/12/21 1524     Sodium 138 mmol/L      Potassium 5 0 mmol/L      Chloride 105 mmol/L      CO2 26 mmol/L      ANION GAP 7 mmol/L      BUN 14 mg/dL      Creatinine 0 66 mg/dL      Glucose 104 mg/dL      Calcium 8 7 mg/dL      AST 18 U/L      ALT 30 U/L      Alkaline Phosphatase 84 U/L      Total Protein 7 7 g/dL      Albumin 3 6 g/dL      Total Bilirubin 0 14 mg/dL      eGFR 115 ml/min/1 73sq m     Narrative:      [place] guidelines for Chronic Kidney Disease (CKD):     Stage 1 with normal or high GFR (GFR > 90 mL/min/1 73 square meters)    Stage 2 Mild CKD (GFR = 60-89 mL/min/1 73 square meters)    Stage 3A Moderate CKD (GFR = 45-59 mL/min/1 73 square meters)    Stage 3B Moderate CKD (GFR = 30-44 mL/min/1 73 square meters)    Stage 4 Severe CKD (GFR = 15-29 mL/min/1 73 square meters)    Stage 5 End Stage CKD (GFR <15 mL/min/1 73 square meters)  Note: GFR calculation is accurate only with a steady state creatinine    CBC and differential [[id number]] Collected: 05/12/21 1500    Lab Status: Final result Specimen: Blood from Arm, Left Updated: 05/12/21 1506     WBC 7 35 Thousand/uL      RBC 3 90 Million/uL      Hemoglobin 11 6 g/dL      Hematocrit 36 0 %      MCV 92 fL      MCH 29 7 pg      MCHC 32 2 g/dL      RDW 14 0 %      MPV 10 0 fL      Platelets 874 Thousands/uL      nRBC 0 /100 WBCs      Neutrophils Relative 61 %      Immat GRANS % 0 %      Lymphocytes Relative 27 %      Monocytes Relative 10 %      Eosinophils Relative 2 %      Basophils Relative 0 %      Neutrophils Absolute 4 48 Thousands/µL      Immature Grans Absolute 0 02 Thousand/uL      Lymphocytes Absolute 2 00 Thousands/µL      Monocytes Absolute 0 72 Thousand/µL Eosinophils Absolute 0 11 Thousand/µL      Basophils Absolute 0 02 Thousands/µL                  CTA head with and without contrast   Final Result by Kedar Bishop MD (05/12 7951)         1  No evidence of acute intracranial hemorrhage  2  No evidence of hemodynamic significant stenosis, aneurysm or dissection  If focal neurologic deficit symptoms persist, proceed with noncontrast brain MRI  Findings were directly discussed with Mirna Sellers on 5/12/2021 4:15 PM                      Workstation performed: [id number]         XR chest 1 view portable   Final Result by Bailey Arredondo MD (05/12 0360)      No acute cardiopulmonary disease  Workstation performed: [id number]               Procedures  Procedures      ED Course                             SBIRT 20yo+      Most Recent Value   SBIRT (25 yo +)   In order to provide better care to our patients, we are screening all of our patients for alcohol and drug use  Would it be okay to ask you these screening questions? No Filed at: 05/12/2021 1410                MDM  Number of Diagnoses or Management Options  Abnormal EKG:   Headache:   Left arm numbness:   Left leg numbness:   Right facial numbness:   Diagnosis management comments: 71-year-old female with headache and multifocal numbness without specific distribution, well-appearing with grossly nonfocal neuro examination other than previously mentioned areas of numbness, unremarkable cardiac exam, given patient's shortness of breath, cardiac workup including labs and EKG performed, patient noted to have new inferior lead T-wave inversions, informed of this finding, denying any component of chest pain at any time, patient concerned about neurologic symptoms due to family history of brain aneurysm    CT/CTA performed and unremarkable, patient stating improvement of numbness on re-examination, instructed to follow up with primary care provider regarding EKG changes, return to the emergency department if she develops any component of chest pain, worsening shortness of breath, palpitations, syncope or near syncope, discharged       Disposition  Final diagnoses:   Abnormal EKG   Left arm numbness   Left leg numbness   Right facial numbness   Headache     Time reflects when diagnosis was documented in both MDM as applicable and the Disposition within this note     Time User Action Codes Description Comment    5/12/2021  4:56 PM Keagan Cull Add [R94 31] Abnormal EKG     5/12/2021  4:57 PM Keagan Cull Add [R20 0] Left arm numbness     5/12/2021  4:57 PM Keagan Cull Add [R20 0] Left leg numbness     5/12/2021  4:57 PM Keagan Cull Add [R20 0] Right facial numbness     5/12/2021  4:57 PM Keagan Cull Add [R51 9] Headache       ED Disposition     ED Disposition Condition Date/Time Comment    Discharge Stable Wed May 12, 2021  4:56 PM Yamileth Zhao discharge to home/self care  Follow-up Information     Follow up With Specialties Details Why [address] Agata Ye 151, MD Family Medicine   [address]    [address]        Neurology [facility]   [address] [facility]  [phone] Neurology [facility], [address] [place] [address], [address] [place] [address]          Discharge Medication List as of 5/12/2021  4:59 PM      CONTINUE these medications which have NOT CHANGED    Details   acetaminophen (TYLENOL) 500 mg tablet Take 1 tablet (500 mg total) by mouth every 6 (six) hours as needed for mild pain, Starting Mon 12/28/2020, Normal      albuterol (PROVENTIL HFA,VENTOLIN HFA) 90 mcg/act inhaler Inhale 2 puffs every 4 (four) hours as needed for wheezing, Starting Tue 1/12/2021, Normal      amLODIPine (NORVASC) 5 mg tablet Take 1 tablet (5 mg total) by mouth daily, Starting Tue 3/23/2021, Normal      calcium carbonate 1250 MG capsule Take 1,250 mg by mouth 2 (two) times a day with meals, Historical Med      cholecalciferol (VITAMIN D3) 1,000 units tablet Take 1 tablet (1,000 Units total) by mouth daily, Starting Thu 11/21/2019, Normal      cyanocobalamin (VITAMIN B-12) 100 mcg tablet Take by mouth daily, Historical Med      ferrous sulfate 325 (65 Fe) mg tablet Take 325 mg by mouth daily with breakfast, Historical Med      Multiple Vitamin (MULTIVITAMIN) tablet Take 1 tablet by mouth daily, Historical Med           No discharge procedures on file  PDMP Review     None           ED Provider  Attending physically available and evaluated Joanne Tavares I managed the patient along with the ED Attending      Electronically Signed by         Felipa Rivera MD  05/13/21 Prakash [address] Alysa Acharya MD  05/13/21 7994

## 2021-05-13 NOTE — TELEPHONE ENCOUNTER
Regarding: Chest tightness/ had an abnormal EKG at ER today   ----- Message from Cheo Hurley sent at 5/12/2021  9:09 PM EDT -----  "I was seen in the ER for having chest tightness  While at the ER they did an EKG which came out abnormal  I was discharged but now I feel worse then what I felt at the ER   My chest still feels tight "

## 2021-05-13 NOTE — PROGRESS NOTES
BMI Counseling: Body mass index is 35 58 kg/m²  The BMI is above normal  Nutrition recommendations include decreasing portion sizes, decreasing fast food intake and limiting drinks that contain sugar  Exercise recommendations include exercising 3-5 times per week  No pharmacotherapy was ordered  Subjective:   Chief Complaint   Patient presents with    Chest Pain     patient describes chest discomfort when taking a breath         Patient ID: Yesenia Carmen is a 29 y o  female  Patient here concerned about the numbness she feel tingling sensation on her right side of the face and her  A left the upper arm and lower leg associated with the vision problem no double vision but sometimes blurry on the peripheral and no headache no head trauma no fever no weight loss no lose control of the urine or stool and no muscle weakness and no rash also patient did have a chest pain and and she had multiple ER visit for chest pain she had history of hypertension hyperlipidemia obesity she is not smoker no cough no wheezing and no dyspnea on exertion      The following portions of the patient's history were reviewed and updated as appropriate: allergies, current medications, past family history, past medical history, past social history, past surgical history and problem list     Review of Systems   Constitutional: Negative for activity change, appetite change, fatigue and fever  HENT: Negative for congestion, ear pain, sinus pressure, sinus pain and sore throat  Eyes: Negative for pain, discharge, redness and itching  Respiratory: Negative for cough, chest tightness, shortness of breath and stridor  Cardiovascular: Positive for chest pain  Negative for palpitations and leg swelling  Gastrointestinal: Negative for abdominal pain, blood in stool, constipation, diarrhea and nausea  Genitourinary: Negative for dysuria, flank pain, frequency and hematuria     Musculoskeletal: Negative for back pain, joint swelling and neck pain  Skin: Negative for pallor and rash  Neurological: Positive for numbness  Negative for dizziness, tremors, seizures, syncope, speech difficulty, weakness and headaches  Hematological: Does not bruise/bleed easily  Objective:  Vitals:    05/13/21 1530   BP: 130/82   BP Location: Left arm   Patient Position: Sitting   Cuff Size: Large   Pulse: 80   Temp: 99 9 °F (37 7 °C)   TempSrc: Tympanic   SpO2: 98%   Weight: 97 kg (213 lb 12 8 oz)   Height: 5' 5" (1 651 m)      Physical Exam  Vitals signs and nursing note reviewed  Constitutional:       General: She is not in acute distress  Appearance: She is well-developed  She is not diaphoretic  HENT:      Head: Normocephalic  Right Ear: Tympanic membrane, ear canal and external ear normal       Left Ear: Tympanic membrane, ear canal and external ear normal       Nose: Nose normal  No congestion or rhinorrhea  Mouth/Throat:      Mouth: Mucous membranes are moist       Pharynx: Oropharynx is clear  No oropharyngeal exudate or posterior oropharyngeal erythema  Eyes:      General:         Right eye: No discharge  Left eye: No discharge  Conjunctiva/sclera: Conjunctivae normal       Pupils: Pupils are equal, round, and reactive to light  Neck:      Musculoskeletal: Normal range of motion and neck supple  Vascular: No JVD  Cardiovascular:      Rate and Rhythm: Normal rate and regular rhythm  Heart sounds: Normal heart sounds  No murmur  No gallop  Pulmonary:      Effort: Pulmonary effort is normal  No respiratory distress  Breath sounds: Normal breath sounds  No stridor  No wheezing or rales  Chest:      Chest wall: No tenderness  Abdominal:      General: There is no distension  Palpations: Abdomen is soft  There is no mass  Tenderness: There is no abdominal tenderness  There is no rebound  Musculoskeletal:         General: No tenderness     Lymphadenopathy: Cervical: No cervical adenopathy  Skin:     General: Skin is warm  Findings: No erythema or rash  Neurological:      General: No focal deficit present  Mental Status: She is alert and oriented to person, place, and time  Sensory: No sensory deficit  Motor: No weakness  Coordination: Coordination normal       Gait: Gait normal            Assessment/Plan:    Other chest pain   A new diagnosis status post ER visit EKG abnormal patient multiple ER visits for chest pain  Patient had multiple risk factor including hyperlipidemia obesity and hypertension the the we discussed the patient a possible and anxiety versus the coronary artery disease with her multiple risk factor recommend to be seen by Cardiology    EKG, abnormal   Abnormal EKG during ER visit which chest pain plan to refer the patient to Cardiology    Severe obesity (BMI 35 0-39  9) with comorbidity ([address] )   Chronic asymptomatic status post the gastric bypass and uncontrolled discussed with the patient portion control low carb low-fat diet increase physical activity    Numbness   New diagnosis symptomatic status post ER visit physical exam in the office is negative the patient she had a numbness was vision problem plan to do MRI of the brain to rule out MS also plan to do blood work to rule out B12 deficiency Lyme disease HIV workup discussed with the patient    Situational anxiety   A patient had an anxiety from close basis and she requests something before she does her MRI will give Valium 5 mg a half or before test       Diagnoses and all orders for this visit:    Numbness  -     ZAHIRA Screen w/ Reflex to Titer/Pattern; Future  -     Lyme Total Antibody Profile with reflex to WB; Future  -     Vitamin B12; Future  -     MRI brain wo contrast; Future    Other chest pain  -     Ambulatory referral to Cardiology; Future    EKG, abnormal  -     Ambulatory referral to Cardiology;  Future    Situational anxiety  -     diazepam (VALIUM) 5 mg tablet; Take 1 tablet (5 mg total) by mouth every 6 (six) hours as needed for anxiety    Severe obesity (BMI 35 0-39  9) with comorbidity ([address] )

## 2021-05-13 NOTE — TELEPHONE ENCOUNTER
Reason for Disposition   [1] Chest pain from known angina comes and goes AND [2] is NOT happening more often (increasing in frequency) or getting worse (increasing in severity)    Answer Assessment - Initial Assessment Questions  1  LOCATION: "Where does it hurt?"        Left side, states is a discomfort not a pain     2  RADIATION: "Does the pain go anywhere else?" (e g , into neck, jaw, arms, back)      Feels it in lungs     3  ONSET: "When did the chest pain begin?" (Minutes, hours or days)       2 days ago    4  PATTERN "Does the pain come and go, or has it been constant since it started?"  "Does it get worse with exertion?"       Comes and goes     5  DURATION: "How long does it last" (e g , seconds, minutes, hours)      Happening about every hour lasting less then 5 minutes but states is unsure and not keeping track     6  SEVERITY: "How bad is the pain?"  (e g , Scale 1-10; mild, moderate, or severe)     - MILD (1-3): doesn't interfere with normal activities      - MODERATE (4-7): interferes with normal activities or awakens from sleep     - SEVERE (8-10): excruciating pain, unable to do any normal activities        Mild     7  CARDIAC RISK FACTORS: "Do you have any history of heart problems or risk factors for heart disease?" (e g , angina, prior heart attack; diabetes, high blood pressure, high cholesterol, smoker, or strong family history of heart disease)      Denies     8  PULMONARY RISK FACTORS: "Do you have any history of lung disease?"  (e g , blood clots in lung, asthma, emphysema, birth control pills)      Denies     9  CAUSE: "What do you think is causing the chest pain?"      Unsure, states had abnormal EKG when at the ER earlier today, states also feels very anxious     10   OTHER SYMPTOMS: "Do you have any other symptoms?" (e g , dizziness, nausea, vomiting, sweating, fever, difficulty breathing, cough)        Dizziness, SOB only with chest discomfort episodes, stool is black states is taking iron, anxiety     11   PREGNANCY: "Is there any chance you are pregnant?" "When was your last menstrual period?"        States denies pregnancy but has not gotten period it is 6 days late    Protocols used: CHEST PAIN-ADULT-AH

## 2021-05-14 ENCOUNTER — TELEPHONE (OUTPATIENT)
Dept: FAMILY MEDICINE CLINIC | Facility: CLINIC | Age: 36
End: 2021-05-14

## 2021-05-14 NOTE — TELEPHONE ENCOUNTER
A to take 1 tablet 30 minutes before her MRI
Spoke to pharmacy verbal clarification patient is to take one tablet 30 min before MRI no refills
jyoti sent to pharmacy yessterday with at [facility] of 1 is this coreect because directions state one every 6 hrs prn please advise
never used

## 2021-05-16 PROBLEM — E66.01 SEVERE OBESITY (BMI 35.0-39.9) WITH COMORBIDITY (HCC): Status: ACTIVE | Noted: 2020-12-23

## 2021-05-16 NOTE — ASSESSMENT & PLAN NOTE
A new diagnosis status post ER visit EKG abnormal patient multiple ER visits for chest pain  Patient had multiple risk factor including hyperlipidemia obesity and hypertension the the we discussed the patient a possible and anxiety versus the coronary artery disease with her multiple risk factor recommend to be seen by Cardiology

## 2021-05-16 NOTE — ASSESSMENT & PLAN NOTE
Chronic asymptomatic status post the gastric bypass and uncontrolled discussed with the patient portion control low carb low-fat diet increase physical activity

## 2021-05-16 NOTE — ASSESSMENT & PLAN NOTE
A patient had an anxiety from close basis and she requests something before she does her MRI will give Valium 5 mg a half or before test

## 2021-05-16 NOTE — ASSESSMENT & PLAN NOTE
New diagnosis symptomatic status post ER visit physical exam in the office is negative the patient she had a numbness was vision problem plan to do MRI of the brain to rule out MS also plan to do blood work to rule out B12 deficiency Lyme disease HIV workup discussed with the patient

## 2021-05-19 ENCOUNTER — OFFICE VISIT (OUTPATIENT)
Dept: FAMILY MEDICINE CLINIC | Facility: CLINIC | Age: 36
End: 2021-05-19
Payer: COMMERCIAL

## 2021-05-19 VITALS
OXYGEN SATURATION: 100 % | WEIGHT: 214 LBS | DIASTOLIC BLOOD PRESSURE: 100 MMHG | TEMPERATURE: 98 F | BODY MASS INDEX: 35.65 KG/M2 | HEIGHT: 65 IN | SYSTOLIC BLOOD PRESSURE: 130 MMHG | HEART RATE: 70 BPM

## 2021-05-19 DIAGNOSIS — R09.81 NASAL CONGESTION: ICD-10-CM

## 2021-05-19 DIAGNOSIS — F41.1 GENERALIZED ANXIETY DISORDER: Primary | ICD-10-CM

## 2021-05-19 PROCEDURE — 99214 OFFICE O/P EST MOD 30 MIN: CPT | Performed by: FAMILY MEDICINE

## 2021-05-19 RX ORDER — FLUTICASONE PROPIONATE 50 MCG
SPRAY, SUSPENSION (ML) NASAL
Qty: 48 G | Refills: 0 | Status: SHIPPED | OUTPATIENT
Start: 2021-05-19 | End: 2021-06-23

## 2021-05-19 RX ORDER — SERTRALINE HYDROCHLORIDE 25 MG/1
25 TABLET, FILM COATED ORAL DAILY
Qty: 30 TABLET | Refills: 1 | Status: SHIPPED | OUTPATIENT
Start: 2021-05-19 | End: 2021-06-14

## 2021-05-19 RX ORDER — ALPRAZOLAM 0.25 MG/1
0.25 TABLET ORAL
Qty: 30 TABLET | Refills: 0 | Status: SHIPPED | OUTPATIENT
Start: 2021-05-19 | End: 2021-06-14

## 2021-05-19 RX ORDER — FLUTICASONE PROPIONATE 50 MCG
2 SPRAY, SUSPENSION (ML) NASAL DAILY
Qty: 16 G | Refills: 0 | Status: SHIPPED | OUTPATIENT
Start: 2021-05-19 | End: 2021-05-19

## 2021-05-20 ENCOUNTER — TELEPHONE (OUTPATIENT)
Dept: FAMILY MEDICINE CLINIC | Facility: CLINIC | Age: 36
End: 2021-05-20

## 2021-05-20 PROBLEM — R09.81 NASAL CONGESTION: Status: ACTIVE | Noted: 2021-05-20

## 2021-05-20 NOTE — TELEPHONE ENCOUNTER
pc from pt stating she went home and looked up the medication that was prescribed and is worried to take the medication because she works and there is lots of side effects  I did advise the patient the Xanax should be taken at night and can be used as a PRN at night she does not need to take it all the time

## 2021-05-20 NOTE — PROGRESS NOTES
Subjective:   Chief Complaint   Patient presents with    Anxiety        Patient ID: Senthil Francis is a 29 y o  female  A patient call to be seen she is concerned about an anxiety patient had the excessive worry and affecting her sleeping her appetite and ability to focus and this has been going on for while and the she is well worry about her health and all was worry some so she can get sick or some been bad can happen to her a specially after recent ER visit with the numbness and been told possible MS she been which is searching about it and he feel she had condition and she is not going to be able a to handle it deny any suicide attempt or ideation   also patient concerned about nasal congestion postnasal drip no cough no wheezing no decrease in oral intake no fever no chills      The following portions of the patient's history were reviewed and updated as appropriate: allergies, current medications, past family history, past medical history, past social history, past surgical history and problem list     Review of Systems   Constitutional: Negative for activity change, appetite change, fatigue and fever  HENT: Negative for congestion, ear pain, sinus pressure, sinus pain and sore throat  Eyes: Negative for pain, discharge, redness and itching  Respiratory: Negative for cough, chest tightness, shortness of breath and stridor  Cardiovascular: Negative for chest pain, palpitations and leg swelling  Gastrointestinal: Negative for abdominal pain, blood in stool, constipation, diarrhea and nausea  Genitourinary: Negative for dysuria, flank pain, frequency and hematuria  Musculoskeletal: Negative for back pain, joint swelling and neck pain  Skin: Negative for pallor and rash  Neurological: Negative for dizziness, tremors, weakness, numbness and headaches  Hematological: Does not bruise/bleed easily     Psychiatric/Behavioral: Negative for behavioral problems, sleep disturbance and suicidal ideas  The patient is nervous/anxious  Objective:  Vitals:    05/19/21 0738   BP: 130/100   BP Location: Left arm   Patient Position: Sitting   Cuff Size: Large   Pulse: 70   Temp: 98 °F (36 7 °C)   TempSrc: Tympanic   SpO2: 100%   Weight: 97 1 kg (214 lb)   Height: 5' 5" (1 651 m)      Physical Exam  Vitals signs and nursing note reviewed  Constitutional:       General: She is not in acute distress  Appearance: She is well-developed  She is not diaphoretic  HENT:      Head: Normocephalic  Right Ear: Tympanic membrane, ear canal and external ear normal       Left Ear: Tympanic membrane, ear canal and external ear normal       Nose: Nose normal  No congestion or rhinorrhea  Mouth/Throat:      Mouth: Mucous membranes are moist       Pharynx: Oropharynx is clear  No oropharyngeal exudate or posterior oropharyngeal erythema  Eyes:      General:         Right eye: No discharge  Left eye: No discharge  Conjunctiva/sclera: Conjunctivae normal       Pupils: Pupils are equal, round, and reactive to light  Neck:      Musculoskeletal: Normal range of motion and neck supple  Vascular: No JVD  Cardiovascular:      Rate and Rhythm: Normal rate and regular rhythm  Heart sounds: Normal heart sounds  No murmur  No gallop  Pulmonary:      Effort: Pulmonary effort is normal  No respiratory distress  Breath sounds: Normal breath sounds  No stridor  No wheezing or rales  Chest:      Chest wall: No tenderness  Abdominal:      General: There is no distension  Palpations: Abdomen is soft  There is no mass  Tenderness: There is no abdominal tenderness  There is no rebound  Musculoskeletal:         General: No tenderness  Lymphadenopathy:      Cervical: No cervical adenopathy  Skin:     General: Skin is warm  Findings: No erythema or rash  Neurological:      Mental Status: She is alert and oriented to person, place, and time  Motor: No weakness  Gait: Gait normal    Psychiatric:         Mood and Affect: Mood normal            Assessment/Plan:    Generalized anxiety disorder   A new diagnosis symptomatic will start patient on Zoloft 25 mg once a day proper use and possible side effect discussed the patient also start him on alprazolam 0 25 mg once a day proper use discussed with the patient    Nasal congestion   A new diagnosis symptomatic will start Flonase nasal spray 2 spray in each nostril once a day proper use and possible side effect discussed the patient       Diagnoses and all orders for this visit:    Generalized anxiety disorder  -     sertraline (ZOLOFT) 25 mg tablet; Take 1 tablet (25 mg total) by mouth daily  -     ALPRAZolam (XANAX) 0 25 mg tablet;  Take 1 tablet (0 25 mg total) by mouth daily at bedtime as needed for anxiety    Nasal congestion  -     Discontinue: fluticasone (FLONASE) 50 mcg/act nasal spray; 2 sprays into each nostril daily

## 2021-05-20 NOTE — ASSESSMENT & PLAN NOTE
A new diagnosis symptomatic will start patient on Zoloft 25 mg once a day proper use and possible side effect discussed the patient also start him on alprazolam 0 25 mg once a day proper use discussed with the patient

## 2021-05-20 NOTE — ASSESSMENT & PLAN NOTE
A new diagnosis symptomatic will start Flonase nasal spray 2 spray in each nostril once a day proper use and possible side effect discussed the patient

## 2021-05-24 ENCOUNTER — OFFICE VISIT (OUTPATIENT)
Dept: BARIATRICS | Facility: CLINIC | Age: 36
End: 2021-05-24
Payer: COMMERCIAL

## 2021-05-24 VITALS — RESPIRATION RATE: 20 BRPM | BODY MASS INDEX: 35.82 KG/M2 | WEIGHT: 215 LBS | HEIGHT: 65 IN

## 2021-05-24 DIAGNOSIS — K91.2 POSTSURGICAL MALABSORPTION: ICD-10-CM

## 2021-05-24 DIAGNOSIS — Z98.84 BARIATRIC SURGERY STATUS: ICD-10-CM

## 2021-05-24 DIAGNOSIS — R11.10 VOMITING: ICD-10-CM

## 2021-05-24 DIAGNOSIS — Z48.815 ENCOUNTER FOR SURGICAL AFTERCARE FOLLOWING SURGERY OF DIGESTIVE SYSTEM: Primary | ICD-10-CM

## 2021-05-24 PROCEDURE — 99213 OFFICE O/P EST LOW 20 MIN: CPT | Performed by: PHYSICIAN ASSISTANT

## 2021-05-24 NOTE — PROGRESS NOTES
Virtual Brief Visit    Assessment/Plan:    Problem List Items Addressed This Visit        Digestive    Postsurgical malabsorption    Relevant Orders    Zinc    Vitamin D 25 hydroxy    Vitamin B12    Vitamin B1, whole blood    Vitamin A    CBC and Platelet    Comprehensive metabolic panel    Ferritin    Folate    Iron Saturation %    PTH, intact      Other Visit Diagnoses     Encounter for surgical aftercare following surgery of digestive system    -  Primary    Relevant Orders    FL UPPER GI UGI    Zinc    Vitamin D 25 hydroxy    Vitamin B12    Vitamin B1, whole blood    Vitamin A    CBC and Platelet    Comprehensive metabolic panel    Ferritin    Folate    Iron Saturation %    PTH, intact    Bariatric surgery status        Relevant Orders    FL UPPER GI UGI    Zinc    Vitamin D 25 hydroxy    Vitamin B12    Vitamin B1, whole blood    Vitamin A    CBC and Platelet    Comprehensive metabolic panel    Ferritin    Folate    Iron Saturation %    PTH, intact    Vomiting        Relevant Orders    FL UPPER GI UGI            s/p Robotic lysis of adhesions, Robotic small-bowel resection, Robotic partial gastrectomy, Robotic paraesophageal hernia repair, Robotic bilateral truncal vagotomy  Robotic gastrojejunostomy anastomosis with intraop endoscopy  on 12/2020    C/o weight regain  States she is trying to track her calories and watch her diet, also goes to the gym regularly however continues to gain weight  She is currently undergoing some testing to r/o MS  Also states has thyroid testing done earlier this year and was found to have thyroid nodules which could affect her weight as well  Also c/o vomiting with meals  States sometimes will vomit and develop epigastric pain but only if she eats  She is taking omeprazole once a day and does not smoke, drink alcohol or use NSAIDS frequently       Plan for now:  - pt interested in RD follow up to revisit her diet and caloric requirements  - explained weight gain can be related to her thyroid issues as well as medication  She just recently started on zoloft  In the future may potentially consider metformin to help counteract her medication   - will have her increase her PPI to BID for now and add carafate  Will check UGI and have pt follow up in about 4 weeks  If no improvement noted will need EGD   - will check vitamin labs   Last TSH was checked 3/2021in normal range but lower end of normal  - for new or worsening sxs pt advised to go to ER     Reason for visit is weight gain  Chief Complaint   Patient presents with    Virtual Brief Visit        Encounter provider Kumar Palmer PA-C    Provider located at [address] [address]    Recent Visits  Date Type Provider Dept   05/19/21 Office Visit Pernell Arellano MD [facility]  [address]   Showing recent visits within past 7 days and meeting all other requirements     Today's Visits  Date Type Provider Dept   05/24/21 Office Visit Kumar Palmer PA-C [facility]   Showing today's visits and meeting all other requirements     Future Appointments  No visits were found meeting these conditions  Showing future appointments within next 150 days and meeting all other requirements        After connecting through telephone, the patient was identified by name and date of birth  Kiah Ribeiro was informed that this is a telemedicine visit and that the visit is being conducted through telephone  My office door was closed  No one else was in the room  She acknowledged consent and understanding of privacy and security of the platform  The patient has agreed to participate and understands she can discontinue the visit at any time  Patient is aware this is a billable service       Subjective    Kiah Ribeiro is a 29 y o  female     Past Medical History:   Diagnosis Date    Anemia     BMI 36 0-36 9,adult 12/8/2020    Disease of thyroid gland     Dizziness     due to anemia, last episode August 2020, no falls    Fatigue     History of transfusion 2016    anemic - loss of blood due to bleeding ulcers, no reaction    Hyperlipidemia     Hypertension     Multiple gastric ulcers     Obesity     11/14/16    Thyroid disease     took synthroid but not currently taking    Ulcer        Past Surgical History:   Procedure Laterality Date    GASTRIC BYPASS  2014    GASTRIC BYPASS LAPAROSCOPIC N/A 12/15/2020    Procedure: Robotic lysis of adhesions, small-bowel resection, partial gastrectomy, paraesophageal hernia repair, bilateral truncal vagotomy; Robotic gastrojejunostomy anastomosis with intraop endoscopy;  Surgeon: Trish Elam MD;  Location: [facility];  Service: Bariatrics    LITO-EN-Y PROCEDURE  11/14/2016    Gastric Bypass by Dr Amita Olivarez Weight 339 6,nw    TUBAL LIGATION Bilateral     2010    WISDOM TOOTH EXTRACTION         Current Outpatient Medications   Medication Sig Dispense Refill    acetaminophen (TYLENOL) 500 mg tablet Take 1 tablet (500 mg total) by mouth every 6 (six) hours as needed for mild pain 30 tablet 0    amLODIPine (NORVASC) 5 mg tablet Take 1 tablet (5 mg total) by mouth daily 90 tablet 0    calcium carbonate 1250 MG capsule Take 1,250 mg by mouth 2 (two) times a day with meals      cholecalciferol (VITAMIN D3) 1,000 units tablet Take 1 tablet (1,000 Units total) by mouth daily 30 tablet 0    cyanocobalamin (VITAMIN B-12) 100 mcg tablet Take by mouth daily      diazepam (VALIUM) 5 mg tablet Take 1 tablet (5 mg total) by mouth every 6 (six) hours as needed for anxiety 1 tablet 0    ferrous sulfate 325 (65 Fe) mg tablet Take 325 mg by mouth daily with breakfast      fluticasone (FLONASE) 50 mcg/act nasal spray SHAKE LIQUID AND USE 2 SPRAYS IN EACH NOSTRIL DAILY 48 g 0    Multiple Vitamin (MULTIVITAMIN) tablet Take 1 tablet by mouth daily      sertraline (ZOLOFT) 25 mg tablet Take 1 tablet (25 mg total) by mouth daily 30 tablet 1    albuterol (PROVENTIL HFA,VENTOLIN HFA) 90 mcg/act inhaler Inhale 2 puffs every 4 (four) hours as needed for wheezing (Patient not taking: Reported on 5/24/2021) 1 Inhaler 2    ALPRAZolam (XANAX) 0 25 mg tablet Take 1 tablet (0 25 mg total) by mouth daily at bedtime as needed for anxiety (Patient not taking: Reported on 5/24/2021) 30 tablet 0     No current facility-administered medications for this visit  Allergies   Allergen Reactions    [place] Oil - Food Allergy Shortness Of Breath    Venofer [Iron Sucrose]      Chest pressure after 1st dose  Tachycardia and blurred vision after 8 minutes of dose #2  Review of Systems    Vitals:    05/24/21 0813   Resp: 20   Weight: 97 5 kg (215 lb)   Height: 5' 5" (1 651 m)         I spent 20 minutes directly with the patient during this visit    VIRTUAL VISIT DISCLAIMER    Kaleb Hilliard acknowledges that she has consented to an online visit or consultation  She understands that the online visit is based solely on information provided by her, and that, in the absence of a face-to-face physical evaluation by the physician, the diagnosis she receives is both limited and provisional in terms of accuracy and completeness  This is not intended to replace a full medical face-to-face evaluation by the physician  Kaleb Hilliard understands and accepts these terms

## 2021-05-28 ENCOUNTER — LAB (OUTPATIENT)
Dept: LAB | Facility: CLINIC | Age: 36
End: 2021-05-28
Payer: COMMERCIAL

## 2021-05-28 DIAGNOSIS — Z98.84 BARIATRIC SURGERY STATUS: ICD-10-CM

## 2021-05-28 DIAGNOSIS — Z48.815 ENCOUNTER FOR SURGICAL AFTERCARE FOLLOWING SURGERY OF DIGESTIVE SYSTEM: ICD-10-CM

## 2021-05-28 DIAGNOSIS — E78.2 MIXED HYPERLIPIDEMIA: ICD-10-CM

## 2021-05-28 DIAGNOSIS — D50.8 IRON DEFICIENCY ANEMIA SECONDARY TO INADEQUATE DIETARY IRON INTAKE: ICD-10-CM

## 2021-05-28 DIAGNOSIS — R71.0 DROP IN HEMOGLOBIN: ICD-10-CM

## 2021-05-28 DIAGNOSIS — E53.8 B12 DEFICIENCY: ICD-10-CM

## 2021-05-28 DIAGNOSIS — E55.9 VITAMIN D DEFICIENCY: ICD-10-CM

## 2021-05-28 DIAGNOSIS — K91.2 POSTSURGICAL MALABSORPTION: ICD-10-CM

## 2021-05-28 DIAGNOSIS — G44.89 OTHER HEADACHE SYNDROME: ICD-10-CM

## 2021-05-28 DIAGNOSIS — D72.828 OTHER ELEVATED WHITE BLOOD CELL (WBC) COUNT: ICD-10-CM

## 2021-05-28 DIAGNOSIS — R71.0 DROP IN HEMATOCRIT: ICD-10-CM

## 2021-05-28 DIAGNOSIS — D51.8 OTHER VITAMIN B12 DEFICIENCY ANEMIA: ICD-10-CM

## 2021-05-28 DIAGNOSIS — R10.2 PELVIC PAIN: ICD-10-CM

## 2021-05-28 DIAGNOSIS — D50.9 MICROCYTIC ANEMIA: ICD-10-CM

## 2021-05-28 DIAGNOSIS — R20.0 NUMBNESS: ICD-10-CM

## 2021-05-28 LAB
25(OH)D3 SERPL-MCNC: 19.1 NG/ML (ref 30–100)
ALBUMIN SERPL BCP-MCNC: 3.6 G/DL (ref 3.5–5)
ALP SERPL-CCNC: 90 U/L (ref 46–116)
ALT SERPL W P-5'-P-CCNC: 27 U/L (ref 12–78)
ANION GAP SERPL CALCULATED.3IONS-SCNC: 4 MMOL/L (ref 4–13)
AST SERPL W P-5'-P-CCNC: 21 U/L (ref 5–45)
BASOPHILS # BLD AUTO: 0.02 THOUSANDS/ΜL (ref 0–0.1)
BASOPHILS NFR BLD AUTO: 0 % (ref 0–1)
BILIRUB SERPL-MCNC: 0.33 MG/DL (ref 0.2–1)
BUN SERPL-MCNC: 10 MG/DL (ref 5–25)
CALCIUM SERPL-MCNC: 9 MG/DL (ref 8.3–10.1)
CHLORIDE SERPL-SCNC: 109 MMOL/L (ref 100–108)
CHOLEST SERPL-MCNC: 165 MG/DL (ref 50–200)
CO2 SERPL-SCNC: 26 MMOL/L (ref 21–32)
CREAT SERPL-MCNC: 0.51 MG/DL (ref 0.6–1.3)
EOSINOPHIL # BLD AUTO: 0.07 THOUSAND/ΜL (ref 0–0.61)
EOSINOPHIL NFR BLD AUTO: 1 % (ref 0–6)
ERYTHROCYTE [DISTWIDTH] IN BLOOD BY AUTOMATED COUNT: 14.8 % (ref 11.6–15.1)
FERRITIN SERPL-MCNC: 7 NG/ML (ref 8–388)
FOLATE SERPL-MCNC: >20 NG/ML (ref 3.1–17.5)
GFR SERPL CREATININE-BSD FRML MDRD: 125 ML/MIN/1.73SQ M
GLUCOSE P FAST SERPL-MCNC: 84 MG/DL (ref 65–99)
HCT VFR BLD AUTO: 39.1 % (ref 34.8–46.1)
HDLC SERPL-MCNC: 61 MG/DL
HGB BLD-MCNC: 12.2 G/DL (ref 11.5–15.4)
IMM GRANULOCYTES # BLD AUTO: 0.02 THOUSAND/UL (ref 0–0.2)
IMM GRANULOCYTES NFR BLD AUTO: 0 % (ref 0–2)
IRON SATN MFR SERPL: 16 %
IRON SERPL-MCNC: 70 UG/DL (ref 50–170)
LDLC SERPL CALC-MCNC: 90 MG/DL (ref 0–100)
LYMPHOCYTES # BLD AUTO: 1.67 THOUSANDS/ΜL (ref 0.6–4.47)
LYMPHOCYTES NFR BLD AUTO: 20 % (ref 14–44)
MCH RBC QN AUTO: 30.1 PG (ref 26.8–34.3)
MCHC RBC AUTO-ENTMCNC: 31.2 G/DL (ref 31.4–37.4)
MCV RBC AUTO: 97 FL (ref 82–98)
MONOCYTES # BLD AUTO: 0.53 THOUSAND/ΜL (ref 0.17–1.22)
MONOCYTES NFR BLD AUTO: 6 % (ref 4–12)
NEUTROPHILS # BLD AUTO: 6.15 THOUSANDS/ΜL (ref 1.85–7.62)
NEUTS SEG NFR BLD AUTO: 73 % (ref 43–75)
NRBC BLD AUTO-RTO: 0 /100 WBCS
PLATELET # BLD AUTO: 330 THOUSANDS/UL (ref 149–390)
PMV BLD AUTO: 10.6 FL (ref 8.9–12.7)
POTASSIUM SERPL-SCNC: 4.6 MMOL/L (ref 3.5–5.3)
PROT SERPL-MCNC: 7.7 G/DL (ref 6.4–8.2)
PTH-INTACT SERPL-MCNC: 82 PG/ML (ref 18.4–80.1)
RBC # BLD AUTO: 4.05 MILLION/UL (ref 3.81–5.12)
SODIUM SERPL-SCNC: 139 MMOL/L (ref 136–145)
TIBC SERPL-MCNC: 427 UG/DL (ref 250–450)
TRIGL SERPL-MCNC: 71 MG/DL
VIT B12 SERPL-MCNC: 1801 PG/ML (ref 100–900)
WBC # BLD AUTO: 8.46 THOUSAND/UL (ref 4.31–10.16)

## 2021-05-28 PROCEDURE — 82306 VITAMIN D 25 HYDROXY: CPT

## 2021-05-28 PROCEDURE — 82728 ASSAY OF FERRITIN: CPT

## 2021-05-28 PROCEDURE — 80053 COMPREHEN METABOLIC PANEL: CPT

## 2021-05-28 PROCEDURE — 83550 IRON BINDING TEST: CPT

## 2021-05-28 PROCEDURE — 84425 ASSAY OF VITAMIN B-1: CPT

## 2021-05-28 PROCEDURE — 86618 LYME DISEASE ANTIBODY: CPT

## 2021-05-28 PROCEDURE — 84630 ASSAY OF ZINC: CPT

## 2021-05-28 PROCEDURE — 84590 ASSAY OF VITAMIN A: CPT

## 2021-05-28 PROCEDURE — 86038 ANTINUCLEAR ANTIBODIES: CPT

## 2021-05-28 PROCEDURE — 80061 LIPID PANEL: CPT

## 2021-05-28 PROCEDURE — 85025 COMPLETE CBC W/AUTO DIFF WBC: CPT

## 2021-05-28 PROCEDURE — 82746 ASSAY OF FOLIC ACID SERUM: CPT

## 2021-05-28 PROCEDURE — 36415 COLL VENOUS BLD VENIPUNCTURE: CPT

## 2021-05-28 PROCEDURE — 83970 ASSAY OF PARATHORMONE: CPT

## 2021-05-28 PROCEDURE — 83540 ASSAY OF IRON: CPT

## 2021-05-28 PROCEDURE — 82607 VITAMIN B-12: CPT

## 2021-05-29 ENCOUNTER — HOSPITAL ENCOUNTER (OUTPATIENT)
Dept: MRI IMAGING | Facility: HOSPITAL | Age: 36
Discharge: HOME | End: 2021-05-29
Payer: COMMERCIAL

## 2021-05-29 DIAGNOSIS — R20.0 NUMBNESS: ICD-10-CM

## 2021-05-29 LAB — B BURGDOR IGG+IGM SER-ACNC: 41

## 2021-05-29 PROCEDURE — G1004 CDSM NDSC: HCPCS

## 2021-05-29 PROCEDURE — 70551 MRI BRAIN STEM W/O DYE: CPT

## 2021-05-31 LAB — RYE IGE QN: NEGATIVE

## 2021-06-01 ENCOUNTER — TELEPHONE (OUTPATIENT)
Dept: FAMILY MEDICINE CLINIC | Facility: CLINIC | Age: 36
End: 2021-06-01

## 2021-06-01 DIAGNOSIS — E55.9 VITAMIN D DEFICIENCY: Primary | ICD-10-CM

## 2021-06-01 RX ORDER — ERGOCALCIFEROL 1.25 MG/1
50000 CAPSULE ORAL WEEKLY
Qty: 12 CAPSULE | Refills: 0 | Status: SHIPPED | OUTPATIENT
Start: 2021-06-01 | End: 2021-06-28

## 2021-06-01 NOTE — TELEPHONE ENCOUNTER
----- Message from Cb Powers MD sent at 5/31/2021  2:33 PM EDT -----  Vit D def recommend start Vit D 91513 iu/w for 12 w

## 2021-06-01 NOTE — TELEPHONE ENCOUNTER
Per dr Kathi Tirado script sent for vitamin d-patient notified  PATIENT ALSO LOOKING FOR HER MRI RESULTS BUT NOT IN YET

## 2021-06-02 ENCOUNTER — IMMUNIZATIONS (OUTPATIENT)
Dept: FAMILY MEDICINE CLINIC | Facility: HOSPITAL | Age: 36
End: 2021-06-02

## 2021-06-02 DIAGNOSIS — Z23 ENCOUNTER FOR IMMUNIZATION: Primary | ICD-10-CM

## 2021-06-02 LAB — ZINC SERPL-MCNC: 72 UG/DL (ref 44–115)

## 2021-06-02 PROCEDURE — 91301 SARS-COV-2 / COVID-19 MRNA VACCINE (MODERNA) 100 MCG: CPT

## 2021-06-02 PROCEDURE — 0011A SARS-COV-2 / COVID-19 MRNA VACCINE (MODERNA) 100 MCG: CPT

## 2021-06-03 LAB — VIT A SERPL-MCNC: 37.6 UG/DL (ref 18.9–57.3)

## 2021-06-04 LAB — VIT B1 BLD-SCNC: 159.5 NMOL/L (ref 66.5–200)

## 2021-06-08 ENCOUNTER — TELEPHONE (OUTPATIENT)
Dept: FAMILY MEDICINE CLINIC | Facility: CLINIC | Age: 36
End: 2021-06-08

## 2021-06-08 NOTE — TELEPHONE ENCOUNTER
On 03/21 patient had US of thyroid and we discuss at time of visit will repeat in one year  Also her TSH on 03/21 was normal

## 2021-06-08 NOTE — TELEPHONE ENCOUNTER
patient said her thyroid was elavated and wants to know if your going to do anything for this  also said she has 2 nodules on her thyroid  please advise   has apt at end of month

## 2021-06-14 ENCOUNTER — OFFICE VISIT (OUTPATIENT)
Dept: CARDIOLOGY CLINIC | Facility: CLINIC | Age: 36
End: 2021-06-14
Payer: COMMERCIAL

## 2021-06-14 VITALS
SYSTOLIC BLOOD PRESSURE: 132 MMHG | OXYGEN SATURATION: 99 % | HEART RATE: 78 BPM | BODY MASS INDEX: 36.89 KG/M2 | HEIGHT: 65 IN | DIASTOLIC BLOOD PRESSURE: 88 MMHG | WEIGHT: 221.4 LBS

## 2021-06-14 DIAGNOSIS — R94.31 EKG, ABNORMAL: ICD-10-CM

## 2021-06-14 DIAGNOSIS — I10 ESSENTIAL HYPERTENSION: ICD-10-CM

## 2021-06-14 DIAGNOSIS — E66.01 SEVERE OBESITY (BMI 35.0-39.9) WITH COMORBIDITY (HCC): ICD-10-CM

## 2021-06-14 DIAGNOSIS — E78.2 MIXED HYPERLIPIDEMIA: ICD-10-CM

## 2021-06-14 DIAGNOSIS — D50.8 IRON DEFICIENCY ANEMIA SECONDARY TO INADEQUATE DIETARY IRON INTAKE: ICD-10-CM

## 2021-06-14 DIAGNOSIS — R07.89 OTHER CHEST PAIN: Primary | ICD-10-CM

## 2021-06-14 PROCEDURE — 93000 ELECTROCARDIOGRAM COMPLETE: CPT | Performed by: INTERNAL MEDICINE

## 2021-06-14 PROCEDURE — 3079F DIAST BP 80-89 MM HG: CPT | Performed by: INTERNAL MEDICINE

## 2021-06-14 PROCEDURE — 3008F BODY MASS INDEX DOCD: CPT | Performed by: INTERNAL MEDICINE

## 2021-06-14 PROCEDURE — 1036F TOBACCO NON-USER: CPT | Performed by: INTERNAL MEDICINE

## 2021-06-14 PROCEDURE — 99214 OFFICE O/P EST MOD 30 MIN: CPT | Performed by: INTERNAL MEDICINE

## 2021-06-14 PROCEDURE — 3075F SYST BP GE 130 - 139MM HG: CPT | Performed by: INTERNAL MEDICINE

## 2021-06-14 NOTE — LETTER
June 14, 2021     Freda Srivastava, [address] [facility]  [address]    Patient: Sandoval Taylor   YOB: 1985   Date of Visit: 6/14/2021       Dear Dr Ras Agustin: Thank you for referring Sandoval Taylor to me for evaluation  Below are my notes for this consultation  If you have questions, please do not hesitate to call me  I look forward to following your patient along with you  Sincerely,        Zelda Hubbard MD        CC: No Recipients  Zelda Hubbard MD  6/14/2021  9:28 AM  Sign when Signing Visit                                             Cardiology Follow Up    Sandoval Taylor  1985  [id number]  [address]  [address] [facility] [address] [phone]    1  Other chest pain     2  EKG, abnormal  POCT ECG    Stress test only, exercise   3  Mixed hyperlipidemia     4  Essential hypertension     5  Iron deficiency anemia secondary to inadequate dietary iron intake     6  Severe obesity (BMI 35 0-39  9) with comorbidity [facility])         Patient was 1st seen  on 10/07/2016 for clearance for bariatric surgery in treatment hypertension  Patient was and seen again on 10/27/2020 for redo bariatric surgery clearance  06/12/2020 lipid profile:  Total cholesterol 154, triglycerides 61, HDL 52, LDL calculated 90    /28/2021 lipid profile:  Cholesterol 165, triglycerides 71, HDL 61, LDL calculated 90    Interval History:   Patient was in the emergency room on 05/12/2021 for numbness without recurrence of symptoms  An EKG on that day demonstrated T-wave inversion inferiorly in 3  and AVF which was not symmetrical   Repeat EKG today demonstrated resolution of the T-wave inversion  Patient denies chest discomfort  She has  rare palpitations which only lasts for seconds  They do not seem to bother her  Her blood pressure is slightly elevated today    However she does complain of lightheadedness when she stands  On that basis, I would not increase her antihypertensives  Discussion/Summary:  1  Regular stress test  2  Call patient if stress test is abnormal  3  Otherwise Return in 6 months    Patient Active Problem List   Diagnosis    Acute duodenal ulcer    Anemia    Essential hypertension    Hyperinsulinemia    Hyperlipidemia    Morbid obesity ([address] )    Vitamin D deficiency    S/P gastric bypass    Postsurgical malabsorption    Iron deficiency anemia secondary to inadequate dietary iron intake    B12 deficiency    Hiatal hernia    Preoperative cardiovascular examination    Pelvic pain    Drop in hemoglobin    Other headache syndrome    Cough    COVID-19 virus infection    Severe obesity (BMI 35 0-39  9) with comorbidity (HCC)    Shortness of breath    Diarrhea    Gastric outlet obstruction    Vertigo    Leucocytosis    Menorrhagia with regular cycle    Cyst of right breast    Thyroid mass    Numbness    Other chest pain    EKG, abnormal    Situational anxiety    Generalized anxiety disorder    Nasal congestion     Past Medical History:   Diagnosis Date    Anemia     BMI 36 0-36 9,adult 12/8/2020    Disease of thyroid gland     Dizziness     due to anemia, last episode August 2020, no falls    Fatigue     History of transfusion 2016    anemic - loss of blood due to bleeding ulcers, no reaction    Hyperlipidemia     Hypertension     Multiple gastric ulcers     Obesity     11/14/16    Thyroid disease     took synthroid but not currently taking    Ulcer      Social History     Socioeconomic History    Marital status: Single     Spouse name: Not on file    Number of children: Not on file    Years of education: Not on file    Highest education level: Not on file   Occupational History    Not on file   Tobacco Use    Smoking status: Former Smoker     Types: Cigarettes     Quit date: 2018     Years since quitting: 3 4    Smokeless tobacco: Former User    Tobacco comment: former   Vaping Use    Vaping Use: Never used   Substance and Sexual Activity    Alcohol use: No    Drug use: No    Sexual activity: Yes     Partners: Male     Birth control/protection: Female Sterilization   Other Topics Concern    Not on file   Social History Narrative    fhx not asked in triage     Social Determinants of Health     Financial Resource Strain: Low Risk     Difficulty of Paying Living Expenses: Not hard at all   Food Insecurity: No Food Insecurity    Worried About Running Out of Food in the Last Year: Never true    [address] in the Last Year: Never true   Transportation Needs: No Transportation Needs    Lack of Transportation (Medical): No    Lack of Transportation (Non-Medical): No   Physical Activity: Sufficiently Active    Days of Exercise per Week: 7 days    Minutes of Exercise per Session: 30 min   Stress: No Stress Concern Present    Feeling of Stress : Only a little   Social Connections: Socially Integrated    Frequency of Communication with Friends and Family: More than three times a week    Frequency of Social Gatherings with Friends and Family: Never    Attends [employer] Services: More than 4 times per year    Active Member of [employer] or Organizations:  Yes    Attends Club or Organization Meetings: More than 4 times per year    Marital Status:    Intimate Partner Violence: Not At Risk    Fear of Current or Ex-Partner: No    Emotionally Abused: No    Physically Abused: No    Sexually Abused: No      Family History   Problem Relation Age of Onset    Asthma Mother     Coronary artery disease Mother     Diabetes Mother         MELLITUS    Hyperlipidemia Mother     Hypertension Mother     Aneurysm Mother         2018 just diagnosed with two    No Known Problems Father          when she was 1 months old    Aneurysm Maternal Grandmother     Aneurysm Maternal Grandfather           of a ruptured abdominal aneurysm    No Known Problems Sister     No Known Problems Daughter     No Known Problems Paternal Grandmother     No Known Problems Paternal Grandfather     No Known Problems Daughter     No Known Problems Maternal Aunt     No Known Problems Maternal Aunt     No Known Problems Maternal Aunt      Past Surgical History:   Procedure Laterality Date    GASTRIC BYPASS  2014    GASTRIC BYPASS LAPAROSCOPIC N/A 12/15/2020    Procedure: Robotic lysis of adhesions, small-bowel resection, partial gastrectomy, paraesophageal hernia repair, bilateral truncal vagotomy; Robotic gastrojejunostomy anastomosis with intraop endoscopy;  Surgeon: Nubia Argueta MD;  Location: [facility];  Service: Bariatrics    LITO-EN-Y PROCEDURE  11/14/2016    Gastric Bypass by Dr Sher Coats Weight 339 6,nw    TUBAL LIGATION Bilateral     2010    WISDOM TOOTH EXTRACTION         Current Outpatient Medications:     acetaminophen (TYLENOL) 500 mg tablet, Take 1 tablet (500 mg total) by mouth every 6 (six) hours as needed for mild pain, Disp: 30 tablet, Rfl: 0    albuterol (PROVENTIL HFA,VENTOLIN HFA) 90 mcg/act inhaler, Inhale 2 puffs every 4 (four) hours as needed for wheezing, Disp: 1 Inhaler, Rfl: 2    amLODIPine (NORVASC) 5 mg tablet, Take 1 tablet (5 mg total) by mouth daily, Disp: 90 tablet, Rfl: 0    calcium carbonate 1250 MG capsule, Take 1,250 mg by mouth 2 (two) times a day with meals, Disp: , Rfl:     cholecalciferol (VITAMIN D3) 1,000 units tablet, Take 1 tablet (1,000 Units total) by mouth daily, Disp: 30 tablet, Rfl: 0    ergocalciferol (VITAMIN D2) 50,000 units, Take 1 capsule (50,000 Units total) by mouth once a week, Disp: 12 capsule, Rfl: 0    ferrous sulfate 325 (65 Fe) mg tablet, Take 325 mg by mouth daily with breakfast, Disp: , Rfl:     fluticasone (FLONASE) 50 mcg/act nasal spray, SHAKE LIQUID AND USE 2 SPRAYS IN EACH NOSTRIL DAILY, Disp: 48 g, Rfl: 0    Multiple Vitamin (MULTIVITAMIN) tablet, Take 1 tablet by mouth daily, Disp: , Rfl:     cyanocobalamin (VITAMIN B-12) 100 mcg tablet, Take by mouth daily, Disp: , Rfl:     diazepam (VALIUM) 5 mg tablet, Take 1 tablet (5 mg total) by mouth every 6 (six) hours as needed for anxiety (Patient not taking: Reported on 6/14/2021), Disp: 1 tablet, Rfl: 0  Allergies   Allergen Reactions    [place] Oil - Food Allergy Shortness Of Breath    Venofer [Iron Sucrose]      Chest pressure after 1st dose  Tachycardia and blurred vision after 8 minutes of dose #2  Results for orders placed or performed in visit on 06/14/21   POCT ECG    Narrative     Normal sinus rhythm at a rate of 77 beats per minute  Normal EKG  In comparison to the prior electrocardiogram performed on May 12, 2021, the previous inferior T-wave inversions in leads 3 in AVF are no longer present  Review of Systems:  Review of Systems   Respiratory: Negative for cough, choking, chest tightness, shortness of breath and wheezing  Cardiovascular: Negative for chest pain, palpitations and leg swelling  Musculoskeletal: Negative for gait problem  Skin: Negative for rash  Neurological: Negative for dizziness, tremors, syncope, weakness, light-headedness, numbness and headaches  Psychiatric/Behavioral: Negative for agitation and behavioral problems  The patient is not hyperactive  Physical Exam:  /88   Pulse 78   Ht 5' 5" (1 651 m)   Wt 100 kg (221 lb 6 4 oz)   SpO2 99%   BMI 36 84 kg/m²   Physical Exam  Constitutional:       General: She is not in acute distress  Appearance: She is well-developed  HENT:      Head: Normocephalic and atraumatic  Neck:      Thyroid: No thyromegaly  Vascular: No carotid bruit or JVD  Trachea: No tracheal deviation  Cardiovascular:      Rate and Rhythm: Normal rate and regular rhythm  Pulses: Normal pulses  Heart sounds: Normal heart sounds  No murmur heard  No friction rub  No gallop      Pulmonary:      Effort: Pulmonary effort is normal  No respiratory distress  Breath sounds: Normal breath sounds  No wheezing, rhonchi or rales  Chest:      Chest wall: No tenderness  Abdominal:      General: Bowel sounds are normal  There is no distension  Palpations: Abdomen is soft  Tenderness: There is no abdominal tenderness  Musculoskeletal:         General: Normal range of motion  Cervical back: Normal range of motion and neck supple  Right lower leg: No edema  Left lower leg: No edema  Skin:     General: Skin is warm and dry  Neurological:      General: No focal deficit present  Mental Status: She is alert and oriented to person, place, and time  Gait: Gait normal    Psychiatric:         Mood and Affect: Mood normal          Behavior: Behavior normal          Thought Content: Thought content normal          Judgment: Judgment normal            --------------------------------------------------------------------------------  ECHO:   Results for orders placed during the hospital encounter of 10/14/15    Echo complete with contrast if indicated    Narrative  [address] [place] [address]  [phone]    Transthoracic Echocardiogram  2D, M-mode, Doppler, and Color Doppler    Study date:  15-Oct-2015    Patient: Geoff Hernandez  MR number: [id number]  Account number: [de-identified]  : 1985  Age: 35 years  Gender: Female  Status: Inpatient  Location: Bedside  Height: 67 in  Weight: 317 lb  BP: 122/ 56 mmHg    Indications: Syncope  Diagnoses: 780 2 - SYNCOPE AND COLLAPSE    Sonographer:  KENYATTA Oreilly  Referring Physician:  Radha Acharya MD  Group:  [facility]  Interpreting Physician:  Mindi Oconnor MD    SUMMARY    LEFT VENTRICLE:  Systolic function was normal  Ejection fraction was estimated in the range of  55 % to 65 %  There were no regional wall motion abnormalities  Wall thickness was mildly increased      TRICUSPID VALVE:  There was mild regurgitation  PULMONIC VALVE:  There was trace regurgitation  HISTORY: PRIOR HISTORY: Risk factors: hypertension  PROCEDURE: The procedure was performed at the bedside  This was a routine  study  The transthoracic approach was used  The study included complete 2D  imaging, M-mode, complete spectral Doppler, and color Doppler  The heart rate  was 92 bpm, at the start of the study  Echocardiographic views were limited due  to poor acoustic window availability and decreased penetration  This was a  technically difficult study  LEFT VENTRICLE: Size was normal  Systolic function was normal  Ejection  fraction was estimated in the range of 55 % to 65 %  There were no regional  wall motion abnormalities  Wall thickness was mildly increased  DOPPLER: The  transmitral flow pattern was normal     RIGHT VENTRICLE: The size was normal  Systolic function was normal  Wall  thickness was normal     LEFT ATRIUM: Size was within the limits of normal when indexed for body surface  area  RIGHT ATRIUM: Size was normal     MITRAL VALVE: Valve structure was normal  There was normal leaflet separation  DOPPLER: The transmitral velocity was within the normal range  There was no  evidence for stenosis  There was no regurgitation  AORTIC VALVE: The valve was trileaflet  Leaflets exhibited normal thickness and  normal cuspal separation  DOPPLER: Transaortic velocity was within the normal  range  There was no evidence for stenosis  There was no regurgitation  TRICUSPID VALVE: The valve structure was normal  There was normal leaflet  separation  DOPPLER: The transtricuspid velocity was within the normal range  There was no evidence for stenosis  There was mild regurgitation  PULMONIC VALVE: Leaflets exhibited normal thickness, no calcification, and  normal cuspal separation  DOPPLER: The transpulmonic velocity was within the  normal range  There was trace regurgitation      PERICARDIUM: There was no pericardial effusion  The pericardium was normal in  appearance  AORTA: The root exhibited normal size  SYSTEMIC VEINS: IVC: The inferior vena cava was normal in size and course    Respirophasic changes were normal     MEASUREMENT TABLES    2D MEASUREMENTS  Left ventricle   (Reference normals)  LVID ed, PLAX   48 mm   (35-60)  LVID es, PLAX   33 mm   (21-40)  FS, PLAX   31 25 %   (25-46)  LVPW thickness ed   11 mm   (6-11)  Ratio, IVS/LVPW   1    (<1 3)  Ventricular septum   (Reference normals)  IVS thickness ed   11 mm   (6-11)  Aorta   (Reference normals)  Root diam   33 mm   (--)  Left atrium   (Reference normals)  AP dim   36 mm   (--)  AP dim index   1 46 cm/m squared   (<2 2)  LA/Ao root ratio   1 09    (--)    [facility] Accredited [facility]chocardiography Laboratory    Prepared and electronically signed by    Daniel Olivas MD  Signed 16-Oct-2015 09:48:19      ======================================================    Lab Results   Component Value Date    WBC 8 46 05/28/2021    HGB 12 2 05/28/2021    HCT 39 1 05/28/2021    MCV 97 05/28/2021     05/28/2021      Lab Results   Component Value Date    SODIUM 139 05/28/2021    K 4 6 05/28/2021     (H) 05/28/2021    CO2 26 05/28/2021    BUN 10 05/28/2021    CREATININE 0 51 (L) 05/28/2021    GLUC 104 05/12/2021    CALCIUM 9 0 05/28/2021      Lab Results   Component Value Date    HGBA1C 5 6 06/12/2020      Lab Results   Component Value Date    CHOL 116 10/15/2015     Lab Results   Component Value Date    HDL 61 05/28/2021    HDL 52 06/12/2020    HDL 52 11/12/2018     Lab Results   Component Value Date    LDLCALC 90 05/28/2021    LDLCALC 90 06/12/2020    LDLCALC 100 11/12/2018     Lab Results   Component Value Date    TRIG 71 05/28/2021    TRIG 61 06/12/2020    TRIG 79 11/12/2018     No results found for: [place], [place]   Lab Results   Component Value Date    INR 1 04 10/14/2015    PROTIME 13 6 10/14/2015        Imaging:   I have personally reviewed pertinent reports  Portions of the record may have been created with voice recognition software  Occasional wrong word or "sound a like" substitutions may have occurred due to the inherent limitations of voice recognition software  Read the chart carefully and recognize, using context, where substitutions have occurred

## 2021-06-14 NOTE — PROGRESS NOTES
Cardiology Follow Up    Yesenia Carmen  1985  [id number]  [address] [address]  [phone]    1  Other chest pain     2  EKG, abnormal  POCT ECG    Stress test only, exercise   3  Mixed hyperlipidemia     4  Essential hypertension     5  Iron deficiency anemia secondary to inadequate dietary iron intake     6  Severe obesity (BMI 35 0-39  9) with comorbidity [facility])         Patient was 1st seen  on 10/07/2016 for clearance for bariatric surgery in treatment hypertension  Patient was and seen again on 10/27/2020 for redo bariatric surgery clearance  06/12/2020 lipid profile:  Total cholesterol 154, triglycerides 61, HDL 52, LDL calculated 90    /28/2021 lipid profile:  Cholesterol 165, triglycerides 71, HDL 61, LDL calculated 90    Interval History:   Patient was in the emergency room on 05/12/2021 for numbness without recurrence of symptoms  An EKG on that day demonstrated T-wave inversion inferiorly in 3  and AVF which was not symmetrical   Repeat EKG today demonstrated resolution of the T-wave inversion  Patient denies chest discomfort  She has  rare palpitations which only lasts for seconds  They do not seem to bother her  Her blood pressure is slightly elevated today  However she does complain of lightheadedness when she stands  On that basis, I would not increase her antihypertensives  Discussion/Summary:  1  Regular stress test  2  Call patient if stress test is abnormal  3    Otherwise Return in 6 months    Patient Active Problem List   Diagnosis    Acute duodenal ulcer    Anemia    Essential hypertension    Hyperinsulinemia    Hyperlipidemia    Morbid obesity ([address] )    Vitamin D deficiency    S/P gastric bypass    Postsurgical malabsorption    Iron deficiency anemia secondary to inadequate dietary iron intake    B12 deficiency    Hiatal hernia    Preoperative cardiovascular examination    Pelvic pain    Drop in hemoglobin    Other headache syndrome    Cough    COVID-19 virus infection    Severe obesity (BMI 35 0-39  9) with comorbidity (HCC)    Shortness of breath    Diarrhea    Gastric outlet obstruction    Vertigo    Leucocytosis    Menorrhagia with regular cycle    Cyst of right breast    Thyroid mass    Numbness    Other chest pain    EKG, abnormal    Situational anxiety    Generalized anxiety disorder    Nasal congestion     Past Medical History:   Diagnosis Date    Anemia     BMI 36 0-36 9,adult 12/8/2020    Disease of thyroid gland     Dizziness     due to anemia, last episode August 2020, no falls    Fatigue     History of transfusion 2016    anemic - loss of blood due to bleeding ulcers, no reaction    Hyperlipidemia     Hypertension     Multiple gastric ulcers     Obesity     11/14/16    Thyroid disease     took synthroid but not currently taking    Ulcer      Social History     Socioeconomic History    Marital status: Single     Spouse name: Not on file    Number of children: Not on file    Years of education: Not on file    Highest education level: Not on file   Occupational History    Not on file   Tobacco Use    Smoking status: Former Smoker     Types: Cigarettes     Quit date: 2018     Years since quitting: 3 4    Smokeless tobacco: Former User    Tobacco comment: former   Vaping Use    Vaping Use: Never used   Substance and Sexual Activity    Alcohol use: No    Drug use: No    Sexual activity: Yes     Partners: Male     Birth control/protection: Female Sterilization   Other Topics Concern    Not on file   Social History Narrative    fhx not asked in triage     Social Determinants of Health     Financial Resource Strain: Low Risk     Difficulty of Paying Living Expenses: Not hard at all   Food Insecurity: No Food Insecurity    Worried About [address] in the Last Year: Never true    [facility] in the Last Year: Never true   Transportation Needs: No Transportation Needs    Lack of Transportation (Medical): No    Lack of Transportation (Non-Medical): No   Physical Activity: Sufficiently Active    Days of Exercise per Week: 7 days    Minutes of Exercise per Session: 30 min   Stress: No Stress Concern Present    Feeling of Stress : Only a little   Social Connections: Socially Integrated    Frequency of Communication with Friends and Family: More than three times a week    Frequency of Social Gatherings with Friends and Family: Never    Attends Jain Services: More than 4 times per year    Active Member of [employer] or Organizations:  Yes    Attends Club or Organization Meetings: More than 4 times per year    Marital Status:    Intimate Partner Violence: Not At Risk    Fear of Current or Ex-Partner: No    Emotionally Abused: No    Physically Abused: No    Sexually Abused: No      Family History   Problem Relation Age of Onset    Asthma Mother     Coronary artery disease Mother     Diabetes Mother         MELLITUS    Hyperlipidemia Mother     Hypertension Mother     Aneurysm Mother         2018 just diagnosed with two    No Known Problems Father          when she was 1 months old    Aneurysm Maternal Grandmother     Aneurysm Maternal Grandfather           of a ruptured abdominal aneurysm    No Known Problems Sister     No Known Problems Daughter     No Known Problems Paternal Grandmother     No Known Problems Paternal Grandfather     No Known Problems Daughter     No Known Problems Maternal Aunt     No Known Problems Maternal Aunt     No Known Problems Maternal Aunt      Past Surgical History:   Procedure Laterality Date    GASTRIC BYPASS      GASTRIC BYPASS LAPAROSCOPIC N/A 12/15/2020    Procedure: Robotic lysis of adhesions, small-bowel resection, partial gastrectomy, paraesophageal hernia repair, bilateral truncal vagotomy; Robotic gastrojejunostomy anastomosis with intraop endoscopy;  Surgeon: Dima Fox MD;  Location: [facility];  Service: Bariatrics    LITO-EN-Y PROCEDURE  11/14/2016    Gastric Bypass by Dr Ryan Nina Weight 339 6,nw    TUBAL LIGATION Bilateral     2010    WISDOM TOOTH EXTRACTION         Current Outpatient Medications:     acetaminophen (TYLENOL) 500 mg tablet, Take 1 tablet (500 mg total) by mouth every 6 (six) hours as needed for mild pain, Disp: 30 tablet, Rfl: 0    albuterol (PROVENTIL HFA,VENTOLIN HFA) 90 mcg/act inhaler, Inhale 2 puffs every 4 (four) hours as needed for wheezing, Disp: 1 Inhaler, Rfl: 2    amLODIPine (NORVASC) 5 mg tablet, Take 1 tablet (5 mg total) by mouth daily, Disp: 90 tablet, Rfl: 0    calcium carbonate 1250 MG capsule, Take 1,250 mg by mouth 2 (two) times a day with meals, Disp: , Rfl:     cholecalciferol (VITAMIN D3) 1,000 units tablet, Take 1 tablet (1,000 Units total) by mouth daily, Disp: 30 tablet, Rfl: 0    ergocalciferol (VITAMIN D2) 50,000 units, Take 1 capsule (50,000 Units total) by mouth once a week, Disp: 12 capsule, Rfl: 0    ferrous sulfate 325 (65 Fe) mg tablet, Take 325 mg by mouth daily with breakfast, Disp: , Rfl:     fluticasone (FLONASE) 50 mcg/act nasal spray, SHAKE LIQUID AND USE 2 SPRAYS IN EACH NOSTRIL DAILY, Disp: 48 g, Rfl: 0    Multiple Vitamin (MULTIVITAMIN) tablet, Take 1 tablet by mouth daily, Disp: , Rfl:     cyanocobalamin (VITAMIN B-12) 100 mcg tablet, Take by mouth daily, Disp: , Rfl:     diazepam (VALIUM) 5 mg tablet, Take 1 tablet (5 mg total) by mouth every 6 (six) hours as needed for anxiety (Patient not taking: Reported on 6/14/2021), Disp: 1 tablet, Rfl: 0  Allergies   Allergen Reactions    T[place] Oil - Food Allergy Shortness Of Breath    Venofer [Iron Sucrose]      Chest pressure after 1st dose  Tachycardia and blurred vision after 8 minutes of dose #2          Results for orders placed or performed in visit on 06/14/21   POCT ECG    Narrative Normal sinus rhythm at a rate of 77 beats per minute  Normal EKG  In comparison to the prior electrocardiogram performed on May 12, 2021, the previous inferior T-wave inversions in leads 3 in AVF are no longer present  Review of Systems:  Review of Systems   Respiratory: Negative for cough, choking, chest tightness, shortness of breath and wheezing  Cardiovascular: Negative for chest pain, palpitations and leg swelling  Musculoskeletal: Negative for gait problem  Skin: Negative for rash  Neurological: Negative for dizziness, tremors, syncope, weakness, light-headedness, numbness and headaches  Psychiatric/Behavioral: Negative for agitation and behavioral problems  The patient is not hyperactive  Physical Exam:  /88   Pulse 78   Ht 5' 5" (1 651 m)   Wt 100 kg (221 lb 6 4 oz)   SpO2 99%   BMI 36 84 kg/m²   Physical Exam  Constitutional:       General: She is not in acute distress  Appearance: She is well-developed  HENT:      Head: Normocephalic and atraumatic  Neck:      Thyroid: No thyromegaly  Vascular: No carotid bruit or JVD  Trachea: No tracheal deviation  Cardiovascular:      Rate and Rhythm: Normal rate and regular rhythm  Pulses: Normal pulses  Heart sounds: Normal heart sounds  No murmur heard  No friction rub  No gallop  Pulmonary:      Effort: Pulmonary effort is normal  No respiratory distress  Breath sounds: Normal breath sounds  No wheezing, rhonchi or rales  Chest:      Chest wall: No tenderness  Abdominal:      General: Bowel sounds are normal  There is no distension  Palpations: Abdomen is soft  Tenderness: There is no abdominal tenderness  Musculoskeletal:         General: Normal range of motion  Cervical back: Normal range of motion and neck supple  Right lower leg: No edema  Left lower leg: No edema  Skin:     General: Skin is warm and dry     Neurological:      General: No focal deficit present  Mental Status: She is alert and oriented to person, place, and time  Gait: Gait normal    Psychiatric:         Mood and Affect: Mood normal          Behavior: Behavior normal          Thought Content: Thought content normal          Judgment: Judgment normal            --------------------------------------------------------------------------------  ECHO:   Results for orders placed during the hospital encounter of 10/14/15    Echo complete with contrast if indicated    Narrative  [address] [place] [address]  [facility], [address]  [phone]    Transthoracic Echocardiogram  2D, M-mode, Doppler, and Color Doppler    Study date:  15-Oct-2015    Patient: Fany Foley  MR number: [id number]  Account number: [de-identified]  : 1985  Age: 35 years  Gender: Female  Status: Inpatient  Location: Bedside  Height: 67 in  Weight: 317 lb  BP: 122/ 56 mmHg    Indications: Syncope  Diagnoses: 780 2 - SYNCOPE AND COLLAPSE    Sonographer:  KENYATTA Webster  Referring Physician:  Garret Lane MD  Group:  [facility]  Interpreting Physician:  Demarco Conroy MD    SUMMARY    LEFT VENTRICLE:  Systolic function was normal  Ejection fraction was estimated in the range of  55 % to 65 %  There were no regional wall motion abnormalities  Wall thickness was mildly increased  TRICUSPID VALVE:  There was mild regurgitation  PULMONIC VALVE:  There was trace regurgitation  HISTORY: PRIOR HISTORY: Risk factors: hypertension  PROCEDURE: The procedure was performed at the bedside  This was a routine  study  The transthoracic approach was used  The study included complete 2D  imaging, M-mode, complete spectral Doppler, and color Doppler  The heart rate  was 92 bpm, at the start of the study  Echocardiographic views were limited due  to poor acoustic window availability and decreased penetration  This was a  technically difficult study      LEFT VENTRICLE: Size was normal  Systolic function was normal  Ejection  fraction was estimated in the range of 55 % to 65 %  There were no regional  wall motion abnormalities  Wall thickness was mildly increased  DOPPLER: The  transmitral flow pattern was normal     RIGHT VENTRICLE: The size was normal  Systolic function was normal  Wall  thickness was normal     LEFT ATRIUM: Size was within the limits of normal when indexed for body surface  area  RIGHT ATRIUM: Size was normal     MITRAL VALVE: Valve structure was normal  There was normal leaflet separation  DOPPLER: The transmitral velocity was within the normal range  There was no  evidence for stenosis  There was no regurgitation  AORTIC VALVE: The valve was trileaflet  Leaflets exhibited normal thickness and  normal cuspal separation  DOPPLER: Transaortic velocity was within the normal  range  There was no evidence for stenosis  There was no regurgitation  TRICUSPID VALVE: The valve structure was normal  There was normal leaflet  separation  DOPPLER: The transtricuspid velocity was within the normal range  There was no evidence for stenosis  There was mild regurgitation  PULMONIC VALVE: Leaflets exhibited normal thickness, no calcification, and  normal cuspal separation  DOPPLER: The transpulmonic velocity was within the  normal range  There was trace regurgitation  PERICARDIUM: There was no pericardial effusion  The pericardium was normal in  appearance  AORTA: The root exhibited normal size  SYSTEMIC VEINS: IVC: The inferior vena cava was normal in size and course    Respirophasic changes were normal     MEASUREMENT TABLES    2D MEASUREMENTS  Left ventricle   (Reference normals)  LVID ed, PLAX   48 mm   (35-60)  LVID es, PLAX   33 mm   (21-40)  FS, PLAX   31 25 %   (25-46)  LVPW thickness ed   11 mm   (6-11)  Ratio, IVS/LVPW   1    (<1 3)  Ventricular septum   (Reference normals)  IVS thickness ed   11 mm   (6-11)  Aorta   (Reference normals)  Root diam   33 mm   (--)  Left atrium   (Reference normals)  AP dim   36 mm   (--)  AP dim index   1 46 cm/m squared   (<2 2)  LA/Ao root ratio   1 09    (--)    [employer] Accredited Echocardiography Laboratory    Prepared and electronically signed by    Ramiro Guzman MD  Signed 16-Oct-2015 09:48:19      ======================================================    Lab Results   Component Value Date    WBC 8 46 05/28/2021    HGB 12 2 05/28/2021    HCT 39 1 05/28/2021    MCV 97 05/28/2021     05/28/2021      Lab Results   Component Value Date    SODIUM 139 05/28/2021    K 4 6 05/28/2021     (H) 05/28/2021    CO2 26 05/28/2021    BUN 10 05/28/2021    CREATININE 0 51 (L) 05/28/2021    GLUC 104 05/12/2021    CALCIUM 9 0 05/28/2021      Lab Results   Component Value Date    HGBA1C 5 6 06/12/2020      Lab Results   Component Value Date    CHOL 116 10/15/2015     Lab Results   Component Value Date    HDL 61 05/28/2021    HDL 52 06/12/2020    HDL 52 11/12/2018     Lab Results   Component Value Date    LDLCALC 90 05/28/2021    LDLCALC 90 06/12/2020    LDLCALC 100 11/12/2018     Lab Results   Component Value Date    TRIG 71 05/28/2021    TRIG 61 06/12/2020    TRIG 79 11/12/2018     No results found for: CHOLHDL   Lab Results   Component Value Date    INR 1 04 10/14/2015    PROTIME 13 6 10/14/2015        Imaging:   I have personally reviewed pertinent reports  Portions of the record may have been created with voice recognition software  Occasional wrong word or "sound a like" substitutions may have occurred due to the inherent limitations of voice recognition software  Read the chart carefully and recognize, using context, where substitutions have occurred

## 2021-06-23 DIAGNOSIS — R09.81 NASAL CONGESTION: ICD-10-CM

## 2021-06-23 RX ORDER — FLUTICASONE PROPIONATE 50 MCG
SPRAY, SUSPENSION (ML) NASAL
Qty: 48 G | Refills: 0 | Status: SHIPPED | OUTPATIENT
Start: 2021-06-23 | End: 2022-01-18 | Stop reason: ALTCHOICE

## 2021-06-28 DIAGNOSIS — E55.9 VITAMIN D DEFICIENCY: ICD-10-CM

## 2021-06-28 RX ORDER — ERGOCALCIFEROL 1.25 MG/1
CAPSULE ORAL
Qty: 12 CAPSULE | Refills: 0 | Status: SHIPPED | OUTPATIENT
Start: 2021-06-28 | End: 2021-09-27 | Stop reason: ALTCHOICE

## 2021-06-29 DIAGNOSIS — I10 ESSENTIAL HYPERTENSION: ICD-10-CM

## 2021-06-29 RX ORDER — AMLODIPINE BESYLATE 5 MG/1
TABLET ORAL
Qty: 90 TABLET | Refills: 0 | Status: SHIPPED | OUTPATIENT
Start: 2021-06-29 | End: 2021-09-27 | Stop reason: SDUPTHER

## 2021-06-30 ENCOUNTER — OFFICE VISIT (OUTPATIENT)
Dept: BARIATRICS | Facility: CLINIC | Age: 36
End: 2021-06-30
Payer: COMMERCIAL

## 2021-06-30 VITALS
TEMPERATURE: 98.5 F | WEIGHT: 227 LBS | DIASTOLIC BLOOD PRESSURE: 80 MMHG | HEIGHT: 64 IN | SYSTOLIC BLOOD PRESSURE: 136 MMHG | BODY MASS INDEX: 38.76 KG/M2 | HEART RATE: 102 BPM

## 2021-06-30 DIAGNOSIS — Z98.84 BARIATRIC SURGERY STATUS: ICD-10-CM

## 2021-06-30 DIAGNOSIS — R11.10 VOMITING: ICD-10-CM

## 2021-06-30 DIAGNOSIS — E66.9 OBESITY, CLASS II, BMI 35-39.9: Primary | ICD-10-CM

## 2021-06-30 PROCEDURE — 3075F SYST BP GE 130 - 139MM HG: CPT | Performed by: PHYSICIAN ASSISTANT

## 2021-06-30 PROCEDURE — 3008F BODY MASS INDEX DOCD: CPT | Performed by: PHYSICIAN ASSISTANT

## 2021-06-30 PROCEDURE — 1036F TOBACCO NON-USER: CPT | Performed by: PHYSICIAN ASSISTANT

## 2021-06-30 PROCEDURE — 3079F DIAST BP 80-89 MM HG: CPT | Performed by: PHYSICIAN ASSISTANT

## 2021-06-30 PROCEDURE — 99214 OFFICE O/P EST MOD 30 MIN: CPT | Performed by: PHYSICIAN ASSISTANT

## 2021-06-30 NOTE — PROGRESS NOTES
Assessment/Plan:    s/p Robotic lysis of adhesions, Robotic small-bowel resection, Robotic partial gastrectomy, Robotic paraesophageal hernia repair, Robotic bilateral truncal vagotomy Robotic gastrojejunostomy anastomosis with intraop endoscopy  on 12/2020  Here for follow up  She still has vomiting but improved and only with certain foods, such as meat or eggs  She is on her PPI BID  Denies epigastric pain  She continues to gain weight  She is exercising but does not food log or track calories  Of note she has known thyroid nodules  She is no longer on zoloft  She was supposed to have  UGI done after visit but did not so will schedule now to evaluate her anatomy  She will follow up with RD next month as scheduled to review diet and meal plan  I will touch base with patient in around 3 moths if no weight loss can consider [facility] referral   Will continue BID PPI for now  Diagnoses and all orders for this visit:    Obesity, Class II, BMI 35-39 9    Bariatric surgery status    Vomiting          Subjective:      Patient ID: Tasha Brenner is a 29 y o  female  HPI follow up on weight gain, vomiting     The following portions of the patient's history were reviewed and updated as appropriate: allergies, current medications, past family history, past medical history, past social history, past surgical history and problem list     Review of Systems   Constitutional: Negative  Respiratory: Negative  Cardiovascular: Negative  Gastrointestinal: Positive for vomiting  Negative for abdominal pain  Objective:      /80 (BP Location: Left arm, Patient Position: Sitting, Cuff Size: Standard)   Pulse 102   Temp 98 5 °F (36 9 °C) (Tympanic)   Ht 5' 4" (1 626 m)   Wt 103 kg (227 lb)   BMI 38 96 kg/m²          Physical Exam  Vitals and nursing note reviewed  Constitutional:       Appearance: Normal appearance  She is obese     Eyes:      Extraocular Movements: Extraocular movements intact  Pupils: Pupils are equal, round, and reactive to light  Cardiovascular:      Rate and Rhythm: Normal rate  Pulmonary:      Effort: Pulmonary effort is normal    Musculoskeletal:         General: Normal range of motion  Cervical back: Normal range of motion  Skin:     General: Skin is warm and dry  Neurological:      General: No focal deficit present  Mental Status: She is alert and oriented to person, place, and time     Psychiatric:         Mood and Affect: Mood normal

## 2021-07-01 ENCOUNTER — IMMUNIZATIONS (OUTPATIENT)
Dept: FAMILY MEDICINE CLINIC | Facility: HOSPITAL | Age: 36
End: 2021-07-01

## 2021-07-01 DIAGNOSIS — Z23 ENCOUNTER FOR IMMUNIZATION: Primary | ICD-10-CM

## 2021-07-01 PROCEDURE — 0012A SARS-COV-2 / COVID-19 MRNA VACCINE (MODERNA) 100 MCG: CPT

## 2021-07-01 PROCEDURE — 91301 SARS-COV-2 / COVID-19 MRNA VACCINE (MODERNA) 100 MCG: CPT

## 2021-07-07 ENCOUNTER — HOSPITAL ENCOUNTER (OUTPATIENT)
Dept: RADIOLOGY | Facility: HOSPITAL | Age: 36
Discharge: HOME | End: 2021-07-07
Payer: COMMERCIAL

## 2021-07-07 ENCOUNTER — TELEPHONE (OUTPATIENT)
Dept: BARIATRICS | Facility: CLINIC | Age: 36
End: 2021-07-07

## 2021-07-07 DIAGNOSIS — Z98.84 BARIATRIC SURGERY STATUS: ICD-10-CM

## 2021-07-07 DIAGNOSIS — Z48.815 ENCOUNTER FOR SURGICAL AFTERCARE FOLLOWING SURGERY OF DIGESTIVE SYSTEM: ICD-10-CM

## 2021-07-07 DIAGNOSIS — R11.10 VOMITING: ICD-10-CM

## 2021-07-07 PROCEDURE — 74240 X-RAY XM UPR GI TRC 1CNTRST: CPT

## 2021-07-07 NOTE — TELEPHONE ENCOUNTER
Called patient to review her UGI which showed:    IMPRESSION:     Mild gastroesophageal reflux  Otherwise, unremarkable appearance status post Jaycee-en-Y gastric bypass        Patient still having occasional vomiting but symptoms are improving  Does also note heartburn which was also shown on her UGI but states overall improving  She is scheduled to see me again in Sept and if no continued improvement will likely obtain EGD  Will continue PPI BID for now   Has appt with dietician next week regarding weight gain

## 2021-07-15 ENCOUNTER — TELEPHONE (OUTPATIENT)
Dept: BARIATRICS | Facility: CLINIC | Age: 36
End: 2021-07-15

## 2021-07-15 NOTE — TELEPHONE ENCOUNTER
Patient missed appointment today 7/15 at [facility] called to reschedule and left office information

## 2021-08-20 DIAGNOSIS — R10.9 ABDOMINAL PAIN: ICD-10-CM

## 2021-08-20 DIAGNOSIS — R11.10 VOMITING: ICD-10-CM

## 2021-08-20 DIAGNOSIS — Z98.84 BARIATRIC SURGERY STATUS: ICD-10-CM

## 2021-08-20 DIAGNOSIS — E66.01 MORBID OBESITY (HCC): ICD-10-CM

## 2021-08-20 RX ORDER — OMEPRAZOLE 20 MG/1
CAPSULE, DELAYED RELEASE ORAL
Qty: 180 CAPSULE | Refills: 0 | Status: SHIPPED | OUTPATIENT
Start: 2021-08-20 | End: 2021-09-27 | Stop reason: SDUPTHER

## 2021-08-20 RX ORDER — OMEPRAZOLE 20 MG/1
CAPSULE, DELAYED RELEASE ORAL
Qty: 60 CAPSULE | Refills: 1 | Status: SHIPPED | OUTPATIENT
Start: 2021-08-20 | End: 2021-08-20

## 2021-08-20 NOTE — TELEPHONE ENCOUNTER
Called pt, received refill request on her omeprazole which she continues to take BID  States she is having abd pain  Her UGI 7/2020 was unremarkable  She is scheduled to see me 9/29 but will have  call to move her appt up as she likely needs to scheduled for EGD  Will refill omeprazole

## 2021-08-27 ENCOUNTER — TELEPHONE (OUTPATIENT)
Dept: BARIATRICS | Facility: CLINIC | Age: 36
End: 2021-08-27

## 2021-08-27 NOTE — TELEPHONE ENCOUNTER
LM for patient to reschedule no showed appointment at convenience  Office information left to call back at convenience

## 2021-09-03 ENCOUNTER — TELEMEDICINE (OUTPATIENT)
Dept: BARIATRICS | Facility: CLINIC | Age: 36
End: 2021-09-03
Payer: COMMERCIAL

## 2021-09-03 VITALS — HEIGHT: 64 IN | WEIGHT: 242 LBS | BODY MASS INDEX: 41.32 KG/M2

## 2021-09-03 DIAGNOSIS — Z98.84 BARIATRIC SURGERY STATUS: ICD-10-CM

## 2021-09-03 DIAGNOSIS — R10.9 ABDOMINAL PAIN: ICD-10-CM

## 2021-09-03 DIAGNOSIS — R14.3 FLATULENCE: ICD-10-CM

## 2021-09-03 DIAGNOSIS — Z48.815 ENCOUNTER FOR SURGICAL AFTERCARE FOLLOWING SURGERY OF DIGESTIVE SYSTEM: Primary | ICD-10-CM

## 2021-09-03 PROCEDURE — G2012 BRIEF CHECK IN BY MD/QHP: HCPCS | Performed by: PHYSICIAN ASSISTANT

## 2021-09-03 NOTE — PROGRESS NOTES
Virtual Brief Visit    Verification of patient location:    Patient is located in the following state in which I hold an active license [place]      Assessment/Plan:    Problem List Items Addressed This Visit     None      Visit Diagnoses     Encounter for surgical aftercare following surgery of digestive system    -  Primary    Relevant Orders    Ambulatory referral to Gastroenterology    Bariatric surgery status        Relevant Orders    Ambulatory referral to Gastroenterology    Abdominal pain        Relevant Orders    Ambulatory referral to Gastroenterology    Flatulence        Relevant Orders    Ambulatory referral to Gastroenterology        s/p Robotic lysis of adhesions, Robotic small-bowel resection, Robotic partial gastrectomy, Robotic paraesophageal hernia repair, Robotic bilateral truncal vagotomy Robotic gastrojejunostomy anastomosis with intraop endoscopy  on 12/2020  With Dr Damian Pablo       I saw patient a  Few months ago for abdominal pain/vomiting  She was started on PPI and Carafate for several weeks and on follow up stated her sxs improved although still had some vomiting with certain foods  Her UGI 7/2020 was unremarkable  She continues to experience abdominal pain although states pain is in lower abdomen and improves with passing gas  She c/o excess flatulence and belching and states is prone to diarrhea almost daily  Denies fevers  She continues on PPI BID    Given continuation of her sxs will plan for EGD and follow up with surgeon to rule out any issues related to her bariatric surgery  Of note, she had CT abd/pelvis a few months ago 1/2021which was without acute abnormality  I suspect possible IBS related issue so will also refer to GI for evaluation pending unremarkable egd       Reason for visit is   Chief Complaint   Patient presents with    Follow-up     follow up abdominal pain~cd    Virtual Brief Visit        Encounter provider Gela Guillaume PA-C    Provider located at [address] [place] [address] [facility]  [place] [address]    Recent Visits  Date Type Provider Dept   08/27/21 Telephone Inez Rodriguez [facility]   Showing recent visits within past 7 days and meeting all other requirements  Today's Visits  Date Type Provider Dept   09/03/21 Telemedicine Loida Carrizales PA-C [facility]   Showing today's visits and meeting all other requirements  Future Appointments  No visits were found meeting these conditions  Showing future appointments within next 150 days and meeting all other requirements       After connecting through telephone, the patient was identified by name and date of birth  Too Goodwin was informed that this is a telemedicine visit and that the visit is being conducted through telephone  My office door was closed  No one else was in the room  She acknowledged consent and understanding of privacy and security of the platform  The patient has agreed to participate and understands she can discontinue the visit at any time  Patient is aware this is a billable service  Subjective    Too Goodwin is a 29 y o  female follow up abdominal pain    HPI     Past Medical History:   Diagnosis Date    Anemia     BMI 36 0-36 9,adult 12/8/2020    Disease of thyroid gland     Dizziness     due to anemia, last episode August 2020, no falls    Fatigue     History of transfusion 2016    anemic - loss of blood due to bleeding ulcers, no reaction    Hyperlipidemia     Hypertension     Multiple gastric ulcers     Obesity     11/14/16    Thyroid disease     took synthroid but not currently taking    Ulcer        Past Surgical History:   Procedure Laterality Date    GASTRIC BYPASS  2014    GASTRIC BYPASS LAPAROSCOPIC N/A 12/15/2020    Procedure: Robotic lysis of adhesions, small-bowel resection, partial gastrectomy, paraesophageal hernia repair, bilateral truncal vagotomy; Robotic gastrojejunostomy anastomosis with intraop endoscopy;  Surgeon: Myrna Haider MD;  Location: [facility];  Service: Bariatrics    LITO-EN-Y PROCEDURE  11/14/2016    Gastric Bypass by Dr Luz Falling Weight 339 6,nw    TUBAL LIGATION Bilateral     2010    WISDOM TOOTH EXTRACTION         Current Outpatient Medications   Medication Sig Dispense Refill    acetaminophen (TYLENOL) 500 mg tablet Take 1 tablet (500 mg total) by mouth every 6 (six) hours as needed for mild pain 30 tablet 0    albuterol (PROVENTIL HFA,VENTOLIN HFA) 90 mcg/act inhaler Inhale 2 puffs every 4 (four) hours as needed for wheezing 1 Inhaler 2    amLODIPine (NORVASC) 5 mg tablet TAKE 1 TABLET(5 MG) BY MOUTH DAILY 90 tablet 0    calcium carbonate 1250 MG capsule Take 1,250 mg by mouth 2 (two) times a day with meals      cholecalciferol (VITAMIN D3) 1,000 units tablet Take 1 tablet (1,000 Units total) by mouth daily 30 tablet 0    cyanocobalamin (VITAMIN B-12) 100 mcg tablet Take by mouth daily      diazepam (VALIUM) 5 mg tablet Take 1 tablet (5 mg total) by mouth every 6 (six) hours as needed for anxiety 1 tablet 0    ergocalciferol (VITAMIN D2) 50,000 units TAKE 1 CAPSULE BY MOUTH 1 TIME A WEEK 12 capsule 0    ferrous sulfate 325 (65 Fe) mg tablet Take 325 mg by mouth daily with breakfast      fluticasone (FLONASE) 50 mcg/act nasal spray SHAKE LIQUID AND USE 2 SPRAYS IN EACH NOSTRIL DAILY 48 g 0    Multiple Vitamin (MULTIVITAMIN) tablet Take 1 tablet by mouth daily      omeprazole (PriLOSEC) 20 mg delayed release capsule TAKE 1 CAPSULE(20 MG) BY MOUTH TWICE DAILY 180 capsule 0     No current facility-administered medications for this visit  Allergies   Allergen Reactions    [place] [employer] - Food Allergy Shortness Of Breath    Venofer [Iron Sucrose]      Chest pressure after 1st dose  Tachycardia and blurred vision after 8 minutes of dose #2  Review of Systems   Constitutional: Positive for chills  Negative for fever     Respiratory: Negative  Cardiovascular: Negative  Gastrointestinal: Positive for diarrhea and nausea  + flatulence and belching        Vitals:    09/03/21 1134   Weight: 110 kg (242 lb)   Height: 5' 4" (1 626 m)         I spent 15 minutes directly with the patient during this visit    VIRTUAL VISIT DISCLAIMER      Buster Ott verbally agrees to participate in [employer]  Pt is aware that [employer] could be limited without vital signs or the ability to perform a full hands-on physical Amador Jolly understands she or the provider may request at any time to terminate the video visit and request the patient to seek care or treatment in person

## 2021-09-10 ENCOUNTER — PREP FOR PROCEDURE (OUTPATIENT)
Dept: BARIATRICS | Facility: CLINIC | Age: 36
End: 2021-09-10

## 2021-09-10 DIAGNOSIS — Z98.84 BARIATRIC SURGERY STATUS: Primary | ICD-10-CM

## 2021-09-17 ENCOUNTER — NURSE TRIAGE (OUTPATIENT)
Dept: OTHER | Facility: OTHER | Age: 36
End: 2021-09-17

## 2021-09-17 NOTE — TELEPHONE ENCOUNTER
Regarding: last cycle was on 6th / bleeding again / cramping  ----- Message from [facility] ([place], [place]) sent at 9/17/2021  5:01 PM EDT -----  "I had my period on the 6th and I just went to the bathroom and I am bleeding, I am having lower abdominal pain and its making me feel concerned now  im not supposed to be bleeding right now

## 2021-09-17 NOTE — TELEPHONE ENCOUNTER
Reason for Disposition   [1] Menstrual cycle < 21 days OR > 35 days AND [2] occurs more than two cycles (2 months) this past year    Answer Assessment - Initial Assessment Questions  1  AMOUNT: "Describe the bleeding that you are having "     - SPOTTING: spotting, or pinkish / brownish mucous discharge; does not fill panti-liner or pad     - MILD:  less than 1 pad / hour; less than patient's usual menstrual bleeding    - MODERATE: 1-2 pads / hour; 1 menstrual cup every 6 hours; small-medium blood clots (e g , pea, grape, small coin)    - SEVERE: soaking 2 or more pads/hour for 2 or more hours; 1 menstrual cup every 2 hours; bleeding not contained by pads or continuous red blood from vagina; large blood clots (e g , golf ball, large coin)       Mild     2  ONSET: "When did the bleeding begin?" "Is it continuing now?"      Today     3  MENSTRUAL PERIOD: "When was the last normal menstrual period?" "How is this different than your period?"      9/6  2 months ago had period twice in one month     4  REGULARITY: "How regular are your periods?"      Gets it monthly but on different days     5  ABDOMINAL PAIN: "Do you have any pain?" "How bad is the pain?"  (e g , Scale 1-10; mild, moderate, or severe)    - MILD (1-3): doesn't interfere with normal activities, abdomen soft and not tender to touch     - MODERATE (4-7): interferes with normal activities or awakens from sleep, tender to touch     - SEVERE (8-10): excruciating pain, doubled over, unable to do any normal activities       3/10    6  PREGNANCY: "Could you be pregnant?" "Are you sexually active?" "Did you recently give birth?"      Denies     7  BREASTFEEDING: "Are you breastfeeding?"      Denies     8  HORMONES: "Are you taking any hormone medications, prescription or OTC?" (e g , birth control pills, estrogen)      denies     9  BLOOD THINNERS: "Do you take any blood thinners?" (e g , Coumadin/warfarin, Pradaxa/dabigatran, aspirin)      Denies     10   CAUSE: "What do you think is causing the bleeding?" (e g , recent gyn surgery, recent gyn procedure; known bleeding disorder, cervical cancer, polycystic ovarian disease, fibroids)          Unsure     11  HEMODYNAMIC STATUS: "Are you weak or feeling lightheaded?" If Yes, ask: "Can you stand and walk normally?"   Denies           12   OTHER SYMPTOMS: "What other symptoms are you having with the bleeding?" (e g , passed tissue, vaginal discharge, fever, menstrual-type cramps)        Diarrhea in the mornings, 3 days ago saw blood on stool    Protocols used: VAGINAL BLEEDING - ABNORMAL-ADULT-

## 2021-09-18 ENCOUNTER — NURSE TRIAGE (OUTPATIENT)
Dept: OTHER | Facility: OTHER | Age: 36
End: 2021-09-18

## 2021-09-18 ENCOUNTER — APPOINTMENT (EMERGENCY)
Dept: ULTRASOUND IMAGING | Facility: HOSPITAL | Age: 36
End: 2021-09-18
Payer: COMMERCIAL

## 2021-09-18 ENCOUNTER — HOSPITAL ENCOUNTER (EMERGENCY)
Facility: HOSPITAL | Age: 36
Discharge: HOME | End: 2021-09-18
Attending: EMERGENCY MEDICINE
Payer: COMMERCIAL

## 2021-09-18 VITALS
OXYGEN SATURATION: 100 % | RESPIRATION RATE: 17 BRPM | BODY MASS INDEX: 40.83 KG/M2 | SYSTOLIC BLOOD PRESSURE: 140 MMHG | HEART RATE: 65 BPM | DIASTOLIC BLOOD PRESSURE: 92 MMHG | WEIGHT: 237.88 LBS | TEMPERATURE: 98.2 F

## 2021-09-18 DIAGNOSIS — N93.9 ABNORMAL UTERINE BLEEDING: Primary | ICD-10-CM

## 2021-09-18 LAB
ALBUMIN SERPL BCP-MCNC: 3.6 G/DL (ref 3.5–5)
ALP SERPL-CCNC: 90 U/L (ref 46–116)
ALT SERPL W P-5'-P-CCNC: 25 U/L (ref 12–78)
ANION GAP SERPL CALCULATED.3IONS-SCNC: 9 MMOL/L (ref 4–13)
AST SERPL W P-5'-P-CCNC: 17 U/L (ref 5–45)
BACTERIA UR QL AUTO: ABNORMAL /HPF
BASOPHILS # BLD AUTO: 0.03 THOUSANDS/ΜL (ref 0–0.1)
BASOPHILS NFR BLD AUTO: 0 % (ref 0–1)
BILIRUB SERPL-MCNC: 0.33 MG/DL (ref 0.2–1)
BILIRUB UR QL STRIP: NEGATIVE
BUN SERPL-MCNC: 6 MG/DL (ref 5–25)
CALCIUM SERPL-MCNC: 8.4 MG/DL (ref 8.3–10.1)
CHLORIDE SERPL-SCNC: 106 MMOL/L (ref 100–108)
CLARITY UR: CLEAR
CO2 SERPL-SCNC: 25 MMOL/L (ref 21–32)
COLOR UR: ABNORMAL
CREAT SERPL-MCNC: 0.62 MG/DL (ref 0.6–1.3)
EOSINOPHIL # BLD AUTO: 0.04 THOUSAND/ΜL (ref 0–0.61)
EOSINOPHIL NFR BLD AUTO: 1 % (ref 0–6)
ERYTHROCYTE [DISTWIDTH] IN BLOOD BY AUTOMATED COUNT: 14.1 % (ref 11.6–15.1)
EXT PREG TEST URINE: NORMAL
EXT. CONTROL ED NAV: NORMAL
GFR SERPL CREATININE-BSD FRML MDRD: 117 ML/MIN/1.73SQ M
GLUCOSE SERPL-MCNC: 109 MG/DL (ref 65–140)
GLUCOSE UR STRIP-MCNC: NEGATIVE MG/DL
HCT VFR BLD AUTO: 35.6 % (ref 34.8–46.1)
HGB BLD-MCNC: 11.2 G/DL (ref 11.5–15.4)
HGB UR QL STRIP.AUTO: ABNORMAL
IMM GRANULOCYTES # BLD AUTO: 0.04 THOUSAND/UL (ref 0–0.2)
IMM GRANULOCYTES NFR BLD AUTO: 1 % (ref 0–2)
KETONES UR STRIP-MCNC: NEGATIVE MG/DL
LEUKOCYTE ESTERASE UR QL STRIP: NEGATIVE
LIPASE SERPL-CCNC: 69 U/L (ref 73–393)
LYMPHOCYTES # BLD AUTO: 1.37 THOUSANDS/ΜL (ref 0.6–4.47)
LYMPHOCYTES NFR BLD AUTO: 16 % (ref 14–44)
MCH RBC QN AUTO: 28.4 PG (ref 26.8–34.3)
MCHC RBC AUTO-ENTMCNC: 31.5 G/DL (ref 31.4–37.4)
MCV RBC AUTO: 90 FL (ref 82–98)
MONOCYTES # BLD AUTO: 0.59 THOUSAND/ΜL (ref 0.17–1.22)
MONOCYTES NFR BLD AUTO: 7 % (ref 4–12)
NEUTROPHILS # BLD AUTO: 6.27 THOUSANDS/ΜL (ref 1.85–7.62)
NEUTS SEG NFR BLD AUTO: 75 % (ref 43–75)
NITRITE UR QL STRIP: NEGATIVE
NON-SQ EPI CELLS URNS QL MICRO: ABNORMAL /HPF
NRBC BLD AUTO-RTO: 0 /100 WBCS
PH UR STRIP.AUTO: 7 [PH] (ref 4.5–8)
PLATELET # BLD AUTO: 311 THOUSANDS/UL (ref 149–390)
PMV BLD AUTO: 10.2 FL (ref 8.9–12.7)
POTASSIUM SERPL-SCNC: 5.1 MMOL/L (ref 3.5–5.3)
PROT SERPL-MCNC: 7.3 G/DL (ref 6.4–8.2)
PROT UR STRIP-MCNC: ABNORMAL MG/DL
RBC # BLD AUTO: 3.94 MILLION/UL (ref 3.81–5.12)
RBC #/AREA URNS AUTO: ABNORMAL /HPF
SODIUM SERPL-SCNC: 140 MMOL/L (ref 136–145)
SP GR UR STRIP.AUTO: 1.02 (ref 1–1.03)
TSH SERPL DL<=0.05 MIU/L-ACNC: 1.1 UIU/ML (ref 0.36–3.74)
UROBILINOGEN UR QL STRIP.AUTO: 0.2 E.U./DL
WBC # BLD AUTO: 8.34 THOUSAND/UL (ref 4.31–10.16)
WBC #/AREA URNS AUTO: ABNORMAL /HPF

## 2021-09-18 PROCEDURE — 81001 URINALYSIS AUTO W/SCOPE: CPT

## 2021-09-18 PROCEDURE — 36415 COLL VENOUS BLD VENIPUNCTURE: CPT | Performed by: PHYSICIAN ASSISTANT

## 2021-09-18 PROCEDURE — 83690 ASSAY OF LIPASE: CPT | Performed by: PHYSICIAN ASSISTANT

## 2021-09-18 PROCEDURE — 84443 ASSAY THYROID STIM HORMONE: CPT | Performed by: PHYSICIAN ASSISTANT

## 2021-09-18 PROCEDURE — 85025 COMPLETE CBC W/AUTO DIFF WBC: CPT | Performed by: PHYSICIAN ASSISTANT

## 2021-09-18 PROCEDURE — 81025 URINE PREGNANCY TEST: CPT | Performed by: PHYSICIAN ASSISTANT

## 2021-09-18 PROCEDURE — 96375 TX/PRO/DX INJ NEW DRUG ADDON: CPT

## 2021-09-18 PROCEDURE — 76830 TRANSVAGINAL US NON-OB: CPT

## 2021-09-18 PROCEDURE — 96374 THER/PROPH/DIAG INJ IV PUSH: CPT

## 2021-09-18 PROCEDURE — 99284 EMERGENCY DEPT VISIT MOD MDM: CPT

## 2021-09-18 PROCEDURE — 96361 HYDRATE IV INFUSION ADD-ON: CPT

## 2021-09-18 PROCEDURE — 76856 US EXAM PELVIC COMPLETE: CPT

## 2021-09-18 PROCEDURE — 99284 EMERGENCY DEPT VISIT MOD MDM: CPT | Performed by: PHYSICIAN ASSISTANT

## 2021-09-18 PROCEDURE — 80053 COMPREHEN METABOLIC PANEL: CPT | Performed by: PHYSICIAN ASSISTANT

## 2021-09-18 RX ORDER — ONDANSETRON 2 MG/ML
4 INJECTION INTRAMUSCULAR; INTRAVENOUS ONCE
Status: COMPLETED | OUTPATIENT
Start: 2021-09-18 | End: 2021-09-18

## 2021-09-18 RX ORDER — KETOROLAC TROMETHAMINE 30 MG/ML
15 INJECTION, SOLUTION INTRAMUSCULAR; INTRAVENOUS ONCE
Status: COMPLETED | OUTPATIENT
Start: 2021-09-18 | End: 2021-09-18

## 2021-09-18 RX ADMIN — ONDANSETRON 4 MG: 2 INJECTION INTRAMUSCULAR; INTRAVENOUS at 11:04

## 2021-09-18 RX ADMIN — KETOROLAC TROMETHAMINE 15 MG: 30 INJECTION, SOLUTION INTRAMUSCULAR; INTRAVENOUS at 11:04

## 2021-09-18 RX ADMIN — SODIUM CHLORIDE 1000 ML: 0.9 INJECTION, SOLUTION INTRAVENOUS at 11:04

## 2021-09-18 NOTE — TELEPHONE ENCOUNTER
Reason for Disposition   MODERATE vaginal bleeding (e g , soaking 1 pad or tampon per hour and present > 6 hours; 1 menstrual cup every 6 hours)    Answer Assessment - Initial Assessment Questions  1  AMOUNT: "Describe the bleeding that you are having "     - SPOTTING: spotting, or pinkish / brownish mucous discharge; does not fill panti-liner or pad     - MILD:  less than 1 pad / hour; less than patient's usual menstrual bleeding    - MODERATE: 1-2 pads / hour; 1 menstrual cup every 6 hours; small-medium blood clots (e g , pea, grape, small coin)    - SEVERE: soaking 2 or more pads/hour for 2 or more hours; 1 menstrual cup every 2 hours; bleeding not contained by pads or continuous red blood from vagina; large blood clots (e g , golf ball, large coin)       Moderate, denies any clots at this time    2  ONSET: "When did the bleeding begin?" "Is it continuing now?"      Yesterday     3  MENSTRUAL PERIOD: "When was the last normal menstrual period?" "How is this different than your period?"      9/6/2021    4  REGULARITY: "How regular are your periods?"      Yes, states they come every month and last about 2 days     5  ABDOMINAL PAIN: "Do you have any pain?" "How  bad is the pain?"  (e g , Scale 1-10; mild, moderate, or severe)    - MILD (1-3): doesn't interfere with normal activities, abdomen soft and not tender to touch     - MODERATE (4-7): interferes with normal activities or awakens from sleep, tender to touch     - SEVERE (8-10): excruciating pain, doubled over, unable to do any normal activities       6/10    6  PREGNANCY: "Could you be pregnant?" "Are you sexually active?" "Did you recently give birth?"      Denies     7  BREASTFEEDING: "Are you breastfeeding?"      Denies     8  HORMONES: "Are you taking any hormone medications, prescription or OTC?" (e g , birth control pills, estrogen)      Denies     9   BLOOD THINNERS: "Do you take any blood thinners?" (e g , Coumadin/warfarin, Pradaxa/dabigatran, aspirin)      Denies     10  CAUSE: "What do you think is causing the bleeding?" (e g , recent gyn surgery, recent gyn procedure; known bleeding disorder, cervical cancer, polycystic ovarian disease, fibroids)          Unsure     11  HEMODYNAMIC STATUS: "Are you weak or feeling lightheaded?" If Yes, ask: "Can you stand and walk normally?"         Dizziness  States "I am able to stand and walk"    12   OTHER SYMPTOMS: "What other symptoms are you having with the bleeding?" (e g , passed tissue, vaginal discharge, fever, menstrual-type cramps)        Denies    Protocols used: VAGINAL BLEEDING - ABNORMAL-ADULT-

## 2021-09-18 NOTE — TELEPHONE ENCOUNTER
Regarding: Vaginal bleeding & pain  ----- Message from Harsh Claire sent at 9/18/2021  8:27 AM EDT -----  "I got my period on the 6th and it was only two days   I started bleeding yesterday and then today I am bleeding really heavy and soaking my pad and I am in a lot of pain and feeling really dizzy "

## 2021-09-18 NOTE — ED PROVIDER NOTES
History  Chief Complaint   Patient presents with    Vaginal Bleeding     patient had mentrual period as normal in beginning of month  yesterday started with vaginal bleeding which is now becoming heavier  patient states sharp RLQ pain     Patient is a 61-year-old female with past medical history of hypertension, hyperlipidemia, gastric bypass, anemia who presents with abnormal vaginal bleeding  Patient states she had her typical menstrual period on 09/06, which was consistent with her usual menstrual period  She notes that her periods are regular monthly, last for a couple days, which occurred this month  Yesterday she began again with heavy vaginal bleeding, currently soaking through a pad every 2-3 hours  She denies any clot passage  She notes associated lower abdominal pain, initially started on the left, now is on the right side  She notes associated nausea, but denies any vomiting, constipation, dysuria, hematuria, urinary frequency or urgency, abnormal vaginal discharge, history of ovarian cysts or fibroids, previous similar symptoms  Patient notes her chronic diarrhea, unchanged  Patient has not tried anything to help alleviate her symptoms  Patient states she is otherwise in her usual state of health and denies any fevers, chills, diaphoresis, headache, dizziness, lightheadedness, congestion, cough, shortness of breath, chest pain, palpitations  Prior to Admission Medications   Prescriptions Last Dose Informant Patient Reported? Taking?    Multiple Vitamin (MULTIVITAMIN) tablet  Self Yes Yes   Sig: Take 1 tablet by mouth daily   acetaminophen (TYLENOL) 500 mg tablet  Self No Yes   Sig: Take 1 tablet (500 mg total) by mouth every 6 (six) hours as needed for mild pain   albuterol (PROVENTIL HFA,VENTOLIN HFA) 90 mcg/act inhaler Not Taking at Unknown time Self No No   Sig: Inhale 2 puffs every 4 (four) hours as needed for wheezing   Patient not taking: Reported on 9/18/2021   amLODIPine (NORVASC) 5 mg tablet   No Yes   Sig: TAKE 1 TABLET(5 MG) BY MOUTH DAILY   calcium carbonate 1250 MG capsule  Self Yes Yes   Sig: Take 1,250 mg by mouth 2 (two) times a day with meals   cholecalciferol (VITAMIN D3) 1,000 units tablet  Self No Yes   Sig: Take 1 tablet (1,000 Units total) by mouth daily   cyanocobalamin (VITAMIN B-12) 100 mcg tablet  Self Yes Yes   Sig: Take by mouth daily   ergocalciferol (VITAMIN D2) 50,000 units   No Yes   Sig: TAKE 1 CAPSULE BY MOUTH 1 TIME A WEEK   ferrous sulfate 325 (65 Fe) mg tablet  Self Yes Yes   Sig: Take 325 mg by mouth daily with breakfast   fluticasone (FLONASE) 50 mcg/act nasal spray   No Yes   Sig: SHAKE LIQUID AND USE 2 SPRAYS IN EACH NOSTRIL DAILY   omeprazole (PriLOSEC) 20 mg delayed release capsule   No Yes   Sig: TAKE 1 CAPSULE(20 MG) BY MOUTH TWICE DAILY      Facility-Administered Medications: None       Past Medical History:   Diagnosis Date    Anemia     BMI 36 0-36 9,adult 12/8/2020    Disease of thyroid gland     Dizziness     due to anemia, last episode August 2020, no falls    Fatigue     History of transfusion 2016    anemic - loss of blood due to bleeding ulcers, no reaction    Hyperlipidemia     Hypertension     Multiple gastric ulcers     Obesity     11/14/16    Thyroid disease     took synthroid but not currently taking    Ulcer        Past Surgical History:   Procedure Laterality Date    GASTRIC BYPASS  2014    GASTRIC BYPASS LAPAROSCOPIC N/A 12/15/2020    Procedure: Robotic lysis of adhesions, small-bowel resection, partial gastrectomy, paraesophageal hernia repair, bilateral truncal vagotomy; Robotic gastrojejunostomy anastomosis with intraop endoscopy;  Surgeon: Clarice Harper MD;  Location: [facility];  Service: Bariatrics    LITO-EN-Y PROCEDURE  11/14/2016    Gastric Bypass by Dr Thomas Oh Weight 339 6,nw    TUBAL LIGATION Bilateral     2010    WISDOM TOOTH EXTRACTION         Family History   Problem Relation Age of Onset  Asthma Mother     Coronary artery disease Mother     Diabetes Mother         MELLITUS    Hyperlipidemia Mother     Hypertension Mother     Aneurysm Mother         2018 just diagnosed with two    No Known Problems Father          when she was 1 months old    Aneurysm Maternal Grandmother     Aneurysm Maternal Grandfather           of a ruptured abdominal aneurysm    No Known Problems Sister     No Known Problems Daughter     No Known Problems Paternal Grandmother     No Known Problems Paternal Grandfather     No Known Problems Daughter     No Known Problems Maternal Aunt     No Known Problems Maternal Aunt     No Known Problems Maternal Aunt      I have reviewed and agree with the history as documented  E-Cigarette/Vaping    E-Cigarette Use Never User      E-Cigarette/Vaping Substances    Nicotine No     THC No     CBD No     Flavoring No     Other No     Unknown No      Social History     Tobacco Use    Smoking status: Former Smoker     Types: Cigarettes     Quit date:      Years since quitting: 3 7    Smokeless tobacco: Former User    Tobacco comment: former   Vaping Use    Vaping Use: Never used   Substance Use Topics    Alcohol use: No    Drug use: No       Review of Systems   Constitutional: Negative for chills, diaphoresis and fever  HENT: Negative for congestion  Eyes: Negative for visual disturbance  Respiratory: Negative for cough, shortness of breath, wheezing and stridor  Cardiovascular: Negative for chest pain, palpitations and leg swelling  Gastrointestinal: Positive for abdominal pain and nausea  Negative for diarrhea and vomiting  Genitourinary: Positive for vaginal bleeding  Negative for difficulty urinating, dysuria, flank pain, frequency, hematuria, urgency and vaginal discharge  Musculoskeletal: Negative for myalgias, neck pain and neck stiffness  Skin: Negative for color change, pallor and rash     Neurological: Negative for dizziness, weakness, light-headedness, numbness and headaches  All other systems reviewed and are negative  Physical Exam  Physical Exam  Vitals and nursing note reviewed  Constitutional:       General: She is awake  She is not in acute distress  Appearance: She is well-developed  She is not ill-appearing, toxic-appearing or diaphoretic  HENT:      Head: Normocephalic and atraumatic  Right Ear: Hearing and external ear normal       Left Ear: Hearing and external ear normal       Nose: Nose normal    Eyes:      Conjunctiva/sclera: Conjunctivae normal       Pupils: Pupils are equal, round, and reactive to light  Cardiovascular:      Rate and Rhythm: Normal rate and regular rhythm  Pulses: Normal pulses  Heart sounds: Normal heart sounds, S1 normal and S2 normal    Pulmonary:      Effort: Pulmonary effort is normal  No respiratory distress  Breath sounds: Normal breath sounds  No stridor  No decreased breath sounds or wheezing  Abdominal:      General: Bowel sounds are normal  There is no distension  Palpations: Abdomen is soft  Tenderness: There is abdominal tenderness in the right lower quadrant and suprapubic area  There is no right CVA tenderness, left CVA tenderness, guarding or rebound  Musculoskeletal:         General: Normal range of motion  Cervical back: Normal range of motion and neck supple  Comments: Moving all extremities freely, ambulating without issue   Skin:     General: Skin is warm and dry  Capillary Refill: Capillary refill takes less than 2 seconds  Neurological:      Mental Status: She is alert and oriented to person, place, and time  GCS: GCS eye subscore is 4  GCS verbal subscore is 5  GCS motor subscore is 6  Psychiatric:         Behavior: Behavior is cooperative           Vital Signs  ED Triage Vitals   Temperature Pulse Respirations Blood Pressure SpO2   09/18/21 0934 09/18/21 0934 09/18/21 0934 09/18/21 0934 09/18/21 0934   98 2 °F (36 8 °C) 66 18 (!) 172/108 100 %      Temp Source Heart Rate Source Patient Position - Orthostatic VS BP Location FiO2 (%)   09/18/21 0934 09/18/21 0934 -- -- --   Oral Monitor         Pain Score       09/18/21 1104       8           Vitals:    09/18/21 0934 09/18/21 1228   BP: (!) 172/108 140/92   Pulse: 66 65         Visual Acuity      ED Medications  Medications   sodium chloride 0 9 % bolus 1,000 mL (0 mL Intravenous Stopped 9/18/21 1230)   ondansetron (ZOFRAN) injection 4 mg (4 mg Intravenous Given 9/18/21 1104)   ketorolac (TORADOL) injection 15 mg (15 mg Intravenous Given 9/18/21 1104)       Diagnostic Studies  Results Reviewed     Procedure Component Value Units Date/Time    TSH [[id number]]  (Normal) Collected: 09/18/21 1105    Lab Status: Final result Specimen: Blood from Arm, Left Updated: 09/18/21 1145     TSH 3RD GENERATON 1 101 uIU/mL     Narrative:      Patients undergoing fluorescein dye angiography may retain small amounts of fluorescein in the body for 48-72 hours post procedure  Samples containing fluorescein can produce falsely depressed TSH values  If the patient had this procedure,a specimen should be resubmitted post fluorescein clearance        Lipase [[id number]]  (Abnormal) Collected: 09/18/21 1105    Lab Status: Final result Specimen: Blood from Arm, Left Updated: 09/18/21 1145     Lipase 69 u/L     Urine Microscopic [[id number]]  (Abnormal) Collected: 09/18/21 1008    Lab Status: Final result Specimen: Urine, Clean Catch Updated: 09/18/21 1140     RBC, UA Innumerable /hpf      WBC, UA None Seen /hpf      Epithelial Cells Occasional /hpf      Bacteria, UA None Seen /hpf     Comprehensive metabolic panel [[id number]] Collected: 09/18/21 1105    Lab Status: Final result Specimen: Blood from Arm, Left Updated: 09/18/21 1138     Sodium 140 mmol/L      Potassium 5 1 mmol/L      Chloride 106 mmol/L      CO2 25 mmol/L      ANION GAP 9 mmol/L      BUN 6 mg/dL      Creatinine 0 62 mg/dL Glucose 109 mg/dL      Calcium 8 4 mg/dL      AST 17 U/L      ALT 25 U/L      Alkaline Phosphatase 90 U/L      Total Protein 7 3 g/dL      Albumin 3 6 g/dL      Total Bilirubin 0 33 mg/dL      eGFR 117 ml/min/1 73sq m     Narrative:      [place] guidelines for Chronic Kidney Disease (CKD):     Stage 1 with normal or high GFR (GFR > 90 mL/min/1 73 square meters)    Stage 2 Mild CKD (GFR = 60-89 mL/min/1 73 square meters)    Stage 3A Moderate CKD (GFR = 45-59 mL/min/1 73 square meters)    Stage 3B Moderate CKD (GFR = 30-44 mL/min/1 73 square meters)    Stage 4 Severe CKD (GFR = 15-29 mL/min/1 73 square meters)    Stage 5 End Stage CKD (GFR <15 mL/min/1 73 square meters)  Note: GFR calculation is accurate only with a steady state creatinine    CBC and differential [[id number]]  (Abnormal) Collected: 09/18/21 1105    Lab Status: Final result Specimen: Blood from Arm, Left Updated: 09/18/21 1116     WBC 8 34 Thousand/uL      RBC 3 94 Million/uL      Hemoglobin 11 2 g/dL      Hematocrit 35 6 %      MCV 90 fL      MCH 28 4 pg      MCHC 31 5 g/dL      RDW 14 1 %      MPV 10 2 fL      Platelets 458 Thousands/uL      nRBC 0 /100 WBCs      Neutrophils Relative 75 %      Immat GRANS % 1 %      Lymphocytes Relative 16 %      Monocytes Relative 7 %      Eosinophils Relative 1 %      Basophils Relative 0 %      Neutrophils Absolute 6 27 Thousands/µL      Immature Grans Absolute 0 04 Thousand/uL      Lymphocytes Absolute 1 37 Thousands/µL      Monocytes Absolute 0 59 Thousand/µL      Eosinophils Absolute 0 04 Thousand/µL      Basophils Absolute 0 03 Thousands/µL     POCT pregnancy, urine [[id number]]  (Normal) Resulted: 09/18/21 1011    Lab Status: Final result Updated: 09/18/21 1011     EXT PREG TEST UR (Ref: Negative) Negative (-)     Control Valid    Urine Macroscopic, POC [[id number]]  (Abnormal) Collected: 09/18/21 1008    Lab Status: Final result Specimen: Urine Updated: 09/18/21 1009 Color, UA Red     Clarity, UA Clear     pH, UA 7 0     Leukocytes, UA Negative     Nitrite, UA Negative     Protein, UA 30 (1+) mg/dl      Glucose, UA Negative mg/dl      Ketones, UA Negative mg/dl      Urobilinogen, UA 0 2 E U /dl      Bilirubin, UA Negative     Blood, UA Large     Specific [place], UA 1 020    Narrative:      CLINITEK RESULT                 US pelvis complete w transvaginal   Final Result by Madison Escobedo MD (09/18 1110)          1  Ovoid fluid-filled structure adjacent to the left superior /posterior uterine fundus questionably representing a peritoneal inclusion cyst or paraovarian cyst   Degenerated fibroid less likely  It appears unchanged when compared to CT 1/31/2021    2   Uterus appears otherwise unremarkable  3   Normal-appearing ovaries  4   Trace free fluid in the pelvic cul-de-sac likely physiologic in nature  Workstation performed: [id number]                    Procedures  Procedures         ED Course  ED Course as of Sep 18 1233   Sat Sep 18, 2021   1107 Leukocytes, UA: Negative   1107 Nitrite, UA: Negative   1107 PREGNANCY TEST URINE: Negative (-)   1154 IMPRESSION:        1  Ovoid fluid-filled structure adjacent to the left superior /posterior uterine fundus questionably representing a peritoneal inclusion cyst or paraovarian cyst   Degenerated fibroid less likely  It appears unchanged when compared to CT 1/31/2021   2   Uterus appears otherwise unremarkable  3   Normal-appearing ovaries  4   Trace free fluid in the pelvic cul-de-sac likely physiologic in nature  US pelvis complete w transvaginal   1210 Reviewed ultrasound findings with OBGYN  Patient is stable for outpatient follow-up      [id number] Reviewed all results with patient, answered questions  Patient noted resolution of pain and states the vaginal bleeding has slowed  Also reviewed discussion with OBGYN  Patient stable for discharge  MDM  Number of Diagnoses or Management Options  Abnormal uterine bleeding  Diagnosis management comments: Reviewed all results with patient, answered questions  Patient noted resolution of pain and states she is feeling much better with decreased vaginal bleeding  Repeat abdominal exam is benign  Reviewed treatment at home  Recommended follow-up with OBGYN for further evaluation and monitoring of symptoms  The management plan was discussed in detail with the patient at bedside and all questions were answered  Provided both verbal and written instructions  Reviewed red flag symptoms and strict return to ED instructions  Patient notes understanding and agrees to plan  Disposition  Final diagnoses:   Abnormal uterine bleeding     Time reflects when diagnosis was documented in both MDM as applicable and the Disposition within this note     Time User Action Codes Description Comment    9/18/2021 12:19 PM Agustin, [address] [N93 9] Abnormal uterine bleeding       ED Disposition     ED Disposition Condition Date/Time Comment    Discharge Stable Sat Sep 18, 2021 12:19 PM Chacho Zhao discharge to home/self care  Follow-up Information     Follow up With Specialties Details Why Contact Info Additional [address] [facility] Emergency Department Emergency Medicine  If symptoms worsen [facility] [id number]  [address] [place] [address] Emergency Department, [address] [place] [address], [address] [facility] Obstetrics and Gynecology Schedule an appointment as soon as possible for a visit   [address], [address] [facility] [id number]  [address], [address], [address], [address] Igor Banuelos MD Family Medicine  As needed [address] [facility] [address] [facility]             Patient's Medications   Discharge Prescriptions    No medications on file     No discharge procedures on file      PDMP Review       Value Time User    PDMP Reviewed  Yes 6/29/2021  8:33 AM Boo Dee          ED Provider  Electronically Signed by           Seth Panda PA-C  09/18/21 8722

## 2021-09-18 NOTE — DISCHARGE INSTRUCTIONS
Continue Tylenol or ibuprofen at home as needed for pain  Stay hydrated, drink plenty of fluids  Follow-up with OBGYN for further evaluation and monitoring of symptoms  Return to ED if symptoms worsen including increasing pain, inability tolerate food or fluid, persistent vaginal bleeding, fevers

## 2021-09-18 NOTE — ED NOTES
PT returned from [address]        Earnstine Rafa Brooks  09/18/21 4835

## 2021-09-20 ENCOUNTER — OFFICE VISIT (OUTPATIENT)
Dept: FAMILY MEDICINE CLINIC | Facility: CLINIC | Age: 36
End: 2021-09-20
Payer: COMMERCIAL

## 2021-09-20 VITALS
WEIGHT: 240 LBS | OXYGEN SATURATION: 100 % | DIASTOLIC BLOOD PRESSURE: 100 MMHG | HEART RATE: 60 BPM | BODY MASS INDEX: 40.97 KG/M2 | TEMPERATURE: 99.4 F | SYSTOLIC BLOOD PRESSURE: 138 MMHG | HEIGHT: 64 IN

## 2021-09-20 DIAGNOSIS — I10 ESSENTIAL HYPERTENSION: ICD-10-CM

## 2021-09-20 DIAGNOSIS — E66.01 MORBID OBESITY (HCC): ICD-10-CM

## 2021-09-20 DIAGNOSIS — R13.19 OTHER DYSPHAGIA: ICD-10-CM

## 2021-09-20 DIAGNOSIS — N93.9 ABNORMAL VAGINAL BLEEDING: ICD-10-CM

## 2021-09-20 DIAGNOSIS — M54.2 NECK PAIN: Primary | ICD-10-CM

## 2021-09-20 PROBLEM — E07.9 THYROID MASS: Status: RESOLVED | Noted: 2021-03-15 | Resolved: 2021-09-20

## 2021-09-20 PROBLEM — R05.9 COUGH: Status: RESOLVED | Noted: 2020-12-21 | Resolved: 2021-09-20

## 2021-09-20 PROBLEM — U07.1 COVID-19 VIRUS INFECTION: Status: RESOLVED | Noted: 2020-12-23 | Resolved: 2021-09-20

## 2021-09-20 PROCEDURE — 99215 OFFICE O/P EST HI 40 MIN: CPT | Performed by: FAMILY MEDICINE

## 2021-09-20 NOTE — ASSESSMENT & PLAN NOTE
New diagnosis status post ER visit review the transvaginal ultrasound with the patient recommend the to the patient to be evaluated by gyn

## 2021-09-20 NOTE — PROGRESS NOTES
Subjective:   Chief Complaint   Patient presents with    Follow-up      er follow up for irregular mentral bleeding     thorat problem        Patient ID: Riki Zamora is a 29 y o  female       Patient here in the office the had the multiple complain she complained about the pain in her neck mostly on the left side radiate to her left the upper arm and she described it as a dull certain range of motion of the head aggravated the pain and no fall no trauma does not increase with the cough and no numbness no muscle weakness and she described it as achy and graded as 6/10   patient also concerned about the throat the pain and the feel food stuck in her and chest and she had to drink water or pressure fluid in by repetitive the swallowing movement patient does have history of the gastric bypass had history of duodenal ulcer and she is on omeprazole a deny any weight change actually she gaining weight not losing weight no vomiting no cough and no wheezing and the and and this problem can happen with mostly solid food   also patient seen recently by emergency room for vaginal bleeding patient who is  with history of tubal ligation 10 years ago who get her menstruation on  and  but she start bleeding again in  with pain and clot emergency room transvaginal ultrasound has been done and there is no change compared with before also blood work review with the patient   the patient also who known to have history of hypertension she is on amlodipine taking medication regularly blood pressure today uncontrolled she deny any and chest pain short of breath no palpitation no TIA symptom      The following portions of the patient's history were reviewed and updated as appropriate: allergies, current medications, past family history, past medical history, past social history, past surgical history and problem list     Review of Systems   Constitutional: Negative for activity change, appetite change, fatigue and fever  HENT: Positive for sore throat and trouble swallowing  Negative for congestion, ear pain, sinus pressure and sinus pain  Eyes: Negative for pain, discharge, redness and itching  Respiratory: Negative for cough, chest tightness, shortness of breath and stridor  Cardiovascular: Negative for chest pain, palpitations and leg swelling  Gastrointestinal: Negative for abdominal pain, blood in stool, constipation, diarrhea and nausea  Genitourinary: Negative for dysuria, flank pain, frequency and hematuria  Musculoskeletal: Positive for neck pain  Negative for back pain and joint swelling  Skin: Negative for pallor and rash  Neurological: Negative for dizziness, tremors, weakness, numbness and headaches  Hematological: Does not bruise/bleed easily  Objective:  Vitals:    09/20/21 0811   BP: 138/100   BP Location: Left arm   Patient Position: Sitting   Cuff Size: Large   Pulse: 60   Temp: 99 4 °F (37 4 °C)   TempSrc: Tympanic   SpO2: 100%   Weight: 109 kg (240 lb)   Height: 5' 4" (1 626 m)      Physical Exam  Vitals and nursing note reviewed  Constitutional:       General: She is not in acute distress  Appearance: She is well-developed  She is not diaphoretic  HENT:      Head: Normocephalic  Right Ear: Tympanic membrane, ear canal and external ear normal       Left Ear: Tympanic membrane, ear canal and external ear normal       Nose: Nose normal  No congestion or rhinorrhea  Mouth/Throat:      Mouth: Mucous membranes are moist       Pharynx: Oropharynx is clear  No oropharyngeal exudate or posterior oropharyngeal erythema  Eyes:      General:         Right eye: No discharge  Left eye: No discharge  Conjunctiva/sclera: Conjunctivae normal       Pupils: Pupils are equal, round, and reactive to light  Neck:      Vascular: No JVD  Cardiovascular:      Rate and Rhythm: Normal rate and regular rhythm        Heart sounds: Normal heart sounds  No murmur heard  No gallop  Pulmonary:      Effort: Pulmonary effort is normal  No respiratory distress  Breath sounds: Normal breath sounds  No stridor  No wheezing or rales  Chest:      Chest wall: No tenderness  Abdominal:      General: There is no distension  Palpations: Abdomen is soft  There is no mass  Tenderness: There is no abdominal tenderness  There is no rebound  Musculoskeletal:      Cervical back: Normal range of motion and neck supple  Tenderness present  No swelling, edema, erythema, bony tenderness or crepitus  Pain with movement present  Back:    Lymphadenopathy:      Cervical: No cervical adenopathy  Skin:     General: Skin is warm  Findings: No erythema or rash  Neurological:      Mental Status: She is alert and oriented to person, place, and time  Motor: No weakness        Gait: Gait normal            Assessment/Plan:    Neck pain  A new diagnosis symptomatic worse on the left side Spurling test is positive the patient had history of gastric bypass recommend to take Tylenol avoid nonsteroidal anti-inflammatory drugs plan for x-ray of the cervical spine    Abnormal vaginal bleeding   New diagnosis status post ER visit review the transvaginal ultrasound with the patient recommend the to the patient to be evaluated by gyn    Other dysphagia   New diagnosis symptomatic patient's history of gastric bypass and the she had history of gastric outlet obstruction before patient already on omeprazole I do recommend to be evaluated by GI for EGD patient already had appointment discussed the 8 small meal and chew food well and do not eat and lie down    Essential hypertension   A chronic today blood pressure 138/100 patient on amlodipine 5 mg once a day she compliant with her medication but the she was in pain did not sleep well   the recommend to monitor blood pressure at home low-salt diet and will re-evaluate if persistent to be high to adjust medication       Diagnoses and all orders for this visit:    Neck pain  -     XR spine cervical complete 4 or 5 vw non injury; Future    Abnormal vaginal bleeding  -     Ambulatory referral to Obstetrics / Gynecology;  Future    Other dysphagia    Morbid obesity ([facility] [address] )    Essential hypertension

## 2021-09-20 NOTE — ASSESSMENT & PLAN NOTE
New diagnosis symptomatic patient's history of gastric bypass and the she had history of gastric outlet obstruction before patient already on omeprazole I do recommend to be evaluated by GI for EGD patient already had appointment discussed the 8 small meal and chew food well and do not eat and lie down

## 2021-09-20 NOTE — ASSESSMENT & PLAN NOTE
A new diagnosis symptomatic worse on the left side Spurling test is positive the patient had history of gastric bypass recommend to take Tylenol avoid nonsteroidal anti-inflammatory drugs plan for x-ray of the cervical spine

## 2021-09-20 NOTE — ASSESSMENT & PLAN NOTE
A chronic today blood pressure 138/100 patient on amlodipine 5 mg once a day she compliant with her medication but the she was in pain did not sleep well   the recommend to monitor blood pressure at home low-salt diet and will re-evaluate if persistent to be high to adjust medication

## 2021-09-22 ENCOUNTER — TELEPHONE (OUTPATIENT)
Dept: FAMILY MEDICINE CLINIC | Facility: CLINIC | Age: 36
End: 2021-09-22

## 2021-09-22 DIAGNOSIS — M54.2 NECK PAIN: Primary | ICD-10-CM

## 2021-09-22 RX ORDER — NAPROXEN 250 MG/1
250 TABLET ORAL 2 TIMES DAILY WITH MEALS
Qty: 30 TABLET | Refills: 0 | Status: SHIPPED | OUTPATIENT
Start: 2021-09-22 | End: 2021-10-05

## 2021-09-22 NOTE — TELEPHONE ENCOUNTER
requesting something to be sent to pharmacy for neck pain patient has been taking tylenol as advised however no improvement now pain is affecting her sleep please advise

## 2021-09-27 ENCOUNTER — OFFICE VISIT (OUTPATIENT)
Dept: FAMILY MEDICINE CLINIC | Facility: CLINIC | Age: 36
End: 2021-09-27
Payer: COMMERCIAL

## 2021-09-27 VITALS
BODY MASS INDEX: 40.46 KG/M2 | OXYGEN SATURATION: 99 % | TEMPERATURE: 98 F | DIASTOLIC BLOOD PRESSURE: 80 MMHG | WEIGHT: 237 LBS | SYSTOLIC BLOOD PRESSURE: 130 MMHG | HEART RATE: 70 BPM | HEIGHT: 64 IN

## 2021-09-27 DIAGNOSIS — I10 ESSENTIAL HYPERTENSION: ICD-10-CM

## 2021-09-27 DIAGNOSIS — E66.01 MORBID OBESITY (HCC): ICD-10-CM

## 2021-09-27 DIAGNOSIS — Z28.21 IMMUNIZATION REFUSED: ICD-10-CM

## 2021-09-27 DIAGNOSIS — Z98.84 BARIATRIC SURGERY STATUS: ICD-10-CM

## 2021-09-27 DIAGNOSIS — Z00.01 ENCOUNTER FOR WELL ADULT EXAM WITH ABNORMAL FINDINGS: Primary | ICD-10-CM

## 2021-09-27 PROCEDURE — 99395 PREV VISIT EST AGE 18-39: CPT | Performed by: FAMILY MEDICINE

## 2021-09-27 RX ORDER — OMEPRAZOLE 20 MG/1
20 CAPSULE, DELAYED RELEASE ORAL 2 TIMES DAILY
Qty: 180 CAPSULE | Refills: 0 | Status: SHIPPED | OUTPATIENT
Start: 2021-09-27 | End: 2022-01-03

## 2021-09-27 RX ORDER — AMLODIPINE BESYLATE 5 MG/1
5 TABLET ORAL DAILY
Qty: 90 TABLET | Refills: 0 | Status: SHIPPED | OUTPATIENT
Start: 2021-09-27 | End: 2022-01-03 | Stop reason: SDUPTHER

## 2021-09-27 NOTE — ASSESSMENT & PLAN NOTE
A chronic status post gastric bypass patient does follow-up with the weight management discussed the patient portion control low carb low-fat diet and increased physical activity

## 2021-09-27 NOTE — ASSESSMENT & PLAN NOTE
I discussed with the patient flu shot for today benefits versus side effect patient decline it also she decline tetanus vaccine

## 2021-09-27 NOTE — ASSESSMENT & PLAN NOTE
Improved and the blood pressure patient tolerate  amlodipine 5 milligram once a day encouraged patient to continue

## 2021-09-27 NOTE — PATIENT INSTRUCTIONS

## 2021-09-27 NOTE — PROGRESS NOTES
[address] [place] [address] PRIMARY CARE [facility]    NAME: Reji Zhao  AGE: 29 y o  SEX: female  : 1985     DATE: 2021     Assessment and Plan:     Problem List Items Addressed This Visit        Cardiovascular and Mediastinum    Essential hypertension     Improved and the blood pressure patient tolerate  amlodipine 5 milligram once a day encouraged patient to continue         Relevant Medications    amLODIPine (NORVASC) 5 mg tablet       Other    Morbid obesity (HCC)     A chronic status post gastric bypass patient does follow-up with the weight management discussed the patient portion control low carb low-fat diet and increased physical activity         Relevant Medications    omeprazole (PriLOSEC) 20 mg delayed release capsule    Encounter for well adult exam with abnormal findings - Primary     Advice and education were given regarding nutrition, aerobic exercises, weight bearing exercises, cardiovascular risk reduction, fall risk reduction, and age appropriate supplements  The patient was counseled regarding instructions for management, risk factor reductions, prognosis, risks and benefits of treatment options, patient and family education, and importance of compliance with treatment  Immunization refused     I discussed with the patient flu shot for today benefits versus side effect patient decline it also she decline tetanus vaccine           Other Visit Diagnoses     Bariatric surgery status        Relevant Medications    omeprazole (PriLOSEC) 20 mg delayed release capsule    Other Relevant Orders    Ferritin    CBC and differential    Iron    Vitamin B12    Folate          Immunizations and preventive care screenings were discussed with patient today  Appropriate education was printed on patient's after visit summary      Counseling:  Alcohol/drug use: discussed moderation in alcohol intake, the recommendations for healthy alcohol use, and avoidance of illicit drug use  Dental Health: discussed importance of regular tooth brushing, flossing, and dental visits  Injury prevention: discussed safety/seat belts, safety helmets, smoke detectors, carbon dioxide detectors, and smoking near bedding or upholstery  Sexual health: discussed sexually transmitted diseases, partner selection, use of condoms, avoidance of unintended pregnancy, and contraceptive alternatives  · Exercise: the importance of regular exercise/physical activity was discussed  Recommend exercise 3-5 times per week for at least 30 minutes  BMI Counseling: Body mass index is 40 68 kg/m²  The BMI is above normal  Nutrition recommendations include decreasing portion sizes, decreasing fast food intake and limiting drinks that contain sugar  Exercise recommendations include exercising 3-5 times per week  No pharmacotherapy was ordered  Patient referred to weight management  Depression Screening and Follow-up Plan:   Patient was screened for depression during today's encounter  They screened negative with a PHQ-2 score of 0  No follow-ups on file  Chief Complaint:     Chief Complaint   Patient presents with    Physical Exam    Blood Pressure Check      History of Present Illness:     Adult Annual Physical   Patient here for a comprehensive physical exam  The patient reports no problems  Diet and Physical Activity  · Diet/Nutrition: Bariatric diet  · Exercise: no formal exercise  Depression Screening  PHQ-9 Depression Screening    PHQ-9:   Frequency of the following problems over the past two weeks:      Little interest or pleasure in doing things: 0 - not at all  Feeling down, depressed, or hopeless: 0 - not at all  PHQ-2 Score: 0       General Health  · Sleep: sleeps well  · Hearing: normal - bilateral   · Vision: no vision problems  · Dental: regular dental visits         /GYN Health  Patient follow-up with the gyn last Pap smear was done in June 29, 2020 normal no new concern   Review of Systems:     Review of Systems   Constitutional: Negative for activity change, appetite change, fatigue and fever  HENT: Negative for congestion, ear pain, sinus pressure, sinus pain and sore throat  Eyes: Negative for pain, discharge, redness and itching  Respiratory: Negative for cough, chest tightness, shortness of breath and stridor  Cardiovascular: Negative for chest pain, palpitations and leg swelling  Gastrointestinal: Negative for abdominal pain, blood in stool, constipation, diarrhea and nausea  Endocrine: Negative for heat intolerance and polydipsia  Genitourinary: Negative for dysuria, flank pain, frequency and hematuria  Musculoskeletal: Negative for back pain, joint swelling and neck pain  Skin: Negative for pallor and rash  Neurological: Negative for dizziness, tremors, weakness, numbness and headaches  Hematological: Does not bruise/bleed easily  Psychiatric/Behavioral: Negative for agitation        Past Medical History:     Past Medical History:   Diagnosis Date    Anemia     BMI 36 0-36 9,adult 12/8/2020    Cough 12/21/2020    COVID-19 virus infection 12/23/2020    Disease of thyroid gland     Dizziness     due to anemia, last episode August 2020, no falls    Fatigue     History of transfusion 2016    anemic - loss of blood due to bleeding ulcers, no reaction    Hyperlipidemia     Hypertension     Multiple gastric ulcers     Obesity     11/14/16    Thyroid disease     took synthroid but not currently taking    Thyroid mass 3/15/2021     A CT scan of the neck incidental finding 1 7 cm of the right thyroid    Ulcer       Past Surgical History:     Past Surgical History:   Procedure Laterality Date    GASTRIC BYPASS  2014    GASTRIC BYPASS LAPAROSCOPIC N/A 12/15/2020    Procedure: Robotic lysis of adhesions, small-bowel resection, partial gastrectomy, paraesophageal hernia repair, bilateral truncal vagotomy; Robotic gastrojejunostomy anastomosis with intraop endoscopy;  Surgeon: Anabel Buck MD;  Location: [facility];  Service: Bariatrics    LITO-EN-Y PROCEDURE  11/14/2016    Gastric Bypass by Dr Michelle Monteiro Weight 339 6,nw    TUBAL LIGATION Bilateral     2010    WISDOM TOOTH EXTRACTION        Social History:     Social History     Socioeconomic History    Marital status: Single     Spouse name: None    Number of children: None    Years of education: None    Highest education level: None   Occupational History    None   Tobacco Use    Smoking status: Former Smoker     Types: Cigarettes     Quit date: 2018     Years since quitting: 3 7    Smokeless tobacco: Former User    Tobacco comment: former   Vaping Use    Vaping Use: Never used   Substance and Sexual Activity    Alcohol use: No    Drug use: No    Sexual activity: Yes     Partners: Male     Birth control/protection: Female Sterilization   Other Topics Concern    None   Social History Narrative    fhx not asked in triage     Social Determinants of Health     Financial Resource Strain: Low Risk     Difficulty of Paying Living Expenses: Not hard at all   Food Insecurity: No Food Insecurity    Worried About [address] in the Last Year: Never true    [facility] in the Last Year: Never true   Transportation Needs: No Transportation Needs    Lack of Transportation (Medical): No    Lack of Transportation (Non-Medical): No   Physical Activity: Sufficiently Active    Days of Exercise per Week: 7 days    Minutes of Exercise per Session: 30 min   Stress: No Stress Concern Present    Feeling of Stress :  Only a little   Social Connections: Socially Integrated    Frequency of Communication with Friends and Family: More than three times a week    Frequency of Social Gatherings with Friends and Family: Never    Attends Church Services: More than 4 times per year    Active Member of [employer] or Organizations: Yes    Attends Club or Organization Meetings: More than 4 times per year    Marital Status:    Intimate Partner Violence: Not At Risk    Fear of Current or Ex-Partner: No    Emotionally Abused: No    Physically Abused: No    Sexually Abused: No      Family History:     Family History   Problem Relation Age of Onset    Asthma Mother     Coronary artery disease Mother     Diabetes Mother         MELLITUS    Hyperlipidemia Mother     Hypertension Mother     Aneurysm Mother         2018 just diagnosed with two    No Known Problems Father          when she was 1 months old    Aneurysm Maternal Grandmother     Aneurysm Maternal Grandfather           of a ruptured abdominal aneurysm    No Known Problems Sister     No Known Problems Daughter     No Known Problems Paternal Grandmother     No Known Problems Paternal Grandfather     No Known Problems Daughter     No Known Problems Maternal Aunt     No Known Problems Maternal Aunt     No Known Problems Maternal Aunt       Current Medications:     Current Outpatient Medications   Medication Sig Dispense Refill    acetaminophen (TYLENOL) 500 mg tablet Take 1 tablet (500 mg total) by mouth every 6 (six) hours as needed for mild pain 30 tablet 0    albuterol (PROVENTIL HFA,VENTOLIN HFA) 90 mcg/act inhaler Inhale 2 puffs every 4 (four) hours as needed for wheezing 1 Inhaler 2    amLODIPine (NORVASC) 5 mg tablet Take 1 tablet (5 mg total) by mouth daily 90 tablet 0    calcium carbonate 1250 MG capsule Take 1,250 mg by mouth 2 (two) times a day with meals      cholecalciferol (VITAMIN D3) 1,000 units tablet Take 1 tablet (1,000 Units total) by mouth daily 30 tablet 0    cyanocobalamin (VITAMIN B-12) 100 mcg tablet Take by mouth daily      ferrous sulfate 325 (65 Fe) mg tablet Take 325 mg by mouth daily with breakfast      fluticasone (FLONASE) 50 mcg/act nasal spray SHAKE LIQUID AND USE 2 SPRAYS IN EACH NOSTRIL DAILY 48 g 0    Multiple Vitamin (MULTIVITAMIN) tablet Take 1 tablet by mouth daily      naproxen (Naprosyn) 250 mg tablet Take 1 tablet (250 mg total) by mouth 2 (two) times a day with meals 30 tablet 0    omeprazole (PriLOSEC) 20 mg delayed release capsule Take 1 capsule (20 mg total) by mouth 2 (two) times a day 180 capsule 0     No current facility-administered medications for this visit  Allergies: Allergies   Allergen Reactions    [employer] - Food Allergy Shortness Of Breath    Venofer [Iron Sucrose]      Chest pressure after 1st dose  Tachycardia and blurred vision after 8 minutes of dose #2  Physical Exam:     /80   Pulse 70   Temp 98 °F (36 7 °C) (Tympanic)   Ht 5' 4" (1 626 m)   Wt 108 kg (237 lb)   LMP 09/17/2021 (Exact Date)   SpO2 99%   Breastfeeding No   BMI 40 68 kg/m²     Physical Exam  Constitutional:       General: She is not in acute distress  Appearance: She is well-developed and normal weight  She is not toxic-appearing or diaphoretic  HENT:      Head: Normocephalic and atraumatic  Right Ear: Tympanic membrane, ear canal and external ear normal  There is no impacted cerumen  Left Ear: Tympanic membrane, ear canal and external ear normal  There is no impacted cerumen  Nose: Nose normal  No congestion or rhinorrhea  Mouth/Throat:      Mouth: Mucous membranes are moist       Pharynx: Oropharynx is clear  No oropharyngeal exudate or posterior oropharyngeal erythema  Eyes:      General: No scleral icterus  Right eye: No discharge  Left eye: No discharge  Conjunctiva/sclera: Conjunctivae normal       Pupils: Pupils are equal, round, and reactive to light  Neck:      Thyroid: No thyromegaly  Cardiovascular:      Rate and Rhythm: Normal rate and regular rhythm  Pulses: Normal pulses  Heart sounds: Normal heart sounds  No murmur heard  No friction rub     Pulmonary:      Effort: Pulmonary effort is normal  No respiratory distress  Breath sounds: Normal breath sounds  No wheezing or rales  Chest:      Chest wall: No tenderness  Abdominal:      General: There is no distension  Palpations: Abdomen is soft  There is no mass  Tenderness: There is no abdominal tenderness  Hernia: No hernia is present  There is no hernia in the left inguinal area  Genitourinary:     Labia:         Right: No rash  Left: No rash  Vagina: No foreign body  No vaginal discharge, tenderness or bleeding  Cervix: No cervical motion tenderness  Adnexa:         Right: No mass or tenderness  Left: No mass or tenderness  Musculoskeletal:         General: Normal range of motion  Cervical back: Normal range of motion  No rigidity  Lymphadenopathy:      Cervical: No cervical adenopathy  Skin:     General: Skin is warm  Coloration: Skin is not pale  Findings: No erythema or rash  Neurological:      Mental Status: She is alert and oriented to person, place, and time  Sensory: No sensory deficit  Motor: No weakness or abnormal muscle tone        Gait: Gait normal       Deep Tendon Reflexes: Reflexes normal    Psychiatric:         Mood and Affect: Mood normal          Behavior: Behavior normal           Bartolo Nunn MD   [address] [place] [address]

## 2021-09-29 ENCOUNTER — APPOINTMENT (OUTPATIENT)
Dept: LAB | Facility: CLINIC | Age: 36
End: 2021-09-29
Payer: COMMERCIAL

## 2021-09-29 DIAGNOSIS — Z98.84 BARIATRIC SURGERY STATUS: ICD-10-CM

## 2021-09-29 LAB
BASOPHILS # BLD AUTO: 0.02 THOUSANDS/ΜL (ref 0–0.1)
BASOPHILS NFR BLD AUTO: 0 % (ref 0–1)
EOSINOPHIL # BLD AUTO: 0.12 THOUSAND/ΜL (ref 0–0.61)
EOSINOPHIL NFR BLD AUTO: 2 % (ref 0–6)
ERYTHROCYTE [DISTWIDTH] IN BLOOD BY AUTOMATED COUNT: 14.5 % (ref 11.6–15.1)
FERRITIN SERPL-MCNC: 5 NG/ML (ref 8–388)
FOLATE SERPL-MCNC: 13.2 NG/ML (ref 3.1–17.5)
HCT VFR BLD AUTO: 35.3 % (ref 34.8–46.1)
HGB BLD-MCNC: 10.7 G/DL (ref 11.5–15.4)
IMM GRANULOCYTES # BLD AUTO: 0.03 THOUSAND/UL (ref 0–0.2)
IMM GRANULOCYTES NFR BLD AUTO: 0 % (ref 0–2)
IRON SERPL-MCNC: 35 UG/DL (ref 50–170)
LYMPHOCYTES # BLD AUTO: 2.16 THOUSANDS/ΜL (ref 0.6–4.47)
LYMPHOCYTES NFR BLD AUTO: 28 % (ref 14–44)
MCH RBC QN AUTO: 27.8 PG (ref 26.8–34.3)
MCHC RBC AUTO-ENTMCNC: 30.3 G/DL (ref 31.4–37.4)
MCV RBC AUTO: 92 FL (ref 82–98)
MONOCYTES # BLD AUTO: 0.6 THOUSAND/ΜL (ref 0.17–1.22)
MONOCYTES NFR BLD AUTO: 8 % (ref 4–12)
NEUTROPHILS # BLD AUTO: 4.79 THOUSANDS/ΜL (ref 1.85–7.62)
NEUTS SEG NFR BLD AUTO: 62 % (ref 43–75)
NRBC BLD AUTO-RTO: 0 /100 WBCS
PLATELET # BLD AUTO: 372 THOUSANDS/UL (ref 149–390)
PMV BLD AUTO: 11 FL (ref 8.9–12.7)
RBC # BLD AUTO: 3.85 MILLION/UL (ref 3.81–5.12)
VIT B12 SERPL-MCNC: 466 PG/ML (ref 100–900)
WBC # BLD AUTO: 7.72 THOUSAND/UL (ref 4.31–10.16)

## 2021-09-29 PROCEDURE — 36415 COLL VENOUS BLD VENIPUNCTURE: CPT

## 2021-09-29 PROCEDURE — 83540 ASSAY OF IRON: CPT

## 2021-09-29 PROCEDURE — 82746 ASSAY OF FOLIC ACID SERUM: CPT

## 2021-09-29 PROCEDURE — 85025 COMPLETE CBC W/AUTO DIFF WBC: CPT

## 2021-09-29 PROCEDURE — 82728 ASSAY OF FERRITIN: CPT

## 2021-09-29 PROCEDURE — 82607 VITAMIN B-12: CPT

## 2021-10-04 DIAGNOSIS — D50.8 OTHER IRON DEFICIENCY ANEMIA: ICD-10-CM

## 2021-10-04 DIAGNOSIS — Z13.0 SCREENING FOR IRON DEFICIENCY ANEMIA: Primary | ICD-10-CM

## 2021-10-04 RX ORDER — FERROUS SULFATE 325(65) MG
325 TABLET ORAL 2 TIMES DAILY WITH MEALS
Qty: 60 TABLET | Refills: 0 | Status: SHIPPED | OUTPATIENT
Start: 2021-10-04 | End: 2021-10-04

## 2021-10-04 RX ORDER — FERROUS SULFATE 325(65) MG
TABLET ORAL
Qty: 180 TABLET | Refills: 0 | Status: SHIPPED | OUTPATIENT
Start: 2021-10-04 | End: 2021-12-17 | Stop reason: SDUPTHER

## 2021-10-05 ENCOUNTER — TELEPHONE (OUTPATIENT)
Dept: GASTROENTEROLOGY | Facility: HOSPITAL | Age: 36
End: 2021-10-05

## 2021-10-05 DIAGNOSIS — D50.8 OTHER IRON DEFICIENCY ANEMIA: Primary | ICD-10-CM

## 2021-10-05 DIAGNOSIS — M54.2 NECK PAIN: ICD-10-CM

## 2021-10-05 RX ORDER — NAPROXEN 250 MG/1
TABLET ORAL
Qty: 30 TABLET | Refills: 0 | Status: SHIPPED | OUTPATIENT
Start: 2021-10-05 | End: 2021-10-25

## 2021-10-06 ENCOUNTER — ANESTHESIA EVENT (OUTPATIENT)
Dept: GASTROENTEROLOGY | Facility: HOSPITAL | Age: 36
End: 2021-10-06

## 2021-10-06 ENCOUNTER — HOSPITAL ENCOUNTER (OUTPATIENT)
Dept: GASTROENTEROLOGY | Facility: HOSPITAL | Age: 36
Setting detail: OUTPATIENT SURGERY
Discharge: HOME | End: 2021-10-06
Attending: SURGERY | Admitting: SURGERY
Payer: COMMERCIAL

## 2021-10-06 ENCOUNTER — ANESTHESIA (OUTPATIENT)
Dept: GASTROENTEROLOGY | Facility: HOSPITAL | Age: 36
End: 2021-10-06

## 2021-10-06 VITALS
BODY MASS INDEX: 40.46 KG/M2 | OXYGEN SATURATION: 92 % | RESPIRATION RATE: 12 BRPM | SYSTOLIC BLOOD PRESSURE: 134 MMHG | WEIGHT: 237 LBS | TEMPERATURE: 97 F | DIASTOLIC BLOOD PRESSURE: 86 MMHG | HEART RATE: 65 BPM | HEIGHT: 64 IN

## 2021-10-06 DIAGNOSIS — Z98.84 BARIATRIC SURGERY STATUS: ICD-10-CM

## 2021-10-06 LAB
EXT PREGNANCY TEST URINE: NEGATIVE
EXT. CONTROL: NORMAL

## 2021-10-06 PROCEDURE — 88305 TISSUE EXAM BY PATHOLOGIST: CPT | Performed by: PATHOLOGY

## 2021-10-06 PROCEDURE — 43239 EGD BIOPSY SINGLE/MULTIPLE: CPT | Performed by: SURGERY

## 2021-10-06 PROCEDURE — 81025 URINE PREGNANCY TEST: CPT | Performed by: ANESTHESIOLOGY

## 2021-10-06 RX ORDER — SODIUM CHLORIDE 9 MG/ML
125 INJECTION, SOLUTION INTRAVENOUS CONTINUOUS
Status: DISCONTINUED | OUTPATIENT
Start: 2021-10-06 | End: 2021-10-10 | Stop reason: HOSPADM

## 2021-10-06 RX ORDER — LIDOCAINE HYDROCHLORIDE 20 MG/ML
INJECTION, SOLUTION EPIDURAL; INFILTRATION; INTRACAUDAL; PERINEURAL AS NEEDED
Status: DISCONTINUED | OUTPATIENT
Start: 2021-10-06 | End: 2021-10-06

## 2021-10-06 RX ORDER — PROPOFOL 10 MG/ML
INJECTION, EMULSION INTRAVENOUS AS NEEDED
Status: DISCONTINUED | OUTPATIENT
Start: 2021-10-06 | End: 2021-10-06

## 2021-10-06 RX ADMIN — PROPOFOL 50 MG: 10 INJECTION, EMULSION INTRAVENOUS at 11:23

## 2021-10-06 RX ADMIN — PROPOFOL 200 MG: 10 INJECTION, EMULSION INTRAVENOUS at 11:22

## 2021-10-06 RX ADMIN — PROPOFOL 50 MG: 10 INJECTION, EMULSION INTRAVENOUS at 11:26

## 2021-10-06 RX ADMIN — PROPOFOL 50 MG: 10 INJECTION, EMULSION INTRAVENOUS at 11:25

## 2021-10-06 RX ADMIN — LIDOCAINE HYDROCHLORIDE 100 MG: 20 INJECTION, SOLUTION EPIDURAL; INFILTRATION; INTRACAUDAL; PERINEURAL at 11:21

## 2021-10-06 RX ADMIN — PROPOFOL 50 MG: 10 INJECTION, EMULSION INTRAVENOUS at 11:24

## 2021-10-06 RX ADMIN — SODIUM CHLORIDE 125 ML/HR: 0.9 INJECTION, SOLUTION INTRAVENOUS at 10:53

## 2021-10-22 ENCOUNTER — OFFICE VISIT (OUTPATIENT)
Dept: BARIATRICS | Facility: CLINIC | Age: 36
End: 2021-10-22
Payer: COMMERCIAL

## 2021-10-22 VITALS
SYSTOLIC BLOOD PRESSURE: 122 MMHG | HEART RATE: 72 BPM | DIASTOLIC BLOOD PRESSURE: 90 MMHG | WEIGHT: 242 LBS | HEIGHT: 64 IN | TEMPERATURE: 98.3 F | BODY MASS INDEX: 41.32 KG/M2

## 2021-10-22 DIAGNOSIS — K91.2 POSTSURGICAL MALABSORPTION: Primary | ICD-10-CM

## 2021-10-22 PROCEDURE — 99213 OFFICE O/P EST LOW 20 MIN: CPT | Performed by: SURGERY

## 2021-10-24 DIAGNOSIS — M54.2 NECK PAIN: ICD-10-CM

## 2021-10-25 RX ORDER — NAPROXEN 250 MG/1
TABLET ORAL
Qty: 30 TABLET | Refills: 0 | Status: SHIPPED | OUTPATIENT
Start: 2021-10-25 | End: 2021-11-11

## 2021-10-27 ENCOUNTER — OFFICE VISIT (OUTPATIENT)
Dept: FAMILY MEDICINE CLINIC | Facility: CLINIC | Age: 36
End: 2021-10-27
Payer: COMMERCIAL

## 2021-10-27 VITALS
SYSTOLIC BLOOD PRESSURE: 130 MMHG | BODY MASS INDEX: 41.48 KG/M2 | DIASTOLIC BLOOD PRESSURE: 100 MMHG | WEIGHT: 243 LBS | HEIGHT: 64 IN | OXYGEN SATURATION: 99 % | HEART RATE: 64 BPM | TEMPERATURE: 98.8 F

## 2021-10-27 DIAGNOSIS — J02.9 SORE THROAT: ICD-10-CM

## 2021-10-27 DIAGNOSIS — I10 ESSENTIAL HYPERTENSION: Primary | ICD-10-CM

## 2021-10-27 PROCEDURE — 99214 OFFICE O/P EST MOD 30 MIN: CPT | Performed by: FAMILY MEDICINE

## 2021-10-27 RX ORDER — HYDROCHLOROTHIAZIDE 25 MG/1
25 TABLET ORAL DAILY
Qty: 90 TABLET | Refills: 0 | Status: SHIPPED | OUTPATIENT
Start: 2021-10-27 | End: 2022-01-03 | Stop reason: SDUPTHER

## 2021-11-17 ENCOUNTER — OFFICE VISIT (OUTPATIENT)
Dept: FAMILY MEDICINE CLINIC | Facility: CLINIC | Age: 36
End: 2021-11-17
Payer: COMMERCIAL

## 2021-11-17 VITALS
HEART RATE: 60 BPM | TEMPERATURE: 98.6 F | WEIGHT: 249 LBS | SYSTOLIC BLOOD PRESSURE: 130 MMHG | BODY MASS INDEX: 41.48 KG/M2 | DIASTOLIC BLOOD PRESSURE: 84 MMHG | HEIGHT: 65 IN | OXYGEN SATURATION: 96 %

## 2021-11-17 DIAGNOSIS — I10 ESSENTIAL HYPERTENSION: ICD-10-CM

## 2021-11-17 DIAGNOSIS — N64.4 BREAST PAIN: Primary | ICD-10-CM

## 2021-11-17 DIAGNOSIS — N60.01 CYST OF RIGHT BREAST: ICD-10-CM

## 2021-11-17 DIAGNOSIS — E55.9 VITAMIN D DEFICIENCY: ICD-10-CM

## 2021-11-17 DIAGNOSIS — E78.2 MIXED HYPERLIPIDEMIA: ICD-10-CM

## 2021-11-17 PROCEDURE — 99214 OFFICE O/P EST MOD 30 MIN: CPT | Performed by: FAMILY MEDICINE

## 2021-11-18 PROBLEM — N64.4 BREAST PAIN: Status: ACTIVE | Noted: 2021-11-18

## 2021-11-25 ENCOUNTER — NURSE TRIAGE (OUTPATIENT)
Dept: OTHER | Facility: OTHER | Age: 36
End: 2021-11-25

## 2021-11-25 DIAGNOSIS — Z20.822 ENCOUNTER FOR SCREENING LABORATORY TESTING FOR COVID-19 VIRUS: Primary | ICD-10-CM

## 2021-11-26 PROCEDURE — U0003 INFECTIOUS AGENT DETECTION BY NUCLEIC ACID (DNA OR RNA); SEVERE ACUTE RESPIRATORY SYNDROME CORONAVIRUS 2 (SARS-COV-2) (CORONAVIRUS DISEASE [COVID-19]), AMPLIFIED PROBE TECHNIQUE, MAKING USE OF HIGH THROUGHPUT TECHNOLOGIES AS DESCRIBED BY CMS-2020-01-R: HCPCS | Performed by: FAMILY MEDICINE

## 2021-11-26 PROCEDURE — U0005 INFEC AGEN DETEC AMPLI PROBE: HCPCS | Performed by: FAMILY MEDICINE

## 2021-11-29 ENCOUNTER — TELEPHONE (OUTPATIENT)
Dept: FAMILY MEDICINE CLINIC | Facility: CLINIC | Age: 36
End: 2021-11-29

## 2021-12-06 ENCOUNTER — TELEPHONE (OUTPATIENT)
Dept: FAMILY MEDICINE CLINIC | Facility: CLINIC | Age: 36
End: 2021-12-06

## 2021-12-09 ENCOUNTER — TELEMEDICINE (OUTPATIENT)
Dept: FAMILY MEDICINE CLINIC | Facility: CLINIC | Age: 36
End: 2021-12-09
Payer: COMMERCIAL

## 2021-12-09 VITALS — TEMPERATURE: 98.1 F

## 2021-12-09 DIAGNOSIS — Z92.29 COVID-19 VACCINE SERIES COMPLETED: ICD-10-CM

## 2021-12-09 DIAGNOSIS — Z86.16 HISTORY OF COVID-19: ICD-10-CM

## 2021-12-09 DIAGNOSIS — Z20.822 EXPOSURE TO COVID-19 VIRUS: Primary | ICD-10-CM

## 2021-12-09 PROCEDURE — 99213 OFFICE O/P EST LOW 20 MIN: CPT | Performed by: NURSE PRACTITIONER

## 2021-12-13 PROCEDURE — U0005 INFEC AGEN DETEC AMPLI PROBE: HCPCS | Performed by: NURSE PRACTITIONER

## 2021-12-13 PROCEDURE — U0003 INFECTIOUS AGENT DETECTION BY NUCLEIC ACID (DNA OR RNA); SEVERE ACUTE RESPIRATORY SYNDROME CORONAVIRUS 2 (SARS-COV-2) (CORONAVIRUS DISEASE [COVID-19]), AMPLIFIED PROBE TECHNIQUE, MAKING USE OF HIGH THROUGHPUT TECHNOLOGIES AS DESCRIBED BY CMS-2020-01-R: HCPCS | Performed by: NURSE PRACTITIONER

## 2021-12-14 ENCOUNTER — APPOINTMENT (OUTPATIENT)
Dept: LAB | Facility: CLINIC | Age: 36
End: 2021-12-14
Payer: COMMERCIAL

## 2021-12-14 ENCOUNTER — OFFICE VISIT (OUTPATIENT)
Dept: FAMILY MEDICINE CLINIC | Facility: CLINIC | Age: 36
End: 2021-12-14
Payer: COMMERCIAL

## 2021-12-14 VITALS
TEMPERATURE: 99.7 F | HEIGHT: 64 IN | DIASTOLIC BLOOD PRESSURE: 80 MMHG | WEIGHT: 250 LBS | BODY MASS INDEX: 42.68 KG/M2 | OXYGEN SATURATION: 100 % | SYSTOLIC BLOOD PRESSURE: 130 MMHG | HEART RATE: 64 BPM

## 2021-12-14 DIAGNOSIS — N64.52 BREAST DISCHARGE: ICD-10-CM

## 2021-12-14 DIAGNOSIS — E78.2 MIXED HYPERLIPIDEMIA: ICD-10-CM

## 2021-12-14 DIAGNOSIS — N64.4 BREAST PAIN: Primary | ICD-10-CM

## 2021-12-14 DIAGNOSIS — E55.9 VITAMIN D DEFICIENCY: ICD-10-CM

## 2021-12-14 DIAGNOSIS — I10 ESSENTIAL HYPERTENSION: ICD-10-CM

## 2021-12-14 PROCEDURE — 80061 LIPID PANEL: CPT

## 2021-12-14 PROCEDURE — 85025 COMPLETE CBC W/AUTO DIFF WBC: CPT

## 2021-12-14 PROCEDURE — 83540 ASSAY OF IRON: CPT

## 2021-12-14 PROCEDURE — 99214 OFFICE O/P EST MOD 30 MIN: CPT | Performed by: FAMILY MEDICINE

## 2021-12-14 PROCEDURE — 80053 COMPREHEN METABOLIC PANEL: CPT

## 2021-12-14 PROCEDURE — 84443 ASSAY THYROID STIM HORMONE: CPT

## 2021-12-14 PROCEDURE — 84146 ASSAY OF PROLACTIN: CPT

## 2021-12-14 PROCEDURE — 36415 COLL VENOUS BLD VENIPUNCTURE: CPT

## 2021-12-14 PROCEDURE — 82728 ASSAY OF FERRITIN: CPT

## 2021-12-14 PROCEDURE — 84702 CHORIONIC GONADOTROPIN TEST: CPT

## 2021-12-15 ENCOUNTER — TELEPHONE (OUTPATIENT)
Dept: FAMILY MEDICINE CLINIC | Facility: CLINIC | Age: 36
End: 2021-12-15

## 2021-12-15 LAB
ALBUMIN SERPL BCP-MCNC: 3.5 G/DL (ref 3.5–5)
ALP SERPL-CCNC: 87 U/L (ref 46–116)
ALT SERPL W P-5'-P-CCNC: 18 U/L (ref 12–78)
ANION GAP SERPL CALCULATED.3IONS-SCNC: 6 MMOL/L (ref 4–13)
AST SERPL W P-5'-P-CCNC: 12 U/L (ref 5–45)
B-HCG SERPL-ACNC: <2 MIU/ML
BASOPHILS # BLD AUTO: 0.02 THOUSANDS/ΜL (ref 0–0.1)
BASOPHILS NFR BLD AUTO: 0 % (ref 0–1)
BILIRUB SERPL-MCNC: 0.3 MG/DL (ref 0.2–1)
BUN SERPL-MCNC: 13 MG/DL (ref 5–25)
CALCIUM SERPL-MCNC: 9.1 MG/DL (ref 8.3–10.1)
CHLORIDE SERPL-SCNC: 109 MMOL/L (ref 100–108)
CHOLEST SERPL-MCNC: 179 MG/DL
CO2 SERPL-SCNC: 24 MMOL/L (ref 21–32)
CREAT SERPL-MCNC: 0.6 MG/DL (ref 0.6–1.3)
EOSINOPHIL # BLD AUTO: 0.11 THOUSAND/ΜL (ref 0–0.61)
EOSINOPHIL NFR BLD AUTO: 1 % (ref 0–6)
ERYTHROCYTE [DISTWIDTH] IN BLOOD BY AUTOMATED COUNT: 14.6 % (ref 11.6–15.1)
FERRITIN SERPL-MCNC: 6 NG/ML (ref 8–388)
GFR SERPL CREATININE-BSD FRML MDRD: 118 ML/MIN/1.73SQ M
GLUCOSE P FAST SERPL-MCNC: 95 MG/DL (ref 65–99)
HCT VFR BLD AUTO: 35.4 % (ref 34.8–46.1)
HDLC SERPL-MCNC: 45 MG/DL
HGB BLD-MCNC: 10.8 G/DL (ref 11.5–15.4)
IMM GRANULOCYTES # BLD AUTO: 0.02 THOUSAND/UL (ref 0–0.2)
IMM GRANULOCYTES NFR BLD AUTO: 0 % (ref 0–2)
IRON SERPL-MCNC: 81 UG/DL (ref 50–170)
LDLC SERPL CALC-MCNC: 114 MG/DL (ref 0–100)
LYMPHOCYTES # BLD AUTO: 2.49 THOUSANDS/ΜL (ref 0.6–4.47)
LYMPHOCYTES NFR BLD AUTO: 25 % (ref 14–44)
MCH RBC QN AUTO: 27.3 PG (ref 26.8–34.3)
MCHC RBC AUTO-ENTMCNC: 30.5 G/DL (ref 31.4–37.4)
MCV RBC AUTO: 89 FL (ref 82–98)
MONOCYTES # BLD AUTO: 0.59 THOUSAND/ΜL (ref 0.17–1.22)
MONOCYTES NFR BLD AUTO: 6 % (ref 4–12)
NEUTROPHILS # BLD AUTO: 6.57 THOUSANDS/ΜL (ref 1.85–7.62)
NEUTS SEG NFR BLD AUTO: 68 % (ref 43–75)
NONHDLC SERPL-MCNC: 134 MG/DL
NRBC BLD AUTO-RTO: 0 /100 WBCS
PLATELET # BLD AUTO: 322 THOUSANDS/UL (ref 149–390)
PMV BLD AUTO: 11 FL (ref 8.9–12.7)
POTASSIUM SERPL-SCNC: 4.2 MMOL/L (ref 3.5–5.3)
PROLACTIN SERPL-MCNC: 8.1 NG/ML
PROT SERPL-MCNC: 7.7 G/DL (ref 6.4–8.2)
RBC # BLD AUTO: 3.96 MILLION/UL (ref 3.81–5.12)
SODIUM SERPL-SCNC: 139 MMOL/L (ref 136–145)
TRIGL SERPL-MCNC: 102 MG/DL
TSH SERPL DL<=0.05 MIU/L-ACNC: 0.99 UIU/ML (ref 0.36–3.74)
WBC # BLD AUTO: 9.8 THOUSAND/UL (ref 4.31–10.16)

## 2021-12-16 PROBLEM — N64.52 BREAST DISCHARGE: Status: ACTIVE | Noted: 2021-12-16

## 2021-12-17 DIAGNOSIS — D50.8 OTHER IRON DEFICIENCY ANEMIA: ICD-10-CM

## 2021-12-17 RX ORDER — FERROUS SULFATE 325(65) MG
1 TABLET ORAL 2 TIMES DAILY WITH MEALS
Qty: 180 TABLET | Refills: 0 | Status: SHIPPED | OUTPATIENT
Start: 2021-12-17 | End: 2022-01-03 | Stop reason: SDUPTHER

## 2021-12-23 ENCOUNTER — HOSPITAL ENCOUNTER (OUTPATIENT)
Dept: MAMMOGRAPHY | Facility: CLINIC | Age: 36
Discharge: HOME | End: 2021-12-23
Payer: COMMERCIAL

## 2021-12-23 VITALS — BODY MASS INDEX: 42.68 KG/M2 | WEIGHT: 250 LBS | HEIGHT: 64 IN

## 2021-12-23 DIAGNOSIS — N64.4 BREAST PAIN: ICD-10-CM

## 2021-12-23 DIAGNOSIS — N60.01 CYST OF RIGHT BREAST: ICD-10-CM

## 2021-12-23 PROCEDURE — G0279 TOMOSYNTHESIS, MAMMO: HCPCS

## 2021-12-23 PROCEDURE — 77066 DX MAMMO INCL CAD BI: CPT

## 2021-12-30 ENCOUNTER — HOSPITAL ENCOUNTER (OUTPATIENT)
Dept: MAMMOGRAPHY | Facility: CLINIC | Age: 36
Discharge: HOME | End: 2021-12-30
Payer: COMMERCIAL

## 2021-12-30 DIAGNOSIS — M54.2 NECK PAIN: ICD-10-CM

## 2021-12-30 PROCEDURE — 76642 ULTRASOUND BREAST LIMITED: CPT

## 2021-12-30 RX ORDER — NAPROXEN 250 MG/1
TABLET ORAL
Qty: 30 TABLET | Refills: 2 | Status: SHIPPED | OUTPATIENT
Start: 2021-12-30 | End: 2022-01-18 | Stop reason: ALTCHOICE

## 2022-01-03 ENCOUNTER — TELEPHONE (OUTPATIENT)
Dept: CARDIOLOGY CLINIC | Facility: CLINIC | Age: 37
End: 2022-01-03

## 2022-01-03 ENCOUNTER — TELEPHONE (OUTPATIENT)
Dept: FAMILY MEDICINE CLINIC | Facility: CLINIC | Age: 37
End: 2022-01-03

## 2022-01-03 DIAGNOSIS — I10 ESSENTIAL HYPERTENSION: ICD-10-CM

## 2022-01-03 DIAGNOSIS — Z98.84 BARIATRIC SURGERY STATUS: ICD-10-CM

## 2022-01-03 DIAGNOSIS — E55.9 VITAMIN D DEFICIENCY: ICD-10-CM

## 2022-01-03 DIAGNOSIS — D50.8 OTHER IRON DEFICIENCY ANEMIA: ICD-10-CM

## 2022-01-03 DIAGNOSIS — E66.01 MORBID OBESITY (HCC): ICD-10-CM

## 2022-01-03 RX ORDER — FERROUS SULFATE 325(65) MG
1 TABLET ORAL 2 TIMES DAILY WITH MEALS
Qty: 180 TABLET | Refills: 0 | Status: SHIPPED | OUTPATIENT
Start: 2022-01-03

## 2022-01-03 RX ORDER — AMLODIPINE BESYLATE 5 MG/1
5 TABLET ORAL DAILY
Qty: 90 TABLET | Refills: 0 | Status: SHIPPED | OUTPATIENT
Start: 2022-01-03 | End: 2022-01-14

## 2022-01-03 RX ORDER — HYDROCHLOROTHIAZIDE 25 MG/1
25 TABLET ORAL DAILY
Qty: 90 TABLET | Refills: 0 | Status: SHIPPED | OUTPATIENT
Start: 2022-01-03 | End: 2022-05-20

## 2022-01-03 RX ORDER — MELATONIN
1000 DAILY
Qty: 30 TABLET | Refills: 0 | Status: SHIPPED | OUTPATIENT
Start: 2022-01-03

## 2022-01-03 RX ORDER — OMEPRAZOLE 20 MG/1
CAPSULE, DELAYED RELEASE ORAL
Qty: 180 CAPSULE | Refills: 0 | Status: SHIPPED | OUTPATIENT
Start: 2022-01-03

## 2022-01-03 NOTE — TELEPHONE ENCOUNTER
----- Message from Maryam Rodriguez MD sent at 1/1/2022  3:01 PM EST -----  This patient was previously seen on 06/14/2021 following being in the ED for chest pain  Due to EKG changes, a stress test was ordered  Patient never had a stress test done  There is no purpose in me seeing this patient in till she has her stress test done      If we cancel this patient, it will solve are problem on the previous patient, Tonjoe Dionne, who you can then give a 40 minute spot to

## 2022-01-03 NOTE — TELEPHONE ENCOUNTER
Patient was moving over the weekend and lost the bag with her medications req them to be sent to the pharmacy explained they might not be covered but will send in for her,

## 2022-01-03 NOTE — TELEPHONE ENCOUNTER
Spoke to pts mom she will contact her daughter to call us needs stress ordered first before Dr cris chavez

## 2022-01-07 ENCOUNTER — OFFICE VISIT (OUTPATIENT)
Dept: BARIATRICS | Facility: CLINIC | Age: 37
End: 2022-01-07

## 2022-01-07 ENCOUNTER — TELEPHONE (OUTPATIENT)
Dept: HEMATOLOGY ONCOLOGY | Facility: CLINIC | Age: 37
End: 2022-01-07

## 2022-01-07 VITALS — BODY MASS INDEX: 43.36 KG/M2 | WEIGHT: 254 LBS | HEIGHT: 64 IN

## 2022-01-07 DIAGNOSIS — Z98.84 BARIATRIC SURGERY STATUS: Primary | ICD-10-CM

## 2022-01-07 DIAGNOSIS — R63.5 WEIGHT GAIN FOLLOWING GASTRIC BYPASS SURGERY: Primary | ICD-10-CM

## 2022-01-07 DIAGNOSIS — Z98.84 WEIGHT GAIN FOLLOWING GASTRIC BYPASS SURGERY: Primary | ICD-10-CM

## 2022-01-07 PROCEDURE — RECHECK

## 2022-01-07 PROCEDURE — RECHECK: Performed by: DIETITIAN, REGISTERED

## 2022-01-07 NOTE — PROGRESS NOTES
Referral for MWM program  Patient lives with her  and three children, ages 12, 15, and 6  Works as HR Supervisor for [employer]  Started this job 6 months ago moving from direct care staff to administrative staff  Change in activity level with job  Patient goes to the gym 3-4 times a week for one and a half hours doing Cardio  Suggested to patient she work with  at gym and develop weight training program to increase metabolism  Reviewed recommendations of no tobacco and no alcohol  Patient does not smoke or drink  Patient denies Mental Health diagnosis and treatment  Patient feels she has good support from her , daughter and mother in her weight loss efforts   Patient is appropriate for MWM program

## 2022-01-07 NOTE — PROGRESS NOTES
Bariatric  Nutrition Assessment Note    Type of surgery  Gastric bypass: laparoscopic  Surgery Date:2016 [facility] with Dr Carrol Trent   5 years  post-op  Lap robotic revision RNY, GJ anastomosis, partial gastrectomy, B/L vagotomy, small bowel resection, PEHR, DAYRON and EGD  Surgery date 12/15/2020  Surgeon: Dr Cyndi Garcia  29 y o   female  LMP 12/20/2021      Wt Readings from Last 3 Encounters:   12/23/21 113 kg (250 lb)   12/14/21 113 kg (250 lb)   11/17/21 113 kg (249 lb)       [place] [place] Equation:  9203  Estimated calories for weight loss 0767-1242 kcal per day  ( 1-2# per wk wt loss - sedentary )  Estimated protein needs (1 0-1 2 gms/kg IBW )   Estimated fluid needs ***(30-35 ml/kg IBW )      Weight on Day of Weight Loss Surgery:   Weight in (lb) to have BMI = 25: 146 5#  Pre-Op Excess Wt: ***  Post-Op Wt Loss: ***#/ ***% EBWL in *** {TIME [facility]:93692}    Review of History and Medications   Past Medical History:   Diagnosis Date    Anemia     BMI 36 0-36 9,adult 12/8/2020    Cough 12/21/2020    COVID-19 virus infection 12/23/2020    Disease of thyroid gland     Dizziness     due to anemia, last episode August 2020, no falls    Fatigue     History of transfusion 2016    anemic - loss of blood due to bleeding ulcers, no reaction    Hyperlipidemia     Hypertension     Multiple gastric ulcers     Obesity     11/14/16    Thyroid disease     took synthroid but not currently taking    Thyroid mass 3/15/2021     A CT scan of the neck incidental finding 1 7 cm of the right thyroid    Ulcer      Past Surgical History:   Procedure Laterality Date    GASTRIC BYPASS  2014    GASTRIC BYPASS LAPAROSCOPIC N/A 12/15/2020    Procedure: Robotic lysis of adhesions, small-bowel resection, partial gastrectomy, paraesophageal hernia repair, bilateral truncal vagotomy; Robotic gastrojejunostomy anastomosis with intraop endoscopy;  Surgeon: Ash Thomson MD;  Location[facility];  Service: Bariatrics    LITO-EN-Y PROCEDURE  2016    Gastric Bypass by Dr Rosas Greene Weight 339 6,nw    TUBAL LIGATION Bilateral     2010    WISDOM TOOTH EXTRACTION       Social History     Socioeconomic History    Marital status: /Civil Union     Spouse name: Not on file    Number of children: Not on file    Years of education: Not on file    Highest education level: Not on file   Occupational History    Not on file   Tobacco Use    Smoking status: Former Smoker     Types: Cigarettes     Quit date:      Years since quittin 0    Smokeless tobacco: Former User    Tobacco comment: former   Vaping Use    Vaping Use: Never used   Substance and Sexual Activity    Alcohol use: No    Drug use: No    Sexual activity: Yes     Partners: Male     Birth control/protection: Female Sterilization   Other Topics Concern    Not on file   Social History Narrative    fhx not asked in triage     Social Determinants of Health     Financial Resource Strain: Low Risk     Difficulty of Paying Living Expenses: Not hard at all   Food Insecurity: No Food Insecurity    Worried About [address] in the Last Year: Never true    [address] [employer] in the Last Year: Never true   Transportation Needs: No Transportation Needs    Lack of Transportation (Medical): No    Lack of Transportation (Non-Medical): No   Physical Activity: Sufficiently Active    Days of Exercise per Week: 7 days    Minutes of Exercise per Session: 30 min   Stress: No Stress Concern Present    Feeling of Stress : Only a little   Social Connections: Socially Integrated    Frequency of Communication with Friends and Family: More than three times a week    Frequency of Social Gatherings with Friends and Family: Never    Attends Yaz[employer] Services: More than 4 times per year    Active Member of [employer] or Organizations:  Yes    Attends Club or Organization Meetings: More than 4 times per year    Marital Status:    Intimate Partner Violence: Not At Risk    Fear of Current or Ex-Partner: No    Emotionally Abused: No    Physically Abused: No    Sexually Abused: No   Housing Stability: Not on file       Current Outpatient Medications:     acetaminophen (TYLENOL) 500 mg tablet, Take 1 tablet (500 mg total) by mouth every 6 (six) hours as needed for mild pain, Disp: 30 tablet, Rfl: 0    albuterol (PROVENTIL HFA,VENTOLIN HFA) 90 mcg/act inhaler, Inhale 2 puffs every 4 (four) hours as needed for wheezing, Disp: 1 Inhaler, Rfl: 2    amLODIPine (NORVASC) 5 mg tablet, Take 1 tablet (5 mg total) by mouth daily, Disp: 90 tablet, Rfl: 0    calcium carbonate 1250 MG capsule, Take 1,250 mg by mouth 2 (two) times a day with meals, Disp: , Rfl:     cholecalciferol (VITAMIN D3) 1,000 units tablet, Take 1 tablet (1,000 Units total) by mouth daily, Disp: 30 tablet, Rfl: 0    ferrous sulfate (FeroSul) 325 (65 Fe) mg tablet, Take 1 tablet (325 mg total) by mouth 2 (two) times a day with meals, Disp: 180 tablet, Rfl: 0    fluticasone (FLONASE) 50 mcg/act nasal spray, SHAKE LIQUID AND USE 2 SPRAYS IN EACH NOSTRIL DAILY, Disp: 48 g, Rfl: 0    hydrochlorothiazide (HYDRODIURIL) 25 mg tablet, Take 1 tablet (25 mg total) by mouth daily, Disp: 90 tablet, Rfl: 0    Multiple Vitamin (MULTIVITAMIN) tablet, Take 1 tablet by mouth daily, Disp: , Rfl:     naproxen (NAPROSYN) 250 mg tablet, TAKE 1 TABLET(250 MG) BY MOUTH TWICE DAILY WITH MEALS, Disp: 30 tablet, Rfl: 2    omeprazole (PriLOSEC) 20 mg delayed release capsule, TAKE 1 CAPSULE(20 MG) BY MOUTH TWICE DAILY, Disp: 180 capsule, Rfl: 0    Food Intake and Lifestyle Assessment   Food Intake Assessment completed via {oral intake:31598}  Breakfast: ***  Snack: ***   Lunch: ***  Snack: ***  Dinner: ***  Snack: ***  Beverage intake: {beverage JLWE:68220}  Diet texture/stage: {diet texture/stage:20908}  Protein supplement: ***  Estimated protein intake per day: ***  Estimated fluid intake per day: ***  Meals eaten away from home: ***  Typical meal pattern: *** meals per day and *** snacks per day  Eating Behaviors: {NUT Eating Behaviors:99860}    Food allergies or intolerances: ***  Cultural or Mandaen considerations: ***    Physical Assessment  Nutrition Related Findings  {NUT Physical Assessment:36654}    Physical Activity  Types of exercise: {NUT EXERCISE GLQS:76483}  Current physical limitations: ***    Psychosocial Assessment   Support systems: {nutrition support systems:55457}  Socioeconomic factors: ***    Nutrition Diagnosis  Diagnosis: {LV LM WT LOSS NUTRITIONAL DX:72063}  Related to: {LV LM WT LOSS RELATED TO:49331}  As Evidenced by: {LV LM WT LOSS AS EVIDENCED OX:87745}     Nutrition Prescription: Recommend the following diet  {nutrition prescription:95352}    Meal Plan ( Kalyan/Pro/Carb)   Breakfast: 200-300/20/30  Snack: 150/>5/20  Lunch: 300/30/30-45  Snack: 150/>5/20  Dinner: 300/30/30-45  Snack: 150/>5/20       Interventions and Teaching   Patient educated on post-op nutrition guidelines  Patient educated and handouts provided    {bariatric patient education:33693}    Education provided to: {Patient/Family/Caregiver:41122}    Barriers to learning: {NUT Barriers to Tx:95204}    Readiness to change: {dx readiness to change:01432}    Comprehension: {RESPONSES; PT CLJ:80552}     Expected Compliance: {GOOD:19141}    Evaluation/Monitoring   {dietitian to monitor:37866}    Goals  {GOALS:10462}     Time Spent:   {Time; intervals:01277} ***

## 2022-01-07 NOTE — PROGRESS NOTES
Bariatric Follow Up Nutrition Note    Type of surgery  Gastric bypass: laparoscopic  Surgery Date: 2016 by Dr Everton Rogers at [facility]  5 years  post-op  Surgery:Lap robotic revision RNY, GJ anastomosis, partial gastrectomy, B/L vagotomy, small bowel resection, PEHR, DAYRON and EGD  Surgery date 12/15/2020  Surgeon: Dr Sera Weems  29 y o   female  LMP 12/20/2021    Ht 5' 4" (1 626 m)   Wt 115 kg (254 lb) Comment: self reported weight  LMP 12/20/2021   BMI 43 60 kg/m²      Pt has gained 46# since her surgery one year ago  States that she can tolerate all food now without issue( prior to revision had some difficulties with foods and was primarily tolerating liquids )     Wt Readings from Last 3 Encounters:   12/23/21 113 kg (250 lb)   12/14/21 113 kg (250 lb)   11/17/21 113 kg (249 lb)     [facility] Equation:  1601 kcal  Estimated calories for weight loss 922-1422 kcal  ( 1-2# per wk wt loss - sedentary )  Estimated protein needs 67-80 grams (1 0-1 2 gms/kg IBW )   Estimated fluid needs 2615 ml (35 ml/kg IBW ) 67 ounces or more per day     Weight on Day of Weight Loss Surgery: 339 6  Weight in (lb) to have BMI = 25: 146 4#  Pre-Op Excess Wt: 193 2#  Post-Op Wt Loss: 89 6#/46 % EBWL in 5 year(s)  Isidoro wt 190# 2 months post op     Review of History and Medications   Past Medical History:   Diagnosis Date    Anemia     BMI 36 0-36 9,adult 12/8/2020    Cough 12/21/2020    COVID-19 virus infection 12/23/2020    Disease of thyroid gland     Dizziness     due to anemia, last episode August 2020, no falls    Fatigue     History of transfusion 2016    anemic - loss of blood due to bleeding ulcers, no reaction    Hyperlipidemia     Hypertension     Multiple gastric ulcers     Obesity     11/14/16    Thyroid disease     took synthroid but not currently taking    Thyroid mass 3/15/2021     A CT scan of the neck incidental finding 1 7 cm of the right thyroid    Ulcer      Past Surgical History:   Procedure Laterality Date    GASTRIC BYPASS      GASTRIC BYPASS LAPAROSCOPIC N/A 12/15/2020    Procedure: Robotic lysis of adhesions, small-bowel resection, partial gastrectomy, paraesophageal hernia repair, bilateral truncal vagotomy; Robotic gastrojejunostomy anastomosis with intraop endoscopy;  Surgeon: Mike Grissom MD;  Location: [facility];  Service: Bariatrics    LITO-EN-Y PROCEDURE  2016    Gastric Bypass by Dr Dallin Lim Weight 339 6,nw    TUBAL LIGATION Bilateral         WISDOM TOOTH EXTRACTION       Social History     Socioeconomic History    Marital status: /Civil Union     Spouse name: Not on file    Number of children: Not on file    Years of education: Not on file    Highest education level: Not on file   Occupational History    Not on file   Tobacco Use    Smoking status: Former Smoker     Types: Cigarettes     Quit date:      Years since quittin 0    Smokeless tobacco: Former User    Tobacco comment: former   Vaping Use    Vaping Use: Never used   Substance and Sexual Activity    Alcohol use: No    Drug use: No    Sexual activity: Yes     Partners: Male     Birth control/protection: Female Sterilization   Other Topics Concern    Not on file   Social History Narrative    fhx not asked in triage     Social Determinants of Health     Financial Resource Strain: Low Risk     Difficulty of Paying Living Expenses: Not hard at all   Food Insecurity: No Food Insecurity    Worried About [address] in the Last Year: Never true    [facility] in the Last Year: Never true   Transportation Needs: No Transportation Needs    Lack of Transportation (Medical): No    Lack of Transportation (Non-Medical): No   Physical Activity: Sufficiently Active    Days of Exercise per Week: 7 days    Minutes of Exercise per Session: 30 min   Stress: No Stress Concern Present    Feeling of Stress :  Only a little   Social Connections: Socially Integrated    Frequency of Communication with Friends and Family: More than three times a week    Frequency of Social Gatherings with Friends and Family: Never    Attends Restorationism Services: More than 4 times per year    Active Member of [employer] or Organizations:  Yes    Attends Club or Organization Meetings: More than 4 times per year    Marital Status:    Intimate Partner Violence: Not At Risk    Fear of Current or Ex-Partner: No    Emotionally Abused: No    Physically Abused: No    Sexually Abused: No   Housing Stability: Not on file       Current Outpatient Medications:     acetaminophen (TYLENOL) 500 mg tablet, Take 1 tablet (500 mg total) by mouth every 6 (six) hours as needed for mild pain, Disp: 30 tablet, Rfl: 0    albuterol (PROVENTIL HFA,VENTOLIN HFA) 90 mcg/act inhaler, Inhale 2 puffs every 4 (four) hours as needed for wheezing, Disp: 1 Inhaler, Rfl: 2    amLODIPine (NORVASC) 5 mg tablet, Take 1 tablet (5 mg total) by mouth daily, Disp: 90 tablet, Rfl: 0    calcium carbonate 1250 MG capsule, Take 1,250 mg by mouth 2 (two) times a day with meals, Disp: , Rfl:     cholecalciferol (VITAMIN D3) 1,000 units tablet, Take 1 tablet (1,000 Units total) by mouth daily, Disp: 30 tablet, Rfl: 0    ferrous sulfate (FeroSul) 325 (65 Fe) mg tablet, Take 1 tablet (325 mg total) by mouth 2 (two) times a day with meals, Disp: 180 tablet, Rfl: 0    fluticasone (FLONASE) 50 mcg/act nasal spray, SHAKE LIQUID AND USE 2 SPRAYS IN EACH NOSTRIL DAILY, Disp: 48 g, Rfl: 0    hydrochlorothiazide (HYDRODIURIL) 25 mg tablet, Take 1 tablet (25 mg total) by mouth daily, Disp: 90 tablet, Rfl: 0    Multiple Vitamin (MULTIVITAMIN) tablet, Take 1 tablet by mouth daily, Disp: , Rfl:     naproxen (NAPROSYN) 250 mg tablet, TAKE 1 TABLET(250 MG) BY MOUTH TWICE DAILY WITH MEALS, Disp: 30 tablet, Rfl: 2    omeprazole (PriLOSEC) 20 mg delayed release capsule, TAKE 1 CAPSULE(20 MG) BY MOUTH TWICE DAILY, Disp: 180 capsule, Rfl: 0     Food Intake and Lifestyle Assessment   Food Intake Assessment completed via usual diet recall  Breakfast: eggs and toast OR oatmeal   Snack: fruit    Lunch: salad with chicken   Snack: fruit   Dinner: does not feel like eating a  Meal -will graze all evening on crunchy snacks/foods   Beverage intake: water and reports that plain water "hurts" her stomach   Chews ice all day   Diet texture/stage: regular  Protein supplement: none currently   Estimated protein intake per day: 70-80 grams   Estimated fluid intake per day: chews multiple cups of ice per day( 32 ounces cups )  - 64 ounces or more per day   Meals eaten away from home: rarely, maybe once to twice per  Month   Typical meal pattern: 2 meals per day and 3 snacks per day  Eating Behaviors: Appropriate diet advancement, Does not drink with meals and waits 30-minutes after meal before resuming drinking, Frequent snacking/ grazing, Mindless eating and Craves sweet foods  Food allergies or intolerances: none at this time   Cultural or Jainism considerations:      Physical Assessment  Nutrition Related Findings  Nausea  Chews ice, craves "crunchy"   Patient with anemia - some dizziness and fatigue    Pt has allergy to IV iron  Has hematology consult( 1/10/2022 )scheduled to see if there are other alternatives that she can try   Unable to bring up her iron with oral supplementation     Lab Results   Component Value Date    IRON 81 12/14/2021    TIBC 427 05/28/2021    FERRITIN 6 (L) 12/14/2021     Lab Results   Component Value Date    WBC 9 80 12/14/2021    HGB 10 8 (L) 12/14/2021    HCT 35 4 12/14/2021    MCV 89 12/14/2021     12/14/2021     Lab Results   Component Value Date    CYZZAGLM80 442 09/29/2021     Physical Activity  Types of exercise: Walking  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     Tracks her steps to 3000 per day - sometimes 4000 per day   Current physical limitations: some dizziness due to poor po intake     Psychosocial Assessment   Support systems: spouse  Socioeconomic factors: works full time at office for home [employer] agency     Nutrition Diagnosis  Diagnosis: Overweight / Obesity (NC-3 3) and Excessive energy intake (NI-1 5)  Related to: Physical inactivity, Excessive energy intake and grazing   As Evidenced by: BMI >25, Expected anthropometric outcomes are not achieved and Unintentional weight gain    Nutrition Prescription: Recommend the following diet  1400 kcal/ 90 grams protein / 140 grams of carb / 55 grams of fat  70 ounces of fluid per day     Interventions and Teaching   Patient educated on post-op nutrition guidelines  Patient educated and handouts provided    Surgical changes to stomach / GI  Capacity of post-surgery stomach  Adequate hydration  Weight loss plateaus/ possibility of weight regain  Exercise  Nutrition considerations after surgery  Protein supplements  Appropriate carbohydrate, protein, and fat intake, and food/fluid choices to maximize safe weight loss, nutrient intake, and tolerance   Dietary and lifestyle changes  Possible problems with poor eating habits  Intuitive eating  Techniques for self monitoring and keeping daily food journal  Vitamin / Mineral supplementation of Multivitamin with minerals, Calcium, Vitamin B12, Iron, Fat Soluble vitamins and Vitamin D  Patient currently takes - adult multivitamin and mineral supplement, 2 iron supplements per day ( unsure of dose) vitamin D, calcium , B12 ( unsure of dose ) - referral to see Hematology on January 10th for anemia     Reviewed labs - recommended 1000 mcg of B12  Patient with anemia but cannot tolerate IV iron - seeing hematology on Monday   Provided with BA capsules of 45 mg iron with vitamin C and thiamine ( recommend take 2 per day)  Also provided with samples of soft chew Celebrate iron with vitamin C ( 30 to 60 mg) and celebrate hard chew 60 mg iron with vitamin C- recommended 2 per day     Education provided to: patient    Barriers to learning: No barriers identified    Readiness to change: action    Comprehension: verbalizes understanding     Expected Compliance: good    Evaluation/Monitoring   Eating pattern as discussed Tolerance of nutrition prescription Body weight Lab values    Goals  Food journal, Eat 3 meals per day and Eliminate mindless snacking  Recommend to stop GRAZING  Shift to eating 2 meals ( B & L) shake for dinner and protein bar for evening snack  Drink fluids( try low calorie flavorings for water )   Measure foods at meals - 3/4 cup for total meal and eat off smaller plate  Switch from regular creamer in coffee to sugar free creamer in coffee   Increase steps to 2000 per day     Referral to [facility] provider         Time Spent:   45 Minutes

## 2022-01-07 NOTE — TELEPHONE ENCOUNTER
I spoke to the patient in regards to lab orders that were placed in the system for her to have completed prior to her appointment on 1/10/22 at 11:20am  Patient states understanding

## 2022-01-10 ENCOUNTER — TELEPHONE (OUTPATIENT)
Dept: HEMATOLOGY ONCOLOGY | Facility: CLINIC | Age: 37
End: 2022-01-10

## 2022-01-13 ENCOUNTER — TELEMEDICINE (OUTPATIENT)
Dept: FAMILY MEDICINE CLINIC | Facility: CLINIC | Age: 37
End: 2022-01-13
Payer: COMMERCIAL

## 2022-01-13 VITALS — HEART RATE: 86 BPM | TEMPERATURE: 99.4 F | OXYGEN SATURATION: 98 %

## 2022-01-13 DIAGNOSIS — J06.9 UPPER RESPIRATORY TRACT INFECTION, UNSPECIFIED TYPE: Primary | ICD-10-CM

## 2022-01-13 DIAGNOSIS — Z92.29 COVID-19 VACCINE SERIES COMPLETED: ICD-10-CM

## 2022-01-13 PROCEDURE — U0005 INFEC AGEN DETEC AMPLI PROBE: HCPCS | Performed by: NURSE PRACTITIONER

## 2022-01-13 PROCEDURE — U0003 INFECTIOUS AGENT DETECTION BY NUCLEIC ACID (DNA OR RNA); SEVERE ACUTE RESPIRATORY SYNDROME CORONAVIRUS 2 (SARS-COV-2) (CORONAVIRUS DISEASE [COVID-19]), AMPLIFIED PROBE TECHNIQUE, MAKING USE OF HIGH THROUGHPUT TECHNOLOGIES AS DESCRIBED BY CMS-2020-01-R: HCPCS | Performed by: NURSE PRACTITIONER

## 2022-01-13 PROCEDURE — 99214 OFFICE O/P EST MOD 30 MIN: CPT | Performed by: NURSE PRACTITIONER

## 2022-01-13 RX ORDER — BENZONATATE 100 MG/1
200 CAPSULE ORAL 3 TIMES DAILY PRN
Qty: 20 CAPSULE | Refills: 0 | Status: SHIPPED | OUTPATIENT
Start: 2022-01-13 | End: 2022-02-15

## 2022-01-13 RX ORDER — GUAIFENESIN 600 MG
1200 TABLET, EXTENDED RELEASE 12 HR ORAL EVERY 12 HOURS SCHEDULED
Qty: 20 TABLET | Refills: 0 | Status: SHIPPED | OUTPATIENT
Start: 2022-01-13 | End: 2022-06-08 | Stop reason: ALTCHOICE

## 2022-01-13 NOTE — PROGRESS NOTES
COVID-19 Outpatient Progress Note    Assessment/Plan:    Problem List Items Addressed This Visit        Respiratory    Upper respiratory tract infection - Primary     Acute symptomatic patient had no onset of sore throat and abdominal pain and diarrhea cough chest heaviness starting 01/12/2022  Patient has had COVID vaccine series completed  Patient denies known COVID contact  Will recommend increase fluids COVID vitamins and COVID testing sent to lab  Patient call with any worsening symptoms         Relevant Medications    guaiFENesin (MUCINEX) 600 mg 12 hr tablet    benzonatate (TESSALON PERLES) 100 mg capsule    Other Relevant Orders    COVID Only - Office Collect       Other    COVID-19 vaccine series completed     Patient has had Marshal Michelley vaccine series completed last injection provided 07/01/2021  Patient did have COVID infection 12/21/2020  patient is currently symptomatic  will recommend COVID testing              Disposition:     Recommended patient to come to the office to test for COVID-19  I have spent 15 minutes directly with the patient  Greater than 50% of this time was spent in counseling/coordination of care regarding: instructions for management and patient and family education  Encounter provider EN Guerrero    Provider located at [facility] AT [place] [address] [address] [place] [id number] [phone]    Recent Visits  Date Type Provider Dept   01/13/22 Telemedicine [place] [address]  Primary Care Neto Najera   Showing recent visits within past 7 days and meeting all other requirements  Future Appointments  No visits were found meeting these conditions  Showing future appointments within next 150 days and meeting all other requirements     This virtual check-in was done via ???? and patient was informed that this is a secure, HIPAA-compliant platform   She agrees to proceed  Patient agrees to participate in a virtual check in via telephone or video visit instead of presenting to the office to address urgent/immediate medical needs  Patient is aware this is a billable service  After connecting through [facility], the patient was identified by name and date of birth  Joanne Tavares was informed that this was a telemedicine visit and that the exam was being conducted confidentially over secure lines  My office door was closed  No one else was in the room  Joanne Tavares acknowledged consent and understanding of privacy and security of the telemedicine visit  I informed the patient that I have reviewed her record in Epic and presented the opportunity for her to ask any questions regarding the visit today  The patient agreed to participate  Verification of patient location:  Patient is located in the following state in which I hold an active license: [place]    Subjective: Joanne Tavares is a 29 y o  female who is concerned about COVID-19  Patient's symptoms include sore throat, cough, chest tightness, abdominal pain and diarrhea  Patient denies fever, chills, fatigue, malaise, congestion, rhinorrhea, anosmia, loss of taste, shortness of breath, nausea, vomiting, myalgias and headaches       - Date of symptom onset: 1/12/2022      COVID-19 vaccination status: Fully vaccinated (primary series)    Exposure:   Contact with a person who is under investigation (PUI) for or who is positive for COVID-19 within the last 14 days?: No    Hospitalized recently for fever and/or lower respiratory symptoms?: No      Currently a healthcare worker that is involved in direct patient care?: No      Works in a special setting where the risk of COVID-19 transmission may be high? (this may include long-term care, correctional and jail facilities; homeless shelters; assisted-living facilities and group homes ): No      Resident in a special setting where the risk of COVID-19 transmission may be high? (this may include long-term care, correctional and senior living facilities; homeless shelters; assisted-living facilities and group homes ): No      Lab Results   Component Value Date    SARSCOV2 Negative 12/13/2021    SARSCOV2 Detected (A) 12/21/2020    Yuan Valenzuela Not Detected 10/24/2020     Past Medical History:   Diagnosis Date    Anemia     BMI 36 0-36 9,adult 12/8/2020    Cough 12/21/2020    COVID-19 virus infection 12/23/2020    Disease of thyroid gland     Dizziness     due to anemia, last episode August 2020, no falls    Fatigue     History of transfusion 2016    anemic - loss of blood due to bleeding ulcers, no reaction    Hyperlipidemia     Hypertension     Multiple gastric ulcers     Obesity     11/14/16    Thyroid disease     took synthroid but not currently taking    Thyroid mass 3/15/2021     A CT scan of the neck incidental finding 1 7 cm of the right thyroid    Ulcer      Past Surgical History:   Procedure Laterality Date    GASTRIC BYPASS  2014    GASTRIC BYPASS LAPAROSCOPIC N/A 12/15/2020    Procedure: Robotic lysis of adhesions, small-bowel resection, partial gastrectomy, paraesophageal hernia repair, bilateral truncal vagotomy; Robotic gastrojejunostomy anastomosis with intraop endoscopy;  Surgeon: Warren Abrams MD;  Location: [facility];  Service: Bariatrics    LITO-EN-Y PROCEDURE  11/14/2016    Gastric Bypass by Dr Delilah Weaver Weight 339 6,nw    TUBAL LIGATION Bilateral     2010    WISDOM TOOTH EXTRACTION       Current Outpatient Medications   Medication Sig Dispense Refill    albuterol (PROVENTIL HFA,VENTOLIN HFA) 90 mcg/act inhaler Inhale 2 puffs every 4 (four) hours as needed for wheezing 1 Inhaler 2    calcium carbonate 1250 MG capsule Take 1,250 mg by mouth 2 (two) times a day with meals      cholecalciferol (VITAMIN D3) 1,000 units tablet Take 1 tablet (1,000 Units total) by mouth daily 30 tablet 0    ferrous sulfate (FeroSul) 325 (65 Fe) mg tablet Take 1 tablet (325 mg total) by mouth 2 (two) times a day with meals 180 tablet 0    hydrochlorothiazide (HYDRODIURIL) 25 mg tablet Take 1 tablet (25 mg total) by mouth daily 90 tablet 0    Multiple Vitamin (MULTIVITAMIN) tablet Take 1 tablet by mouth daily      omeprazole (PriLOSEC) 20 mg delayed release capsule TAKE 1 CAPSULE(20 MG) BY MOUTH TWICE DAILY 180 capsule 0    acetaminophen (TYLENOL) 500 mg tablet Take 1 tablet (500 mg total) by mouth every 6 (six) hours as needed for mild pain (Patient not taking: Reported on 1/13/2022 ) 30 tablet 0    amLODIPine (NORVASC) 5 mg tablet TAKE 1 TABLET(5 MG) BY MOUTH DAILY 30 tablet 2    benzonatate (TESSALON PERLES) 100 mg capsule Take 2 capsules (200 mg total) by mouth 3 (three) times a day as needed for cough 20 capsule 0    fluticasone (FLONASE) 50 mcg/act nasal spray SHAKE LIQUID AND USE 2 SPRAYS IN EACH NOSTRIL DAILY (Patient not taking: Reported on 1/13/2022) 48 g 0    guaiFENesin (MUCINEX) 600 mg 12 hr tablet Take 2 tablets (1,200 mg total) by mouth every 12 (twelve) hours 20 tablet 0    naproxen (NAPROSYN) 250 mg tablet TAKE 1 TABLET(250 MG) BY MOUTH TWICE DAILY WITH MEALS (Patient not taking: Reported on 1/13/2022) 30 tablet 2     No current facility-administered medications for this visit  Allergies   Allergen Reactions    [place] Oil - Food Allergy Shortness Of Breath    Venofer [Iron Sucrose]      Chest pressure after 1st dose  Tachycardia and blurred vision after 8 minutes of dose #2  Review of Systems   Constitutional: Negative for activity change, chills, fatigue and fever  HENT: Positive for sore throat  Negative for congestion, rhinorrhea and sneezing  Respiratory: Positive for cough and chest tightness  Negative for shortness of breath  Gastrointestinal: Positive for abdominal pain and diarrhea  Negative for nausea and vomiting  Musculoskeletal: Negative for myalgias  Neurological: Negative for headaches  Objective:    Vitals:    01/13/22 1016   Pulse: 86   Temp: 99 4 °F (37 4 °C)   SpO2: 98%       Physical Exam  Vitals and nursing note reviewed  Constitutional:       General: She is not in acute distress  Appearance: Normal appearance  She is ill-appearing  She is not toxic-appearing or diaphoretic  HENT:      Head: Normocephalic and atraumatic  Nose: No rhinorrhea  Eyes:      General:         Right eye: No discharge  Left eye: No discharge  Pulmonary:      Effort: Pulmonary effort is normal  No respiratory distress  Musculoskeletal:         General: Normal range of motion  Cervical back: Normal range of motion  Skin:     Coloration: Skin is not jaundiced or pale  Findings: No bruising, erythema, lesion or rash  Neurological:      Mental Status: She is alert and oriented to person, place, and time  Psychiatric:         Mood and Affect: Mood normal          Behavior: Behavior normal          Thought Content: Thought content normal          Judgment: Judgment normal          VIRTUAL VISIT DISCLAIMER    Vincent Zhao verbally agrees to participate in [employer]  Pt is aware that [employer] could be limited without vital signs or the ability to perform a full hands-on physical [place] understands she or the provider may request at any time to terminate the video visit and request the patient to seek care or treatment in person

## 2022-01-14 ENCOUNTER — TELEMEDICINE (OUTPATIENT)
Dept: FAMILY MEDICINE CLINIC | Facility: CLINIC | Age: 37
End: 2022-01-14
Payer: COMMERCIAL

## 2022-01-14 DIAGNOSIS — R06.2 WHEEZING: ICD-10-CM

## 2022-01-14 DIAGNOSIS — U07.1 COVID-19 VIRUS INFECTION: Primary | ICD-10-CM

## 2022-01-14 DIAGNOSIS — I10 ESSENTIAL HYPERTENSION: ICD-10-CM

## 2022-01-14 DIAGNOSIS — R06.02 SOB (SHORTNESS OF BREATH) ON EXERTION: ICD-10-CM

## 2022-01-14 PROBLEM — J06.9 UPPER RESPIRATORY TRACT INFECTION: Status: ACTIVE | Noted: 2022-01-14

## 2022-01-14 PROBLEM — J06.9 UPPER RESPIRATORY TRACT INFECTION: Status: RESOLVED | Noted: 2022-01-14 | Resolved: 2022-01-14

## 2022-01-14 LAB — SARS-COV-2 RNA RESP QL NAA+PROBE: POSITIVE

## 2022-01-14 PROCEDURE — 99214 OFFICE O/P EST MOD 30 MIN: CPT | Performed by: NURSE PRACTITIONER

## 2022-01-14 RX ORDER — AMLODIPINE BESYLATE 5 MG/1
TABLET ORAL
Qty: 30 TABLET | Refills: 2 | Status: SHIPPED | OUTPATIENT
Start: 2022-01-14

## 2022-01-14 RX ORDER — ALBUTEROL SULFATE 2.5 MG/3ML
2.5 SOLUTION RESPIRATORY (INHALATION) EVERY 6 HOURS PRN
Qty: 180 ML | Refills: 5 | Status: SHIPPED | OUTPATIENT
Start: 2022-01-14

## 2022-01-14 NOTE — ASSESSMENT & PLAN NOTE
New diagnoses acute symptomatic patient positive for COVID 01/13/2022  Patient began with symptoms on 01/12/2022 and continues with fatigue chest tightness shortness of breath change of taste rhinorrhea diarrhea and cough  Patient has had COVID vaccine series completed and has a history of COVID infection  Will recommend increase fluids COVID vitamins and can come off quarantine 01/17/2022 with 5 days of strict masking following    Patient to call with any worsening of symptoms

## 2022-01-14 NOTE — ASSESSMENT & PLAN NOTE
Patient has had Margarie Achilles vaccine series completed last injection provided 07/01/2021  Patient did have COVID infection 12/21/2020     patient is currently symptomatic  will recommend COVID testing

## 2022-01-14 NOTE — PROGRESS NOTES
COVID-19 Outpatient Progress Note    Assessment/Plan:    Problem List Items Addressed This Visit        Other    COVID-19 virus infection - Primary     New diagnoses acute symptomatic patient positive for COVID 01/13/2022  Patient began with symptoms on 01/12/2022 and continues with fatigue chest tightness shortness of breath change of taste rhinorrhea diarrhea and cough  Patient has had COVID vaccine series completed and has a history of COVID infection  Will recommend increase fluids COVID vitamins and can come off quarantine 01/17/2022 with 5 days of strict masking following  Patient to call with any worsening of symptoms         Relevant Orders    XR chest pa & lateral    SOB (shortness of breath) on exertion     Acute symptomatic secondary to COVID infection 01/13/2022  Associated symptoms include cough wheezing and fatigue  No ability to check oxygen saturation  Will recommend breathing exercises albuterol nebulizer every 6 hours as needed and orders placed for chest x-ray  Patient call with worsening symptoms         Relevant Orders    XR chest pa & lateral    Wheezing     Acute symptomatic secondary to a COVID infection 01/13/2022  Patient is using nebulizer treatment every 6 hours as needed at home with relief  Will recommend continue using albuterol nebulizer every 6 hours as needed  Refills placed  Patient to call any worsening symptoms  Relevant Medications    albuterol (2 5 mg/3 mL) 0 083 % nebulizer solution         Disposition:     Patient has COVID-19 infection  Based off CDC guidelines, they were recommended to isolate for 5 days from the date of the positive test  If they remain asymptomatic, isolation may be ended followed by 5 days of wearing a mask when around othes to minimize risk of infecting others  If they have a fever, continue to stay home until fever resolves for at least 24 hours  I have spent 15 minutes directly with the patient   Greater than 50% of this time was spent in counseling/coordination of care regarding: instructions for management and patient and family education  Encounter provider EN Ronquillo    [facility] [address] [address] [phone]    Recent Visits  Date Type Provider Dept   01/13/22 Telemedicine Candice [address]  Primary Care [facility]   Showing recent visits within past 7 days and meeting all other requirements  Today's Visits  Date Type Provider Dept   01/14/22 Telemedicine Candice [address], [address] [facility]   Showing today's visits and meeting all other requirements  Future Appointments  No visits were found meeting these conditions  Showing future appointments within next 150 days and meeting all other requirements     This virtual check-in was done via [employer] and patient was informed that this is a secure, HIPAA-compliant platform  She agrees to proceed  Patient agrees to participate in a virtual check in via telephone or video visit instead of presenting to the office to address urgent/immediate medical needs  Patient is aware this is a billable service  After connecting through [facility], the patient was identified by name and date of birth  Shira Garcia was informed that this was a telemedicine visit and that the exam was being conducted confidentially over secure lines  My office door was closed  No one else was in the room  Shira Garcia acknowledged consent and understanding of privacy and security of the telemedicine visit  I informed the patient that I have reviewed her record in Epic and presented the opportunity for her to ask any questions regarding the visit today  The patient agreed to participate      Verification of patient location:  Patient is located in the following state in which I hold an active license: [place]    Subjective: Britta Martini is a 29 y o  female who has been screened for COVID-19  Patient's symptoms include fever, fatigue, nasal congestion, rhinorrhea, loss of taste, cough, shortness of breath, chest tightness and diarrhea  Patient denies chills, malaise, sore throat, anosmia, abdominal pain, nausea, vomiting, myalgias and headaches  - Date of symptom onset: 1/12/2022  - Date of positive COVID-19 test: 1/13/2022  Type of test: PCR  COVID-19 vaccination status: Fully vaccinated (primary series)    Marleen Granda has been staying home and has isolated themselves in her home  She is taking care to not share personal items and is cleaning all surfaces that are touched often, like counters, tabletops, and doorknobs using household cleaning sprays or wipes  She is wearing a mask when she leaves her room       Lab Results   Component Value Date    SARSCOV2 Positive (A) 01/13/2022    SARSCOV2 Detected (A) 12/21/2020    [address] [facility] 98 Not Detected 10/24/2020     Past Medical History:   Diagnosis Date    Anemia     BMI 36 0-36 9,adult 12/8/2020    Cough 12/21/2020    COVID-19 virus infection 12/23/2020    Disease of thyroid gland     Dizziness     due to anemia, last episode August 2020, no falls    Fatigue     History of transfusion 2016    anemic - loss of blood due to bleeding ulcers, no reaction    Hyperlipidemia     Hypertension     Multiple gastric ulcers     Obesity     11/14/16    Thyroid disease     took synthroid but not currently taking    Thyroid mass 3/15/2021     A CT scan of the neck incidental finding 1 7 cm of the right thyroid    Ulcer      Past Surgical History:   Procedure Laterality Date    GASTRIC BYPASS  2014    GASTRIC BYPASS LAPAROSCOPIC N/A 12/15/2020    Procedure: Robotic lysis of adhesions, small-bowel resection, partial gastrectomy, paraesophageal hernia repair, bilateral truncal vagotomy; Robotic gastrojejunostomy anastomosis with intraop endoscopy;  Surgeon: Dante Barnes MD;  Location: [facility] OR;  Service: Bariatrics    LITO-EN-Y PROCEDURE  11/14/2016    Gastric Bypass by Dr Stephanie Chen Weight 339 6,nw    TUBAL LIGATION Bilateral     2010    WISDOM TOOTH EXTRACTION       Current Outpatient Medications   Medication Sig Dispense Refill    acetaminophen (TYLENOL) 500 mg tablet Take 1 tablet (500 mg total) by mouth every 6 (six) hours as needed for mild pain (Patient not taking: Reported on 1/13/2022 ) 30 tablet 0    albuterol (2 5 mg/3 mL) 0 083 % nebulizer solution Take 3 mL (2 5 mg total) by nebulization every 6 (six) hours as needed for wheezing or shortness of breath 180 mL 5    albuterol (PROVENTIL HFA,VENTOLIN HFA) 90 mcg/act inhaler Inhale 2 puffs every 4 (four) hours as needed for wheezing 1 Inhaler 2    amLODIPine (NORVASC) 5 mg tablet TAKE 1 TABLET(5 MG) BY MOUTH DAILY 30 tablet 2    benzonatate (TESSALON PERLES) 100 mg capsule Take 2 capsules (200 mg total) by mouth 3 (three) times a day as needed for cough 20 capsule 0    calcium carbonate 1250 MG capsule Take 1,250 mg by mouth 2 (two) times a day with meals      cholecalciferol (VITAMIN D3) 1,000 units tablet Take 1 tablet (1,000 Units total) by mouth daily 30 tablet 0    ferrous sulfate (FeroSul) 325 (65 Fe) mg tablet Take 1 tablet (325 mg total) by mouth 2 (two) times a day with meals 180 tablet 0    fluticasone (FLONASE) 50 mcg/act nasal spray SHAKE LIQUID AND USE 2 SPRAYS IN EACH NOSTRIL DAILY (Patient not taking: Reported on 1/13/2022) 48 g 0    guaiFENesin (MUCINEX) 600 mg 12 hr tablet Take 2 tablets (1,200 mg total) by mouth every 12 (twelve) hours 20 tablet 0    hydrochlorothiazide (HYDRODIURIL) 25 mg tablet Take 1 tablet (25 mg total) by mouth daily 90 tablet 0    Multiple Vitamin (MULTIVITAMIN) tablet Take 1 tablet by mouth daily      naproxen (NAPROSYN) 250 mg tablet TAKE 1 TABLET(250 MG) BY MOUTH TWICE DAILY WITH MEALS (Patient not taking: Reported on 1/13/2022) 30 tablet 2  omeprazole (PriLOSEC) 20 mg delayed release capsule TAKE 1 CAPSULE(20 MG) BY MOUTH TWICE DAILY 180 capsule 0     No current facility-administered medications for this visit  Allergies   Allergen Reactions    [place] Oil - Food Allergy Shortness Of Breath    Venofer [Iron Sucrose]      Chest pressure after 1st dose  Tachycardia and blurred vision after 8 minutes of dose #2  Review of Systems   Constitutional: Positive for activity change, fatigue and fever  Negative for chills  HENT: Positive for congestion and rhinorrhea  Negative for sneezing and sore throat  Respiratory: Positive for cough, chest tightness and shortness of breath  Gastrointestinal: Positive for diarrhea  Negative for abdominal pain, nausea and vomiting  Musculoskeletal: Negative for myalgias  Neurological: Negative for headaches  Objective: There were no vitals filed for this visit  Physical Exam  Vitals and nursing note reviewed  Constitutional:       General: She is not in acute distress  Appearance: Normal appearance  She is ill-appearing  She is not toxic-appearing or diaphoretic  HENT:      Head: Normocephalic and atraumatic  Nose: No rhinorrhea  Eyes:      General:         Right eye: No discharge  Left eye: No discharge  Pulmonary:      Effort: Pulmonary effort is normal  No respiratory distress  Musculoskeletal:         General: Normal range of motion  Cervical back: Normal range of motion  Skin:     Coloration: Skin is not jaundiced or pale  Findings: No bruising, erythema, lesion or rash  Neurological:      Mental Status: She is alert and oriented to person, place, and time  Psychiatric:         Mood and Affect: Mood normal          Behavior: Behavior normal          Thought Content: Thought content normal          Judgment: Judgment normal          VIRTUAL VISIT DISCLAIMER    Jong Munguiamilton verbally agrees to participate in [employer]   Pt is aware that [employer] could be limited without vital signs or the ability to perform a full hands-on physical Mushtaq Vijaya understands she or the provider may request at any time to terminate the video visit and request the patient to seek care or treatment in person

## 2022-01-14 NOTE — ASSESSMENT & PLAN NOTE
Acute symptomatic secondary to COVID infection 01/13/2022  Associated symptoms include cough wheezing and fatigue  No ability to check oxygen saturation  Will recommend breathing exercises albuterol nebulizer every 6 hours as needed and orders placed for chest x-ray    Patient call with worsening symptoms

## 2022-01-14 NOTE — ASSESSMENT & PLAN NOTE
Acute symptomatic patient had no onset of sore throat and abdominal pain and diarrhea cough chest heaviness starting 01/12/2022  Patient has had COVID vaccine series completed  Patient denies known COVID contact  Will recommend increase fluids COVID vitamins and COVID testing sent to lab    Patient call with any worsening symptoms

## 2022-01-14 NOTE — ASSESSMENT & PLAN NOTE
Acute symptomatic secondary to a COVID infection 01/13/2022  Patient is using nebulizer treatment every 6 hours as needed at home with relief  Will recommend continue using albuterol nebulizer every 6 hours as needed  Refills placed  Patient to call any worsening symptoms

## 2022-01-18 ENCOUNTER — TELEMEDICINE (OUTPATIENT)
Dept: FAMILY MEDICINE CLINIC | Facility: CLINIC | Age: 37
End: 2022-01-18
Payer: COMMERCIAL

## 2022-01-18 VITALS — TEMPERATURE: 96.6 F

## 2022-01-18 DIAGNOSIS — U07.1 COVID-19 VIRUS INFECTION: Primary | ICD-10-CM

## 2022-01-18 PROCEDURE — 99213 OFFICE O/P EST LOW 20 MIN: CPT | Performed by: NURSE PRACTITIONER

## 2022-01-18 NOTE — LETTER
January 18, 2022    Patient: Christy Zhao  YOB: 1985  Date of Last Encounter: 1/18/2022      To whom it may concern: Sho Nye has tested positive for COVID-19 (Coronavirus)  She may return to work on 1/19/2022 provided symptoms are improving and 24 hours without fever      Sincerely,         EN Arroyo

## 2022-01-18 NOTE — ASSESSMENT & PLAN NOTE
Acute symptomatic patient positive for COVID 01/13/2022  Patient has had COVID vaccine series completed  Patient began with symptoms 01/12/2022 and continues with fatigue cough dry mouth and dry lips  Will recommend increase fluids, Vaseline for lips, hard candy/ice chips, COVID vitamins, and can return to work 01/19/2022 with 5 days of strict masking    Patient call with any worsening of symptoms or concerns

## 2022-01-18 NOTE — PROGRESS NOTES
COVID-19 Outpatient Progress Note    Assessment/Plan:    Problem List Items Addressed This Visit        Other    COVID-19 virus infection - Primary     Acute symptomatic patient positive for COVID 01/13/2022  Patient has had COVID vaccine series completed  Patient began with symptoms 01/12/2022 and continues with fatigue cough dry mouth and dry lips  Will recommend increase fluids, Vaseline for lips, hard candy/ice chips, COVID vitamins, and can return to work 01/19/2022 with 5 days of strict masking  Patient call with any worsening of symptoms or concerns              Disposition:     Patient has COVID-19 infection  Based off CDC guidelines, they were recommended to isolate for 5 days from the date of the positive test  If they remain asymptomatic, isolation may be ended followed by 5 days of wearing a mask when around othes to minimize risk of infecting others  If they have a fever, continue to stay home until fever resolves for at least 24 hours  I have spent 15 minutes directly with the patient  Greater than 50% of this time was spent in counseling/coordination of care regarding: instructions for management and patient and family education  Encounter provider Jimmie Bradley, [address]    Provider located at [facility] AT [address] [address] [phone]    Recent Visits  Date Type Provider Dept   01/14/22 Telemedicine [place] [facility] Primary Care [facility]   01/13/22 Telemedicine Candice [address] Primary Care [facility]   Showing recent visits within past 7 days and meeting all other requirements  Today's Visits  Date Type Provider Dept   01/18/22 Telemedicine Candice [address], [address] [facility]   Showing today's visits and meeting all other requirements  Future Appointments  No visits were found meeting these conditions    Showing future appointments within next 150 days and meeting all other requirements     This virtual check-in was done via [employer] and patient was informed that this is a secure, HIPAA-compliant platform  She agrees to proceed  Patient agrees to participate in a virtual check in via telephone or video visit instead of presenting to the office to address urgent/immediate medical needs  Patient is aware this is a billable service  After connecting through [facility], the patient was identified by name and date of birth  Shana Melgar was informed that this was a telemedicine visit and that the exam was being conducted confidentially over secure lines  My office door was closed  No one else was in the room  Shana Melgar acknowledged consent and understanding of privacy and security of the telemedicine visit  I informed the patient that I have reviewed her record in Epic and presented the opportunity for her to ask any questions regarding the visit today  The patient agreed to participate  Verification of patient location:  Patient is located in the following state in which I hold an active license: [place]    Subjective: Shana Melgar is a 29 y o  female who has been screened for COVID-19  Symptom change since last report: improving  Patient's symptoms include fatigue and cough  Patient denies fever, chills, malaise, congestion, rhinorrhea, sore throat, anosmia, loss of taste, shortness of breath, chest tightness, abdominal pain, nausea, vomiting, diarrhea, myalgias and headaches  - Date of symptom onset: 1/12/2022  - Date of positive COVID-19 test: 1/13/2022  Type of test: PCR  COVID-19 vaccination status: Fully vaccinated (primary series)    Madhav George has been staying home and has isolated themselves in her home   She is taking care to not share personal items and is cleaning all surfaces that are touched often, like counters, tabletops, and doorknobs using household cleaning sprays or wipes  She is wearing a mask when she leaves her room       Lab Results   Component Value Date    SARSCOV2 Positive (A) 01/13/2022    SARSCOV2 Detected (A) 12/21/2020    Ashli Her Not Detected 10/24/2020     Past Medical History:   Diagnosis Date    Anemia     BMI 36 0-36 9,adult 12/8/2020    Cough 12/21/2020    COVID-19 virus infection 12/23/2020    Disease of thyroid gland     Dizziness     due to anemia, last episode August 2020, no falls    Fatigue     History of transfusion 2016    anemic - loss of blood due to bleeding ulcers, no reaction    Hyperlipidemia     Hypertension     Multiple gastric ulcers     Obesity     11/14/16    Thyroid disease     took synthroid but not currently taking    Thyroid mass 3/15/2021     A CT scan of the neck incidental finding 1 7 cm of the right thyroid    Ulcer      Past Surgical History:   Procedure Laterality Date    GASTRIC BYPASS  2014    GASTRIC BYPASS LAPAROSCOPIC N/A 12/15/2020    Procedure: Robotic lysis of adhesions, small-bowel resection, partial gastrectomy, paraesophageal hernia repair, bilateral truncal vagotomy; Robotic gastrojejunostomy anastomosis with intraop endoscopy;  Surgeon: Ash Thomson MD;  Location: [facility];  Service: Bariatrics    LITO-EN-Y PROCEDURE  11/14/2016    Gastric Bypass by Dr Patric Law Weight 339 6,nw    TUBAL LIGATION Bilateral     2010    WISDOM TOOTH EXTRACTION       Current Outpatient Medications   Medication Sig Dispense Refill    albuterol (2 5 mg/3 mL) 0 083 % nebulizer solution Take 3 mL (2 5 mg total) by nebulization every 6 (six) hours as needed for wheezing or shortness of breath 180 mL 5    albuterol (PROVENTIL HFA,VENTOLIN HFA) 90 mcg/act inhaler Inhale 2 puffs every 4 (four) hours as needed for wheezing 1 Inhaler 2    amLODIPine (NORVASC) 5 mg tablet TAKE 1 TABLET(5 MG) BY MOUTH DAILY 30 tablet 2    benzonatate (TESSALON PERLES) 100 mg capsule Take 2 capsules (200 mg total) by mouth 3 (three) times a day as needed for cough 20 capsule 0    calcium carbonate 1250 MG capsule Take 1,250 mg by mouth 2 (two) times a day with meals      cholecalciferol (VITAMIN D3) 1,000 units tablet Take 1 tablet (1,000 Units total) by mouth daily 30 tablet 0    ferrous sulfate (FeroSul) 325 (65 Fe) mg tablet Take 1 tablet (325 mg total) by mouth 2 (two) times a day with meals 180 tablet 0    guaiFENesin (MUCINEX) 600 mg 12 hr tablet Take 2 tablets (1,200 mg total) by mouth every 12 (twelve) hours 20 tablet 0    hydrochlorothiazide (HYDRODIURIL) 25 mg tablet Take 1 tablet (25 mg total) by mouth daily 90 tablet 0    Multiple Vitamin (MULTIVITAMIN) tablet Take 1 tablet by mouth daily      omeprazole (PriLOSEC) 20 mg delayed release capsule TAKE 1 CAPSULE(20 MG) BY MOUTH TWICE DAILY 180 capsule 0     No current facility-administered medications for this visit  Allergies   Allergen Reactions    [place] Oil - Food Allergy Shortness Of Breath    Venofer [Iron Sucrose]      Chest pressure after 1st dose  Tachycardia and blurred vision after 8 minutes of dose #2  Review of Systems   Constitutional: Positive for fatigue  Negative for activity change, chills and fever  HENT: Positive for voice change  Negative for congestion, rhinorrhea, sneezing and sore throat  Dry mouth and lips     Respiratory: Positive for cough  Negative for chest tightness and shortness of breath  Gastrointestinal: Negative for abdominal pain, constipation, diarrhea, nausea and vomiting  Musculoskeletal: Negative for myalgias  Neurological: Negative for headaches  Objective:    Vitals:    01/18/22 0844   Temp: (!) 96 6 °F (35 9 °C)   TempSrc: Temporal       Physical Exam  Vitals and nursing note reviewed  Constitutional:       General: She is not in acute distress  Appearance: Normal appearance  She is not ill-appearing, toxic-appearing or diaphoretic  HENT:      Head: Normocephalic and atraumatic        Nose: No rhinorrhea  Eyes:      General:         Right eye: No discharge  Left eye: No discharge  Pulmonary:      Effort: Pulmonary effort is normal  No respiratory distress  Musculoskeletal:         General: Normal range of motion  Cervical back: Normal range of motion  Skin:     Coloration: Skin is not jaundiced or pale  Findings: No bruising, erythema, lesion or rash  Neurological:      Mental Status: She is alert and oriented to person, place, and time  Psychiatric:         Mood and Affect: Mood normal          Behavior: Behavior normal          Thought Content: Thought content normal          Judgment: Judgment normal          VIRTUAL VISIT DISCLAIMER    Sussy Zhao verbally agrees to participate in [employer]  Pt is aware that [employer] could be limited without vital signs or the ability to perform a full hands-on physical Roosvelt Regulo understands she or the provider may request at any time to terminate the video visit and request the patient to seek care or treatment in person

## 2022-01-21 ENCOUNTER — TELEPHONE (OUTPATIENT)
Dept: HEMATOLOGY ONCOLOGY | Facility: CLINIC | Age: 37
End: 2022-01-21

## 2022-01-21 NOTE — TELEPHONE ENCOUNTER
Spoke with patient asking if she would like to r/s her appt that was canceled  She stated she will have labs done tomorrow 1/22 and call us to r/s appt

## 2022-01-30 DIAGNOSIS — R09.81 NASAL CONGESTION: ICD-10-CM

## 2022-01-31 ENCOUNTER — OFFICE VISIT (OUTPATIENT)
Dept: FAMILY MEDICINE CLINIC | Facility: CLINIC | Age: 37
End: 2022-01-31
Payer: COMMERCIAL

## 2022-01-31 VITALS
HEIGHT: 64 IN | DIASTOLIC BLOOD PRESSURE: 100 MMHG | TEMPERATURE: 98.7 F | BODY MASS INDEX: 43.02 KG/M2 | SYSTOLIC BLOOD PRESSURE: 152 MMHG | WEIGHT: 252 LBS | HEART RATE: 67 BPM | RESPIRATION RATE: 16 BRPM | OXYGEN SATURATION: 99 %

## 2022-01-31 DIAGNOSIS — I10 ESSENTIAL HYPERTENSION: ICD-10-CM

## 2022-01-31 DIAGNOSIS — M25.512 CHRONIC LEFT SHOULDER PAIN: ICD-10-CM

## 2022-01-31 DIAGNOSIS — G89.29 CHRONIC LEFT SHOULDER PAIN: ICD-10-CM

## 2022-01-31 DIAGNOSIS — R05.8 COUGH WITH EXPOSURE TO COVID-19 VIRUS: Primary | ICD-10-CM

## 2022-01-31 DIAGNOSIS — Z20.822 COUGH WITH EXPOSURE TO COVID-19 VIRUS: Primary | ICD-10-CM

## 2022-01-31 PROCEDURE — 99214 OFFICE O/P EST MOD 30 MIN: CPT | Performed by: FAMILY MEDICINE

## 2022-01-31 RX ORDER — FLUTICASONE PROPIONATE 50 MCG
SPRAY, SUSPENSION (ML) NASAL
Qty: 48 G | Refills: 0 | Status: SHIPPED | OUTPATIENT
Start: 2022-01-31

## 2022-01-31 RX ORDER — LOSARTAN POTASSIUM 25 MG/1
25 TABLET ORAL DAILY
Qty: 30 TABLET | Refills: 1 | Status: SHIPPED | OUTPATIENT
Start: 2022-01-31 | End: 2022-01-31

## 2022-01-31 RX ORDER — B-COMPLEX WITH VITAMIN C
TABLET ORAL
COMMUNITY

## 2022-01-31 RX ORDER — LOSARTAN POTASSIUM 25 MG/1
TABLET ORAL
Qty: 90 TABLET | Refills: 0 | Status: SHIPPED | OUTPATIENT
Start: 2022-01-31

## 2022-01-31 RX ORDER — BUDESONIDE AND FORMOTEROL FUMARATE DIHYDRATE 160; 4.5 UG/1; UG/1
2 AEROSOL RESPIRATORY (INHALATION) 2 TIMES DAILY
Qty: 10.2 G | Refills: 0 | Status: SHIPPED | OUTPATIENT
Start: 2022-01-31

## 2022-01-31 NOTE — LETTER
January 31, 2022     Patient: Twan Zhao   YOB: 1985   Date of Visit: 1/31/2022       To Whom it May Concern: Jose Miguel Nurse is under my professional care  She was seen in my office on 1/31/2022  She may return to work on 02/01/2022  If you have any questions or concerns, please don't hesitate to call           Sincerely,          Rogelio Salamanca MD        CC: No Recipients

## 2022-02-02 ENCOUNTER — OFFICE VISIT (OUTPATIENT)
Dept: BARIATRICS | Facility: CLINIC | Age: 37
End: 2022-02-02
Payer: COMMERCIAL

## 2022-02-02 VITALS
SYSTOLIC BLOOD PRESSURE: 128 MMHG | BODY MASS INDEX: 43.19 KG/M2 | RESPIRATION RATE: 14 BRPM | HEIGHT: 64 IN | WEIGHT: 253 LBS | HEART RATE: 78 BPM | TEMPERATURE: 97.9 F | DIASTOLIC BLOOD PRESSURE: 72 MMHG

## 2022-02-02 DIAGNOSIS — K91.2 POSTSURGICAL MALABSORPTION: ICD-10-CM

## 2022-02-02 DIAGNOSIS — Z98.84 BARIATRIC SURGERY STATUS: ICD-10-CM

## 2022-02-02 DIAGNOSIS — E61.1 IRON DEFICIENCY: ICD-10-CM

## 2022-02-02 DIAGNOSIS — E66.01 OBESITY, CLASS III, BMI 40-49.9 (MORBID OBESITY) (HCC): Primary | ICD-10-CM

## 2022-02-02 DIAGNOSIS — I10 ESSENTIAL HYPERTENSION: ICD-10-CM

## 2022-02-02 DIAGNOSIS — E78.2 MIXED HYPERLIPIDEMIA: ICD-10-CM

## 2022-02-02 PROCEDURE — 99214 OFFICE O/P EST MOD 30 MIN: CPT | Performed by: PHYSICIAN ASSISTANT

## 2022-02-02 NOTE — PROGRESS NOTES
- s/p Robotic lysis of adhesions, Robotic small-bowel resection, Robotic partial gastrectomy, Robotic paraesophageal hernia repair, Robotic bilateral truncal vagotomy Robotic gastrojejunostomy anastomosis with intraop endoscopy  on 12/2020  With Dr Giulia Delcid   -Discussed options of Healthy[facility]-Intensive Lifestyle Intervention Program, Very Low Calorie Diet-VLCD and Conservative Program and the role of weight loss medications   -Initial weight loss goal of 5-10% weight loss for improved health  -Screening labs 12/2021   - denies history of kidney stones or glaucoma  Hx tubal ligation 11 years ago may consider topamax for cravings if patient interested going forward as she struggles with some cravings    - HTN on 3 antihypertensives and family history of aneurysm - caution phentermine   -Patient wants to consider her options and will give us a call when she makes her decision     HTN  - continue meds and pcp follow up    Iron def  - continue heme follow up     Assessment/Plan:      Goals:  Food log (ie ) www myfitnesspal com,sparkpeople  com,loseit com,calorieking  com,etc  baritastic  No sugary beverages  At least 64oz of water daily  Increase physical activity by 10 minutes daily  Gradually increase physical activity to a goal of 5 days per week for 30 minutes of MODERATE intensity PLUS 2 days per week of FULL BODY resistance training  5-10 servings of fruits and vegetables per day and 25-35 grams of dietary fiber per day, gradually increasing        Diagnoses and all orders for this visit:    Obesity, Class III, BMI 40-49 9 (morbid obesity) ([address] )    Mixed hyperlipidemia    Essential hypertension    Bariatric surgery status    Postsurgical malabsorption        Subjective:   Chief Complaint   Patient presents with    Post-op     Post op WT Gain Consult    Consult     Patient ID: Claudia Shearer  is a 29 y o  female with excess weight/obesity here to pursue weight management    Past Medical History:   Diagnosis Date    Anemia     BMI 36 0-36 9,adult 12/8/2020    Cough 12/21/2020    COVID-19 virus infection 12/23/2020    Disease of thyroid gland     Dizziness     due to anemia, last episode August 2020, no falls    Fatigue     History of transfusion 2016    anemic - loss of blood due to bleeding ulcers, no reaction    Hyperlipidemia     Hypertension     Multiple gastric ulcers     Obesity     11/14/16    Thyroid disease     took synthroid but not currently taking    Thyroid mass 3/15/2021     A CT scan of the neck incidental finding 1 7 cm of the right thyroid    Ulcer      HPI:  s/p Robotic lysis of adhesions, Robotic small-bowel resection, Robotic partial gastrectomy, Robotic paraesophageal hernia repair, Robotic bilateral truncal vagotomy Robotic gastrojejunostomy anastomosis with intraop endoscopy  on 12/2020  With Dr Katelin Bender     She is not tracking calories but trying to watch her diet  Down to mostly protein, veggies and fruits mostly       She is exercising and going to the gym daily - mostly treadmill       The following portions of the patient's history were reviewed and updated as appropriate: allergies, current medications, past family history, past medical history, past social history, past surgical history and problem list     Review of Systems    Objective:    /72   Pulse 78   Temp 97 9 °F (36 6 °C) (Tympanic)   Resp 14   Ht 5' 4" (1 626 m)   Wt 115 kg (253 lb)   BMI 43 43 kg/m²     Physical Exam

## 2022-02-03 PROBLEM — G89.29 CHRONIC LEFT SHOULDER PAIN: Status: ACTIVE | Noted: 2022-01-31

## 2022-02-03 PROBLEM — G89.29 CHRONIC LEFT SHOULDER PAIN: Status: ACTIVE | Noted: 2022-02-03

## 2022-02-03 PROBLEM — R05.8 COUGH WITH EXPOSURE TO COVID-19 VIRUS: Status: ACTIVE | Noted: 2022-01-31

## 2022-02-03 PROBLEM — Z20.822 COUGH WITH EXPOSURE TO COVID-19 VIRUS: Status: ACTIVE | Noted: 2022-01-31

## 2022-02-03 PROBLEM — M25.512 CHRONIC LEFT SHOULDER PAIN: Status: ACTIVE | Noted: 2022-02-03

## 2022-02-03 PROBLEM — Z20.822 COUGH WITH EXPOSURE TO COVID-19 VIRUS: Status: ACTIVE | Noted: 2022-02-03

## 2022-02-03 PROBLEM — R05.8 COUGH WITH EXPOSURE TO COVID-19 VIRUS: Status: ACTIVE | Noted: 2022-02-03

## 2022-02-03 PROBLEM — M25.512 CHRONIC LEFT SHOULDER PAIN: Status: ACTIVE | Noted: 2022-01-31

## 2022-02-03 NOTE — PROGRESS NOTES
Subjective:   Chief Complaint   Patient presents with    Cough     persistant cough         Patient ID: Tasha Brenner is a 29 y o  female  Patient here had multiple concern concerned about the left shoulder pain she graded the his the a 6/10 localized in the shoulder radiated to the left upper arm no fall no trauma certain range of the motion aggravated the pain no numbness or muscle weakness the also patient who tested positive for COVID the in January 14 since then she been having cough her cough is nonproductive no wheezing no short of breath no lower extremity edema patient already evaluated the by PCP and she was prescribed the a Zithromax and Medrol pack finish a course and no improvement no hematosis      The following portions of the patient's history were reviewed and updated as appropriate: allergies, current medications, past family history, past medical history, past social history, past surgical history and problem list     Review of Systems   Constitutional: Negative for activity change, appetite change, fatigue and fever  HENT: Negative for congestion, ear pain, sinus pressure, sinus pain and sore throat  Eyes: Negative for pain, discharge, redness and itching  Respiratory: Positive for cough  Negative for chest tightness, shortness of breath and stridor  Cardiovascular: Negative for chest pain, palpitations and leg swelling  Gastrointestinal: Negative for abdominal pain, blood in stool, constipation, diarrhea and nausea  Genitourinary: Negative for dysuria, flank pain, frequency and hematuria  Musculoskeletal: Positive for arthralgias  Negative for back pain, joint swelling and neck pain  The left shoulder pain   Skin: Negative for pallor and rash  Neurological: Negative for dizziness, tremors, weakness, numbness and headaches  Hematological: Does not bruise/bleed easily               Objective:  Vitals:    01/31/22 1154   BP: 152/100   BP Location: Left arm   Patient Position: Sitting   Cuff Size: Large   Pulse: 67   Resp: 16   Temp: 98 7 °F (37 1 °C)   TempSrc: Tympanic   SpO2: 99%   Weight: 114 kg (252 lb)   Height: 5' 4" (1 626 m)      Physical Exam  Vitals and nursing note reviewed  Constitutional:       General: She is not in acute distress  Appearance: She is well-developed  She is obese  She is not diaphoretic  HENT:      Head: Normocephalic and atraumatic  Right Ear: Tympanic membrane, ear canal and external ear normal       Left Ear: Tympanic membrane, ear canal and external ear normal       Nose: Nose normal  No congestion or rhinorrhea  Mouth/Throat:      Mouth: Mucous membranes are moist       Pharynx: Oropharynx is clear  No oropharyngeal exudate or posterior oropharyngeal erythema  Eyes:      General: No scleral icterus  Right eye: No discharge  Left eye: No discharge  Conjunctiva/sclera: Conjunctivae normal       Pupils: Pupils are equal, round, and reactive to light  Neck:      Vascular: No JVD  Cardiovascular:      Rate and Rhythm: Normal rate and regular rhythm  Heart sounds: Normal heart sounds  No murmur heard  No gallop  Pulmonary:      Effort: Pulmonary effort is normal  No respiratory distress  Breath sounds: Normal breath sounds  No stridor  No wheezing or rales  Chest:      Chest wall: No tenderness  Abdominal:      General: There is no distension  Palpations: Abdomen is soft  There is no mass  Tenderness: There is no abdominal tenderness  There is no rebound  Musculoskeletal:      Left shoulder: Tenderness present  No swelling or deformity  Decreased range of motion  Cervical back: Normal range of motion and neck supple  Lymphadenopathy:      Cervical: No cervical adenopathy  Skin:     General: Skin is warm  Findings: No erythema or rash  Neurological:      Mental Status: She is alert and oriented to person, place, and time  Motor: No weakness  Gait: Gait normal            Assessment/Plan:    Cough with exposure to COVID-19 virus  Asymptomatic patient has been having cough since tested positive for COVID her cough is dry not productive and she been evaluated previously was prescribed course of antibiotic and Medrol pack and she feel it did not help a chest x-ray was order not done yet  Patient has been using the albuterol inhaler  A plan will start the patient on a Symbicort 2 puffs twice a day continue to use albuterol on p r n  basis recommend to do the chest x-ray    Chronic left shoulder pain  A new diagnosis chronic pain no history of fall or trauma the recommend the Voltaren gel 4 g 4 times a day proper use and possible side effect discussed the patient if no improvement to call the office    Essential hypertension  A chronic asymptomatic and control patient already on amlodipine 5 mg she is on lisinopril HCT 25 mg will start the patient on losartan 25 mg once a day proper use and possible side effect discussed the patient low-salt diet and important lose weight review with the patient       Diagnoses and all orders for this visit:    Cough with exposure to COVID-19 virus  -     budesonide-formoterol (Symbicort) 160-4 5 mcg/act inhaler; Inhale 2 puffs 2 (two) times a day Rinse mouth after use  Chronic left shoulder pain  -     Diclofenac Sodium (VOLTAREN) 1 %; Apply 4 g topically 4 (four) times a day    Essential hypertension  -     Discontinue: losartan (COZAAR) 25 mg tablet;  Take 1 tablet (25 mg total) by mouth daily    Other orders  -     Zinc 100 MG TABS; Take by mouth

## 2022-02-03 NOTE — ASSESSMENT & PLAN NOTE
A new diagnosis chronic pain no history of fall or trauma the recommend the Voltaren gel 4 g 4 times a day proper use and possible side effect discussed the patient if no improvement to call the office

## 2022-02-03 NOTE — ASSESSMENT & PLAN NOTE
A chronic asymptomatic and control patient already on amlodipine 5 mg she is on lisinopril HCT 25 mg will start the patient on losartan 25 mg once a day proper use and possible side effect discussed the patient low-salt diet and important lose weight review with the patient

## 2022-02-03 NOTE — ASSESSMENT & PLAN NOTE
Asymptomatic patient has been having cough since tested positive for COVID her cough is dry not productive and she been evaluated previously was prescribed course of antibiotic and Medrol pack and she feel it did not help a chest x-ray was order not done yet  Patient has been using the albuterol inhaler  A plan will start the patient on a Symbicort 2 puffs twice a day continue to use albuterol on p r n  basis recommend to do the chest x-ray

## 2022-02-13 DIAGNOSIS — J06.9 UPPER RESPIRATORY TRACT INFECTION, UNSPECIFIED TYPE: ICD-10-CM

## 2022-02-15 RX ORDER — BENZONATATE 100 MG/1
CAPSULE ORAL
Qty: 20 CAPSULE | Refills: 0 | Status: SHIPPED | OUTPATIENT
Start: 2022-02-15 | End: 2022-06-08 | Stop reason: ALTCHOICE

## 2022-03-03 ENCOUNTER — OFFICE VISIT (OUTPATIENT)
Dept: BARIATRICS | Facility: CLINIC | Age: 37
End: 2022-03-03
Payer: COMMERCIAL

## 2022-03-03 VITALS
HEART RATE: 75 BPM | HEIGHT: 64 IN | BODY MASS INDEX: 43.79 KG/M2 | WEIGHT: 256.5 LBS | SYSTOLIC BLOOD PRESSURE: 146 MMHG | TEMPERATURE: 97.5 F | DIASTOLIC BLOOD PRESSURE: 94 MMHG

## 2022-03-03 DIAGNOSIS — Z98.84 BARIATRIC SURGERY STATUS: Primary | ICD-10-CM

## 2022-03-03 DIAGNOSIS — K21.9 GASTROESOPHAGEAL REFLUX DISEASE WITHOUT ESOPHAGITIS: ICD-10-CM

## 2022-03-03 PROCEDURE — 99213 OFFICE O/P EST LOW 20 MIN: CPT | Performed by: SURGERY

## 2022-03-03 NOTE — PROGRESS NOTES
OFFICE VISIT - BARIATRIC SURGERY  Tonny Zhao 29 y o  female MRN: [id number]  Unit/Bed#:  Encounter: [id number]      HPI:  Lee Cavanaugh is a 29 y o  female status post Laparoscopic RNYGB in 2016 with Dr Daisy Blackmon with subsequent Robotic lysis of adhesions, Robotic small-bowel resection, Robotic partial gastrectomy, Robotic paraesophageal hernia repair, Robotic bilateral truncal vagotomy Robotic gastrojejunostomy anastomosis with intraop endoscopy on 12/2020 with Dr Usha Mckeon  Comes to the office today with complaints of weight regain, here to discuss surgical options  Subjective      Patient complains of weight regain after revision surgery  She saw the dietician and  last month  She is interested in options for surgical weight loss  She denies heartburn symptoms  Has history of HTN on 3 antihypertensives and family history of aneurysm  At the time of their surgery the patient was 339lb, and was able to drop down as low as 175lb  Today, their weight is 256lb, with a BMI of 44 03  Tolerating diet: Yes  Main symptoms: Weight regain  Worse with food: N/A  Nausea: No  Vomiting: No  Diarrhea: No  Duration of symptoms: After revision  Smoking: Quit 1 year  Alcohol use: No  NSAID use: No  Caffeine use: Yes, 1 cup in the morning  Current treatment: N/A  Past medical history significant for: HTN   Previous cholecystectomy or pertinent abdominal surgeries: RNYGB  Previous EGD: 10/6/21  Previous Upper GI: 7/7/21   Previous Manometry: No  Ambulation is not impaired  Review of Systems   Constitutional: Negative for chills and fever  HENT: Negative for ear pain and sore throat  Eyes: Negative for pain and visual disturbance  Respiratory: Negative for cough and shortness of breath  Cardiovascular: Negative for chest pain and palpitations  Gastrointestinal: Negative for abdominal pain and vomiting  Genitourinary: Negative for dysuria and hematuria  Musculoskeletal: Negative for arthralgias and back pain  Skin: Negative for color change and rash  Neurological: Negative for seizures and syncope  All other systems reviewed and are negative        Historical Information   Past Medical History:   Diagnosis Date    Anemia     BMI 36 0-36 9,adult 2020    Cough 2020    COVID-19 virus infection 2020    Disease of thyroid gland     Dizziness     due to anemia, last episode 2020, no falls    Fatigue     History of transfusion     anemic - loss of blood due to bleeding ulcers, no reaction    Hyperlipidemia     Hypertension     Multiple gastric ulcers     Obesity     16    Thyroid disease     took synthroid but not currently taking    Thyroid mass 3/15/2021     A CT scan of the neck incidental finding 1 7 cm of the right thyroid    Ulcer      Past Surgical History:   Procedure Laterality Date    GASTRIC BYPASS      GASTRIC BYPASS LAPAROSCOPIC N/A 12/15/2020    Procedure: Robotic lysis of adhesions, small-bowel resection, partial gastrectomy, paraesophageal hernia repair, bilateral truncal vagotomy; Robotic gastrojejunostomy anastomosis with intraop endoscopy;  Surgeon: Mayda Cohen MD;  Location: [facility];  Service: Bariatrics    LITO-EN-Y PROCEDURE  2016    Gastric Bypass by Dr Lizz Granda Weight 339 6,nw    TUBAL LIGATION Bilateral     2010    WISDOM TOOTH EXTRACTION       Social History   Social History     Substance and Sexual Activity   Alcohol Use No     Social History     Substance and Sexual Activity   Drug Use No     Social History     Tobacco Use   Smoking Status Former Smoker    Types: Cigarettes    Quit date:     Years since quittin 1   Smokeless Tobacco Former User   Tobacco Comment    former       Objective       Current Vitals:   Blood Pressure: 146/94 (22 1044)  Pulse: 75 (22 1044)  Temperature: 97 5 °F (36 4 °C) (22 1044)  Temp Source: Tympanic (03/03/22 1044)  Height: 5' 4" (162 6 cm) (03/03/22 1044)  Weight - Scale: 116 kg (256 lb 8 oz) (03/03/22 1044)    Invasive Devices  Report    None                 Physical Exam  Constitutional:       Appearance: Normal appearance  She is obese  HENT:      Head: Normocephalic and atraumatic  Nose: Nose normal       Mouth/Throat:      Mouth: Mucous membranes are moist    Eyes:      Extraocular Movements: Extraocular movements intact  Pupils: Pupils are equal, round, and reactive to light  Cardiovascular:      Rate and Rhythm: Normal rate and regular rhythm  Pulses: Normal pulses  Heart sounds: Normal heart sounds  Pulmonary:      Effort: Pulmonary effort is normal       Breath sounds: Normal breath sounds  Abdominal:      Palpations: Abdomen is soft  Comments: Well-healed scars   Musculoskeletal:      Cervical back: Normal range of motion  Skin:     General: Skin is warm  Neurological:      General: No focal deficit present  Mental Status: She is alert and oriented to person, place, and time  Psychiatric:         Mood and Affect: Mood normal          Behavior: Behavior normal            Pathology, and Other Studies: I have personally reviewed pertinent reports  Assessment/PLAN:    Shaji Marie is a 29 y o  female status post Laparoscopic RNYGB in 2016 with Dr Monica Thapa with subsequent Robotic lysis of adhesions, Robotic small-bowel resection, Robotic partial gastrectomy, Robotic paraesophageal hernia repair, Robotic bilateral truncal vagotomy Robotic gastrojejunostomy anastomosis with intraop endoscopy on 12/2020 with Dr Akla Dubon  Comes to the office today with complaints of weight regain, here to discuss surgical options  Workup thus far reviewed and discussed with patient    Workup includes:     UGI (7/7/21)  UPPER GI SERIES  DOUBLE CONTRAST     INDICATION:   Z48 [id number]: Encounter for surgical aftercare following surgery on the digestive system  Z98 84: Bariatric surgery status  R11 10: Vomiting, unspecified  s/p RNYGB revision with vomiting, abd pain      COMPARISON:  Fluoroscopic upper GI study dated December 16, 2020  CT abdomen and pelvis dated January 31, 2021      IMAGES:  360, including cine clips      FLUOROSCOPY TIME:   2 9 minutes      TECHNIQUE:  The patient was given different consistencies of barium by mouth and images of the esophagus, stomach, and small bowel were obtained       FINDINGS:     No significant abnormalities are seen on the  images  There are staple lines in the left upper quadrant of the abdomen from patient's gastric bypass surgery      Oropharyngeal phase of swallowing is within normal limits      The esophagus is normal in caliber  Esophageal motility is normal and emptying of contrast from the esophagus is prompt  There is no mucosal mass, ulceration or fold thickening identified      There are postsurgical changes from Jaycee-en-Y gastric bypass  The gastrojejunostomy is widely patent with free passage of contrast   No distention of the gastric pouch  No extension of administered enteric contrast into the excluded portion of the   stomach to suggest gastro-gastric fistula  There is free passage of contrast to distal small bowel loops beyond the expected region of jejunojejunal anastomosis      There is mild gastroesophageal reflux to the distal esophagus      There is no hiatal hernia         IMPRESSION:     Mild gastroesophageal reflux    Otherwise, unremarkable appearance status post Jaycee-en-Y gastric bypass      Workstation performed: [id number]      EGD (10/6/21)  [facility]  [address]  [phone]        DATE OF SERVICE:  10/06/21     PHYSICIAN(S):  Malgorzata Wells MD - Attending Physician        INDICATION:  Bariatric surgery status     POST-OP DIAGNOSIS:  See the impression below      PREPROCEDURE:  Informed consent was obtained for the procedure, including sedation  Risks of perforation, hemorrhage, adverse drug reaction and aspiration were discussed  The patient was placed in the left lateral decubitus position      Patient was explained about the risks and benefits of the procedure  Risks including but not limited to bleeding, infection, and perforation were explained in detail  Also explained about less than 100% sensitivity with the exam and other alternatives      DETAILS OF PROCEDURE:  Patient was taken to the procedure room where a time out was performed to confirm correct patient and correct procedure  The patient underwent monitored anesthesia care, which was administered by an anesthesia professional  The patient's blood pressure, heart rate, level of consciousness, respirations and oxygen were monitored throughout the procedure  The scope was advanced to the second part of the duodenum  Retroflexion was performed in the fundus  The patient experienced no blood loss  The procedure was not difficult  The patient tolerated the procedure well  There were no apparent complications  30 yo female s/p Robotic lysis of adhesions, Robotic small-bowel resection, Robotic partial gastrectomy, Robotic paraesophageal hernia repair, Robotic bilateral truncal vagotomy Robotic gastrojejunostomy anastomosis with intraop endoscopy  on 12/2020   With Dr Bhumika Harvey      Seen in the office a  Few months ago for abdominal pain/vomiting       ANESTHESIA INFORMATION:  ASA: II  Anesthesia Type: General, IV Sedation with Anesthesia     MEDICATIONS:  sodium chloride 0 9 % infusion 900 mL*               *From user-documented volume   (Totals for administrations occurring from 1112 to 1129 on 10/06/21)         FINDINGS:  · Mild, generalized erythematous mucosa in the stomach; performed 2 cold forceps biopsies to rule out H  pylori        SPECIMENS:  ID Type Source Tests Collected by Time Destination   1 : gastric pouch biopsies, rule out h pylori, retrieved with cold biopsy [facility] Tissue Stomach TISSUE EXAM Lasandra Lefort, MD 10/6/2021 1125              IMPRESSION:  Pouchitis  Slightly prominent overhang of the anastomotic candy-cane  No evidence of marginal ulceration, anastomotic stricture or gastrogastric fistula  Otherwise normal gastric bypass anatomy     RECOMMENDATION:   Continue with the bariatric program process and follow up in the office  Biopsy results pending      Lasandra Lefort, MD      Pathology from EGD   Final Diagnosis   A  Stomach, pouch, biopsy:  -  Chronic inactive oxyntic gastritis  -  Negative for Helicobacter pylori, by H&E stain   -  Negative for atrophy, intestinal metaplasia, dysplasia or carcinoma        --------------------------------------------------------------------    Patient to complete 3 month medical weight management and we will bring her back to discuss her options in more detail  Explained to patient that she may not benefit from revision as far as weight goes  Follow up after complete              Marion Dodd PA-C  Bariatric Surgery  3/3/2022  11:08 AM

## 2022-03-16 ENCOUNTER — TELEPHONE (OUTPATIENT)
Dept: HEMATOLOGY ONCOLOGY | Facility: CLINIC | Age: 37
End: 2022-03-16

## 2022-03-16 NOTE — TELEPHONE ENCOUNTER
MARITOM to the patient to remind her of her new patient appt on 3/18/22 at 9:40am at the [address] [place] [address] office  Informed the patient that I am calling in regards to completing Covid screening questions and to review our no show/cancellation policy  Informed the patient to give the office a call back at [phone]

## 2022-03-18 ENCOUNTER — TELEPHONE (OUTPATIENT)
Dept: HEMATOLOGY ONCOLOGY | Facility: CLINIC | Age: 37
End: 2022-03-18

## 2022-05-20 DIAGNOSIS — I10 ESSENTIAL HYPERTENSION: ICD-10-CM

## 2022-05-20 RX ORDER — HYDROCHLOROTHIAZIDE 25 MG/1
TABLET ORAL
Qty: 90 TABLET | Refills: 0 | Status: SHIPPED | OUTPATIENT
Start: 2022-05-20 | End: 2022-05-24 | Stop reason: SDUPTHER

## 2022-05-24 ENCOUNTER — TELEPHONE (OUTPATIENT)
Dept: FAMILY MEDICINE CLINIC | Facility: CLINIC | Age: 37
End: 2022-05-24

## 2022-05-24 DIAGNOSIS — I10 ESSENTIAL HYPERTENSION: ICD-10-CM

## 2022-05-24 RX ORDER — HYDROCHLOROTHIAZIDE 25 MG/1
25 TABLET ORAL DAILY
Qty: 90 TABLET | Refills: 0 | Status: SHIPPED | OUTPATIENT
Start: 2022-05-24 | End: 2022-06-15 | Stop reason: SDUPTHER

## 2022-05-31 ENCOUNTER — TELEPHONE (OUTPATIENT)
Dept: BARIATRICS | Facility: CLINIC | Age: 37
End: 2022-05-31

## 2022-05-31 NOTE — TELEPHONE ENCOUNTER
Reached out to Pt re: tomorrow's appt with RD  Pt's number was out of service  Sent Pt MaxCDNhart message alerting her of need to reschedule  Told Pt we will reschedule that portion of her appointment while she is here meeting with SW and  since the office is closed for the evening

## 2022-06-08 ENCOUNTER — HOSPITAL ENCOUNTER (OUTPATIENT)
Dept: RADIOLOGY | Facility: HOSPITAL | Age: 37
Discharge: HOME | End: 2022-06-08
Payer: COMMERCIAL

## 2022-06-08 ENCOUNTER — OFFICE VISIT (OUTPATIENT)
Dept: FAMILY MEDICINE CLINIC | Facility: CLINIC | Age: 37
End: 2022-06-08
Payer: COMMERCIAL

## 2022-06-08 ENCOUNTER — TELEPHONE (OUTPATIENT)
Dept: OTHER | Facility: OTHER | Age: 37
End: 2022-06-08

## 2022-06-08 ENCOUNTER — HOSPITAL ENCOUNTER (OUTPATIENT)
Dept: ULTRASOUND IMAGING | Facility: HOSPITAL | Age: 37
Discharge: HOME | End: 2022-06-08
Payer: COMMERCIAL

## 2022-06-08 VITALS
BODY MASS INDEX: 43.87 KG/M2 | HEIGHT: 64 IN | OXYGEN SATURATION: 98 % | SYSTOLIC BLOOD PRESSURE: 148 MMHG | DIASTOLIC BLOOD PRESSURE: 110 MMHG | RESPIRATION RATE: 16 BRPM | WEIGHT: 257 LBS | TEMPERATURE: 98.1 F | HEART RATE: 81 BPM

## 2022-06-08 DIAGNOSIS — K52.9 CHRONIC DIARRHEA: ICD-10-CM

## 2022-06-08 DIAGNOSIS — M54.42 CHRONIC LEFT-SIDED LOW BACK PAIN WITH LEFT-SIDED SCIATICA: ICD-10-CM

## 2022-06-08 DIAGNOSIS — G89.29 CHRONIC LEFT-SIDED LOW BACK PAIN WITH LEFT-SIDED SCIATICA: ICD-10-CM

## 2022-06-08 DIAGNOSIS — I10 ESSENTIAL HYPERTENSION: ICD-10-CM

## 2022-06-08 DIAGNOSIS — R10.11 RUQ PAIN: Primary | ICD-10-CM

## 2022-06-08 DIAGNOSIS — R10.11 RUQ PAIN: ICD-10-CM

## 2022-06-08 PROCEDURE — 72110 X-RAY EXAM L-2 SPINE 4/>VWS: CPT

## 2022-06-08 PROCEDURE — 99215 OFFICE O/P EST HI 40 MIN: CPT | Performed by: FAMILY MEDICINE

## 2022-06-08 PROCEDURE — 76705 ECHO EXAM OF ABDOMEN: CPT

## 2022-06-08 RX ORDER — TOPIRAMATE 25 MG/1
25 TABLET ORAL 2 TIMES DAILY
COMMUNITY
Start: 2022-05-24

## 2022-06-08 NOTE — TELEPHONE ENCOUNTER
Pt called, requesting a call back from office at their best convenience to to review ultra sound results

## 2022-06-08 NOTE — PROGRESS NOTES
Subjective:   Chief Complaint   Patient presents with    Back Pain    Other     Patient was seen by Merry Arredondo and was told she has multiple Gallstones after having US of abdomen         Patient ID: Janiya Daniels is a 40 y o  female  Patient here had multiple concern she is concerned about the pain on her lower back mostly on the left side radiate to left lower extremity no numbness no drop on the foot no fall no trauma no lose control of the urine or stool worse when she stand on her feet for long time also she concerned about pain in her right upper quadrant her radiate to her back no nausea no vomiting no abdomen distension no diet change not related to the certain food or position patient been told before by gyn she had a gallbladder stone also she concerned about the diarrhea it has been going on for more than 8 week she described it as loose stool with mucus no blood no recent travel no fever no weight loss no change in the diet      The following portions of the patient's history were reviewed and updated as appropriate: allergies, current medications, past family history, past medical history, past social history, past surgical history and problem list     Review of Systems   Constitutional: Negative for activity change, appetite change, fatigue and fever  HENT: Negative for congestion, ear pain, sinus pressure, sinus pain and sore throat  Eyes: Negative for pain, discharge, redness and itching  Respiratory: Negative for cough, chest tightness, shortness of breath and stridor  Cardiovascular: Negative for chest pain, palpitations and leg swelling  Gastrointestinal: Positive for abdominal pain and diarrhea  Negative for blood in stool, constipation and nausea  Genitourinary: Negative for dysuria, flank pain, frequency and hematuria  Musculoskeletal: Positive for back pain  Negative for joint swelling and neck pain  Skin: Negative for pallor and rash     Neurological: Negative for dizziness, tremors, weakness, numbness and headaches  Hematological: Does not bruise/bleed easily  Objective:  Vitals:    06/08/22 1435   BP: (!) 148/110   BP Location: Left arm   Patient Position: Sitting   Cuff Size: Large   Pulse: 81   Resp: 16   Temp: 98 1 °F (36 7 °C)   TempSrc: Tympanic   SpO2: 98%   Weight: 117 kg (257 lb)   Height: 5' 4" (1 626 m)      Physical Exam  Vitals and nursing note reviewed  Constitutional:       General: She is not in acute distress  Appearance: She is well-developed  She is not diaphoretic  HENT:      Head: Normocephalic  Right Ear: Tympanic membrane, ear canal and external ear normal       Left Ear: Tympanic membrane, ear canal and external ear normal       Nose: Nose normal  No congestion or rhinorrhea  Mouth/Throat:      Mouth: Mucous membranes are moist       Pharynx: Oropharynx is clear  No oropharyngeal exudate or posterior oropharyngeal erythema  Eyes:      General:         Right eye: No discharge  Left eye: No discharge  Conjunctiva/sclera: Conjunctivae normal       Pupils: Pupils are equal, round, and reactive to light  Neck:      Vascular: No JVD  Cardiovascular:      Rate and Rhythm: Normal rate and regular rhythm  Heart sounds: Normal heart sounds  No murmur heard  No gallop  Pulmonary:      Effort: Pulmonary effort is normal  No respiratory distress  Breath sounds: Normal breath sounds  No stridor  No wheezing or rales  Chest:      Chest wall: No tenderness  Abdominal:      General: There is no distension  Palpations: Abdomen is soft  There is no mass  Tenderness: There is abdominal tenderness in the right upper quadrant  There is no right CVA tenderness, left CVA tenderness or rebound  Musculoskeletal:      Cervical back: Normal range of motion and neck supple  Lumbar back: Tenderness present  No signs of trauma  Decreased range of motion   Positive left straight leg raise test  Negative right straight leg raise test    Lymphadenopathy:      Cervical: No cervical adenopathy  Skin:     General: Skin is warm  Findings: No erythema or rash  Neurological:      Mental Status: She is alert and oriented to person, place, and time  Motor: No weakness  Gait: Gait normal            Assessment/Plan:    RUQ pain  Acute symptomatic pain and her right upper quadrant her radiate to her back patient history of gastric bypass associated with nausea and diarrhea plan to do STAT ultrasound to rule out cholecystitis InCase get worse to go to the emergency room per patient she been told by gyn she had the gallbladder stone low record in the chart    Chronic left-sided low back pain with left-sided sciatica  A new diagnosis chronic pain a no recent trauma and pain radiate to her left lower extremity deny any lose control of the urine or stool no numbness or muscle weakness recommend Tylenol for the pain  Plan for x-ray of the lumbar spine    Chronic diarrhea  A new diagnosis the chronic has been going on for more than 8 week she described it as a loose stool with mucus no blood the no weight loss no fever or history of travel  A plan to do stool study InCase get worse to go to the emergency room    Essential hypertension  Blood pressure today uncontrolled patient and pain and the did not sleep well will hold on change medication it can be secondary to the pain recommend to the patient to continue current management continue monitor blood pressure and plan to re-evaluate in 2 week       Diagnoses and all orders for this visit:    RUQ pain  -     US right upper quadrant; Future    Chronic left-sided low back pain with left-sided sciatica  -     XR spine lumbar minimum 4 views non injury; Future    Chronic diarrhea  -     US right upper quadrant; Future  -     Clostridium difficile toxin by PCR with EIA; Future  -     Giardia antigen;  Future  -     Ova and parasite examination; Future  -     Stool Enteric Bacterial Panel by PCR; Future    Essential hypertension    Other orders  -     topiramate (TOPAMAX) 25 mg tablet;  Take 25 mg by mouth 2 (two) times a day

## 2022-06-09 ENCOUNTER — APPOINTMENT (EMERGENCY)
Dept: CT IMAGING | Facility: HOSPITAL | Age: 37
End: 2022-06-09
Payer: COMMERCIAL

## 2022-06-09 ENCOUNTER — TELEPHONE (OUTPATIENT)
Dept: FAMILY MEDICINE CLINIC | Facility: CLINIC | Age: 37
End: 2022-06-09

## 2022-06-09 ENCOUNTER — HOSPITAL ENCOUNTER (OUTPATIENT)
Facility: HOSPITAL | Age: 37
Setting detail: OBSERVATION
Discharge: HOME | End: 2022-06-10
Attending: EMERGENCY MEDICINE | Admitting: SPECIALIST
Payer: COMMERCIAL

## 2022-06-09 ENCOUNTER — TELEPHONE (OUTPATIENT)
Dept: OTHER | Facility: OTHER | Age: 37
End: 2022-06-09

## 2022-06-09 DIAGNOSIS — K80.00 ACUTE CALCULOUS CHOLECYSTITIS: Primary | ICD-10-CM

## 2022-06-09 DIAGNOSIS — K80.20 CALCULUS OF GALLBLADDER WITHOUT CHOLECYSTITIS WITHOUT OBSTRUCTION: Primary | ICD-10-CM

## 2022-06-09 DIAGNOSIS — R10.11 RIGHT UPPER QUADRANT ABDOMINAL PAIN: ICD-10-CM

## 2022-06-09 DIAGNOSIS — K80.21 CALCULUS OF GALLBLADDER WITH BILIARY OBSTRUCTION BUT WITHOUT CHOLECYSTITIS: Primary | ICD-10-CM

## 2022-06-09 DIAGNOSIS — K81.9 CHOLECYSTITIS: ICD-10-CM

## 2022-06-09 PROBLEM — M54.42 CHRONIC LEFT-SIDED LOW BACK PAIN WITH LEFT-SIDED SCIATICA: Status: ACTIVE | Noted: 2022-06-09

## 2022-06-09 PROBLEM — G89.29 CHRONIC LEFT-SIDED LOW BACK PAIN WITH LEFT-SIDED SCIATICA: Status: ACTIVE | Noted: 2022-06-09

## 2022-06-09 PROBLEM — K52.9 CHRONIC DIARRHEA: Status: ACTIVE | Noted: 2022-06-09

## 2022-06-09 LAB
ALBUMIN SERPL BCP-MCNC: 4.2 G/DL (ref 3–5.2)
ALP SERPL-CCNC: 99 U/L (ref 43–122)
ALT SERPL W P-5'-P-CCNC: 15 U/L
ANION GAP SERPL CALCULATED.3IONS-SCNC: 8 MMOL/L (ref 5–14)
AST SERPL W P-5'-P-CCNC: 22 U/L (ref 14–36)
BASOPHILS # BLD AUTO: 0.03 THOUSANDS/ΜL (ref 0–0.1)
BASOPHILS NFR BLD AUTO: 0 % (ref 0–1)
BILIRUB SERPL-MCNC: 0.29 MG/DL
BILIRUB UR QL STRIP: NEGATIVE
BUN SERPL-MCNC: 12 MG/DL (ref 5–25)
CALCIUM SERPL-MCNC: 8.6 MG/DL (ref 8.4–10.2)
CHLORIDE SERPL-SCNC: 111 MMOL/L (ref 97–108)
CLARITY UR: CLEAR
CO2 SERPL-SCNC: 20 MMOL/L (ref 22–30)
COLOR UR: ABNORMAL
CREAT SERPL-MCNC: 0.65 MG/DL (ref 0.6–1.2)
EOSINOPHIL # BLD AUTO: 0.09 THOUSAND/ΜL (ref 0–0.61)
EOSINOPHIL NFR BLD AUTO: 1 % (ref 0–6)
ERYTHROCYTE [DISTWIDTH] IN BLOOD BY AUTOMATED COUNT: 15.3 % (ref 11.6–15.1)
EXT PREG TEST URINE: NEGATIVE
EXT. CONTROL ED NAV: NORMAL
GFR SERPL CREATININE-BSD FRML MDRD: 114 ML/MIN/1.73SQ M
GLUCOSE SERPL-MCNC: 95 MG/DL (ref 70–99)
GLUCOSE UR STRIP-MCNC: NEGATIVE MG/DL
HCT VFR BLD AUTO: 34.4 % (ref 34.8–46.1)
HGB BLD-MCNC: 10.8 G/DL (ref 11.5–15.4)
HGB UR QL STRIP.AUTO: NEGATIVE
IMM GRANULOCYTES # BLD AUTO: 0.05 THOUSAND/UL (ref 0–0.2)
IMM GRANULOCYTES NFR BLD AUTO: 0 % (ref 0–2)
KETONES UR STRIP-MCNC: ABNORMAL MG/DL
LEUKOCYTE ESTERASE UR QL STRIP: NEGATIVE
LIPASE SERPL-CCNC: 69 U/L (ref 23–300)
LYMPHOCYTES # BLD AUTO: 2.27 THOUSANDS/ΜL (ref 0.6–4.47)
LYMPHOCYTES NFR BLD AUTO: 20 % (ref 14–44)
MAGNESIUM SERPL-MCNC: 1.9 MG/DL (ref 1.6–2.3)
MCH RBC QN AUTO: 27 PG (ref 26.8–34.3)
MCHC RBC AUTO-ENTMCNC: 31.4 G/DL (ref 31.4–37.4)
MCV RBC AUTO: 86 FL (ref 82–98)
MONOCYTES # BLD AUTO: 0.71 THOUSAND/ΜL (ref 0.17–1.22)
MONOCYTES NFR BLD AUTO: 6 % (ref 4–12)
NEUTROPHILS # BLD AUTO: 8.07 THOUSANDS/ΜL (ref 1.85–7.62)
NEUTS SEG NFR BLD AUTO: 73 % (ref 43–75)
NITRITE UR QL STRIP: NEGATIVE
NRBC BLD AUTO-RTO: 0 /100 WBCS
PH UR STRIP.AUTO: 6 [PH]
PLATELET # BLD AUTO: 376 THOUSANDS/UL (ref 149–390)
PMV BLD AUTO: 9.8 FL (ref 8.9–12.7)
POTASSIUM SERPL-SCNC: 4 MMOL/L (ref 3.6–5)
PROT SERPL-MCNC: 7.9 G/DL (ref 5.9–8.4)
PROT UR STRIP-MCNC: NEGATIVE MG/DL
RBC # BLD AUTO: 4 MILLION/UL (ref 3.81–5.12)
SODIUM SERPL-SCNC: 139 MMOL/L (ref 137–147)
SP GR UR STRIP.AUTO: 1.02 (ref 1–1.04)
UROBILINOGEN UA: NEGATIVE MG/DL
WBC # BLD AUTO: 11.22 THOUSAND/UL (ref 4.31–10.16)

## 2022-06-09 PROCEDURE — G1004 CDSM NDSC: HCPCS

## 2022-06-09 PROCEDURE — 81025 URINE PREGNANCY TEST: CPT | Performed by: EMERGENCY MEDICINE

## 2022-06-09 PROCEDURE — 99285 EMERGENCY DEPT VISIT HI MDM: CPT

## 2022-06-09 PROCEDURE — 36415 COLL VENOUS BLD VENIPUNCTURE: CPT | Performed by: EMERGENCY MEDICINE

## 2022-06-09 PROCEDURE — 99219 PR INITIAL OBSERVATION CARE/DAY 50 MINUTES: CPT | Performed by: SPECIALIST

## 2022-06-09 PROCEDURE — 81003 URINALYSIS AUTO W/O SCOPE: CPT | Performed by: EMERGENCY MEDICINE

## 2022-06-09 PROCEDURE — 96374 THER/PROPH/DIAG INJ IV PUSH: CPT

## 2022-06-09 PROCEDURE — 96375 TX/PRO/DX INJ NEW DRUG ADDON: CPT

## 2022-06-09 PROCEDURE — 96361 HYDRATE IV INFUSION ADD-ON: CPT

## 2022-06-09 PROCEDURE — 80053 COMPREHEN METABOLIC PANEL: CPT | Performed by: EMERGENCY MEDICINE

## 2022-06-09 PROCEDURE — 99285 EMERGENCY DEPT VISIT HI MDM: CPT | Performed by: EMERGENCY MEDICINE

## 2022-06-09 PROCEDURE — 83735 ASSAY OF MAGNESIUM: CPT | Performed by: EMERGENCY MEDICINE

## 2022-06-09 PROCEDURE — 96376 TX/PRO/DX INJ SAME DRUG ADON: CPT

## 2022-06-09 PROCEDURE — 83690 ASSAY OF LIPASE: CPT | Performed by: EMERGENCY MEDICINE

## 2022-06-09 PROCEDURE — 85025 COMPLETE CBC W/AUTO DIFF WBC: CPT | Performed by: EMERGENCY MEDICINE

## 2022-06-09 PROCEDURE — 74176 CT ABD & PELVIS W/O CONTRAST: CPT

## 2022-06-09 RX ORDER — KETOROLAC TROMETHAMINE 30 MG/ML
15 INJECTION, SOLUTION INTRAMUSCULAR; INTRAVENOUS ONCE
Status: COMPLETED | OUTPATIENT
Start: 2022-06-09 | End: 2022-06-09

## 2022-06-09 RX ORDER — SODIUM CHLORIDE, SODIUM LACTATE, POTASSIUM CHLORIDE, CALCIUM CHLORIDE 600; 310; 30; 20 MG/100ML; MG/100ML; MG/100ML; MG/100ML
125 INJECTION, SOLUTION INTRAVENOUS CONTINUOUS
Status: DISCONTINUED | OUTPATIENT
Start: 2022-06-09 | End: 2022-06-10 | Stop reason: HOSPADM

## 2022-06-09 RX ORDER — HYDROMORPHONE HCL/PF 1 MG/ML
0.5 SYRINGE (ML) INJECTION
Status: DISCONTINUED | OUTPATIENT
Start: 2022-06-09 | End: 2022-06-10 | Stop reason: HOSPADM

## 2022-06-09 RX ORDER — CEFAZOLIN SODIUM 2 G/50ML
2000 SOLUTION INTRAVENOUS ONCE
Status: DISCONTINUED | OUTPATIENT
Start: 2022-06-09 | End: 2022-06-09

## 2022-06-09 RX ORDER — HEPARIN SODIUM 5000 [USP'U]/ML
5000 INJECTION, SOLUTION INTRAVENOUS; SUBCUTANEOUS EVERY 8 HOURS SCHEDULED
Status: DISCONTINUED | OUTPATIENT
Start: 2022-06-09 | End: 2022-06-10 | Stop reason: HOSPADM

## 2022-06-09 RX ORDER — SODIUM CHLORIDE 9 MG/ML
150 INJECTION, SOLUTION INTRAVENOUS CONTINUOUS
Status: DISCONTINUED | OUTPATIENT
Start: 2022-06-09 | End: 2022-06-10

## 2022-06-09 RX ORDER — HYDROMORPHONE HCL/PF 1 MG/ML
1 SYRINGE (ML) INJECTION
Status: DISCONTINUED | OUTPATIENT
Start: 2022-06-09 | End: 2022-06-09

## 2022-06-09 RX ORDER — PANTOPRAZOLE SODIUM 40 MG/1
40 INJECTION, POWDER, FOR SOLUTION INTRAVENOUS
Status: DISCONTINUED | OUTPATIENT
Start: 2022-06-10 | End: 2022-06-10 | Stop reason: HOSPADM

## 2022-06-09 RX ORDER — MORPHINE SULFATE 4 MG/ML
4 INJECTION, SOLUTION INTRAMUSCULAR; INTRAVENOUS ONCE
Status: COMPLETED | OUTPATIENT
Start: 2022-06-09 | End: 2022-06-09

## 2022-06-09 RX ORDER — ONDANSETRON 2 MG/ML
4 INJECTION INTRAMUSCULAR; INTRAVENOUS EVERY 8 HOURS PRN
Status: DISCONTINUED | OUTPATIENT
Start: 2022-06-09 | End: 2022-06-10 | Stop reason: SDUPTHER

## 2022-06-09 RX ORDER — CEFAZOLIN SODIUM 2 G/50ML
2000 SOLUTION INTRAVENOUS EVERY 8 HOURS
Status: DISCONTINUED | OUTPATIENT
Start: 2022-06-09 | End: 2022-06-10 | Stop reason: HOSPADM

## 2022-06-09 RX ADMIN — ONDANSETRON 4 MG: 2 INJECTION INTRAMUSCULAR; INTRAVENOUS at 18:22

## 2022-06-09 RX ADMIN — HEPARIN SODIUM 5000 UNITS: 5000 INJECTION INTRAVENOUS; SUBCUTANEOUS at 19:48

## 2022-06-09 RX ADMIN — SODIUM CHLORIDE 150 ML/HR: 0.9 INJECTION, SOLUTION INTRAVENOUS at 16:27

## 2022-06-09 RX ADMIN — CEFAZOLIN SODIUM 2000 MG: 2 SOLUTION INTRAVENOUS at 17:58

## 2022-06-09 RX ADMIN — MORPHINE SULFATE 4 MG: 4 INJECTION INTRAVENOUS at 14:07

## 2022-06-09 RX ADMIN — SODIUM CHLORIDE 1000 ML: 0.9 INJECTION, SOLUTION INTRAVENOUS at 13:48

## 2022-06-09 RX ADMIN — MORPHINE SULFATE 4 MG: 4 INJECTION INTRAVENOUS at 16:27

## 2022-06-09 RX ADMIN — SODIUM CHLORIDE, SODIUM LACTATE, POTASSIUM CHLORIDE, AND CALCIUM CHLORIDE 125 ML/HR: .6; .31; .03; .02 INJECTION, SOLUTION INTRAVENOUS at 19:08

## 2022-06-09 RX ADMIN — KETOROLAC TROMETHAMINE 15 MG: 30 INJECTION, SOLUTION INTRAMUSCULAR; INTRAVENOUS at 13:49

## 2022-06-09 NOTE — ASSESSMENT & PLAN NOTE
Acute symptomatic pain and her right upper quadrant her radiate to her back patient history of gastric bypass associated with nausea and diarrhea plan to do STAT ultrasound to rule out cholecystitis InCase get worse to go to the emergency room per patient she been told by gyn she had the gallbladder stone low record in the chart

## 2022-06-09 NOTE — TELEPHONE ENCOUNTER
----- Message from Aleksandr Law MD sent at 6/9/2022  8:01 AM EDT -----    Cholelithiasis without findings of acute cholecystitis    Refer patient to DR Lee

## 2022-06-09 NOTE — ED PROVIDER NOTES
History  Chief Complaint   Patient presents with    Abdominal Pain     Pt came to ER for diffuse abd pain that radiates to lower back and diarrhea for one month  Pt reports worsening of symptoms in last 24 hours  73-year-old  Female presented emergency room 1 month of intermittent abdominal pain  Patient notes that after she eats depending on the type of food, she has epigastric, right upper and left upper quadrant abdominal pain that is excruciating in a chest with nausea vomiting  Has had loose stools for the past month as well  Denies fevers chills cough congestion rhinorrhea  Saw her primary care doctor and had an outpatient ultrasound performed which showed gallstones without acute cholecystitis  Nonbloody nonbilious emesis  Nonbloody diarrhea  No chest pain or shortness of breath      History provided by:  Patient  Abdominal Pain  Pain location:  Epigastric, LUQ and RUQ  Pain quality: aching and cramping    Pain radiates to:  L flank  Pain severity:  Severe  Onset quality:  Gradual  Duration:  4 weeks  Timing:  Intermittent  Progression:  Unchanged  Chronicity:  New  Relieved by:  Nothing  Worsened by:  Nothing  Ineffective treatments:  None tried  Associated symptoms: diarrhea, nausea and vomiting    Associated symptoms: no chest pain, no chills, no cough, no dysuria, no fever, no hematuria, no shortness of breath and no sore throat        Prior to Admission Medications   Prescriptions Last Dose Informant Patient Reported? Taking?    Diclofenac Sodium (VOLTAREN) 1 % Past Week at Unknown time Self No Yes   Sig: Apply 4 g topically 4 (four) times a day   Multiple Vitamin (MULTIVITAMIN) tablet 6/8/2022 at Unknown time Self Yes Yes   Sig: Take 1 tablet by mouth daily   Zinc 100 MG TABS 6/8/2022 at Unknown time Self Yes Yes   Sig: Take by mouth   albuterol (2 5 mg/3 mL) 0 083 % nebulizer solution 6/8/2022 at Unknown time Self No Yes   Sig: Take 3 mL (2 5 mg total) by nebulization every 6 (six) hours as needed for wheezing or shortness of breath   albuterol (PROVENTIL HFA,VENTOLIN HFA) 90 mcg/act inhaler Past Week at Unknown time Self No Yes   Sig: Inhale 2 puffs every 4 (four) hours as needed for wheezing   amLODIPine (NORVASC) 5 mg tablet 6/8/2022 at Unknown time Self No Yes   Sig: TAKE 1 TABLET(5 MG) BY MOUTH DAILY   budesonide-formoterol (Symbicort) 160-4 5 mcg/act inhaler Past Week at Unknown time Self No Yes   Sig: Inhale 2 puffs 2 (two) times a day Rinse mouth after use  calcium carbonate 1250 MG capsule 6/8/2022 at Unknown time Self Yes Yes   Sig: Take 1,250 mg by mouth 2 (two) times a day with meals   cholecalciferol (VITAMIN D3) 1,000 units tablet 6/8/2022 at Unknown time Self No Yes   Sig: Take 1 tablet (1,000 Units total) by mouth daily   ferrous sulfate (FeroSul) 325 (65 Fe) mg tablet 6/8/2022 at Unknown time Self No Yes   Sig: Take 1 tablet (325 mg total) by mouth 2 (two) times a day with meals   fluticasone (FLONASE) 50 mcg/act nasal spray Past Week at Unknown time Self No Yes   Sig: SHAKE LIQUID AND USE 2 SPRAYS IN EACH NOSTRIL DAILY   hydrochlorothiazide (HYDRODIURIL) 25 mg tablet 6/8/2022 at Unknown time Self No Yes   Sig: Take 1 tablet (25 mg total) by mouth in the morning     losartan (COZAAR) 25 mg tablet 6/8/2022 at Unknown time Self No Yes   Sig: TAKE 1 TABLET(25 MG) BY MOUTH DAILY   omeprazole (PriLOSEC) 20 mg delayed release capsule 6/8/2022 at Unknown time Self No Yes   Sig: TAKE 1 CAPSULE(20 MG) BY MOUTH TWICE DAILY   topiramate (TOPAMAX) 25 mg tablet 6/8/2022 at Unknown time Self Yes Yes   Sig: Take 25 mg by mouth 2 (two) times a day      Facility-Administered Medications: None       Past Medical History:   Diagnosis Date    Anemia     BMI 36 0-36 9,adult 12/8/2020    Cough 12/21/2020    COVID-19 virus infection 12/23/2020    Disease of thyroid gland     Dizziness     due to anemia, last episode August 2020, no falls    Fatigue     History of transfusion 2016    anemic - loss of blood due to bleeding ulcers, no reaction    Hyperlipidemia     Hypertension     Multiple gastric ulcers     Obesity     16    Thyroid disease     took synthroid but not currently taking    Thyroid mass 3/15/2021     A CT scan of the neck incidental finding 1 7 cm of the right thyroid    Ulcer        Past Surgical History:   Procedure Laterality Date    GASTRIC BYPASS      GASTRIC BYPASS LAPAROSCOPIC N/A 12/15/2020    Procedure: Robotic lysis of adhesions, small-bowel resection, partial gastrectomy, paraesophageal hernia repair, bilateral truncal vagotomy; Robotic gastrojejunostomy anastomosis with intraop endoscopy;  Surgeon: Arsh Bowling MD;  Location: [facility];  Service: Bariatrics    LITO-EN-Y PROCEDURE  2016    Gastric Bypass by Dr Patel Slider Weight 339 6,nw    TUBAL LIGATION Bilateral         WISDOM TOOTH EXTRACTION         Family History   Problem Relation Age of Onset    Asthma Mother     Coronary artery disease Mother     Diabetes Mother         MELLITUS    Hyperlipidemia Mother     Hypertension Mother     Aneurysm Mother         2018 just diagnosed with two    No Known Problems Father          when she was 1 months old    Aneurysm Maternal Grandmother     Aneurysm Maternal Grandfather           of a ruptured abdominal aneurysm    No Known Problems Sister     No Known Problems Daughter     No Known Problems Paternal Grandmother     No Known Problems Paternal Grandfather     No Known Problems Daughter     No Known Problems Maternal Aunt     No Known Problems Maternal Aunt     No Known Problems Maternal Aunt      I have reviewed and agree with the history as documented      E-Cigarette/Vaping    E-Cigarette Use Never User      E-Cigarette/Vaping Substances    Nicotine No     THC No     CBD No     Flavoring No     Other No     Unknown No      Social History     Tobacco Use    Smoking status: Former Smoker     Types: Cigarettes Quit date: 2018     Years since quittin 4    Smokeless tobacco: Former User    Tobacco comment: former   Vaping Use    Vaping Use: Never used   Substance Use Topics    Alcohol use: Never    Drug use: No       Review of Systems   Constitutional: Negative for chills and fever  HENT: Negative for ear pain and sore throat  Eyes: Negative for pain and visual disturbance  Respiratory: Negative for cough and shortness of breath  Cardiovascular: Negative for chest pain and palpitations  Gastrointestinal: Positive for abdominal pain, diarrhea, nausea and vomiting  Genitourinary: Negative for dysuria and hematuria  Musculoskeletal: Negative for arthralgias and back pain  Skin: Negative for color change and rash  Neurological: Negative for seizures and syncope  All other systems reviewed and are negative  Physical Exam  Physical Exam  Vitals and nursing note reviewed  Constitutional:       General: She is not in acute distress  Appearance: She is well-developed  HENT:      Head: Normocephalic and atraumatic  Nose: Nose normal       Mouth/Throat:      Mouth: Mucous membranes are moist    Eyes:      Conjunctiva/sclera: Conjunctivae normal    Cardiovascular:      Rate and Rhythm: Normal rate and regular rhythm  Heart sounds: No murmur heard  Pulmonary:      Effort: Pulmonary effort is normal  No respiratory distress  Breath sounds: Normal breath sounds  Abdominal:      General: Bowel sounds are normal       Palpations: Abdomen is soft  Tenderness: There is abdominal tenderness in the right upper quadrant, epigastric area, left upper quadrant and left lower quadrant  Positive signs include Hewitt's sign  Musculoskeletal:      Cervical back: Neck supple  Skin:     General: Skin is warm and dry  Capillary Refill: Capillary refill takes less than 2 seconds  Neurological:      Mental Status: She is alert and oriented to person, place, and time           Vital Signs  ED Triage Vitals   Temperature Pulse Respirations Blood Pressure SpO2   06/09/22 1302 06/09/22 1302 06/09/22 1302 06/09/22 1302 06/09/22 1302   98 2 °F (36 8 °C) 96 20 157/87 99 %      Temp Source Heart Rate Source Patient Position - Orthostatic VS BP Location FiO2 (%)   06/09/22 1302 06/09/22 1302 06/09/22 1633 06/09/22 1633 --   Tympanic Monitor Lying Right arm       Pain Score       06/09/22 1302       8           Vitals:    06/09/22 1302 06/09/22 1633 06/09/22 1817   BP: 157/87 124/77 141/94   Pulse: 96 88 84   Patient Position - Orthostatic VS:  Lying Sitting         Visual Acuity      ED Medications  Medications   sodium chloride 0 9 % infusion (0 mL/hr Intravenous Stopped 6/9/22 1806)   heparin (porcine) subcutaneous injection 5,000 Units (has no administration in time range)   lactated ringers infusion (has no administration in time range)   ondansetron (ZOFRAN) injection 4 mg (4 mg Intravenous Given 6/9/22 1822)   pantoprazole (PROTONIX) injection 40 mg (has no administration in time range)   ceFAZolin (ANCEF) IVPB (premix in dextrose) 2,000 mg 50 mL (0 mg Intravenous Stopped 6/9/22 1806)   HYDROmorphone (DILAUDID) injection 0 5 mg (has no administration in time range)   sodium chloride 0 9 % bolus 1,000 mL (0 mL Intravenous Stopped 6/9/22 1626)   ketorolac (TORADOL) injection 15 mg (15 mg Intravenous Given 6/9/22 1349)   morphine injection 4 mg (4 mg Intravenous Given 6/9/22 1407)   morphine injection 4 mg (4 mg Intravenous Given 6/9/22 1627)       Diagnostic Studies  Results Reviewed     Procedure Component Value Units Date/Time    Lipase [[id number]]  (Normal) Collected: 06/09/22 1333    Lab Status: Final result Specimen: Blood from Arm, Left Updated: 06/09/22 1352     Lipase 69 u/L     Magnesium [[id number]]  (Normal) Collected: 06/09/22 1333    Lab Status: Final result Specimen: Blood from Arm, Left Updated: 06/09/22 1351     Magnesium 1 9 mg/dL     CMP [[id number]]  (Abnormal) Collected: 06/09/22 1333    Lab Status: Final result Specimen: Blood from Arm, Left Updated: 06/09/22 1351     Sodium 139 mmol/L      Potassium 4 0 mmol/L      Chloride 111 mmol/L      CO2 20 mmol/L      ANION GAP 8 mmol/L      BUN 12 mg/dL      Creatinine 0 65 mg/dL      Glucose 95 mg/dL      Calcium 8 6 mg/dL      AST 22 U/L      ALT 15 U/L      Alkaline Phosphatase 99 U/L      Total Protein 7 9 g/dL      Albumin 4 2 g/dL      Total Bilirubin 0 29 mg/dL      eGFR 114 ml/min/1 73sq m     Narrative:      National Kidney Disease Foundation guidelines for Chronic Kidney Disease (CKD):     Stage 1 with normal or high GFR (GFR > 90 mL/min/1 73 square meters)    Stage 2 Mild CKD (GFR = 60-89 mL/min/1 73 square meters)    Stage 3A Moderate CKD (GFR = 45-59 mL/min/1 73 square meters)    Stage 3B Moderate CKD (GFR = 30-44 mL/min/1 73 square meters)    Stage 4 Severe CKD (GFR = 15-29 mL/min/1 73 square meters)    Stage 5 End Stage CKD (GFR <15 mL/min/1 73 square meters)  Note: GFR calculation is accurate only with a steady state creatinine    CBC and differential [[id number]]  (Abnormal) Collected: 06/09/22 1333    Lab Status: Final result Specimen: Blood from Arm, Left Updated: 06/09/22 1340     WBC 11 22 Thousand/uL      RBC 4 00 Million/uL      Hemoglobin 10 8 g/dL      Hematocrit 34 4 %      MCV 86 fL      MCH 27 0 pg      MCHC 31 4 g/dL      RDW 15 3 %      MPV 9 8 fL      Platelets 708 Thousands/uL      nRBC 0 /100 WBCs      Neutrophils Relative 73 %      Immat GRANS % 0 %      Lymphocytes Relative 20 %      Monocytes Relative 6 %      Eosinophils Relative 1 %      Basophils Relative 0 %      Neutrophils Absolute 8 07 Thousands/µL      Immature Grans Absolute 0 05 Thousand/uL      Lymphocytes Absolute 2 27 Thousands/µL      Monocytes Absolute 0 71 Thousand/µL      Eosinophils Absolute 0 09 Thousand/µL      Basophils Absolute 0 03 Thousands/µL     UA w Reflex to Microscopic w Reflex to Culture [[id number]]  (Abnormal) Collected: 06/09/22 1324    Lab Status: Final result Specimen: Urine, Clean Catch Updated: 06/09/22 1333     Color, UA Petra     Clarity, UA Clear     Specific [place], UA 1 020     pH, UA 6 0     Leukocytes, UA Negative     Nitrite, UA Negative     Protein, UA Negative mg/dl      Glucose, UA Negative mg/dl      Ketones, UA 15 (1+) mg/dl      Bilirubin, UA Negative     Blood, UA Negative     UROBILINOGEN UA Negative mg/dL     POCT pregnancy, urine [[id number]]  (Normal) Resulted: 06/09/22 1326    Lab Status: Final result Updated: 06/09/22 1326     EXT PREG TEST UR (Ref: Negative) negative     Control VALID                 CT abdomen pelvis wo contrast   Final Result by Shahida Wall MD (06/09 1532)      1  No acute abnormality in the abdomen or pelvis  2   Hypodense lesions in the ovaries, likely physiologic cysts/follicles in a patient of this age though measuring slightly greater than fluid attenuation and suboptimally evaluated without contrast  Follow-up ultrasound in 8-12 weeks is recommended to    ensure resolution  The study was marked in [facility] for immediate notification  Workstation performed: [id number]                    Procedures  Procedures         ED Course                                             MDM  Number of Diagnoses or Management Options  Diagnosis management comments: CT abd pelvis unremarkable  Case discussed with Gen Surgeon Dr Adelia Chance, will admit to his service due to intractable abd pain, positive esquivel's sign, recent US with GB stones and leukoicytosis  Clinical picture consistent with acute cholecysitis         Amount and/or Complexity of Data Reviewed  Clinical lab tests: ordered and reviewed  Tests in the radiology section of CPT®: ordered and reviewed        Disposition  Final diagnoses:   Right upper quadrant abdominal pain   Cholecystitis     Time reflects when diagnosis was documented in both MDM as applicable and the Disposition within this note     Time User Action Codes Description Comment    6/9/2022  4:09 PM Jordan Olson Add [R10 11] Right upper quadrant abdominal pain     6/9/2022  4:09 PM Jordan Olson Add [K81 9] Cholecystitis       ED Disposition     ED Disposition   Admit    Condition   Stable    Date/Time   Thu Jun 9, 2022  5:39 PM    Comment   Case was discussed with Dr Ang Solomon and the patient's admission status was agreed to be Admission Status: inpatient status to the service of Dr Ang Solomon   Follow-up Information    None         Current Discharge Medication List      CONTINUE these medications which have NOT CHANGED    Details   albuterol (2 5 mg/3 mL) 0 083 % nebulizer solution Take 3 mL (2 5 mg total) by nebulization every 6 (six) hours as needed for wheezing or shortness of breath  Qty: 180 mL, Refills: 5    Associated Diagnoses: Wheezing      albuterol (PROVENTIL HFA,VENTOLIN HFA) 90 mcg/act inhaler Inhale 2 puffs every 4 (four) hours as needed for wheezing  Qty: 1 Inhaler, Refills: 2    Comments: Substitution to a formulary equivalent within the same pharmaceutical class is authorized  Associated Diagnoses: COVID-19      amLODIPine (NORVASC) 5 mg tablet TAKE 1 TABLET(5 MG) BY MOUTH DAILY  Qty: 30 tablet, Refills: 2    Associated Diagnoses: Essential hypertension      budesonide-formoterol (Symbicort) 160-4 5 mcg/act inhaler Inhale 2 puffs 2 (two) times a day Rinse mouth after use    Qty: 10 2 g, Refills: 0    Associated Diagnoses: Cough with exposure to COVID-19 virus      calcium carbonate 1250 MG capsule Take 1,250 mg by mouth 2 (two) times a day with meals      cholecalciferol (VITAMIN D3) 1,000 units tablet Take 1 tablet (1,000 Units total) by mouth daily  Qty: 30 tablet, Refills: 0    Associated Diagnoses: Vitamin D deficiency      Diclofenac Sodium (VOLTAREN) 1 % Apply 4 g topically 4 (four) times a day  Qty: 100 g, Refills: 0    Associated Diagnoses: Chronic left shoulder pain      ferrous sulfate (FeroSul) 325 (65 Fe) mg tablet Take 1 tablet (325 mg total) by mouth 2 (two) times a day with meals  Qty: 180 tablet, Refills: 0    Comments: **Patient requests 90 days supply**  Associated Diagnoses: Other iron deficiency anemia      fluticasone (FLONASE) 50 mcg/act nasal spray SHAKE LIQUID AND USE 2 SPRAYS IN EACH NOSTRIL DAILY  Qty: 48 g, Refills: 0    Associated Diagnoses: Nasal congestion      hydrochlorothiazide (HYDRODIURIL) 25 mg tablet Take 1 tablet (25 mg total) by mouth in the morning  Qty: 90 tablet, Refills: 0    Associated Diagnoses: Essential hypertension      losartan (COZAAR) 25 mg tablet TAKE 1 TABLET(25 MG) BY MOUTH DAILY  Qty: 90 tablet, Refills: 0    Comments: **Patient requests 90 days supply**  Associated Diagnoses: Essential hypertension      Multiple Vitamin (MULTIVITAMIN) tablet Take 1 tablet by mouth daily      omeprazole (PriLOSEC) 20 mg delayed release capsule TAKE 1 CAPSULE(20 MG) BY MOUTH TWICE DAILY  Qty: 180 capsule, Refills: 0    Associated Diagnoses: Morbid obesity ([address] ); Bariatric surgery status      topiramate (TOPAMAX) 25 mg tablet Take 25 mg by mouth 2 (two) times a day      Zinc 100 MG TABS Take by mouth             No discharge procedures on file      PDMP Review       Value Time User    PDMP Reviewed  Yes 6/29/2021  8:33 AM Adelaida Siu[address] Provider  Electronically Signed by           Claudio Matson MD  06/09/22 1910       Jc Dove MD  06/09/22 1911

## 2022-06-09 NOTE — ASSESSMENT & PLAN NOTE
A new diagnosis chronic pain a no recent trauma and pain radiate to her left lower extremity deny any lose control of the urine or stool no numbness or muscle weakness recommend Tylenol for the pain  Plan for x-ray of the lumbar spine

## 2022-06-09 NOTE — TELEPHONE ENCOUNTER
Patient was diagnosed with gallbladder stones and was referred to general surgery  She would like a call back ASAP to schedule an appointment

## 2022-06-09 NOTE — ED NOTES
Patient transported to [facility], [facility]  06/09/22 2412

## 2022-06-09 NOTE — H&P
Chief Complaint:  Calculous cholecystitis      History of Present Illness:  Patient is a 41-year-old  female status post laparoscopic Lito-en-Y gastric bypass for morbid obesity  She was seen by her family physician yesterday for abdominal pain and ultrasound of the gallbladder was obtained  This demonstrated gallstones  Her pain initially improved but then got worse today and at that time her family physician Dr Tiara Johnson PCP [facility] Vicky notified our office  At that time the patient was sent to the emergency room at [facility]   She was evaluated at that time and bloods were drawn  She was seen to have a leukocytosis and a positive Hewitt sign  At that point she was admitted for definitive treatment        Past Medical History:   Past Medical History:   Diagnosis Date    Anemia     BMI 36 0-36 9,adult 12/8/2020    Cough 12/21/2020    COVID-19 virus infection 12/23/2020    Disease of thyroid gland     Dizziness     due to anemia, last episode August 2020, no falls    Fatigue     History of transfusion 2016    anemic - loss of blood due to bleeding ulcers, no reaction    Hyperlipidemia     Hypertension     Multiple gastric ulcers     Obesity     11/14/16    Thyroid disease     took synthroid but not currently taking    Thyroid mass 3/15/2021     A CT scan of the neck incidental finding 1 7 cm of the right thyroid    Ulcer          Past Surgical History:    Past Surgical History:   Procedure Laterality Date    GASTRIC BYPASS  2014    GASTRIC BYPASS LAPAROSCOPIC N/A 12/15/2020    Procedure: Robotic lysis of adhesions, small-bowel resection, partial gastrectomy, paraesophageal hernia repair, bilateral truncal vagotomy; Robotic gastrojejunostomy anastomosis with intraop endoscopy;  Surgeon: Erick Manning MD;  Location: [facility];  Service: Bariatrics    LITO-EN-Y PROCEDURE  11/14/2016    Gastric Bypass by Dr Stone Hassan Weight 339 6,nw    TUBAL LIGATION Bilateral     2010    WISDOM TOOTH EXTRACTION           Allergies: Allergies   Allergen Reactions    Cerro Oil - Food Allergy Shortness Of Breath    Venofer [Iron Sucrose]      Chest pressure after 1st dose  Tachycardia and blurred vision after 8 minutes of dose #2            Medications:    Current Facility-Administered Medications:     ceFAZolin (ANCEF) IVPB (premix in dextrose) 2,000 mg 50 mL, 2,000 mg, Intravenous, Q8H, Antoine Wagner MD    heparin (porcine) subcutaneous injection 5,000 Units, 5,000 Units, Subcutaneous, Q8H [address], Antoine Wagner MD    HYDROmorphone (DILAUDID) injection 0 5 mg, 0 5 mg, Intravenous, Q3H PRN, Antoine Wagner MD    lactated ringers infusion, 125 mL/hr, Intravenous, Continuous, Antoine Wagner MD    ondansetron [facility]) injection 4 mg, 4 mg, Intravenous, Q8H PRN, Antoine Wagner MD  [facility] ON 6/10/2022] pantoprazole (PROTONIX) injection 40 mg, 40 mg, Intravenous, Q24H [address], Antoine Wagner MD    sodium chloride 0 9 % infusion, 150 mL/hr, Intravenous, Continuous, Virat Safia Vick MD, Last Rate: 150 mL/hr at 06/09/22 1627, 150 mL/hr at 06/09/22 1627    Current Outpatient Medications:     albuterol (2 5 mg/3 mL) 0 083 % nebulizer solution, Take 3 mL (2 5 mg total) by nebulization every 6 (six) hours as needed for wheezing or shortness of breath, Disp: 180 mL, Rfl: 5    albuterol (PROVENTIL HFA,VENTOLIN HFA) 90 mcg/act inhaler, Inhale 2 puffs every 4 (four) hours as needed for wheezing, Disp: 1 Inhaler, Rfl: 2    amLODIPine (NORVASC) 5 mg tablet, TAKE 1 TABLET(5 MG) BY MOUTH DAILY, Disp: 30 tablet, Rfl: 2    budesonide-formoterol (Symbicort) 160-4 5 mcg/act inhaler, Inhale 2 puffs 2 (two) times a day Rinse mouth after use , Disp: 10 2 g, Rfl: 0    calcium carbonate 1250 MG capsule, Take 1,250 mg by mouth 2 (two) times a day with meals, Disp: , Rfl:     cholecalciferol (VITAMIN D3) 1,000 units tablet, Take 1 tablet (1,000 Units total) by mouth daily, Disp: 30 tablet, Rfl: 0    Diclofenac Sodium (VOLTAREN) 1 %, Apply 4 g topically 4 (four) times a day, Disp: 100 g, Rfl: 0    ferrous sulfate (FeroSul) 325 (65 Fe) mg tablet, Take 1 tablet (325 mg total) by mouth 2 (two) times a day with meals, Disp: 180 tablet, Rfl: 0    fluticasone (FLONASE) 50 mcg/act nasal spray, SHAKE LIQUID AND USE 2 SPRAYS IN EACH NOSTRIL DAILY, Disp: 48 g, Rfl: 0    hydrochlorothiazide (HYDRODIURIL) 25 mg tablet, Take 1 tablet (25 mg total) by mouth in the morning , Disp: 90 tablet, Rfl: 0    losartan (COZAAR) 25 mg tablet, TAKE 1 TABLET(25 MG) BY MOUTH DAILY, Disp: 90 tablet, Rfl: 0    Multiple Vitamin (MULTIVITAMIN) tablet, Take 1 tablet by mouth daily, Disp: , Rfl:     omeprazole (PriLOSEC) 20 mg delayed release capsule, TAKE 1 CAPSULE(20 MG) BY MOUTH TWICE DAILY, Disp: 180 capsule, Rfl: 0    topiramate (TOPAMAX) 25 mg tablet, Take 25 mg by mouth 2 (two) times a day, Disp: , Rfl:     Zinc 100 MG TABS, Take by mouth, Disp: , Rfl:       Social History:  Social History     Social History     Substance and Sexual Activity   Alcohol Use No     Social History     Substance and Sexual Activity   Drug Use No     Social History     Tobacco Use   Smoking Status Former Smoker    Types: Cigarettes    Quit date:     Years since quittin 4   Smokeless Tobacco Former User   Tobacco Comment    former         Family History:    Family History   Problem Relation Age of Onset    Asthma Mother     Coronary artery disease Mother     Diabetes Mother         MELLITUS    Hyperlipidemia Mother     Hypertension Mother     Aneurysm Mother          just diagnosed with two    No Known Problems Father          when she was 1 months old    Aneurysm Maternal Grandmother     Aneurysm Maternal Grandfather           of a ruptured abdominal aneurysm    No Known Problems Sister     No Known Problems Daughter     No Known Problems Paternal Grandmother     No Known Problems Paternal Grandfather    Judah Gillis No Known Problems Daughter     No Known Problems Maternal Aunt     No Known Problems Maternal Aunt     No Known Problems Maternal Aunt          Review of Systems:    As per the HPI  Abdominal pain with nausea no vomiting present  Otherwise within normal limits  Vitals:  Vitals:    06/09/22 1633   BP: 124/77   Pulse: 88   Resp: 20   Temp:    SpO2: 100%       Physical Exam:  Patient is a middle-aged obese female who is awake alert no distress  She is 5 ft 4 in 2056 lb with BMI of 44  Vital signs as above    Skin warm dry  Head normocephalic and atraumatic  Eyes POORNIMA a m  Intact  Ears nose within normal limits  Throat gag reflex intact  Neck no masses thyromegaly lymphadenopathy palpable  Back no CVA or spinal tenderness  Lungs clear to a and P  Cor regular rate and rhythm no murmurs carotid bruits  Abdomen obese soft scars consistent with previous laparoscopic surgery  Tender in right upper quadrant no palpable masses or hernias noted  Extremities negative CC E  Neurologically A&O x3 cranial nerves 2-12 intact    Lymphatics no lymphadenopathy palpable      Lab Results: I have personally reviewed pertinent reports  See below  Imaging: I have personally reviewed pertinent imaging for primarily CT scan of the abdomen and pelvis also the ultrasound  EKG, Pathology, and Other Studies: I have personally reviewed pertinent reports       Admission on 06/09/2022   Component Date Value    WBC 06/09/2022 11 22 (A)    RBC 06/09/2022 4 00     Hemoglobin 06/09/2022 10 8 (A)    Hematocrit 06/09/2022 34 4 (A)    MCV 06/09/2022 86     MCH 06/09/2022 27 0     MCHC 06/09/2022 31 4     RDW 06/09/2022 15 3 (A)    MPV 06/09/2022 9 8     Platelets 88/13/6531 376     nRBC 06/09/2022 0     Neutrophils Relative 06/09/2022 73     Immat GRANS % 06/09/2022 0     Lymphocytes Relative 06/09/2022 20     Monocytes Relative 06/09/2022 6     Eosinophils Relative 06/09/2022 1     Basophils Relative 06/09/2022 0     Neutrophils Absolute 06/09/2022 8 07 (A)    Immature Grans Absolute 06/09/2022 0 05     Lymphocytes Absolute 06/09/2022 2 27     Monocytes Absolute 06/09/2022 0 71     Eosinophils Absolute 06/09/2022 0 09     Basophils Absolute 06/09/2022 0 03     Sodium 06/09/2022 139     Potassium 06/09/2022 4 0     Chloride 06/09/2022 111 (A)    CO2 06/09/2022 20 (A)    ANION GAP 06/09/2022 8     BUN 06/09/2022 12     Creatinine 06/09/2022 0 65     Glucose 06/09/2022 95     Calcium 06/09/2022 8 6     AST 06/09/2022 22     ALT 06/09/2022 15     Alkaline Phosphatase 06/09/2022 99     Total Protein 06/09/2022 7 9     Albumin 06/09/2022 4 2     Total Bilirubin 06/09/2022 0 29     eGFR 06/09/2022 114     Lipase 06/09/2022 69     Magnesium 06/09/2022 1 9     Color, UA 06/09/2022 Petra (A)    Clarity, UA 06/09/2022 Clear     Specific Gravity, UA 06/09/2022 1 020     pH, UA 06/09/2022 6 0     Leukocytes, UA 06/09/2022 Negative     Nitrite, UA 06/09/2022 Negative     Protein, UA 06/09/2022 Negative     Glucose, UA 06/09/2022 Negative     Ketones, UA 06/09/2022 15 (1+) (A)    Bilirubin, UA 06/09/2022 Negative     Blood, UA 06/09/2022 Negative     UROBILINOGEN UA 06/09/2022 Negative     EXT PREG TEST UR (Ref: N* 06/09/2022 negative     Control 06/09/2022 VALID          Impression:  Acute calculous cholecystitis in a patient status post laparoscopic Jaycee-en-Y gastric bypass  Plan:  Admit  DVT prophylaxis  Antibiotics    Laparoscopic cholecystectomy plus-minus cholangiogram

## 2022-06-09 NOTE — PLAN OF CARE
Problem: PAIN - ADULT  Goal: Verbalizes/displays adequate comfort level or baseline comfort level  Description: Interventions:  - Encourage patient to monitor pain and request assistance  - Assess pain using appropriate pain scale  - Administer analgesics based on type and severity of pain and evaluate response  - Implement non-pharmacological measures as appropriate and evaluate response  - Consider cultural and social influences on pain and pain management  - Notify physician/advanced practitioner if interventions unsuccessful or patient reports new pain  Outcome: Progressing     Problem: INFECTION - ADULT  Goal: Absence or prevention of progression during hospitalization  Description: INTERVENTIONS:  - Assess and monitor for signs and symptoms of infection  - Monitor lab/diagnostic results  - Monitor all insertion sites, i e  indwelling lines, tubes, and drains  - Monitor endotracheal if appropriate and nasal secretions for changes in amount and color  - [place] appropriate cooling/warming therapies per order  - Administer medications as ordered  - Instruct and encourage patient and family to use good hand hygiene technique  - Identify and instruct in appropriate isolation precautions for identified infection/condition  Outcome: Progressing

## 2022-06-09 NOTE — ASSESSMENT & PLAN NOTE
Blood pressure today uncontrolled patient and pain and the did not sleep well will hold on change medication it can be secondary to the pain recommend to the patient to continue current management continue monitor blood pressure and plan to re-evaluate in 2 week

## 2022-06-09 NOTE — ASSESSMENT & PLAN NOTE
A new diagnosis the chronic has been going on for more than 8 week she described it as a loose stool with mucus no blood the no weight loss no fever or history of travel  A plan to do stool study InCase get worse to go to the emergency room

## 2022-06-10 ENCOUNTER — ANESTHESIA (OUTPATIENT)
Dept: PERIOP | Facility: HOSPITAL | Age: 37
End: 2022-06-10
Payer: COMMERCIAL

## 2022-06-10 ENCOUNTER — ANESTHESIA EVENT (OUTPATIENT)
Dept: PERIOP | Facility: HOSPITAL | Age: 37
End: 2022-06-10
Payer: COMMERCIAL

## 2022-06-10 VITALS
BODY MASS INDEX: 44.38 KG/M2 | DIASTOLIC BLOOD PRESSURE: 92 MMHG | HEIGHT: 64 IN | OXYGEN SATURATION: 98 % | SYSTOLIC BLOOD PRESSURE: 146 MMHG | TEMPERATURE: 96.7 F | HEART RATE: 87 BPM | WEIGHT: 259.92 LBS | RESPIRATION RATE: 18 BRPM

## 2022-06-10 PROBLEM — K80.00 ACUTE CALCULOUS CHOLECYSTITIS: Status: ACTIVE | Noted: 2022-06-10

## 2022-06-10 PROCEDURE — C9113 INJ PANTOPRAZOLE SODIUM, VIA: HCPCS | Performed by: SPECIALIST

## 2022-06-10 PROCEDURE — 88304 TISSUE EXAM BY PATHOLOGIST: CPT | Performed by: SPECIALIST

## 2022-06-10 PROCEDURE — 47562 LAPAROSCOPIC CHOLECYSTECTOMY: CPT | Performed by: SPECIALIST

## 2022-06-10 RX ORDER — LIDOCAINE HYDROCHLORIDE 10 MG/ML
INJECTION, SOLUTION EPIDURAL; INFILTRATION; INTRACAUDAL; PERINEURAL AS NEEDED
Status: DISCONTINUED | OUTPATIENT
Start: 2022-06-10 | End: 2022-06-10

## 2022-06-10 RX ORDER — ONDANSETRON 2 MG/ML
4 INJECTION INTRAMUSCULAR; INTRAVENOUS ONCE AS NEEDED
Status: COMPLETED | OUTPATIENT
Start: 2022-06-10 | End: 2022-06-10

## 2022-06-10 RX ORDER — NEOSTIGMINE METHYLSULFATE 1 MG/ML
INJECTION INTRAVENOUS AS NEEDED
Status: DISCONTINUED | OUTPATIENT
Start: 2022-06-10 | End: 2022-06-10

## 2022-06-10 RX ORDER — OXYCODONE HYDROCHLORIDE AND ACETAMINOPHEN 5; 325 MG/1; MG/1
1 TABLET ORAL EVERY 4 HOURS PRN
Status: DISCONTINUED | OUTPATIENT
Start: 2022-06-10 | End: 2022-06-10 | Stop reason: HOSPADM

## 2022-06-10 RX ORDER — OXYCODONE HYDROCHLORIDE AND ACETAMINOPHEN 5; 325 MG/1; MG/1
2 TABLET ORAL EVERY 4 HOURS PRN
Status: DISCONTINUED | OUTPATIENT
Start: 2022-06-10 | End: 2022-06-10 | Stop reason: HOSPADM

## 2022-06-10 RX ORDER — BUPIVACAINE HYDROCHLORIDE 5 MG/ML
INJECTION, SOLUTION PERINEURAL AS NEEDED
Status: DISCONTINUED | OUTPATIENT
Start: 2022-06-10 | End: 2022-06-10 | Stop reason: HOSPADM

## 2022-06-10 RX ORDER — DEXAMETHASONE SODIUM PHOSPHATE 10 MG/ML
INJECTION, SOLUTION INTRAMUSCULAR; INTRAVENOUS AS NEEDED
Status: DISCONTINUED | OUTPATIENT
Start: 2022-06-10 | End: 2022-06-10

## 2022-06-10 RX ORDER — OXYCODONE HYDROCHLORIDE AND ACETAMINOPHEN 5; 325 MG/1; MG/1
1 TABLET ORAL EVERY 6 HOURS PRN
Qty: 20 TABLET | Refills: 0 | Status: SHIPPED | OUTPATIENT
Start: 2022-06-10 | End: 2022-06-15 | Stop reason: SDUPTHER

## 2022-06-10 RX ORDER — GLYCOPYRROLATE 0.2 MG/ML
INJECTION INTRAMUSCULAR; INTRAVENOUS AS NEEDED
Status: DISCONTINUED | OUTPATIENT
Start: 2022-06-10 | End: 2022-06-10

## 2022-06-10 RX ORDER — PROMETHAZINE HYDROCHLORIDE 25 MG/ML
12.5 INJECTION, SOLUTION INTRAMUSCULAR; INTRAVENOUS ONCE AS NEEDED
Status: DISCONTINUED | OUTPATIENT
Start: 2022-06-10 | End: 2022-06-10 | Stop reason: HOSPADM

## 2022-06-10 RX ORDER — MIDAZOLAM HYDROCHLORIDE 2 MG/2ML
INJECTION, SOLUTION INTRAMUSCULAR; INTRAVENOUS AS NEEDED
Status: DISCONTINUED | OUTPATIENT
Start: 2022-06-10 | End: 2022-06-10

## 2022-06-10 RX ORDER — OXYCODONE HYDROCHLORIDE AND ACETAMINOPHEN 5; 325 MG/1; MG/1
1 TABLET ORAL EVERY 6 HOURS PRN
Qty: 20 TABLET | Refills: 0 | Status: SHIPPED | OUTPATIENT
Start: 2022-06-10 | End: 2022-06-20

## 2022-06-10 RX ORDER — FENTANYL CITRATE 50 UG/ML
INJECTION, SOLUTION INTRAMUSCULAR; INTRAVENOUS AS NEEDED
Status: DISCONTINUED | OUTPATIENT
Start: 2022-06-10 | End: 2022-06-10

## 2022-06-10 RX ORDER — ONDANSETRON 2 MG/ML
INJECTION INTRAMUSCULAR; INTRAVENOUS AS NEEDED
Status: DISCONTINUED | OUTPATIENT
Start: 2022-06-10 | End: 2022-06-10

## 2022-06-10 RX ORDER — HYDROMORPHONE HCL/PF 1 MG/ML
0.5 SYRINGE (ML) INJECTION
Status: DISCONTINUED | OUTPATIENT
Start: 2022-06-10 | End: 2022-06-10 | Stop reason: HOSPADM

## 2022-06-10 RX ORDER — SODIUM CHLORIDE, SODIUM LACTATE, POTASSIUM CHLORIDE, CALCIUM CHLORIDE 600; 310; 30; 20 MG/100ML; MG/100ML; MG/100ML; MG/100ML
50 INJECTION, SOLUTION INTRAVENOUS CONTINUOUS
Status: DISCONTINUED | OUTPATIENT
Start: 2022-06-10 | End: 2022-06-10 | Stop reason: HOSPADM

## 2022-06-10 RX ORDER — ONDANSETRON 2 MG/ML
4 INJECTION INTRAMUSCULAR; INTRAVENOUS EVERY 8 HOURS PRN
Status: DISCONTINUED | OUTPATIENT
Start: 2022-06-10 | End: 2022-06-10 | Stop reason: HOSPADM

## 2022-06-10 RX ORDER — ROCURONIUM BROMIDE 10 MG/ML
INJECTION, SOLUTION INTRAVENOUS AS NEEDED
Status: DISCONTINUED | OUTPATIENT
Start: 2022-06-10 | End: 2022-06-10

## 2022-06-10 RX ORDER — PROPOFOL 10 MG/ML
INJECTION, EMULSION INTRAVENOUS AS NEEDED
Status: DISCONTINUED | OUTPATIENT
Start: 2022-06-10 | End: 2022-06-10

## 2022-06-10 RX ADMIN — MIDAZOLAM 2 MG: 1 INJECTION INTRAMUSCULAR; INTRAVENOUS at 14:13

## 2022-06-10 RX ADMIN — FENTANYL CITRATE 50 MCG: 50 INJECTION, SOLUTION INTRAMUSCULAR; INTRAVENOUS at 14:37

## 2022-06-10 RX ADMIN — PROPOFOL 200 MG: 10 INJECTION, EMULSION INTRAVENOUS at 14:18

## 2022-06-10 RX ADMIN — LIDOCAINE HYDROCHLORIDE 50 MG: 10 INJECTION, SOLUTION EPIDURAL; INFILTRATION; INTRACAUDAL at 14:18

## 2022-06-10 RX ADMIN — FENTANYL CITRATE 50 MCG: 50 INJECTION, SOLUTION INTRAMUSCULAR; INTRAVENOUS at 15:00

## 2022-06-10 RX ADMIN — PANTOPRAZOLE SODIUM 40 MG: 40 INJECTION, POWDER, FOR SOLUTION INTRAVENOUS at 08:28

## 2022-06-10 RX ADMIN — HYDROMORPHONE HYDROCHLORIDE 0.5 MG: 1 INJECTION, SOLUTION INTRAMUSCULAR; INTRAVENOUS; SUBCUTANEOUS at 04:30

## 2022-06-10 RX ADMIN — SODIUM CHLORIDE, SODIUM LACTATE, POTASSIUM CHLORIDE, AND CALCIUM CHLORIDE 125 ML/HR: .6; .31; .03; .02 INJECTION, SOLUTION INTRAVENOUS at 04:33

## 2022-06-10 RX ADMIN — CEFAZOLIN SODIUM 2000 MG: 2 SOLUTION INTRAVENOUS at 09:37

## 2022-06-10 RX ADMIN — NEOSTIGMINE 3 MG: 1 INJECTION INTRAVENOUS at 15:52

## 2022-06-10 RX ADMIN — CEFAZOLIN SODIUM 2000 MG: 2 SOLUTION INTRAVENOUS at 01:43

## 2022-06-10 RX ADMIN — SODIUM CHLORIDE, SODIUM LACTATE, POTASSIUM CHLORIDE, AND CALCIUM CHLORIDE 125 ML/HR: .6; .31; .03; .02 INJECTION, SOLUTION INTRAVENOUS at 13:11

## 2022-06-10 RX ADMIN — GLYCOPYRROLATE 0.4 MG: 0.2 INJECTION, SOLUTION INTRAMUSCULAR; INTRAVENOUS at 15:52

## 2022-06-10 RX ADMIN — ROCURONIUM BROMIDE 40 MG: 10 INJECTION, SOLUTION INTRAVENOUS at 14:18

## 2022-06-10 RX ADMIN — SODIUM CHLORIDE, SODIUM LACTATE, POTASSIUM CHLORIDE, AND CALCIUM CHLORIDE: .6; .31; .03; .02 INJECTION, SOLUTION INTRAVENOUS at 14:05

## 2022-06-10 RX ADMIN — ONDANSETRON 4 MG: 2 INJECTION INTRAMUSCULAR; INTRAVENOUS at 08:34

## 2022-06-10 RX ADMIN — DEXAMETHASONE SODIUM PHOSPHATE 10 MG: 10 INJECTION INTRAMUSCULAR; INTRAVENOUS at 14:23

## 2022-06-10 RX ADMIN — ONDANSETRON 4 MG: 2 INJECTION INTRAMUSCULAR; INTRAVENOUS at 16:23

## 2022-06-10 RX ADMIN — OXYCODONE HYDROCHLORIDE AND ACETAMINOPHEN 2 TABLET: 5; 325 TABLET ORAL at 19:04

## 2022-06-10 RX ADMIN — HEPARIN SODIUM 5000 UNITS: 5000 INJECTION INTRAVENOUS; SUBCUTANEOUS at 05:51

## 2022-06-10 RX ADMIN — SODIUM CHLORIDE, SODIUM LACTATE, POTASSIUM CHLORIDE, AND CALCIUM CHLORIDE: .6; .31; .03; .02 INJECTION, SOLUTION INTRAVENOUS at 15:53

## 2022-06-10 RX ADMIN — HYDROMORPHONE HYDROCHLORIDE 0.5 MG: 1 INJECTION, SOLUTION INTRAMUSCULAR; INTRAVENOUS; SUBCUTANEOUS at 16:29

## 2022-06-10 RX ADMIN — FENTANYL CITRATE 50 MCG: 50 INJECTION, SOLUTION INTRAMUSCULAR; INTRAVENOUS at 14:18

## 2022-06-10 RX ADMIN — ONDANSETRON 4 MG: 2 INJECTION INTRAMUSCULAR; INTRAVENOUS at 14:13

## 2022-06-10 RX ADMIN — ROCURONIUM BROMIDE 10 MG: 10 INJECTION, SOLUTION INTRAVENOUS at 14:49

## 2022-06-10 RX ADMIN — FENTANYL CITRATE 50 MCG: 50 INJECTION, SOLUTION INTRAMUSCULAR; INTRAVENOUS at 14:47

## 2022-06-10 RX ADMIN — HYDROMORPHONE HYDROCHLORIDE 0.5 MG: 1 INJECTION, SOLUTION INTRAMUSCULAR; INTRAVENOUS; SUBCUTANEOUS at 17:15

## 2022-06-10 NOTE — UTILIZATION REVIEW
Initial Clinical Review    Admission: Date/Time/Statement:   Admission Orders (From admission, onward)     Ordered        06/09/22 1751  Place in Observation  Once            Pending  Inpatient Admission  Once                      Orders Placed This Encounter   Procedures    Place in Observation     Standing Status:   Standing     Number of Occurrences:   1     Order Specific Question:   Level of Care     Answer:   Med Surg [16]     ED Arrival Information     Expected   -    Arrival   6/9/2022 12:48    Acuity   Urgent            Means of arrival   Walk-In    Escorted by   Self    Service   Surgery-General    Admission type   Urgent            Arrival complaint   abdominal pain           Chief Complaint   Patient presents with    Abdominal Pain     Pt came to ER for diffuse abd pain that radiates to lower back and diarrhea for one month  Pt reports worsening of symptoms in last 24 hours  Initial Presentation:37 y o  female who presented self from home to [address] [place], [place] [address] [facility] ED  Admitted in observation status for evaluation and treatment of calculous cholecystitis  PMHx: Anemia, COVID-19 (12/2020), s/p lap Jaycee-en-Y gastric bypass, HLD, HTN, Thyroid disease  Presented w/ abdominal pain  On exam, RUQ tenderness, positive Hewitt's sign   Plan: ABX, lap cholecystectomy w/ possible cholangiogram        ED Triage Vitals   Temperature Pulse Respirations Blood Pressure SpO2   06/09/22 1302 06/09/22 1302 06/09/22 1302 06/09/22 1302 06/09/22 1302   98 2 °F (36 8 °C) 96 20 157/87 99 %      Temp Source Heart Rate Source Patient Position - Orthostatic VS BP Location FiO2 (%)   06/09/22 1302 06/09/22 1302 06/09/22 1633 06/09/22 1633 --   Tympanic Monitor Lying Right arm       Pain Score       06/09/22 1302       8          Wt Readings from Last 1 Encounters:   06/09/22 118 kg (259 lb 14 8 oz)     Additional Vital Signs:   Date/Time Temp Pulse Resp BP MAP (mmHg) SpO2 O2 Device   06/10/22 0712 97 2 °F (36 2 °C) Abnormal  80 17 150/88 114 100 % None (Room air)   06/09/22 2330 97 2 °F (36 2 °C) Abnormal  69 19 146/90 102 99 % None (Room air)   06/09/22 1817 97 7 °F (36 5 °C) 84 20 141/94 100 99 % None (Room air)   06/09/22 1633 -- 88 20 124/77 -- 100 % None (Room air)       Pertinent Labs/Diagnostic Test Results:   CT abdomen pelvis wo contrast   Final Result by Gris Day MD (06/09 1532)      1  No acute abnormality in the abdomen or pelvis  2   Hypodense lesions in the ovaries, likely physiologic cysts/follicles in a patient of this age though measuring slightly greater than fluid attenuation and suboptimally evaluated without contrast  Follow-up ultrasound in 8-12 weeks is recommended to    ensure resolution  The study was marked in [facility] for immediate notification        Workstation performed: [id number]               Results from last 7 days   Lab Units 06/09/22  1333   WBC Thousand/uL 11 22*   HEMOGLOBIN g/dL 10 8*   HEMATOCRIT % 34 4*   PLATELETS Thousands/uL 376   NEUTROS ABS Thousands/µL 8 07*         Results from last 7 days   Lab Units 06/09/22  1333   SODIUM mmol/L 139   POTASSIUM mmol/L 4 0   CHLORIDE mmol/L 111*   CO2 mmol/L 20*   ANION GAP mmol/L 8   BUN mg/dL 12   CREATININE mg/dL 0 65   EGFR ml/min/1 73sq m 114   CALCIUM mg/dL 8 6   MAGNESIUM mg/dL 1 9     Results from last 7 days   Lab Units 06/09/22  1333   AST U/L 22   ALT U/L 15   ALK PHOS U/L 99   TOTAL PROTEIN g/dL 7 9   ALBUMIN g/dL 4 2   TOTAL BILIRUBIN mg/dL 0 29         Results from last 7 days   Lab Units 06/09/22  1333   GLUCOSE RANDOM mg/dL 95             No results found for: BETA-HYDROXYBUTYRATE                                                               Results from last 7 days   Lab Units 06/09/22  1333   LIPASE u/L 69                 Results from last 7 days   Lab Units 06/09/22  1324   CLARITY UA  Clear   COLOR UA  Petra*   SPEC GRAV UA  1 020   PH UA  6 0   GLUCOSE UA mg/dl Negative   KETONES UA mg/dl 15 (1+)*   BLOOD UA  Negative PROTEIN UA mg/dl Negative   NITRITE UA  Negative   BILIRUBIN UA  Negative   UROBILINOGEN UA mg/dL Negative   LEUKOCYTES UA  Negative                                               ED Treatment:   Medication Administration from 06/09/2022 1247 to 06/09/2022 1810       Date/Time Order Dose Route Action     06/09/2022 1348 sodium chloride 0 9 % bolus 1,000 mL 1,000 mL Intravenous New Bag     06/09/2022 1349 ketorolac (TORADOL) injection 15 mg 15 mg Intravenous Given     06/09/2022 1407 morphine injection 4 mg 4 mg Intravenous Given     06/09/2022 1627 morphine injection 4 mg 4 mg Intravenous Given     06/09/2022 1627 sodium chloride 0 9 % infusion 150 mL/hr Intravenous New Bag     06/09/2022 1758 ceFAZolin (ANCEF) IVPB (premix in dextrose) 2,000 mg 50 mL 2,000 mg Intravenous New Bag        Past Medical History:   Diagnosis Date    Anemia     BMI 36 0-36 9,adult 12/8/2020    Cough 12/21/2020    COVID-19 virus infection 12/23/2020    Disease of thyroid gland     Dizziness     due to anemia, last episode August 2020, no falls    Fatigue     History of transfusion 2016    anemic - loss of blood due to bleeding ulcers, no reaction    Hyperlipidemia     Hypertension     Multiple gastric ulcers     Obesity     11/14/16    Thyroid disease     took synthroid but not currently taking    Thyroid mass 3/15/2021     A CT scan of the neck incidental finding 1 7 cm of the right thyroid    Ulcer        Admitting Diagnosis: Cholecystitis [K81 9]  Abdominal pain [R10 9]  Right upper quadrant abdominal pain [R10 11]  Age/Sex: 40 y o  female  Admission Orders:  NPO  Incentive Spirometry  I&O   SCDs      Scheduled Medications:  cefazolin, 2,000 mg, Intravenous, Q8H  heparin (porcine), 5,000 Units, Subcutaneous, Q8H BABATUNDE  pantoprazole, 40 mg, Intravenous, Q24H BABATUNDE    Continuous IV Infusions:  lactated ringers, 125 mL/hr, Intravenous, Continuous    PRN Meds:  HYDROmorphone, 0 5 mg, Intravenous, Q3H PRN  ondansetron, 4 mg, Intravenous, Q8H PRN        Network Utilization Review Department  ATTENTION: Please call with any questions or concerns to [phone] and carefully listen to the prompts so that you are directed to the right person  All voicemails are confidential   Leonie De Leon all requests for admission clinical reviews, approved or denied determinations and any other requests to dedicated fax number below belonging to the campus where the patient is receiving treatment   List of dedicated fax numbers for the Facilities:  [address] DENIALS (Administrative/Medical Necessity) [phone]   [address] (Maternity/NICU/Pediatrics) [phone]   [address] [place] [address]  [address] Medical [place] [address] [id number] [place] [address] [address] Hoang [address] [facility] [place] [address] [phone]

## 2022-06-10 NOTE — NURSING NOTE
Pt discharged home  Took down with PCA in wheelchair  Prescription e prescribe to [facility]  Instructions given to patient on signs of infection and when to call the doctor

## 2022-06-10 NOTE — ANESTHESIA PREPROCEDURE EVALUATION
Procedure:  CHOLECYSTECTOMY LAPAROSCOPIC (N/A Abdomen)    Relevant Problems   CARDIO   (+) Breast pain   (+) Essential hypertension   (+) Hyperlipidemia   (+) Other chest pain   (+) SOB (shortness of breath) on exertion      GI/HEPATIC   (+) Acute duodenal ulcer   (+) Hiatal hernia   (+) Hyperinsulinemia   (+) Other dysphagia      HEMATOLOGY   (+) Anemia   (+) Iron deficiency anemia secondary to inadequate dietary iron intake      MUSCULOSKELETAL   (+) Chronic left-sided low back pain with left-sided sciatica      NEURO/PSYCH   (+) Chronic left shoulder pain   (+) Chronic left-sided low back pain with left-sided sciatica   (+) Generalized anxiety disorder   (+) History of COVID-19   (+) Other headache syndrome   (+) Situational anxiety      PULMONARY   (+) SOB (shortness of breath) on exertion      Digestive   (+) Postsurgical malabsorption      Other   (+) S/P gastric bypass   (+) Severe obesity (BMI 35 0-39  9) with comorbidity (HCC)        Physical Exam    Airway    Mallampati score: II  TM Distance: >3 FB  Neck ROM: full     Dental       Cardiovascular  Cardiovascular exam normal    Pulmonary  Pulmonary exam normal     Other Findings        Anesthesia Plan  ASA Score- 3     Anesthesia Type- general with ASA Monitors  Additional Monitors:   Airway Plan: ETT  Plan Factors-Exercise tolerance (METS): >4 METS  Chart reviewed  EKG reviewed  Existing labs reviewed  Patient summary reviewed  Patient is not a current smoker  Patient not instructed to abstain from smoking on day of procedure  Patient did not smoke on day of surgery  Induction- intravenous  Postoperative Plan- Plan for postoperative opioid use  Informed Consent- Anesthetic plan and risks discussed with patient  I personally reviewed this patient with the CRNA  Discussed and agreed on the Anesthesia Plan with the CRNA             Lab Results   Component Value Date    HGBA1C 5 6 06/12/2020       Lab Results   Component Value Date     11/14/2015    K 4 0 06/09/2022     (H) 06/09/2022    CO2 20 (L) 06/09/2022    ANIONGAP 3 (L) 11/14/2015    BUN 12 06/09/2022    CREATININE 0 65 06/09/2022    GLUCOSE 93 08/29/2016    GLUF 95 12/14/2021    CALCIUM 8 6 06/09/2022    CORRECTEDCA 9 1 11/21/2020    AST 22 06/09/2022    ALT 15 06/09/2022    ALKPHOS 99 06/09/2022    PROT 7 8 11/14/2015    BILITOT 0 18 (L) 11/14/2015    EGFR 114 06/09/2022       Lab Results   Component Value Date    WBC 11 22 (H) 06/09/2022    HGB 10 8 (L) 06/09/2022    HCT 34 4 (L) 06/09/2022    MCV 86 06/09/2022     06/09/2022   Normal sinus rhythm  Possible Inferior infarct , age undetermined  Abnormal ECG  When compared with ECG of 13-MAR-2021 10:23,  Vent   rate has decreased BY  33 BPM  Borderline criteria for Inferior infarct are now Present  T wave inversion now evident in Inferior leads   inferior T-wave inversion is new compared to the prior record and suggestive of an acute ischemic event  Confirmed by El Cruz ([id number]) on 5/12/2021 3:56:40 PM

## 2022-06-10 NOTE — NURSING NOTE
Patient received from PACU in stable condition, VSS   C/O abdominal pain, 8/10, denies nausea  Will medicate with Dilaudid  Patient sleeping, but easily aroused  Family at bedside

## 2022-06-10 NOTE — ANESTHESIA POSTPROCEDURE EVALUATION
Post-Op Assessment Note    CV Status:  Stable  Pain Score: 0    Pain management: adequate     Mental Status:  Alert and awake   Hydration Status:  Euvolemic   PONV Controlled:  Controlled   Airway Patency:  Patent      Post Op Vitals Reviewed: Yes      Staff: CRNA         No complications documented      BP   146/87   Temp  97 7   Pulse  98   Resp   14   SpO2   100

## 2022-06-10 NOTE — OP NOTE
OPERATIVE REPORT  PATIENT NAME: Broderick Mayes    :  1985  MRN: [id number]  Pt Location:  OR ROOM 07    SURGERY DATE: 6/10/2022    Surgeon(s) and Role:     * Antoine Wagner MD - Primary        Mary Anne De La Vega MD family practice resident [facility]    Preop Diagnosis:  Cholecystitis [K81 9] acute calculous cholecystitis    Post-Op Diagnosis Codes:     * Cholecystitis [K81 9] same    Procedure(s) (LRB):  CHOLECYSTECTOMY LAPAROSCOPIC (N/A)    Specimen(s):  ID Type Source Tests Collected by Time Destination   1 :  Tissue Gallbladder TISSUE EXAM Antoine Wagner MD 6/10/2022  3:25 PM        Estimated Blood Loss:   25 mL    Drains:  * No LDAs found *    Anesthesia Type:   General    Operative Indications:  Cholecystitis [K81 9]  Acute calculous cholecystitis    Operative Findings:  Cystic duct plugged with stone debris  Multiple large stones in the gallbladder itself  Complications:   None    Procedure and Technique:  Patient brought the operating room postop table supine position  Under adequate general endotracheal anesthesia the abdomen was prepped and draped in usual sterile fashion  A 11  Scalp blade used make a left subcostal incision  The Veress needle inserted the abdomen is insufflated with CO2 to 15 mmHg  Needle was removed the 5 mm port is inserted  5 mm scope was inserted the abdomen was visually explored  There was noted be no trauma from needle port placement  There was actually no significant adhesions upon scanning the abdomen  The ovaries were identified they appeared to be intact  Some small follicles were noted on the ovaries but no solid masses  Additional ports were placed in the umbilicus left subcostal left upper quadrant  These were 10 5 and 5 respectively  The 5 mm scope was replaced with a 10 mm scope  The gallbladder was identified and was extremely distended  Fundus was grasped retracted superiorly    A significant amount of omental adhesions were densely adherent to the anterior surface of the gallbladder  These were taken down bluntly and then with sharp dissection as they were quite dense  As this proceeded the gallbladder was then retracted to the right subphrenic area  Adhesions continually were taken down until the infundibulum was identified  This was grasped and retracted superior laterally  The distal end of the gallbladder was dissected  The common duct was identified and also appeared to be a cystic duct common duct junction  Careful dissection of the distal end of the gallbladder demonstrated the cystic duct  This was circumferentially dissected  Once again the cystic common duct junction was identified  The artery appeared to be superior to this duct  Cystic duct was doubly clipped proximally singly clipped distally and then divided  Some stone debris was noted to be impacted in the cystic duct  The artery which was cephalad to this was dissected clipped and then divided  The gallbladder was taken down from the liver bed using the hook cautery  It was quite large quite distended with significant scar tissue present  An additional vessel was encountered on the lateral aspect of the gallbladder near the liver bed  Pulsatile blood a came up from this and was controlled with clips and electrocautery  The gallbladder was then taken down from liver bed  Was brought out through the umbilical port site after putting it in an endobag  Was sent to pathology for histological diagnosis  The operative site was inspected for bleeding and there was no bleeding noted  It was irrigated with saline solution all this fluid was suction from the field  The clips were inspected noted be intact and functional   The gallbladder fossa was inspected there was noted be no bleeding present  Any residual fluid was suction from the field  At that point all CO2 and ports removed  Fascial defect at the umbilicus was quite small and was not closed    All ports sites were infiltrated with 0 5% Marcaine  Skin was closed with 4 Monocryl in a subcuticular fashion  Benzoin Steri-Strips applied  The estimated blood was minimal patient tolerated the procedure well delivered to recovery in stable condition  Thanks     I was present for the entire procedure    Patient Disposition:  PACU       SIGNATURE: Kin Espinal MD  DATE: Olivia 10, 2022  TIME: 4:45 PM

## 2022-06-14 ENCOUNTER — TRANSITIONAL CARE MANAGEMENT (OUTPATIENT)
Dept: FAMILY MEDICINE CLINIC | Facility: CLINIC | Age: 37
End: 2022-06-14

## 2022-06-15 ENCOUNTER — OFFICE VISIT (OUTPATIENT)
Dept: FAMILY MEDICINE CLINIC | Facility: CLINIC | Age: 37
End: 2022-06-15
Payer: COMMERCIAL

## 2022-06-15 VITALS
WEIGHT: 257 LBS | OXYGEN SATURATION: 98 % | SYSTOLIC BLOOD PRESSURE: 130 MMHG | TEMPERATURE: 99.6 F | DIASTOLIC BLOOD PRESSURE: 80 MMHG | BODY MASS INDEX: 42.82 KG/M2 | HEART RATE: 91 BPM | HEIGHT: 65 IN

## 2022-06-15 DIAGNOSIS — Z90.49 S/P CHOLECYSTECTOMY: Primary | ICD-10-CM

## 2022-06-15 DIAGNOSIS — I10 ESSENTIAL HYPERTENSION: ICD-10-CM

## 2022-06-15 PROCEDURE — 99496 TRANSJ CARE MGMT HIGH F2F 7D: CPT | Performed by: FAMILY MEDICINE

## 2022-06-15 RX ORDER — HYDROCHLOROTHIAZIDE 25 MG/1
25 TABLET ORAL DAILY
Qty: 90 TABLET | Refills: 0 | Status: SHIPPED | OUTPATIENT
Start: 2022-06-15 | End: 2022-06-27 | Stop reason: SDUPTHER

## 2022-06-15 NOTE — PROGRESS NOTES
Assessment/Plan:     S/P cholecystectomy  A status post hospitalization June 9 patient had the laparoscopic cholecystectomy on Olivia 10 a tolerated the procedure well no complication and discussed with the patient proper diet InCase of diarrhea abdomen pain or fever to call the office    Essential hypertension  A chronic asymptomatic improve improve in the blood pressure number compared with before continue current management low-salt diet important lose weight review the patient       Diagnoses and all orders for this visit:    S/P cholecystectomy    Essential hypertension  -     hydrochlorothiazide (HYDRODIURIL) 25 mg tablet; Take 1 tablet (25 mg total) by mouth daily       Subjective:     Patient ID: Isidoro Man is a 40 y o  female  Patient here status post the hospitalization patient the who had the history of gastric bypass the and diagnosed recently with the cholelithiasis developed abdomen pain and and she went to the emergency room on June 9 with the abdomen pain was admitted the with the cholecystitis the a she had positive Hewitt sign and increase white blood cell the patient in had the laparoscopic cholecystectomy on Olivia 10 patient tolerated procedure well without side effect and discharged same day hospital record review with the patient no change in her medication D today deny any cough wheezing and no vomiting no diarrhea no abdomen distension no fever no burning sensation urination and no lower extremity edema      Review of Systems   Constitutional: Negative for activity change, appetite change, fatigue and fever  HENT: Negative for congestion, ear pain, sinus pressure, sinus pain and sore throat  Eyes: Negative for pain, discharge, redness and itching  Respiratory: Negative for cough, chest tightness, shortness of breath and stridor  Cardiovascular: Negative for chest pain, palpitations and leg swelling     Gastrointestinal: Negative for abdominal pain, blood in stool, constipation, diarrhea and nausea  Genitourinary: Negative for dysuria, flank pain, frequency and hematuria  Musculoskeletal: Negative for back pain, joint swelling and neck pain  Skin: Negative for pallor and rash  Neurological: Negative for dizziness, tremors, weakness, numbness and headaches  Hematological: Does not bruise/bleed easily  Objective:     Physical Exam  Vitals and nursing note reviewed  Constitutional:       General: She is not in acute distress  Appearance: She is well-developed  HENT:      Head: Normocephalic and atraumatic  Nose: Nose normal       Mouth/Throat:      Pharynx: Oropharynx is clear  Eyes:      Conjunctiva/sclera: Conjunctivae normal    Neck:      Vascular: No JVD  Cardiovascular:      Rate and Rhythm: Normal rate and regular rhythm  Heart sounds: Normal heart sounds  No murmur heard  No friction rub  No gallop  Pulmonary:      Effort: Pulmonary effort is normal       Breath sounds: Normal breath sounds  Abdominal:      General: Bowel sounds are normal       Palpations: Abdomen is soft  Tenderness: There is no abdominal tenderness  Neurological:      Mental Status: She is oriented to person, place, and time  Vitals:    06/15/22 1017 06/15/22 1040   BP: 130/90 130/80   Pulse: 91    Temp: 99 6 °F (37 6 °C)    TempSrc: Tympanic    SpO2: 98%    Weight: 117 kg (257 lb)    Height: 5' 4 5" (1 638 m)        Transitional Care Management Review: Elva Andersen is a 40 y o  female here for TCM follow up       During the TCM phone call patient stated:    TCM Call (since 5/15/2022)     Date and time call was made  6/14/2022  9:21 AM    Hospital care reviewed  Records reviewed    Patient was hospitialized at  [address] [place] [address]    Date of Admission  06/09/22    Date of discharge  06/10/22    Diagnosis  acute calculous cholecystitis    Disposition  Home    Were the patients medications reviewed and updated  No    Current Symptoms  None      TCM Call (since 5/15/2022)     Post hospital issues  None    Should patient be enrolled in anticoag monitoring?   No    Did you obtain your prescribed medications  Yes    Do you need help managing your prescriptions or medications  No    Is transportation to your appointment needed  No    I have advised the patient to call PCP with any new or worsening symptoms  reyna hilliard [facility]    Support System  Family    The type of support provided  Emotional; Physical    Do you have social support  Yes, as much as I need    Are you recieving any outpatient services  No    Are you recieving home care services  No    Are you using any community resources  No    Current waiver services  No    Have you fallen in the last 12 months  No    Interperter language line needed  No    Counseling  Patient          Abner Ann MD

## 2022-06-15 NOTE — ASSESSMENT & PLAN NOTE
A status post hospitalization June 9 patient had the laparoscopic cholecystectomy on Olivia 10 a tolerated the procedure well no complication and discussed with the patient proper diet InCase of diarrhea abdomen pain or fever to call the office

## 2022-06-15 NOTE — ASSESSMENT & PLAN NOTE
A chronic asymptomatic improve improve in the blood pressure number compared with before continue current management low-salt diet important lose weight review the patient

## 2022-06-22 ENCOUNTER — OFFICE VISIT (OUTPATIENT)
Dept: SURGERY | Facility: CLINIC | Age: 37
End: 2022-06-22

## 2022-06-22 VITALS
WEIGHT: 257 LBS | OXYGEN SATURATION: 98 % | TEMPERATURE: 99.6 F | HEART RATE: 90 BPM | BODY MASS INDEX: 42.82 KG/M2 | HEIGHT: 65 IN

## 2022-06-22 DIAGNOSIS — K80.10 CALCULOUS CHOLECYSTITIS: Primary | ICD-10-CM

## 2022-06-22 PROCEDURE — 99024 POSTOP FOLLOW-UP VISIT: CPT | Performed by: SPECIALIST

## 2022-06-22 NOTE — PROGRESS NOTES
Krzysztof Christian presents today for follow-up visit status post laparoscopic cholecystectomy for acute calculous cholecystitis  She was admitted through the emergency room at [facility] with evidence of acute calculous cholecystitis  She was taken to the operating room and underwent a laparoscopic cholecystectomy  She did well postoperatively was discharged the day of surgery       Today in the office says she feels great  She is tolerating her diet with no postprandial pain  She does say she has some loose stools in the morning  She goes to a 3 times  It is not a problem she says  She has no urgency  Physical exam:  Young adult  female obese  Abdomen:  Multiple scars consistent with previous laparoscopic surgery  Her laparoscopic incisions are healing well with no evidence of infection or hernia  She has excellent cosmetic result  Impression:  Doing great status post laparoscopic cholecystectomy for acute and chronic calculous cholecystitis  Plan: At this point she is discharged the office  She can return here martin Wan She is told follow-up with her family doctor with any other medical problems should they arise      She is a delightful young lady it was a pleasure to meet her

## 2022-06-27 DIAGNOSIS — I10 ESSENTIAL HYPERTENSION: ICD-10-CM

## 2022-06-28 RX ORDER — HYDROCHLOROTHIAZIDE 25 MG/1
25 TABLET ORAL DAILY
Qty: 90 TABLET | Refills: 0 | Status: SHIPPED | OUTPATIENT
Start: 2022-06-28 | End: 2022-07-08 | Stop reason: SDUPTHER

## 2022-07-08 ENCOUNTER — OFFICE VISIT (OUTPATIENT)
Dept: FAMILY MEDICINE CLINIC | Facility: CLINIC | Age: 37
End: 2022-07-08
Payer: COMMERCIAL

## 2022-07-08 VITALS
HEIGHT: 65 IN | OXYGEN SATURATION: 98 % | HEART RATE: 66 BPM | WEIGHT: 256 LBS | TEMPERATURE: 99.3 F | SYSTOLIC BLOOD PRESSURE: 132 MMHG | DIASTOLIC BLOOD PRESSURE: 78 MMHG | BODY MASS INDEX: 42.65 KG/M2 | RESPIRATION RATE: 16 BRPM

## 2022-07-08 DIAGNOSIS — E55.9 VITAMIN D DEFICIENCY: ICD-10-CM

## 2022-07-08 DIAGNOSIS — I10 ESSENTIAL HYPERTENSION: ICD-10-CM

## 2022-07-08 DIAGNOSIS — E78.2 MIXED HYPERLIPIDEMIA: Primary | ICD-10-CM

## 2022-07-08 PROCEDURE — 99213 OFFICE O/P EST LOW 20 MIN: CPT | Performed by: FAMILY MEDICINE

## 2022-07-08 RX ORDER — HYDROCHLOROTHIAZIDE 25 MG/1
25 TABLET ORAL DAILY
Qty: 90 TABLET | Refills: 0 | Status: SHIPPED | OUTPATIENT
Start: 2022-07-08 | End: 2022-07-11

## 2022-07-08 NOTE — PROGRESS NOTES
Subjective:   Chief Complaint   Patient presents with    Follow-up     Chronic conditions        Patient ID: Tasha Brenner is a 40 y o  female  Patient here follow-up with a chronic condition no new concern recent blood work review with the patient      The following portions of the patient's history were reviewed and updated as appropriate: allergies, current medications, past family history, past medical history, past social history, past surgical history and problem list     Review of Systems   Constitutional: Negative for chills and fever  HENT: Negative for ear pain and sore throat  Eyes: Negative for pain and visual disturbance  Respiratory: Negative for cough and shortness of breath  Cardiovascular: Negative for chest pain and palpitations  Gastrointestinal: Negative for abdominal pain and vomiting  Genitourinary: Negative for dysuria and hematuria  Musculoskeletal: Negative for arthralgias and back pain  Skin: Negative for color change and rash  Neurological: Negative for seizures and syncope  All other systems reviewed and are negative  Objective:  Vitals:    07/08/22 1504   BP: 132/78   BP Location: Left arm   Patient Position: Sitting   Cuff Size: Large   Pulse: 66   Resp: 16   Temp: 99 3 °F (37 4 °C)   TempSrc: Tympanic   SpO2: 98%   Weight: 116 kg (256 lb)   Height: 5' 4 5" (1 638 m)      Physical Exam  Vitals and nursing note reviewed  Constitutional:       General: She is not in acute distress  Appearance: She is well-developed  HENT:      Head: Normocephalic and atraumatic  Nose: No congestion  Mouth/Throat:      Pharynx: No oropharyngeal exudate  Eyes:      Conjunctiva/sclera: Conjunctivae normal    Neck:      Vascular: No JVD  Cardiovascular:      Rate and Rhythm: Normal rate and regular rhythm  Heart sounds: Normal heart sounds  No murmur heard  No friction rub  No gallop     Pulmonary:      Effort: Pulmonary effort is normal       Breath sounds: Normal breath sounds  Abdominal:      General: Bowel sounds are normal       Palpations: Abdomen is soft  Tenderness: There is no abdominal tenderness  Skin:     Findings: No erythema or rash  Neurological:      Mental Status: She is oriented to person, place, and time  Assessment/Plan:    Hyperlipidemia   Chronic asymptomatic fair control low-fat diet important lose weight discussed with the patient     Vitamin D deficiency  A status post gastric bypass continue vitamin supplement vitamin-D rich diet discussed the patient       Diagnoses and all orders for this visit:    Mixed hyperlipidemia  -     CBC and differential; Future  -     Lipid panel; Future  -     Ferritin; Future  -     Iron; Future  -     Vitamin B12; Future  -     Vitamin D 25 hydroxy; Future    Vitamin D deficiency  -     CBC and differential; Future  -     Lipid panel; Future  -     Ferritin; Future  -     Iron; Future  -     Vitamin B12; Future  -     Vitamin D 25 hydroxy; Future    Essential hypertension  -     hydrochlorothiazide (HYDRODIURIL) 25 mg tablet; Take 1 tablet (25 mg total) by mouth daily  -     CBC and differential; Future  -     Lipid panel; Future  -     Ferritin; Future  -     Iron; Future  -     Vitamin B12; Future  -     Vitamin D 25 hydroxy;  Future

## 2022-07-10 DIAGNOSIS — I10 ESSENTIAL HYPERTENSION: ICD-10-CM

## 2022-07-10 PROBLEM — R71.0 DROP IN HEMOGLOBIN: Status: RESOLVED | Noted: 2020-12-08 | Resolved: 2022-07-10

## 2022-07-11 RX ORDER — HYDROCHLOROTHIAZIDE 25 MG/1
TABLET ORAL
Qty: 90 TABLET | Refills: 0 | Status: SHIPPED | OUTPATIENT
Start: 2022-07-11 | End: 2022-10-06

## 2022-07-13 ENCOUNTER — HOSPITAL ENCOUNTER (EMERGENCY)
Facility: HOSPITAL | Age: 37
Discharge: HOME | End: 2022-07-13
Attending: EMERGENCY MEDICINE
Payer: COMMERCIAL

## 2022-07-13 VITALS
SYSTOLIC BLOOD PRESSURE: 159 MMHG | HEART RATE: 70 BPM | TEMPERATURE: 98.2 F | HEIGHT: 64 IN | DIASTOLIC BLOOD PRESSURE: 82 MMHG | OXYGEN SATURATION: 100 % | RESPIRATION RATE: 16 BRPM | WEIGHT: 252.43 LBS | BODY MASS INDEX: 43.1 KG/M2

## 2022-07-13 DIAGNOSIS — H10.9 CONJUNCTIVITIS, LEFT EYE: Primary | ICD-10-CM

## 2022-07-13 PROCEDURE — 99284 EMERGENCY DEPT VISIT MOD MDM: CPT | Performed by: PHYSICIAN ASSISTANT

## 2022-07-13 PROCEDURE — 99282 EMERGENCY DEPT VISIT SF MDM: CPT

## 2022-07-13 RX ORDER — TETRACAINE HYDROCHLORIDE 5 MG/ML
2 SOLUTION OPHTHALMIC ONCE
Status: COMPLETED | OUTPATIENT
Start: 2022-07-13 | End: 2022-07-13

## 2022-07-13 RX ORDER — ERYTHROMYCIN 5 MG/G
OINTMENT OPHTHALMIC
Qty: 3.5 G | Refills: 0 | Status: SHIPPED | OUTPATIENT
Start: 2022-07-13

## 2022-07-13 RX ADMIN — TETRACAINE HYDROCHLORIDE 2 DROP: 5 SOLUTION OPHTHALMIC at 09:21

## 2022-07-13 RX ADMIN — FLUORESCEIN SODIUM 1 STRIP: 0.6 STRIP OPHTHALMIC at 09:21

## 2022-07-13 NOTE — Clinical Note
Deedee Schaefer was seen and treated in our emergency department on 7/13/2022  Diagnosis:     Helen Barajas  may return to work on return date  She may return on this date: 07/18/2022         If you have any questions or concerns, please don't hesitate to call        Hayde Pires PA-C    ______________________________           _______________          _______________  Hospital Representative                              Date                                Time

## 2022-07-13 NOTE — ED PROVIDER NOTES
History  Chief Complaint   Patient presents with    Eye Drainage     Reports L eye redness and drainage starting yesterday  C/o burning sensation  Patient is a 41 y/o female presenting to the ED for evaluation of left eye redness, itching and drainage  Pt states yesterday she felt itching and redness to her left eye, was using warm compresses, noticed drainage  This morning woke up with crusting, drainage and FB sensation  Pt denies injury/trauma  Denies fever, pain with EOM, vision changes  Pt denies contact lens use  Prior to Admission Medications   Prescriptions Last Dose Informant Patient Reported? Taking? Multiple Vitamin (MULTIVITAMIN) tablet  Self Yes No   Sig: Take 1 tablet by mouth daily   Zinc 100 MG TABS  Self Yes No   Sig: Take by mouth   albuterol (2 5 mg/3 mL) 0 083 % nebulizer solution  Self No No   Sig: Take 3 mL (2 5 mg total) by nebulization every 6 (six) hours as needed for wheezing or shortness of breath   albuterol (PROVENTIL HFA,VENTOLIN HFA) 90 mcg/act inhaler  Self No No   Sig: Inhale 2 puffs every 4 (four) hours as needed for wheezing   amLODIPine (NORVASC) 5 mg tablet  Self No No   Sig: TAKE 1 TABLET(5 MG) BY MOUTH DAILY   budesonide-formoterol (Symbicort) 160-4 5 mcg/act inhaler  Self No No   Sig: Inhale 2 puffs 2 (two) times a day Rinse mouth after use     cholecalciferol (VITAMIN D3) 1,000 units tablet  Self No No   Sig: Take 1 tablet (1,000 Units total) by mouth daily   ferrous sulfate (FeroSul) 325 (65 Fe) mg tablet  Self No No   Sig: Take 1 tablet (325 mg total) by mouth 2 (two) times a day with meals   fluticasone (FLONASE) 50 mcg/act nasal spray  Self No No   Sig: SHAKE LIQUID AND USE 2 SPRAYS IN EACH NOSTRIL DAILY   hydrochlorothiazide (HYDRODIURIL) 25 mg tablet   No No   Sig: TAKE 1 TABLET(25 MG) BY MOUTH DAILY   losartan (COZAAR) 25 mg tablet  Self No No   Sig: TAKE 1 TABLET(25 MG) BY MOUTH DAILY   omeprazole (PriLOSEC) 20 mg delayed release capsule  Self No No Sig: TAKE 1 CAPSULE(20 MG) BY MOUTH TWICE DAILY   topiramate (TOPAMAX) 25 mg tablet  Self Yes No   Sig: Take 25 mg by mouth 2 (two) times a day      Facility-Administered Medications: None       Past Medical History:   Diagnosis Date    Anemia     BMI 36 0-36 9,adult 2020    Cough 2020    COVID-19 virus infection 2020    Disease of thyroid gland     Dizziness     due to anemia, last episode 2020, no falls    Drop in hemoglobin 2020    Fatigue     History of transfusion     anemic - loss of blood due to bleeding ulcers, no reaction    Hyperlipidemia     Hypertension     Multiple gastric ulcers     Obesity     16    Thyroid disease     took synthroid but not currently taking    Thyroid mass 3/15/2021     A CT scan of the neck incidental finding 1 7 cm of the right thyroid    Ulcer        Past Surgical History:   Procedure Laterality Date    CHOLECYSTECTOMY      CHOLECYSTECTOMY LAPAROSCOPIC N/A 06/10/2022    Procedure: CHOLECYSTECTOMY LAPAROSCOPIC;  Surgeon: Juana Cat MD;  Location: [address] [place] [address] OR;  Service: General    GASTRIC BYPASS      GASTRIC BYPASS LAPAROSCOPIC N/A 12/15/2020    Procedure: Robotic lysis of adhesions, small-bowel resection, partial gastrectomy, paraesophageal hernia repair, bilateral truncal vagotomy; Robotic gastrojejunostomy anastomosis with intraop endoscopy;  Surgeon: Dima Fox MD;  Location: [facility] OR;  Service: Bariatrics    LITO-EN-Y PROCEDURE  2016    Gastric Bypass by Dr Ryan Nina Weight 339 6,nw    TUBAL LIGATION Bilateral         WISDOM TOOTH EXTRACTION         Family History   Problem Relation Age of Onset    Asthma Mother     Coronary artery disease Mother     Diabetes Mother         MELLITUS    Hyperlipidemia Mother     Hypertension Mother     Aneurysm Mother         2018 just diagnosed with two    No Known Problems Father          when she was 1 months old    Aneurysm Maternal Grandmother     Aneurysm Maternal Grandfather           of a ruptured abdominal aneurysm    No Known Problems Sister     No Known Problems Daughter     No Known Problems Paternal Grandmother     No Known Problems Paternal Grandfather     No Known Problems Daughter     No Known Problems Maternal Aunt     No Known Problems Maternal Aunt     No Known Problems Maternal Aunt      I have reviewed and agree with the history as documented  E-Cigarette/Vaping    E-Cigarette Use Never User      E-Cigarette/Vaping Substances    Nicotine No     THC No     CBD No     Flavoring No     Other No     Unknown No      Social History     Tobacco Use    Smoking status: Former Smoker     Packs/day: 0 25     Years: 14 00     Pack years: 3 50     Types: Cigarettes     Start date: 2004     Quit date: 2018     Years since quittin 5    Smokeless tobacco: Never Used    Tobacco comment: former   Vaping Use    Vaping Use: Never used   Substance Use Topics    Alcohol use: Never    Drug use: No       Review of Systems   Eyes: Positive for discharge, redness and itching  All other systems reviewed and are negative  Physical Exam  Physical Exam  Constitutional:       Appearance: Normal appearance  HENT:      Head: Normocephalic and atraumatic  Right Ear: External ear normal       Left Ear: External ear normal       Nose: Nose normal       Mouth/Throat:      Lips: Pink  Mouth: Mucous membranes are moist    Eyes:      General: Vision grossly intact  Left eye: No foreign body  Extraocular Movements: Extraocular movements intact  Conjunctiva/sclera:      Left eye: Left conjunctiva is injected  Pupils: Pupils are equal, round, and reactive to light  Left eye: No corneal abrasion or fluorescein uptake  Daniel exam negative  Pulmonary:      Effort: No tachypnea or respiratory distress  Musculoskeletal:      Cervical back: Normal range of motion and neck supple  Skin:     General: Skin is warm  Capillary Refill: Capillary refill takes less than 2 seconds  Neurological:      Mental Status: She is alert and oriented to person, place, and time  GCS: GCS eye subscore is 4  GCS verbal subscore is 5  GCS motor subscore is 6  Psychiatric:         Mood and Affect: Mood and affect normal          Speech: Speech normal          Vital Signs  ED Triage Vitals [07/13/22 0837]   Temperature Pulse Respirations Blood Pressure SpO2   98 2 °F (36 8 °C) 70 16 159/82 100 %      Temp Source Heart Rate Source Patient Position - Orthostatic VS BP Location FiO2 (%)   Oral Monitor Sitting Left arm --      Pain Score       --           Vitals:    07/13/22 0837   BP: 159/82   Pulse: 70   Patient Position - Orthostatic VS: Sitting         Visual Acuity  Visual Acuity    Flowsheet Row Most Recent Value   Visual acuity R eye is 20/20   Visual acuity Left eye is 20/50   Visual acuity in both eyes is 20/20          ED Medications  Medications   fluorescein sodium sterile ophthalmic strip 1 strip (1 strip Left Eye Given 7/13/22 0921)   tetracaine 0 5 % ophthalmic solution 2 drop (2 drops Left Eye Given 7/13/22 0730)       Diagnostic Studies  Results Reviewed     None                 No orders to display              Procedures  Procedures         ED Course                               SBIRT 20yo+    Flowsheet Row Most Recent Value   SBIRT (25 yo +)    In order to provide better care to our patients, we are screening all of our patients for alcohol and drug use  Would it be okay to ask you these screening questions? Unable to answer at this time Filed at: 07/13/2022 0857                    MDM  Number of Diagnoses or Management Options  Conjunctivitis, left eye  Diagnosis management comments: Patient is a 41 y/o female presenting to the ED for evaluation of left eye redness, itching and drainage  Left eye conjunctivitis - no pain w/EOM or periorbital   rx for erythromycin ointment   F/u with Opthalmology     Patient verbalizes understanding and agrees with plan  The management plan was discussed in detail with the patient at bedside and all questions were answered  Prior to discharge, I provided both verbal and written instructions  I discussed with the patient the signs and symptoms for which to return to the emergency department  All questions were answered and patient was comfortable with the plan of care and discharged to home  The patient agrees to return to the Emergency Department for concerns and/or progression of illness  Disposition  Final diagnoses:   Conjunctivitis, left eye     Time reflects when diagnosis was documented in both MDM as applicable and the Disposition within this note     Time User Action Codes Description Comment    7/13/2022 10:01 AM Mallory Ellis Add [H10 9] Conjunctivitis, left eye       ED Disposition     ED Disposition   Discharge    Condition   Stable    Date/Time   Wed Jul 13, 2022 10:01 AM    Comment   Karina Zhao discharge to home/self care  Follow-up Information     Follow up With Specialties Details Why [address] [facility]   [address]  [address]       [address] Ophthalmology   [address] [facility]  [address]  [phone]            Discharge Medication List as of 7/13/2022 10:03 AM      START taking these medications    Details   erythromycin (ILOTYCIN) ophthalmic ointment Place a 1/2 inch ribbon of ointment into both lower eyelids 4-6 times daily for 5-7 days  , Normal         CONTINUE these medications which have NOT CHANGED    Details   albuterol (2 5 mg/3 mL) 0 083 % nebulizer solution Take 3 mL (2 5 mg total) by nebulization every 6 (six) hours as needed for wheezing or shortness of breath, Starting Fri 1/14/2022, Normal      albuterol (PROVENTIL HFA,VENTOLIN HFA) 90 mcg/act inhaler Inhale 2 puffs every 4 (four) hours as needed for wheezing, Starting Tue 1/12/2021, Normal      amLODIPine (NORVASC) 5 mg tablet TAKE 1 TABLET(5 MG) BY MOUTH DAILY, Normal      budesonide-formoterol (Symbicort) 160-4 5 mcg/act inhaler Inhale 2 puffs 2 (two) times a day Rinse mouth after use , Starting Mon 1/31/2022, Normal      cholecalciferol (VITAMIN D3) 1,000 units tablet Take 1 tablet (1,000 Units total) by mouth daily, Starting Mon 1/3/2022, Normal      ferrous sulfate (FeroSul) 325 (65 Fe) mg tablet Take 1 tablet (325 mg total) by mouth 2 (two) times a day with meals, Starting Mon 1/3/2022, Normal      fluticasone (FLONASE) 50 mcg/act nasal spray SHAKE LIQUID AND USE 2 SPRAYS IN EACH NOSTRIL DAILY, Normal      hydrochlorothiazide (HYDRODIURIL) 25 mg tablet TAKE 1 TABLET(25 MG) BY MOUTH DAILY, Normal      losartan (COZAAR) 25 mg tablet TAKE 1 TABLET(25 MG) BY MOUTH DAILY, Normal      Multiple Vitamin (MULTIVITAMIN) tablet Take 1 tablet by mouth daily, Historical Med      omeprazole (PriLOSEC) 20 mg delayed release capsule TAKE 1 CAPSULE(20 MG) BY MOUTH TWICE DAILY, Normal      topiramate (TOPAMAX) 25 mg tablet Take 25 mg by mouth 2 (two) times a day, Starting Tue 5/24/2022, Historical Med      Zinc 100 MG TABS Take by mouth, Historical Med             No discharge procedures on file      PDMP Review       Value Time User    PDMP Reviewed  Yes 6/29/2021  8:33 AM Benita Sinha [address] [facility] Provider  Electronically Signed by           Ayala Strange PA-C  07/13/22 3428

## 2022-07-14 ENCOUNTER — TELEPHONE (OUTPATIENT)
Dept: FAMILY MEDICINE CLINIC | Facility: CLINIC | Age: 37
End: 2022-07-14

## 2022-07-14 ENCOUNTER — APPOINTMENT (OUTPATIENT)
Dept: LAB | Facility: HOSPITAL | Age: 37
End: 2022-07-14
Payer: COMMERCIAL

## 2022-07-14 DIAGNOSIS — I10 ESSENTIAL HYPERTENSION: ICD-10-CM

## 2022-07-14 DIAGNOSIS — E78.2 MIXED HYPERLIPIDEMIA: ICD-10-CM

## 2022-07-14 DIAGNOSIS — E55.9 VITAMIN D DEFICIENCY: ICD-10-CM

## 2022-07-14 LAB
25(OH)D3 SERPL-MCNC: 18.4 NG/ML (ref 30–100)
BASOPHILS # BLD AUTO: 0.02 THOUSANDS/ΜL (ref 0–0.1)
BASOPHILS NFR BLD AUTO: 0 % (ref 0–1)
CHOLEST SERPL-MCNC: 166 MG/DL
EOSINOPHIL # BLD AUTO: 0.08 THOUSAND/ΜL (ref 0–0.61)
EOSINOPHIL NFR BLD AUTO: 1 % (ref 0–6)
ERYTHROCYTE [DISTWIDTH] IN BLOOD BY AUTOMATED COUNT: 15.7 % (ref 11.6–15.1)
FERRITIN SERPL-MCNC: 5 NG/ML (ref 8–388)
HCT VFR BLD AUTO: 35.1 % (ref 34.8–46.1)
HDLC SERPL-MCNC: 41 MG/DL
HGB BLD-MCNC: 10.4 G/DL (ref 11.5–15.4)
IMM GRANULOCYTES # BLD AUTO: 0.02 THOUSAND/UL (ref 0–0.2)
IMM GRANULOCYTES NFR BLD AUTO: 0 % (ref 0–2)
IRON SERPL-MCNC: 44 UG/DL (ref 50–170)
LDLC SERPL CALC-MCNC: 102 MG/DL
LYMPHOCYTES # BLD AUTO: 1.81 THOUSANDS/ΜL (ref 0.6–4.47)
LYMPHOCYTES NFR BLD AUTO: 25 % (ref 14–44)
MCH RBC QN AUTO: 25.6 PG (ref 26.8–34.3)
MCHC RBC AUTO-ENTMCNC: 29.6 G/DL (ref 31.4–37.4)
MCV RBC AUTO: 86 FL (ref 82–98)
MONOCYTES # BLD AUTO: 0.59 THOUSAND/ΜL (ref 0.17–1.22)
MONOCYTES NFR BLD AUTO: 8 % (ref 4–12)
NEUTROPHILS # BLD AUTO: 4.69 THOUSANDS/ΜL (ref 1.85–7.62)
NEUTS SEG NFR BLD AUTO: 66 % (ref 43–75)
NONHDLC SERPL-MCNC: 125 MG/DL
NRBC BLD AUTO-RTO: 0 /100 WBCS
PLATELET # BLD AUTO: 365 THOUSANDS/UL (ref 149–390)
PMV BLD AUTO: 10.2 FL (ref 8.9–12.7)
RBC # BLD AUTO: 4.07 MILLION/UL (ref 3.81–5.12)
TRIGL SERPL-MCNC: 114 MG/DL
VIT B12 SERPL-MCNC: 356 PG/ML (ref 100–900)
WBC # BLD AUTO: 7.21 THOUSAND/UL (ref 4.31–10.16)

## 2022-07-14 PROCEDURE — 85025 COMPLETE CBC W/AUTO DIFF WBC: CPT

## 2022-07-14 PROCEDURE — 83540 ASSAY OF IRON: CPT

## 2022-07-14 PROCEDURE — 36415 COLL VENOUS BLD VENIPUNCTURE: CPT

## 2022-07-14 PROCEDURE — 82306 VITAMIN D 25 HYDROXY: CPT

## 2022-07-14 PROCEDURE — 80061 LIPID PANEL: CPT

## 2022-07-14 PROCEDURE — 82728 ASSAY OF FERRITIN: CPT

## 2022-07-14 PROCEDURE — 82607 VITAMIN B-12: CPT

## 2022-07-15 DIAGNOSIS — D50.8 OTHER IRON DEFICIENCY ANEMIA: Primary | ICD-10-CM

## 2022-07-15 DIAGNOSIS — E55.9 VITAMIN D DEFICIENCY: Primary | ICD-10-CM

## 2022-07-15 RX ORDER — ERGOCALCIFEROL 1.25 MG/1
50000 CAPSULE ORAL WEEKLY
Qty: 12 CAPSULE | Refills: 0 | Status: SHIPPED | OUTPATIENT
Start: 2022-07-15 | End: 2022-07-18

## 2022-07-15 NOTE — TELEPHONE ENCOUNTER
Patient had low vitamin [place] her on [address] International Units once a week for 12 week also patient iron is low she is on ferrous sulfate to check with the patient if she is taking it daily if she is recommend to be evaluated weighted by Hematology for possible iron infusion

## 2022-07-15 NOTE — TELEPHONE ENCOUNTER
----- Message from Chai Aguilar MD sent at 7/15/2022  9:18 AM EDT -----  A per previous note the to start on vitamin-D 07367 International Units once a week and if patient taking her iron her iron still low recommend iron infusion to be evaluated by Hematology

## 2022-07-18 ENCOUNTER — TELEPHONE (OUTPATIENT)
Dept: HEMATOLOGY ONCOLOGY | Facility: CLINIC | Age: 37
End: 2022-07-18

## 2022-07-18 NOTE — TELEPHONE ENCOUNTER
Scheduling Appointment     Who Is Calling to Schedule Patient    Doctor Dr Greg Pino   Date and Time 07/29 at 9:40am   Reason for scheduling appointment Rescheduling Consult    Patient verbalized understanding    yes

## 2022-07-29 ENCOUNTER — TELEPHONE (OUTPATIENT)
Dept: SURGICAL ONCOLOGY | Facility: CLINIC | Age: 37
End: 2022-07-29

## 2022-08-20 DIAGNOSIS — I10 ESSENTIAL HYPERTENSION: ICD-10-CM

## 2022-08-22 RX ORDER — AMLODIPINE BESYLATE 5 MG/1
TABLET ORAL
Qty: 90 TABLET | Refills: 0 | Status: SHIPPED | OUTPATIENT
Start: 2022-08-22 | End: 2022-08-25

## 2022-08-25 DIAGNOSIS — I10 ESSENTIAL HYPERTENSION: ICD-10-CM

## 2022-08-25 RX ORDER — AMLODIPINE BESYLATE 5 MG/1
TABLET ORAL
Qty: 90 TABLET | Refills: 0 | Status: SHIPPED | OUTPATIENT
Start: 2022-08-25

## 2022-10-06 DIAGNOSIS — I10 ESSENTIAL HYPERTENSION: ICD-10-CM

## 2022-10-06 RX ORDER — HYDROCHLOROTHIAZIDE 25 MG/1
TABLET ORAL
Qty: 90 TABLET | Refills: 0 | Status: SHIPPED | OUTPATIENT
Start: 2022-10-06 | End: 2022-10-13

## 2022-10-12 PROBLEM — R05.8 COUGH WITH EXPOSURE TO COVID-19 VIRUS: Status: RESOLVED | Noted: 2022-01-31 | Resolved: 2022-10-12

## 2022-10-12 PROBLEM — Z20.822 COUGH WITH EXPOSURE TO COVID-19 VIRUS: Status: RESOLVED | Noted: 2022-01-31 | Resolved: 2022-10-12

## 2022-10-13 DIAGNOSIS — I10 ESSENTIAL HYPERTENSION: ICD-10-CM

## 2022-10-13 RX ORDER — HYDROCHLOROTHIAZIDE 25 MG/1
TABLET ORAL
Qty: 90 TABLET | Refills: 0 | Status: SHIPPED | OUTPATIENT
Start: 2022-10-13

## 2022-10-24 DIAGNOSIS — D50.8 OTHER IRON DEFICIENCY ANEMIA: Primary | ICD-10-CM

## 2022-11-04 DIAGNOSIS — I10 ESSENTIAL HYPERTENSION: ICD-10-CM

## 2022-11-04 RX ORDER — AMLODIPINE BESYLATE 5 MG/1
5 TABLET ORAL DAILY
Qty: 90 TABLET | Refills: 0 | Status: SHIPPED | OUTPATIENT
Start: 2022-11-04

## 2022-11-30 ENCOUNTER — OFFICE VISIT (OUTPATIENT)
Dept: FAMILY MEDICINE CLINIC | Facility: CLINIC | Age: 37
End: 2022-11-30

## 2022-11-30 ENCOUNTER — TELEPHONE (OUTPATIENT)
Dept: FAMILY MEDICINE CLINIC | Facility: CLINIC | Age: 37
End: 2022-11-30

## 2022-11-30 ENCOUNTER — LAB (OUTPATIENT)
Dept: LAB | Facility: HOSPITAL | Age: 37
End: 2022-11-30
Attending: INTERNAL MEDICINE

## 2022-11-30 VITALS
WEIGHT: 255 LBS | DIASTOLIC BLOOD PRESSURE: 108 MMHG | HEART RATE: 74 BPM | RESPIRATION RATE: 16 BRPM | HEIGHT: 64 IN | BODY MASS INDEX: 43.54 KG/M2 | TEMPERATURE: 98.5 F | SYSTOLIC BLOOD PRESSURE: 152 MMHG | OXYGEN SATURATION: 99 %

## 2022-11-30 DIAGNOSIS — Z28.21 IMMUNIZATION REFUSED: ICD-10-CM

## 2022-11-30 DIAGNOSIS — I10 UNCONTROLLED HYPERTENSION: ICD-10-CM

## 2022-11-30 DIAGNOSIS — D50.8 OTHER IRON DEFICIENCY ANEMIA: ICD-10-CM

## 2022-11-30 DIAGNOSIS — E78.2 MIXED HYPERLIPIDEMIA: ICD-10-CM

## 2022-11-30 DIAGNOSIS — D50.8 IRON DEFICIENCY ANEMIA SECONDARY TO INADEQUATE DIETARY IRON INTAKE: ICD-10-CM

## 2022-11-30 DIAGNOSIS — D51.8 OTHER VITAMIN B12 DEFICIENCY ANEMIA: ICD-10-CM

## 2022-11-30 DIAGNOSIS — E87.5 SERUM POTASSIUM ELEVATED: Primary | ICD-10-CM

## 2022-11-30 DIAGNOSIS — M25.511 CHRONIC RIGHT SHOULDER PAIN: Primary | ICD-10-CM

## 2022-11-30 DIAGNOSIS — G89.29 CHRONIC RIGHT SHOULDER PAIN: Primary | ICD-10-CM

## 2022-11-30 LAB
ALBUMIN SERPL BCP-MCNC: 3.9 G/DL (ref 3.5–5)
ALP SERPL-CCNC: 99 U/L (ref 43–122)
ALT SERPL W P-5'-P-CCNC: 14 U/L
ANION GAP SERPL CALCULATED.3IONS-SCNC: 5 MMOL/L (ref 5–14)
AST SERPL W P-5'-P-CCNC: 26 U/L (ref 14–36)
BASOPHILS # BLD AUTO: 0.03 THOUSANDS/ÂΜL (ref 0–0.1)
BASOPHILS NFR BLD AUTO: 0 % (ref 0–1)
BILIRUB SERPL-MCNC: 0.14 MG/DL (ref 0.2–1)
BUN SERPL-MCNC: 10 MG/DL (ref 5–25)
CALCIUM SERPL-MCNC: 8.5 MG/DL (ref 8.4–10.2)
CHLORIDE SERPL-SCNC: 108 MMOL/L (ref 96–108)
CHOLEST SERPL-MCNC: 155 MG/DL
CO2 SERPL-SCNC: 25 MMOL/L (ref 21–32)
CREAT SERPL-MCNC: 0.56 MG/DL (ref 0.6–1.2)
EOSINOPHIL # BLD AUTO: 0.13 THOUSAND/ÂΜL (ref 0–0.61)
EOSINOPHIL NFR BLD AUTO: 2 % (ref 0–6)
ERYTHROCYTE [DISTWIDTH] IN BLOOD BY AUTOMATED COUNT: 16.8 % (ref 11.6–15.1)
FERRITIN SERPL-MCNC: 5 NG/ML (ref 8–388)
GFR SERPL CREATININE-BSD FRML MDRD: 120 ML/MIN/1.73SQ M
GLUCOSE P FAST SERPL-MCNC: 95 MG/DL (ref 70–99)
HCT VFR BLD AUTO: 33.1 % (ref 34.8–46.1)
HDLC SERPL-MCNC: 35 MG/DL
HGB BLD-MCNC: 10.1 G/DL (ref 11.5–15.4)
IMM GRANULOCYTES # BLD AUTO: 0.03 THOUSAND/UL (ref 0–0.2)
IMM GRANULOCYTES NFR BLD AUTO: 0 % (ref 0–2)
IRON SATN MFR SERPL: 5 % (ref 15–50)
IRON SERPL-MCNC: 21 UG/DL (ref 50–170)
LDLC SERPL CALC-MCNC: 95 MG/DL
LYMPHOCYTES # BLD AUTO: 2.03 THOUSANDS/ÂΜL (ref 0.6–4.47)
LYMPHOCYTES NFR BLD AUTO: 25 % (ref 14–44)
MCH RBC QN AUTO: 25.6 PG (ref 26.8–34.3)
MCHC RBC AUTO-ENTMCNC: 30.5 G/DL (ref 31.4–37.4)
MCV RBC AUTO: 84 FL (ref 82–98)
MONOCYTES # BLD AUTO: 0.64 THOUSAND/ÂΜL (ref 0.17–1.22)
MONOCYTES NFR BLD AUTO: 8 % (ref 4–12)
NEUTROPHILS # BLD AUTO: 5.25 THOUSANDS/ÂΜL (ref 1.85–7.62)
NEUTS SEG NFR BLD AUTO: 65 % (ref 43–75)
NRBC BLD AUTO-RTO: 0 /100 WBCS
PLATELET # BLD AUTO: 365 THOUSANDS/UL (ref 149–390)
PMV BLD AUTO: 10.8 FL (ref 8.9–12.7)
POTASSIUM SERPL-SCNC: 5.5 MMOL/L (ref 3.5–5.3)
PROT SERPL-MCNC: 7.5 G/DL (ref 6.4–8.4)
RBC # BLD AUTO: 3.95 MILLION/UL (ref 3.81–5.12)
SODIUM SERPL-SCNC: 138 MMOL/L (ref 135–147)
TIBC SERPL-MCNC: 462 UG/DL (ref 250–450)
TRIGL SERPL-MCNC: 127 MG/DL
TSH SERPL DL<=0.05 MIU/L-ACNC: 1.64 UIU/ML (ref 0.45–4.5)
WBC # BLD AUTO: 8.11 THOUSAND/UL (ref 4.31–10.16)

## 2022-11-30 RX ORDER — TRIAMCINOLONE ACETONIDE 40 MG/ML
40 INJECTION, SUSPENSION INTRA-ARTICULAR; INTRAMUSCULAR
Status: COMPLETED | OUTPATIENT
Start: 2022-11-30 | End: 2022-11-30

## 2022-11-30 RX ORDER — LOSARTAN POTASSIUM 100 MG/1
TABLET ORAL
Qty: 90 TABLET | Refills: 0 | Status: SHIPPED | OUTPATIENT
Start: 2022-11-30

## 2022-11-30 RX ORDER — LOSARTAN POTASSIUM 100 MG/1
100 TABLET ORAL DAILY
Qty: 30 TABLET | Refills: 2 | Status: SHIPPED | OUTPATIENT
Start: 2022-11-30 | End: 2022-11-30

## 2022-11-30 RX ADMIN — TRIAMCINOLONE ACETONIDE 40 MG: 40 INJECTION, SUSPENSION INTRA-ARTICULAR; INTRAMUSCULAR at 09:20

## 2022-11-30 NOTE — ASSESSMENT & PLAN NOTE
Uncontrolled recommend to continue with the ferrous sulfate 325 mg twice a day patient will be follow-up with the Hematology a secondary continued to have low iron possible secondary to absorption

## 2022-11-30 NOTE — ASSESSMENT & PLAN NOTE
Uncontrolled asymptomatic patient on amlodipine 5 mg once a day hydrochlorothiazide 25 mg once a day will increase losartan to 100 mg once a day low-salt diet important lose weight discussed with the patient

## 2022-11-30 NOTE — PROGRESS NOTES
Name: Yokasta Pemberton      : 1985      MRN: [id number]  Encounter Provider: Calvin Flores MD  Encounter Date: 2022   Encounter department: [place] [address]  Chronic right shoulder pain  Assessment & Plan:  Symptomatic not responding to the conservative treatment on an nonsteroidal anti-inflammatory drugs , discussed injection with the patient and she is agree risk and benefit  of the injection has been discussed with the patient patient tolerated well      Orders:  -     Large joint arthrocentesis: R subacromial bursa  -     triamcinolone acetonide (KENALOG-40) 40 mg/mL injection 40 mg    2  Uncontrolled hypertension  Assessment & Plan:  Uncontrolled asymptomatic patient on amlodipine 5 mg once a day hydrochlorothiazide 25 mg once a day will increase losartan to 100 mg once a day low-salt diet important lose weight discussed with the patient    Orders:  -     Comprehensive metabolic panel; Future  -     Lipid Panel with Direct LDL reflex; Future  -     TSH, 3rd generation with Free T4 reflex; Future  -     CBC and differential; Future  -     Ferritin; Future  -     Iron; Future    3  Immunization refused  Assessment & Plan:  Patient refuse flu shot for today      4  Other vitamin B12 deficiency anemia  -     Comprehensive metabolic panel; Future  -     Lipid Panel with Direct LDL reflex; Future  -     TSH, 3rd generation with Free T4 reflex; Future  -     CBC and differential; Future  -     Ferritin; Future  -     Iron; Future    5  Mixed hyperlipidemia  -     Comprehensive metabolic panel; Future  -     Lipid Panel with Direct LDL reflex; Future  -     TSH, 3rd generation with Free T4 reflex; Future  -     CBC and differential; Future  -     Ferritin; Future  -     Iron; Future    6   Iron deficiency anemia secondary to inadequate dietary iron intake  Assessment & Plan:  Uncontrolled recommend to continue with the ferrous sulfate 325 mg twice a day patient will be follow-up with the Hematology a secondary continued to have low iron possible secondary to absorption           Large joint arthrocentesis: R subacromial bursa  Universal Protocol:  Consent: Verbal consent obtained  Written consent not obtained  Risks and benefits: risks, benefits and alternatives were discussed  Consent given by: patient  Time out: Immediately prior to procedure a "time out" was called to verify the correct patient, procedure, equipment, support staff and site/side marked as required  Patient understanding: patient states understanding of the procedure being performed  Site marked: the operative site was marked  Patient identity confirmed: verbally with patient    Supporting Documentation  Indications: pain   Procedure Details  Location: shoulder - R subacromial bursa  Preparation: Patient was prepped and draped in the usual sterile fashion  Needle size: 25 G  Ultrasound guidance: no  Approach: posterior  Medications administered: 40 mg triamcinolone acetonide 40 mg/mL    Patient tolerance: patient tolerated the procedure well with no immediate complications  Dressing:  Sterile dressing applied        Subjective      Patient here had multiple concern concerned about right shoulder pain she described it as a achy she graded as 8/10 localized in the shoulder radiate to the right upper arm no numbness no muscle weakness no muscle weakness and no rash patient who known to have history of bariatric surgery and the a she been taking vitamin supplement and concerned about dizziness and light headache     Review of Systems   Constitutional: Negative for chills and fever  HENT: Negative for ear pain and sore throat  Eyes: Negative for pain and visual disturbance  Respiratory: Negative for cough and shortness of breath  Cardiovascular: Negative for chest pain and palpitations  Gastrointestinal: Negative for abdominal pain and vomiting     Genitourinary: Negative for dysuria and hematuria  Musculoskeletal: Positive for arthralgias  Skin: Negative for color change and rash  Neurological: Positive for dizziness  Negative for seizures and syncope  All other systems reviewed and are negative  Current Outpatient Medications on File Prior to Visit   Medication Sig   • albuterol (2 5 mg/3 mL) 0 083 % nebulizer solution Take 3 mL (2 5 mg total) by nebulization every 6 (six) hours as needed for wheezing or shortness of breath   • albuterol (PROVENTIL HFA,VENTOLIN HFA) 90 mcg/act inhaler Inhale 2 puffs every 4 (four) hours as needed for wheezing   • amLODIPine (NORVASC) 5 mg tablet Take 1 tablet (5 mg total) by mouth daily   • budesonide-formoterol (Symbicort) 160-4 5 mcg/act inhaler Inhale 2 puffs 2 (two) times a day Rinse mouth after use  • cholecalciferol (VITAMIN D3) 1,000 units tablet Take 1 tablet (1,000 Units total) by mouth daily   • ferrous sulfate (FeroSul) 325 (65 Fe) mg tablet Take 1 tablet (325 mg total) by mouth 2 (two) times a day with meals   • fluticasone (FLONASE) 50 mcg/act nasal spray SHAKE LIQUID AND USE 2 SPRAYS IN EACH NOSTRIL DAILY   • hydrochlorothiazide (HYDRODIURIL) 25 mg tablet TAKE 1 TABLET(25 MG) BY MOUTH DAILY   • Multiple Vitamin (MULTIVITAMIN) tablet Take 1 tablet by mouth daily   • omeprazole (PriLOSEC) 20 mg delayed release capsule TAKE 1 CAPSULE(20 MG) BY MOUTH TWICE DAILY   • topiramate (TOPAMAX) 25 mg tablet Take 25 mg by mouth 2 (two) times a day   • Zinc 100 MG TABS Take by mouth   • [DISCONTINUED] losartan (COZAAR) 25 mg tablet TAKE 1 TABLET(25 MG) BY MOUTH DAILY   • [DISCONTINUED] ergocalciferol (VITAMIN D2) 50,000 units TAKE 1 CAPSULE BY MOUTH 1 TIME A WEEK   • [DISCONTINUED] erythromycin (ILOTYCIN) ophthalmic ointment Place a 1/2 inch ribbon of ointment into both lower eyelids 4-6 times daily for 5-7 days         Objective     BP (!) 152/108 (BP Location: Left arm, Patient Position: Sitting, Cuff Size: Large)   Pulse 74   Temp 98 5 °F (36 9 °C) (Tympanic)   Resp 16   Ht 5' 4" (1 626 m)   Wt 116 kg (255 lb)   SpO2 99%   BMI 43 77 kg/m²     Physical Exam  Vitals and nursing note reviewed  Constitutional:       General: She is not in acute distress  Appearance: She is well-developed and well-nourished  HENT:      Head: Normocephalic and atraumatic  Right Ear: Tympanic membrane normal       Left Ear: Tympanic membrane normal       Nose: Nose normal       Mouth/Throat:      Pharynx: Oropharynx is clear  Eyes:      Conjunctiva/sclera: Conjunctivae normal    Neck:      Vascular: No JVD  Cardiovascular:      Rate and Rhythm: Normal rate and regular rhythm  Heart sounds: Normal heart sounds  No murmur heard  No friction rub  No gallop  Pulmonary:      Effort: Pulmonary effort is normal       Breath sounds: Normal breath sounds  Abdominal:      General: Bowel sounds are normal       Palpations: Abdomen is soft  Tenderness: There is no abdominal tenderness  Musculoskeletal:         General: No edema  Right shoulder: Tenderness present  No swelling or effusion  Decreased range of motion  Right lower leg: No edema  Left lower leg: No edema  Skin:     Findings: No erythema or rash  Neurological:      Mental Status: She is oriented to person, place, and time         Vargasvalerie Mckeon MD

## 2022-11-30 NOTE — TELEPHONE ENCOUNTER
----- Message from Melissa Culp MD sent at 11/30/2022  2:04 PM EST -----  Potassium is high plan to recheck potassium tomorrow

## 2022-12-01 ENCOUNTER — TELEPHONE (OUTPATIENT)
Dept: HEMATOLOGY ONCOLOGY | Facility: CLINIC | Age: 37
End: 2022-12-01

## 2022-12-01 ENCOUNTER — TELEPHONE (OUTPATIENT)
Dept: FAMILY MEDICINE CLINIC | Facility: CLINIC | Age: 37
End: 2022-12-01

## 2022-12-01 ENCOUNTER — APPOINTMENT (OUTPATIENT)
Dept: LAB | Facility: HOSPITAL | Age: 37
End: 2022-12-01

## 2022-12-01 DIAGNOSIS — E87.5 SERUM POTASSIUM ELEVATED: ICD-10-CM

## 2022-12-01 LAB — POTASSIUM SERPL-SCNC: 4.6 MMOL/L (ref 3.5–5.3)

## 2022-12-01 NOTE — TELEPHONE ENCOUNTER
Phoned pt to inform her that her iron levels are low and she could benefit from more iron infusions  I saw where pt missed appts with Dr Refugio Blake I asked that pt return a call to my Teams number [phone]

## 2022-12-01 NOTE — TELEPHONE ENCOUNTER
Pc from patient stating 1 hour after taking the new BP medication she started to feell weird her chest felt heavy numbness in legs and lips  Wants to know if this is a possible side effect of the medication

## 2022-12-01 NOTE — TELEPHONE ENCOUNTER
The medication is not new patient was on losartan 25 mg we just increase the dose from  she been on this medication before if patient continued to have a tingling and numbness the to go to the emergency room

## 2022-12-05 NOTE — TELEPHONE ENCOUNTER
Spoke with patient states she is feeling worse today and she thinks she may have ulcers again every time she eats it burns and now when she urinated she noted blood in her urine advised patient to contact Hematology asap they left her a message      Also advised ER for symptoms of dizziness and numbness,

## 2022-12-06 ENCOUNTER — TELEPHONE (OUTPATIENT)
Dept: HEMATOLOGY ONCOLOGY | Facility: CLINIC | Age: 37
End: 2022-12-06

## 2022-12-07 ENCOUNTER — TELEPHONE (OUTPATIENT)
Dept: HEMATOLOGY ONCOLOGY | Facility: CLINIC | Age: 37
End: 2022-12-07

## 2022-12-07 NOTE — TELEPHONE ENCOUNTER
Scheduling Appointment SEND TO [facility]    Who Is Calling to Schedule Patient    Doctor Dr Deonna Castellanos   Date and Time 12/23/22   Reason for scheduling appointment Rescheduling consult that was canceled and no show  I did explain the NO Show Policy   Patient verbalized understanding    Yes

## 2022-12-21 ENCOUNTER — TELEPHONE (OUTPATIENT)
Dept: HEMATOLOGY ONCOLOGY | Facility: CLINIC | Age: 37
End: 2022-12-21

## 2022-12-30 DIAGNOSIS — U07.1 COVID-19: ICD-10-CM

## 2023-01-03 RX ORDER — ALBUTEROL SULFATE 90 UG/1
AEROSOL, METERED RESPIRATORY (INHALATION)
Qty: 8.5 G | Refills: 2 | Status: SHIPPED | OUTPATIENT
Start: 2023-01-03

## 2023-01-05 DIAGNOSIS — E55.9 VITAMIN D DEFICIENCY: Primary | ICD-10-CM

## 2023-01-11 DIAGNOSIS — I10 UNCONTROLLED HYPERTENSION: Primary | ICD-10-CM

## 2023-01-11 RX ORDER — GLYCERIN ADULT
SUPPOSITORY, RECTAL RECTAL 2 TIMES DAILY
Qty: 1 EACH | Refills: 0 | Status: SHIPPED | OUTPATIENT
Start: 2023-01-11

## 2023-01-13 ENCOUNTER — OFFICE VISIT (OUTPATIENT)
Dept: FAMILY MEDICINE CLINIC | Facility: CLINIC | Age: 38
End: 2023-01-13

## 2023-01-13 VITALS
HEIGHT: 65 IN | SYSTOLIC BLOOD PRESSURE: 140 MMHG | OXYGEN SATURATION: 95 % | TEMPERATURE: 99.5 F | HEART RATE: 84 BPM | DIASTOLIC BLOOD PRESSURE: 100 MMHG | BODY MASS INDEX: 42.82 KG/M2 | WEIGHT: 257 LBS

## 2023-01-13 DIAGNOSIS — I10 UNCONTROLLED HYPERTENSION: ICD-10-CM

## 2023-01-13 DIAGNOSIS — J02.9 SORE THROAT: ICD-10-CM

## 2023-01-13 DIAGNOSIS — N63.11 MASS OF UPPER OUTER QUADRANT OF RIGHT BREAST: Primary | ICD-10-CM

## 2023-01-13 RX ORDER — OLMESARTAN MEDOXOMIL AND HYDROCHLOROTHIAZIDE 40/25 40; 25 MG/1; MG/1
1 TABLET ORAL DAILY
Qty: 30 TABLET | Refills: 2 | Status: SHIPPED | OUTPATIENT
Start: 2023-01-13 | End: 2023-01-17 | Stop reason: SDUPTHER

## 2023-01-13 NOTE — PROGRESS NOTES
Name: Bereket Travis      : 1985      MRN: [id number]  Encounter Provider: Eunice Fox MD  Encounter Date: 2023   Encounter department: [facility]     1  Mass of upper outer quadrant of right breast  Assessment & Plan:  Palpable lump on the right side of the breast recommend to do diagnostic mammogram    Orders:  -     Mammo diagnostic right w cad; Future; Expected date: 2023  -     US breast right limited (diagnostic); Future; Expected date: 2023    2  Uncontrolled hypertension  Assessment & Plan:  And uncontrolled blood pressure asymptomatic patient currently on nifedipine 30 mg she is on losartan and hydrochlorothiazide plan continue nifedipine 30 mg stop losartan stop hydrochlorothiazide and start the patient on Benicar HCT 40/25 mg a day low-salt diet and important to lose weight discussed with patient plan to reevaluate in 2-week    Orders:  -     olmesartan-hydrochlorothiazide (BENICAR HCT) 40-25 MG per tablet; Take 1 tablet by mouth daily    3  Sore throat  Assessment & Plan:  New diagnosis symptomatic   swab for COVID and flu recommend supportive treatment in case decrease in oral intake to call the office    Orders:  -     Covid/Flu- Office Collect           Subjective      Patient here had multiple concerns concern about lump on her right breast that she noticed it couple weeks ago no pain no change in the color of the skin no discharge no bleeding no history of trauma and no weight change  Patient concerned about sore throat no cough no wheezing no hematemesis no rash no decrease in oral intake also looking at the vital signs blood pressure uncontrolled despite patient has been taking her medication regularly    Review of Systems   Constitutional: Negative for chills and fever  HENT: Positive for sore throat  Negative for ear pain  Eyes: Negative for pain and visual disturbance     Respiratory: Negative for cough and shortness of breath  Cardiovascular: Negative for chest pain and palpitations  Gastrointestinal: Negative for abdominal pain, constipation, diarrhea, nausea and vomiting  Genitourinary: Negative for dysuria and hematuria  Breast lump   Musculoskeletal: Negative for arthralgias and back pain  Skin: Negative for color change and rash  Neurological: Negative for seizures and syncope  All other systems reviewed and are negative  Current Outpatient Medications on File Prior to Visit   Medication Sig   • albuterol (2 5 mg/3 mL) 0 083 % nebulizer solution Take 3 mL (2 5 mg total) by nebulization every 6 (six) hours as needed for wheezing or shortness of breath   • albuterol (PROVENTIL HFA,VENTOLIN HFA) 90 mcg/act inhaler INHALE 2 PUFFS BY MOUTH EVERY 4 HOURS AS NEEDED FOR WHEEZING   • amLODIPine (NORVASC) 5 mg tablet Take 1 tablet (5 mg total) by mouth daily   • Blood Pressure Monitoring (Blood Pressure Monitor/Arm) KT Use 2 (two) times a day   • budesonide-formoterol (Symbicort) 160-4 5 mcg/act inhaler Inhale 2 puffs 2 (two) times a day Rinse mouth after use     • Cholecalciferol 50 MCG (2000 UT) CAPS Take 1 capsule (2,000 Units total) by mouth in the morning   • ferrous sulfate (FeroSul) 325 (65 Fe) mg tablet Take 1 tablet (325 mg total) by mouth 2 (two) times a day with meals   • fluticasone (FLONASE) 50 mcg/act nasal spray SHAKE LIQUID AND USE 2 SPRAYS IN EACH NOSTRIL DAILY   • Multiple Vitamin (MULTIVITAMIN) tablet Take 1 tablet by mouth daily   • omeprazole (PriLOSEC) 20 mg delayed release capsule TAKE 1 CAPSULE(20 MG) BY MOUTH TWICE DAILY   • topiramate (TOPAMAX) 25 mg tablet Take 25 mg by mouth 2 (two) times a day   • Zinc 100 MG TABS Take by mouth       Objective     /100 (BP Location: Left arm, Patient Position: Sitting)   Pulse 84   Temp 99 5 °F (37 5 °C) (Tympanic)   Ht 5' 4 75" (1 645 m)   Wt 117 kg (257 lb)   LMP 12/31/2022 (Exact Date)   SpO2 95% Breastfeeding No   BMI 43 10 kg/m²     Physical Exam  Vitals and nursing note reviewed  Constitutional:       General: She is not in acute distress  Appearance: She is well-developed  She is not diaphoretic  HENT:      Head: Normocephalic  Right Ear: External ear normal       Left Ear: External ear normal       Nose: No congestion  Mouth/Throat:      Pharynx: No posterior oropharyngeal erythema  Eyes:      General:         Right eye: No discharge  Left eye: No discharge  Conjunctiva/sclera: Conjunctivae normal    Neck:      Vascular: No JVD  Cardiovascular:      Rate and Rhythm: Normal rate and regular rhythm  Heart sounds: Normal heart sounds  No murmur heard  No gallop  Pulmonary:      Effort: Pulmonary effort is normal  No respiratory distress  Breath sounds: Normal breath sounds  No stridor  No wheezing or rales  Chest:      Chest wall: No tenderness  Breasts:     Right: Mass present  No inverted nipple, nipple discharge, skin change or tenderness  Left: No inverted nipple, nipple discharge, skin change or tenderness  Abdominal:      General: There is no distension  Palpations: Abdomen is soft  There is no mass  Tenderness: There is no abdominal tenderness  There is no rebound  Musculoskeletal:         General: No tenderness  Cervical back: Normal range of motion and neck supple  Lymphadenopathy:      Cervical: No cervical adenopathy  Upper Body:      Right upper body: No supraclavicular or axillary adenopathy  Left upper body: No supraclavicular or axillary adenopathy  Skin:     General: Skin is warm  Findings: No erythema or rash  Neurological:      Mental Status: She is alert and oriented to person, place, and time     Psychiatric:         Mood and Affect: Mood normal        Delma Boyd MD

## 2023-01-14 LAB
FLUAV RNA RESP QL NAA+PROBE: NEGATIVE
FLUBV RNA RESP QL NAA+PROBE: NEGATIVE
SARS-COV-2 RNA RESP QL NAA+PROBE: NEGATIVE

## 2023-01-16 PROBLEM — N63.11 MASS OF UPPER OUTER QUADRANT OF RIGHT BREAST: Status: ACTIVE | Noted: 2023-01-16

## 2023-01-16 PROBLEM — N63.11 MASS OF UPPER OUTER QUADRANT OF RIGHT BREAST: Status: ACTIVE | Noted: 2023-01-13

## 2023-01-16 NOTE — ASSESSMENT & PLAN NOTE
New diagnosis symptomatic   swab for COVID and flu recommend supportive treatment in case decrease in oral intake to call the office

## 2023-01-16 NOTE — ASSESSMENT & PLAN NOTE
And uncontrolled blood pressure asymptomatic patient currently on nifedipine 30 mg she is on losartan and hydrochlorothiazide plan continue nifedipine 30 mg stop losartan stop hydrochlorothiazide and start the patient on Benicar HCT 40/25 mg a day low-salt diet and important to lose weight discussed with patient plan to reevaluate in 2-week

## 2023-01-17 DIAGNOSIS — I10 UNCONTROLLED HYPERTENSION: ICD-10-CM

## 2023-01-17 RX ORDER — OLMESARTAN MEDOXOMIL AND HYDROCHLOROTHIAZIDE 40/25 40; 25 MG/1; MG/1
1 TABLET ORAL DAILY
Qty: 30 TABLET | Refills: 2 | Status: SHIPPED | OUTPATIENT
Start: 2023-01-17

## 2023-01-19 DIAGNOSIS — D50.8 OTHER IRON DEFICIENCY ANEMIA: ICD-10-CM

## 2023-01-19 RX ORDER — FERROUS SULFATE 325(65) MG
TABLET ORAL
Qty: 180 TABLET | Refills: 0 | Status: SHIPPED | OUTPATIENT
Start: 2023-01-19

## 2023-02-06 ENCOUNTER — OFFICE VISIT (OUTPATIENT)
Dept: FAMILY MEDICINE CLINIC | Facility: CLINIC | Age: 38
End: 2023-02-06

## 2023-02-06 VITALS
SYSTOLIC BLOOD PRESSURE: 140 MMHG | BODY MASS INDEX: 44.05 KG/M2 | WEIGHT: 258 LBS | TEMPERATURE: 99.1 F | HEART RATE: 72 BPM | HEIGHT: 64 IN | OXYGEN SATURATION: 99 % | DIASTOLIC BLOOD PRESSURE: 90 MMHG

## 2023-02-06 DIAGNOSIS — Z00.01 ENCOUNTER FOR WELL ADULT EXAM WITH ABNORMAL FINDINGS: Primary | ICD-10-CM

## 2023-02-06 DIAGNOSIS — I10 UNCONTROLLED HYPERTENSION: ICD-10-CM

## 2023-02-06 DIAGNOSIS — E66.01 MORBID OBESITY (HCC): ICD-10-CM

## 2023-02-06 DIAGNOSIS — N91.2 AMENORRHEA: ICD-10-CM

## 2023-02-06 DIAGNOSIS — Z23 NEED FOR TDAP VACCINATION: ICD-10-CM

## 2023-02-06 PROBLEM — J02.9 SORE THROAT: Status: RESOLVED | Noted: 2021-10-27 | Resolved: 2023-02-06

## 2023-02-06 LAB — SL AMB POCT URINE HCG: NORMAL

## 2023-02-06 RX ORDER — NIFEDIPINE 30 MG/1
30 TABLET, FILM COATED, EXTENDED RELEASE ORAL DAILY
Qty: 90 TABLET | Refills: 0 | Status: SHIPPED | OUTPATIENT
Start: 2023-02-06

## 2023-02-06 NOTE — ASSESSMENT & PLAN NOTE
New diagnosis last menstruation and beginning of December patient had tubal ligation sexually active with her  without protection during the pregnancy in the office is negative  Plan for well blood work to check serum beta hCG prolactin level TSH LH FSH and will do transvaginal ultrasound

## 2023-02-06 NOTE — ASSESSMENT & PLAN NOTE
BMI today 43 94 patient tried to lose weight for a long time portion control low-carb low-fat diet and increase physical activity discussed with the patient Bariatric option and she agree

## 2023-02-06 NOTE — PROGRESS NOTES
[address]  PRIMARY CARE [facility]    NAME: Sahra Zhao  AGE: 40 y o  SEX: female  : 1985     DATE: 2023     Assessment and Plan:     Problem List Items Addressed This Visit        Cardiovascular and Mediastinum    Uncontrolled hypertension     Blood pressure improved compared with before but still uncontrolledwill continue Benicar HCT 40-25 mg once a day and Nifide[pine 30 mg  low-salt diet and important lose weight discussed with patient         Relevant Medications    NIFEdipine ER (ADALAT CC) 30 MG 24 hr tablet       Other    Morbid obesity (HCC)     BMI today 43 94 patient tried to lose weight for a long time portion control low-carb low-fat diet and increase physical activity discussed with the patient Bariatric option and she agree         Relevant Orders    Ambulatory Referral to Bariatric Surgery    Encounter for well adult exam with abnormal findings - Primary     Advice and education were given regarding nutrition, aerobic exercises, weight-bearing exercises, cardiovascular risk reduction, fall risk reduction, and age-appropriate supplements  The patient was counseled regarding instructions for management, risk factor reductions, prognosis, risks and benefits of treatment options, patient and family education, and importance of compliance with treatment       Patient overdue for Tdap possible side effect discussed with patient         Amenorrhea     New diagnosis last menstruation and beginning of December patient had tubal ligation sexually active with her  without protection during the pregnancy in the office is negative  Plan for well blood work to check serum beta hCG prolactin level TSH LH FSH and will do transvaginal ultrasound         Relevant Orders    POCT urine HCG (Completed)    TSH, 3rd generation with Free T4 reflex (Completed)    Prolactin (Completed)    Luteinizing hormone (Completed) Follicle stimulating hormone (Completed)    hCG, quantitative (Completed)    US pelvis complete w transvaginal   Other Visit Diagnoses     Need for Tdap vaccination        Relevant Orders    TDAP VACCINE GREATER THAN OR EQUAL TO 8YO IM (Completed)          Immunizations and preventive care screenings were discussed with patient today  Appropriate education was printed on patient's after visit summary  Counseling:  • Alcohol/drug use: discussed moderation in alcohol intake, the recommendations for healthy alcohol use, and avoidance of illicit drug use  • Dental Health: discussed importance of regular tooth brushing, flossing, and dental visits  • Injury prevention: discussed safety/seat belts, safety helmets, smoke detectors, carbon dioxide detectors, and smoking near bedding or upholstery  • Sexual health: discussed sexually transmitted diseases, partner selection, use of condoms, avoidance of unintended pregnancy, and contraceptive alternatives  · Exercise: the importance of regular exercise/physical activity was discussed  Recommend exercise 3-5 times per week for at least 30 minutes  BMI Counseling: Body mass index is 43 94 kg/m²  The BMI is above normal  Nutrition recommendations include decreasing portion sizes, decreasing fast food intake and limiting drinks that contain sugar  Exercise recommendations include exercising 3-5 times per week  No pharmacotherapy was ordered  Patient referred to bariatric surgery  Rationale for BMI follow-up plan is due to patient being overweight or obese  Depression Screening and Follow-up Plan: Patient was screened for depression during today's encounter  They screened negative with a PHQ-2 score of 0  Return in about 1 year (around 2/6/2024)       Chief Complaint:     Chief Complaint   Patient presents with   • Physical Exam   • Amenorrhea      History of Present Illness:     Adult Annual Physical   Patient here for a comprehensive physical exam  The patient reports problems - Patient here for physical exam and she is concerned about her 1 menstruation and last one was in December and associated with cramping about the no nausea no vomiting  No vaginal discharge she had history of tubal ligation at this of her son has a similar problem  Diet and Physical Activity  · Diet/Nutrition: no special diet  · Exercise: 1-2 times a week on average  Depression Screening  PHQ-2/9 Depression Screening    Little interest or pleasure in doing things: 0 - not at all  Feeling down, depressed, or hopeless: 0 - not at all  PHQ-2 Score: 0  PHQ-2 Interpretation: Negative depression screen       General Health  · Sleep: gets 4-6 hours of sleep on average  · Hearing: normal - bilateral   · Vision: wears glasses  · Dental: regular dental visits  /GYN Health  · Last menstrual period: first week of December  · Contraceptive method: Tubal ligation  · History of STDs?: no      Review of Systems:     Review of Systems   Constitutional: Negative for chills and fever  HENT: Negative for congestion, ear pain and sore throat  Eyes: Negative for pain and visual disturbance  Respiratory: Negative for cough and shortness of breath  Cardiovascular: Negative for chest pain and palpitations  Gastrointestinal: Negative for abdominal pain, blood in stool, constipation, diarrhea and vomiting  Genitourinary: Positive for menstrual problem  Negative for dysuria, hematuria, vaginal bleeding and vaginal discharge  Musculoskeletal: Negative for arthralgias and back pain  Skin: Negative for color change and rash  Neurological: Negative for seizures and syncope  All other systems reviewed and are negative       Past Medical History:     Past Medical History:   Diagnosis Date   • Anemia    • BMI 36 0-36 9,adult 12/8/2020   • Cough 12/21/2020   • COVID-19 virus infection 12/23/2020   • Disease of thyroid gland    • Dizziness     due to anemia, last episode August , no falls   • Drop in hemoglobin 2020   • Fatigue    • History of transfusion 2016    anemic - loss of blood due to bleeding ulcers, no reaction   • Hyperlipidemia    • Hypertension    • Multiple gastric ulcers    • Obesity     16   • Severe obesity (BMI 35 0-39  9) with comorbidity ([address] ) 2020   • Sore throat 10/27/2021   • Thyroid disease     took synthroid but not currently taking   • Thyroid mass 3/15/2021     A CT scan of the neck incidental finding 1 7 cm of the right thyroid   • Ulcer       Past Surgical History:     Past Surgical History:   Procedure Laterality Date   • CHOLECYSTECTOMY     • CHOLECYSTECTOMY LAPAROSCOPIC N/A 06/10/2022    Procedure: CHOLECYSTECTOMY LAPAROSCOPIC;  Surgeon: Leana Ingram MD;  Location: [address] [place] [address] MAIN OR;  Service: General   • GASTRIC BYPASS     • GASTRIC BYPASS LAPAROSCOPIC N/A 12/15/2020    Procedure: Robotic lysis of adhesions, small-bowel resection, partial gastrectomy, paraesophageal hernia repair, bilateral truncal vagotomy; Robotic gastrojejunostomy anastomosis with intraop endoscopy;  Surgeon: Freddie Pate MD;  Location: [facility] Main OR;  Service: Bariatrics   • LITO-EN-Y PROCEDURE  2016    Gastric Bypass by Dr Randy Gongora Weight 339 6,nw   • TUBAL LIGATION Bilateral        • WISDOM TOOTH EXTRACTION        Social History:     Social History     Socioeconomic History   • Marital status: /Civil Union     Spouse name: None   • Number of children: None   • Years of education: None   • Highest education level: None   Occupational History   • None   Tobacco Use   • Smoking status: Former     Packs/day: 0 25     Years: 14 00     Pack years: 3 50     Types: Cigarettes     Start date: 2004     Quit date: 2018     Years since quittin 1   • Smokeless tobacco: Never   • Tobacco comments:     former   Vaping Use   • Vaping Use: Never used   Substance and Sexual Activity   • Alcohol use: Never   • Drug use: No   • Sexual activity: Yes Partners: Male     Birth control/protection: Female Sterilization   Other Topics Concern   • None   Social History Narrative    fhx not asked in triage     Social Determinants of Health     Financial Resource Strain: Not on file   Food Insecurity: Not on file   Transportation Needs: Not on file   Physical Activity: Not on file   Stress: Not on file   Social Connections: Not on file   Intimate Partner Violence: Not on file   Housing Stability: Not on file      Family History:     Family History   Problem Relation Age of Onset   • Asthma Mother    • Coronary artery disease Mother    • Diabetes Mother         MELLITUS   • Hyperlipidemia Mother    • Hypertension Mother    • Aneurysm Mother         2018 just diagnosed with two   • No Known Problems Father          when she was 1 months old   • Aneurysm Maternal Grandmother    • Aneurysm Maternal Grandfather           of a ruptured abdominal aneurysm   • No Known Problems Sister    • No Known Problems Daughter    • No Known Problems Paternal Grandmother    • No Known Problems Paternal Grandfather    • No Known Problems Daughter    • No Known Problems Maternal Aunt    • No Known Problems Maternal Aunt    • No Known Problems Maternal Aunt       Current Medications:     Current Outpatient Medications   Medication Sig Dispense Refill   • NIFEdipine ER (ADALAT CC) 30 MG 24 hr tablet Take 1 tablet (30 mg total) by mouth daily 90 tablet 0   • albuterol (2 5 mg/3 mL) 0 083 % nebulizer solution Take 3 mL (2 5 mg total) by nebulization every 6 (six) hours as needed for wheezing or shortness of breath 180 mL 5   • albuterol (PROVENTIL HFA,VENTOLIN HFA) 90 mcg/act inhaler INHALE 2 PUFFS BY MOUTH EVERY 4 HOURS AS NEEDED FOR WHEEZING 8 5 g 2   • Blood Pressure Monitoring (Blood Pressure Monitor/Arm) KT Use 2 (two) times a day 1 each 0   • budesonide-formoterol (Symbicort) 160-4 5 mcg/act inhaler Inhale 2 puffs 2 (two) times a day Rinse mouth after use   10 2 g 0   • Cholecalciferol 50 MCG (2000 UT) CAPS Take 1 capsule (2,000 Units total) by mouth in the morning 90 capsule 0   • FeroSul 325 (65 Fe) MG tablet TAKE 1 TABLET BY MOUTH TWICE DAILY WITH MEALS 180 tablet 0   • fluticasone (FLONASE) 50 mcg/act nasal spray SHAKE LIQUID AND USE 2 SPRAYS IN EACH NOSTRIL DAILY 48 g 0   • Multiple Vitamin (MULTIVITAMIN) tablet Take 1 tablet by mouth daily     • olmesartan-hydrochlorothiazide (BENICAR HCT) 40-25 MG per tablet Take 1 tablet by mouth daily 30 tablet 2   • topiramate (TOPAMAX) 25 mg tablet Take 25 mg by mouth 2 (two) times a day     • Zinc 100 MG TABS Take by mouth       No current facility-administered medications for this visit  Allergies: Allergies   Allergen Reactions   • [place] Oil - Food Allergy Shortness Of Breath   • Venofer [Iron Sucrose]      Chest pressure after 1st dose  Tachycardia and blurred vision after 8 minutes of dose #2  Physical Exam:     /90 (BP Location: Left arm, Patient Position: Sitting)   Pulse 72   Temp 99 1 °F (37 3 °C)   Ht 5' 4 25" (1 632 m)   Wt 117 kg (258 lb)   LMP 12/31/2022 (Exact Date)   SpO2 99%   Breastfeeding No   BMI 43 94 kg/m²     Physical Exam  Vitals and nursing note reviewed  Constitutional:       General: She is not in acute distress  Appearance: She is well-developed  HENT:      Head: Normocephalic and atraumatic  Right Ear: Tympanic membrane and ear canal normal       Left Ear: Tympanic membrane and ear canal normal       Nose: No congestion  Mouth/Throat:      Pharynx: No oropharyngeal exudate  Eyes:      Conjunctiva/sclera: Conjunctivae normal    Cardiovascular:      Rate and Rhythm: Normal rate and regular rhythm  Heart sounds: No murmur heard  Pulmonary:      Effort: Pulmonary effort is normal  No respiratory distress  Breath sounds: Normal breath sounds  Abdominal:      Palpations: Abdomen is soft  Tenderness: There is no abdominal tenderness  Musculoskeletal:         General: No swelling  Cervical back: Neck supple  Skin:     General: Skin is warm and dry  Capillary Refill: Capillary refill takes less than 2 seconds  Neurological:      Mental Status: She is alert and oriented to person, place, and time     Psychiatric:         Mood and Affect: Mood normal           Nubia Abbott MD   [address] [place] [address]

## 2023-02-06 NOTE — ASSESSMENT & PLAN NOTE
Blood pressure improved compared with before but still uncontrolledwill continue Benicar HCT 40-25 mg once a day and Nifide[pine 30 mg  low-salt diet and important lose weight discussed with patient

## 2023-02-06 NOTE — ASSESSMENT & PLAN NOTE
Advice and education were given regarding nutrition, aerobic exercises, weight-bearing exercises, cardiovascular risk reduction, fall risk reduction, and age-appropriate supplements  The patient was counseled regarding instructions for management, risk factor reductions, prognosis, risks and benefits of treatment options, patient and family education, and importance of compliance with treatment       Patient overdue for Tdap possible side effect discussed with patient

## 2023-02-08 ENCOUNTER — APPOINTMENT (OUTPATIENT)
Dept: LAB | Facility: HOSPITAL | Age: 38
End: 2023-02-08

## 2023-02-08 ENCOUNTER — HOSPITAL ENCOUNTER (OUTPATIENT)
Dept: ULTRASOUND IMAGING | Facility: HOSPITAL | Age: 38
Discharge: HOME | End: 2023-02-08

## 2023-02-08 DIAGNOSIS — N91.2 AMENORRHEA: ICD-10-CM

## 2023-02-08 LAB
B-HCG SERPL-ACNC: <3 MIU/ML
FSH SERPL-ACNC: 4 MIU/ML
LH SERPL-ACNC: 3.9 MIU/ML
PROLACTIN SERPL-MCNC: 11.2 NG/ML
TSH SERPL DL<=0.05 MIU/L-ACNC: 1.18 UIU/ML (ref 0.45–4.5)

## 2023-02-09 ENCOUNTER — TELEPHONE (OUTPATIENT)
Dept: FAMILY MEDICINE CLINIC | Facility: CLINIC | Age: 38
End: 2023-02-09

## 2023-02-17 ENCOUNTER — TELEPHONE (OUTPATIENT)
Dept: FAMILY MEDICINE CLINIC | Facility: CLINIC | Age: 38
End: 2023-02-17

## 2023-02-20 ENCOUNTER — OFFICE VISIT (OUTPATIENT)
Dept: BARIATRICS | Facility: CLINIC | Age: 38
End: 2023-02-20

## 2023-02-20 ENCOUNTER — TELEPHONE (OUTPATIENT)
Dept: HEMATOLOGY ONCOLOGY | Facility: CLINIC | Age: 38
End: 2023-02-20

## 2023-02-20 VITALS
SYSTOLIC BLOOD PRESSURE: 146 MMHG | TEMPERATURE: 96.5 F | HEIGHT: 64 IN | HEART RATE: 84 BPM | DIASTOLIC BLOOD PRESSURE: 90 MMHG | BODY MASS INDEX: 44.47 KG/M2 | WEIGHT: 260.5 LBS

## 2023-02-20 DIAGNOSIS — Z98.84 BARIATRIC SURGERY STATUS: ICD-10-CM

## 2023-02-20 DIAGNOSIS — E66.01 MORBID OBESITY (HCC): ICD-10-CM

## 2023-02-20 DIAGNOSIS — K91.2 POSTSURGICAL MALABSORPTION: ICD-10-CM

## 2023-02-20 DIAGNOSIS — E66.01 OBESITY, CLASS III, BMI 40-49.9 (MORBID OBESITY) (HCC): ICD-10-CM

## 2023-02-20 DIAGNOSIS — Z48.815 ENCOUNTER FOR SURGICAL AFTERCARE FOLLOWING SURGERY OF DIGESTIVE SYSTEM: Primary | ICD-10-CM

## 2023-02-20 DIAGNOSIS — E61.1 IRON DEFICIENCY: ICD-10-CM

## 2023-02-20 DIAGNOSIS — R63.5 ABNORMAL WEIGHT GAIN: ICD-10-CM

## 2023-02-20 DIAGNOSIS — I10 ESSENTIAL HYPERTENSION: ICD-10-CM

## 2023-02-20 RX ORDER — LOSARTAN POTASSIUM 100 MG/1
25 TABLET ORAL DAILY
COMMUNITY

## 2023-02-20 NOTE — PROGRESS NOTES
Assessment/Plan:     Patient ID: Oscar Haynes is a 40 y o  female  Bariatric Surgery Status/Abnormal weight gain     status post Laparoscopic RNYGB in 2016 with Dr Nilda Ledezma with subsequent Robotic lysis of adhesions, Robotic small-bowel resection, Robotic partial gastrectomy, Robotic paraesophageal hernia repair, Robotic bilateral truncal vagotomy Robotic gastrojejunostomy anastomosis with intraop endoscopy on 12/2020 with Dr Noreen Pike  Presents to the office today for an annual with concerns of weight regain that started 2 years ago  She reports having a weight gain of 70 lbs over the past 2 years; she reports she went to Dr Petra Webb to try medical weight loss after her consultation with Dr Misha Rowe however she does not like how she felt with these medications  She has tried topamax and phentermine and was not effective  Reports she still has a lot of cravings especially at night  She recently implemented working out twice a week on the weekends while she can due to hectic work schedule  She doesn't track her calories  She does follow 30/30 minute rule, does try to eat her protein first  Feels full  Frustrated she isn't losing weight  PLAN:     - Advised to follow up with our medical weight management team for 3 months then we can reevaluate whether or not she is a surgical candidate  Per previous office visit, she may not lose a lot of weight with another revision    - Advised to try calorie counting - provided caloric goal per day of 1500 calories to start  - Labs reviewed, she is iron deficient and was recommended to see hematology  She reports she had a hx of an allergic reaction to iron hence was fearful of getting more iron infusions  Discussed she should have a consultation with hematology as there are other options and premedications available to help with this  - Refer to [facility] for furthering weight loss  - Routine follow up in 1 year for annual visit     - Continue with healthy lifestyle, adequate protein intake of 60 gm, fluid intake of at least 64 oz  - Continue with MVI daily  - Activity as tolerated  - Labs ordered and will adjust accordingly if any deficiency  - Follow up with RD and SW as needed  HTN - BP in office slightly elevated in office  She reports at home it is still elevated despite taking her medications and multiple attempts to adjust her medication regimen  Advised to f/u with her PCP for further adjustments  If not, further evaluation may be warranted  ER precautions provided  · Continued/Maintain healthy weight loss with good nutrition intakes  · Adequate hydration with at least 64oz  fluid intake  · Follow diet as discussed  · Follow vitamin and mineral recommendations as reviewed with you  · Exercise as tolerated  · Colonoscopy referral made: not of age range  · Mammogram - not of age range    · Follow-up in 1 year for annual visit  We kindly ask that your arrive 15 minutes before your scheduled appointment time with your provider to allow our staff to room you, get your vital signs and update your chart  · Get lab work done prior to annual visit  Please call the office if you need a script  It is recommended to check with your insurance BEFORE getting labs done to make sure they are covered by your policy  · Call our office if you have any problems with abdominal pain especially associated with fever, chills, nausea, vomiting or any other concerns  · All  Post-bariatric surgery patients should be aware that very small quantities of any alcohol can cause impairment and it is very possible not to feel the effect  The effect can be in the system for several hours  It is also a stomach irritant  · It is advised to AVOID alcohol, Nonsteroidal antiinflammatory drugs (NSAIDS) and nicotine of all forms   Any of these can cause stomach irritation/pain      · Discussed the effects of alcohol on a bariatric patient and the increased impairment risk  · Keep up the good work! Postsurgical Malabsorption   -At risk for malabsorption of vitamins/minerals secondary to malabsorption and restriction of intake from bariatric surgery  -Currently taking adequate postop bariatric surgery vitamin supplementation  -Last set of bariatric labs completed on 11/2022 and showed low iron, vitamin D deficiency  -Next set of bariatric labs ordered for approximately 2 weeks  -Patient received education about the importance of adhering to a lifelong supplementation regimen to avoid vitamin/mineral deficiencies      Diagnoses and all orders for this visit:    Encounter for surgical aftercare following surgery of digestive system  -     Zinc; Future  -     Vitamin D 25 hydroxy; Future  -     Vitamin B12; Future  -     Vitamin B1, whole blood; Future  -     Vitamin A; Future  -     PTH, intact; Future  -     CBC and Platelet; Future  -     Comprehensive metabolic panel; Future  -     Ferritin; Future  -     Folate; Future  -     Iron Saturation %; Future    Morbid obesity ([facility] [address] )  -     Ambulatory Referral to Bariatric Surgery    Bariatric surgery status  -     Zinc; Future  -     Vitamin D 25 hydroxy; Future  -     Vitamin B12; Future  -     Vitamin B1, whole blood; Future  -     Vitamin A; Future  -     PTH, intact; Future  -     CBC and Platelet; Future  -     Comprehensive metabolic panel; Future  -     Ferritin; Future  -     Folate; Future  -     Iron Saturation %; Future  -     Ambulatory referral to Hematology / Oncology; Future    Postsurgical malabsorption  -     Zinc; Future  -     Vitamin D 25 hydroxy; Future  -     Vitamin B12; Future  -     Vitamin B1, whole blood; Future  -     Vitamin A; Future  -     PTH, intact; Future  -     CBC and Platelet; Future  -     Comprehensive metabolic panel; Future  -     Ferritin; Future  -     Folate; Future  -     Iron Saturation %;  Future    Obesity, Class III, BMI 40-49 9 (morbid obesity) ([facility])  -     Zinc; Future  -     Vitamin D 25 hydroxy; Future  -     Vitamin B12; Future  -     Vitamin B1, whole blood; Future  -     Vitamin A; Future  -     PTH, intact; Future  -     CBC and Platelet; Future  -     Comprehensive metabolic panel; Future  -     Ferritin; Future  -     Folate; Future  -     Iron Saturation %; Future    Essential hypertension  -     Zinc; Future  -     Vitamin D 25 hydroxy; Future  -     Vitamin B12; Future  -     Vitamin B1, whole blood; Future  -     Vitamin A; Future  -     PTH, intact; Future  -     CBC and Platelet; Future  -     Comprehensive metabolic panel; Future  -     Ferritin; Future  -     Folate; Future  -     Iron Saturation %; Future    Iron deficiency  -     Zinc; Future  -     Vitamin D 25 hydroxy; Future  -     Vitamin B12; Future  -     Vitamin B1, whole blood; Future  -     Vitamin A; Future  -     PTH, intact; Future  -     CBC and Platelet; Future  -     Comprehensive metabolic panel; Future  -     Ferritin; Future  -     Folate; Future  -     Iron Saturation %; Future  -     Ambulatory referral to Hematology / Oncology; Future    Abnormal weight gain  -     Zinc; Future  -     Vitamin D 25 hydroxy; Future  -     Vitamin B12; Future  -     Vitamin B1, whole blood; Future  -     Vitamin A; Future  -     PTH, intact; Future  -     CBC and Platelet; Future  -     Comprehensive metabolic panel; Future  -     Ferritin; Future  -     Folate; Future  -     Iron Saturation %; Future    Other orders  -     losartan (COZAAR) 100 MG tablet; Take 25 mg by mouth daily         Subjective:      Patient ID: Agnes Gardner is a 40 y o  female  status post Laparoscopic RNYGB in 2016 with Dr Salina Barragan with subsequent Robotic lysis of adhesions, Robotic small-bowel resection, Robotic partial gastrectomy, Robotic paraesophageal hernia repair, Robotic bilateral truncal vagotomy Robotic gastrojejunostomy anastomosis with intraop endoscopy on 12/2020 with Dr Roxanne Lane   Presents to the office today for an annual with concerns of weight regain that started 2 years ago  She reports having a weight gain of 70 lbs over the past 2 years; she reports she went to Dr Cornina Cotton to try medical weight loss after her consultation with Dr Sara Baer however she does not like how she felt with these medications  She has tried topamax and phentermine and was not effective  Reports she still has a lot of cravings especially at night  She recently implemented working out twice a week on the weekends while she can due to hectic work schedule  She doesn't track her calories  She does follow 30/30 minute rule, does try to eat her protein first  Feels full  Frustrated she isn't losing weight  Initial: 339 lbs (prior to RNYGB); 250 LBS ( prior to revision)  Current: 260 5 lbs  EWL: (Weight loss is ahead of schedule at this post surgical period )  Isidoro: 170 lbs (after RNYGB); 190 lbs (with revision)  Current BMI is Body mass index is 44 71 kg/m²  · Tolerating a regular diet-yes  · Eating at least 60 grams of protein per day-yes  · Following 30/60 minute rule with liquids-yes - does do 30/30 minute rule  · Drinking at least 64 ounces of fluid per day-yes  · Drinking carbonated beverages-no  · Sufficient exercise-yes - treadmill twice a week   · Using NSAIDs regularly-no  · Using nicotine-no  · Using alcohol-no  · Supplements: Multivitamins (regular), vitamin D, zinc, calcium, iron 325 mg    · EWL is 41%, which places the patient ahead of schedule for expected post surgical weight loss at this time  The following portions of the patient's history were reviewed and updated as appropriate: allergies, current medications, past family history, past medical history, past social history, past surgical history and problem list     Review of Systems   Constitutional: Positive for fatigue  Respiratory: Negative  Cardiovascular: Positive for palpitations  Gastrointestinal: Negative      Musculoskeletal: Positive for back pain  Neurological: Negative  Psychiatric/Behavioral: Negative  Objective:    /90   Pulse 84   Temp (!) 96 5 °F (35 8 °C) (Tympanic)   Ht 5' 4" (1 626 m)   Wt 118 kg (260 lb 8 oz)   BMI 44 71 kg/m²      Physical Exam  Vitals and nursing note reviewed  Constitutional:       Appearance: Normal appearance  She is obese  Cardiovascular:      Rate and Rhythm: Normal rate and regular rhythm  Pulses: Normal pulses  Heart sounds: Normal heart sounds  Pulmonary:      Effort: Pulmonary effort is normal       Breath sounds: Normal breath sounds  Abdominal:      General: Bowel sounds are normal       Palpations: Abdomen is soft  Tenderness: There is no abdominal tenderness  Musculoskeletal:         General: Normal range of motion  Skin:     General: Skin is warm and dry  Neurological:      General: No focal deficit present  Mental Status: She is alert and oriented to person, place, and time  Psychiatric:         Mood and Affect: Mood normal          Behavior: Behavior normal          Thought Content:  Thought content normal          Judgment: Judgment normal

## 2023-02-22 ENCOUNTER — HOSPITAL ENCOUNTER (OUTPATIENT)
Dept: ULTRASOUND IMAGING | Facility: CLINIC | Age: 38
Discharge: HOME | End: 2023-02-22

## 2023-02-22 ENCOUNTER — HOSPITAL ENCOUNTER (OUTPATIENT)
Dept: MAMMOGRAPHY | Facility: CLINIC | Age: 38
Discharge: HOME | End: 2023-02-22

## 2023-02-22 VITALS — BODY MASS INDEX: 44.39 KG/M2 | HEIGHT: 64 IN | WEIGHT: 260 LBS

## 2023-02-22 DIAGNOSIS — N63.11 MASS OF UPPER OUTER QUADRANT OF RIGHT BREAST: ICD-10-CM

## 2023-02-23 ENCOUNTER — TELEPHONE (OUTPATIENT)
Dept: FAMILY MEDICINE CLINIC | Facility: CLINIC | Age: 38
End: 2023-02-23

## 2023-02-23 NOTE — TELEPHONE ENCOUNTER
----- Message from Usha Davis MD sent at 2/23/2023  8:43 AM EST -----  Negative diagnostic mammogram and US of right breast

## 2023-03-01 ENCOUNTER — APPOINTMENT (OUTPATIENT)
Dept: LAB | Facility: HOSPITAL | Age: 38
End: 2023-03-01

## 2023-03-01 ENCOUNTER — TELEPHONE (OUTPATIENT)
Dept: BARIATRICS | Facility: CLINIC | Age: 38
End: 2023-03-01

## 2023-03-01 DIAGNOSIS — Z48.815 ENCOUNTER FOR SURGICAL AFTERCARE FOLLOWING SURGERY OF DIGESTIVE SYSTEM: ICD-10-CM

## 2023-03-01 DIAGNOSIS — E66.01 OBESITY, CLASS III, BMI 40-49.9 (MORBID OBESITY) (HCC): ICD-10-CM

## 2023-03-01 DIAGNOSIS — E61.1 IRON DEFICIENCY: ICD-10-CM

## 2023-03-01 DIAGNOSIS — K91.2 POSTSURGICAL MALABSORPTION: ICD-10-CM

## 2023-03-01 DIAGNOSIS — I10 ESSENTIAL HYPERTENSION: ICD-10-CM

## 2023-03-01 DIAGNOSIS — Z98.84 BARIATRIC SURGERY STATUS: ICD-10-CM

## 2023-03-01 DIAGNOSIS — R63.5 ABNORMAL WEIGHT GAIN: ICD-10-CM

## 2023-03-01 LAB
25(OH)D3 SERPL-MCNC: 20.1 NG/ML (ref 30–100)
ALBUMIN SERPL BCP-MCNC: 4 G/DL (ref 3.5–5)
ALP SERPL-CCNC: 96 U/L (ref 43–122)
ALT SERPL W P-5'-P-CCNC: 15 U/L
ANION GAP SERPL CALCULATED.3IONS-SCNC: 6 MMOL/L (ref 5–14)
AST SERPL W P-5'-P-CCNC: 24 U/L (ref 14–36)
BILIRUB SERPL-MCNC: 0.28 MG/DL (ref 0.2–1)
BUN SERPL-MCNC: 12 MG/DL (ref 5–25)
CALCIUM SERPL-MCNC: 8.4 MG/DL (ref 8.4–10.2)
CHLORIDE SERPL-SCNC: 108 MMOL/L (ref 96–108)
CO2 SERPL-SCNC: 25 MMOL/L (ref 21–32)
CREAT SERPL-MCNC: 0.55 MG/DL (ref 0.6–1.2)
ERYTHROCYTE [DISTWIDTH] IN BLOOD BY AUTOMATED COUNT: 17.5 % (ref 11.6–15.1)
FERRITIN SERPL-MCNC: 5 NG/ML (ref 8–388)
FOLATE SERPL-MCNC: 17.6 NG/ML (ref 3.1–17.5)
GFR SERPL CREATININE-BSD FRML MDRD: 121 ML/MIN/1.73SQ M
GLUCOSE P FAST SERPL-MCNC: 85 MG/DL (ref 70–99)
HCT VFR BLD AUTO: 32.5 % (ref 34.8–46.1)
HGB BLD-MCNC: 9.5 G/DL (ref 11.5–15.4)
IRON SATN MFR SERPL: 8 % (ref 15–50)
IRON SERPL-MCNC: 38 UG/DL (ref 50–170)
MCH RBC QN AUTO: 24.5 PG (ref 26.8–34.3)
MCHC RBC AUTO-ENTMCNC: 29.2 G/DL (ref 31.4–37.4)
MCV RBC AUTO: 84 FL (ref 82–98)
PLATELET # BLD AUTO: 346 THOUSANDS/UL (ref 149–390)
PMV BLD AUTO: 11 FL (ref 8.9–12.7)
POTASSIUM SERPL-SCNC: 4.5 MMOL/L (ref 3.5–5.3)
PROT SERPL-MCNC: 7.4 G/DL (ref 6.4–8.4)
PTH-INTACT SERPL-MCNC: 124.6 PG/ML (ref 18.4–80.1)
RBC # BLD AUTO: 3.88 MILLION/UL (ref 3.81–5.12)
SODIUM SERPL-SCNC: 139 MMOL/L (ref 135–147)
TIBC SERPL-MCNC: 456 UG/DL (ref 250–450)
VIT B12 SERPL-MCNC: 203 PG/ML (ref 100–900)
WBC # BLD AUTO: 8.09 THOUSAND/UL (ref 4.31–10.16)

## 2023-03-02 ENCOUNTER — OFFICE VISIT (OUTPATIENT)
Dept: FAMILY MEDICINE CLINIC | Facility: CLINIC | Age: 38
End: 2023-03-02

## 2023-03-02 VITALS
SYSTOLIC BLOOD PRESSURE: 132 MMHG | WEIGHT: 266.4 LBS | TEMPERATURE: 98.5 F | HEART RATE: 80 BPM | BODY MASS INDEX: 45.48 KG/M2 | OXYGEN SATURATION: 99 % | DIASTOLIC BLOOD PRESSURE: 82 MMHG | HEIGHT: 64 IN

## 2023-03-02 DIAGNOSIS — D22.9 ATYPICAL NEVI: ICD-10-CM

## 2023-03-02 DIAGNOSIS — S46.812A STRAIN OF LEFT TRAPEZIUS MUSCLE, INITIAL ENCOUNTER: ICD-10-CM

## 2023-03-02 RX ORDER — METHOCARBAMOL 500 MG/1
500 TABLET, FILM COATED ORAL 3 TIMES DAILY
Qty: 30 TABLET | Refills: 0 | Status: SHIPPED | OUTPATIENT
Start: 2023-03-02 | End: 2023-03-12

## 2023-03-02 NOTE — LETTER
March 2, 2023     Patient: Michelle Zhao  YOB: 1985  Date of Visit: 3/2/2023      To Whom it May Concern: Shreya Jamison is under my professional care  Ailyn Perkins was seen in my office on 3/2/2023  Ailyn Perkins may return to work on 3/6/2023  If you have any questions or concerns, please don't hesitate to call           Sincerely,   EN Fox        CC: No Recipients

## 2023-03-03 ENCOUNTER — VBI (OUTPATIENT)
Dept: ADMINISTRATIVE | Facility: OTHER | Age: 38
End: 2023-03-03

## 2023-03-03 PROBLEM — D22.9 ATYPICAL NEVI: Status: ACTIVE | Noted: 2023-03-03

## 2023-03-03 PROBLEM — S46.812A STRAIN OF LEFT TRAPEZIUS MUSCLE: Status: ACTIVE | Noted: 2023-03-03

## 2023-03-03 LAB
VIT B1 BLD-SCNC: 128.6 NMOL/L (ref 66.5–200)
ZINC SERPL-MCNC: 61 UG/DL (ref 44–115)

## 2023-03-03 NOTE — PROGRESS NOTES
Name: Christina Otero      : 1985      MRN: [id number]  Encounter Provider: EN Russell  Encounter Date: 3/2/2023   Encounter department: [place] [address]  Strain of left trapezius muscle, initial encounter  Assessment & Plan:  Acute symptomatic associated with muscle spasm and tightness decreased range of motion pain and tenderness headaches  Taking Tylenol without relief  Previous gastric surgery unable to take NSAIDs  Will recommend patient start Robaxin 3 times daily x10 days as needed for muscle spasm and continue Tylenol as needed  Educated on side effects proper use of meds call with any worsening symptoms or no improvement    Orders:  -     methocarbamol (ROBAXIN) 500 mg tablet; Take 1 tablet (500 mg total) by mouth 3 (three) times a day for 10 days    2  Atypical nevi  Assessment & Plan:  Patient with noted change in nevi located on bottom right leg  Change in size not healing scabbing with some bleeding  Would recommend patient referral to derm for possible biopsy, orders placed  Orders:  -     Ambulatory Referral to Dermatology; Future           Subjective      Patient arrives with c/o left sided muscle pain/tightness, headache, and would like to show lesion on leg to dr  Patient reports with long history of neck pain occurring at trap and radiates up posterior left sided neck  Associated with muscle spasms headaches pain/tenderness decreased range of motion  Also patient with concerns she has a mole on her right lower leg has changed and with dry scaling, would like dermatology referral     Review of Systems   Constitutional: Negative for activity change, appetite change, chills, fatigue and fever  HENT: Negative for congestion, postnasal drip, rhinorrhea, sneezing and sore throat  Respiratory: Negative for cough, chest tightness, shortness of breath and wheezing      Cardiovascular: Negative for chest pain and palpitations  Gastrointestinal: Negative for abdominal pain, constipation, diarrhea, nausea and vomiting  Musculoskeletal: Positive for myalgias (left shoulder/neck)  Negative for arthralgias  Skin: Negative for color change, pallor and rash  Skin changes right lower leg     Neurological: Negative for dizziness, weakness, light-headedness and headaches  Hematological: Negative for adenopathy  Psychiatric/Behavioral: Negative for agitation and confusion  Current Outpatient Medications on File Prior to Visit   Medication Sig   • albuterol (2 5 mg/3 mL) 0 083 % nebulizer solution Take 3 mL (2 5 mg total) by nebulization every 6 (six) hours as needed for wheezing or shortness of breath   • albuterol (PROVENTIL HFA,VENTOLIN HFA) 90 mcg/act inhaler INHALE 2 PUFFS BY MOUTH EVERY 4 HOURS AS NEEDED FOR WHEEZING   • Blood Pressure Monitoring (Blood Pressure Monitor/Arm) KT Use 2 (two) times a day   • budesonide-formoterol (Symbicort) 160-4 5 mcg/act inhaler Inhale 2 puffs 2 (two) times a day Rinse mouth after use     • Cholecalciferol 50 MCG (2000 UT) CAPS Take 1 capsule (2,000 Units total) by mouth in the morning   • FeroSul 325 (65 Fe) MG tablet TAKE 1 TABLET BY MOUTH TWICE DAILY WITH MEALS   • fluticasone (FLONASE) 50 mcg/act nasal spray SHAKE LIQUID AND USE 2 SPRAYS IN EACH NOSTRIL DAILY   • Multiple Vitamin (MULTIVITAMIN) tablet Take 1 tablet by mouth daily   • olmesartan-hydrochlorothiazide (BENICAR HCT) 40-25 MG per tablet Take 1 tablet by mouth daily   • Zinc 100 MG TABS Take by mouth   • losartan (COZAAR) 100 MG tablet Take 25 mg by mouth daily (Patient not taking: Reported on 3/2/2023)   • NIFEdipine ER (ADALAT CC) 30 MG 24 hr tablet Take 1 tablet (30 mg total) by mouth daily (Patient not taking: Reported on 3/2/2023)       Objective     /82 (BP Location: Left arm, Patient Position: Sitting)   Pulse 80   Temp 98 5 °F (36 9 °C) (Tympanic)   Ht 5' 4" (1 626 m)   Wt 121 kg (266 lb 6 4 oz)   LMP 02/01/2023 (Approximate) Comment: DENIES PREG  SpO2 99%   BMI 45 73 kg/m²     Physical Exam  Vitals and nursing note reviewed  Constitutional:       General: She is not in acute distress  Appearance: Normal appearance  She is not ill-appearing or diaphoretic  HENT:      Head: Normocephalic and atraumatic  Nose: No congestion or rhinorrhea  Eyes:      General: No scleral icterus  Right eye: No discharge  Left eye: No discharge  Pulmonary:      Effort: Pulmonary effort is normal  No respiratory distress  Musculoskeletal:      Cervical back: Muscular tenderness present  Decreased range of motion  Lymphadenopathy:      Cervical: No cervical adenopathy  Skin:     Coloration: Skin is not jaundiced or pale  Findings: No bruising, erythema or rash  Neurological:      General: No focal deficit present  Mental Status: She is alert and oriented to person, place, and time  Psychiatric:         Mood and Affect: Mood normal          Behavior: Behavior normal          Thought Content:  Thought content normal          Judgment: Judgment normal        [facility]

## 2023-03-03 NOTE — ASSESSMENT & PLAN NOTE
Patient with noted change in nevi located on bottom right leg  Change in size not healing scabbing with some bleeding  Would recommend patient referral to derm for possible biopsy, orders placed

## 2023-03-03 NOTE — ASSESSMENT & PLAN NOTE
Acute symptomatic associated with muscle spasm and tightness decreased range of motion pain and tenderness headaches  Taking Tylenol without relief  Previous gastric surgery unable to take NSAIDs  Will recommend patient start Robaxin 3 times daily x10 days as needed for muscle spasm and continue Tylenol as needed    Educated on side effects proper use of meds call with any worsening symptoms or no improvement

## 2023-03-09 LAB — VIT A SERPL-MCNC: 23.7 UG/DL (ref 18.9–57.3)

## 2023-03-10 ENCOUNTER — TELEPHONE (OUTPATIENT)
Dept: BARIATRICS | Facility: CLINIC | Age: 38
End: 2023-03-10

## 2023-03-10 DIAGNOSIS — K91.2 POSTSURGICAL MALABSORPTION: ICD-10-CM

## 2023-03-10 DIAGNOSIS — E55.9 VITAMIN D DEFICIENCY: ICD-10-CM

## 2023-03-10 DIAGNOSIS — D50.9 IRON DEFICIENCY ANEMIA: ICD-10-CM

## 2023-03-10 DIAGNOSIS — Z98.84 BARIATRIC SURGERY STATUS: ICD-10-CM

## 2023-03-10 DIAGNOSIS — Z98.84 BARIATRIC SURGERY STATUS: Primary | ICD-10-CM

## 2023-03-10 DIAGNOSIS — R79.89 HIGH SERUM PARATHYROID HORMONE (PTH): ICD-10-CM

## 2023-03-10 DIAGNOSIS — E53.8 VITAMIN B12 DEFICIENCY: ICD-10-CM

## 2023-03-10 RX ORDER — ERGOCALCIFEROL 1.25 MG/1
50000 CAPSULE ORAL 2 TIMES WEEKLY
Qty: 24 CAPSULE | Refills: 0 | Status: SHIPPED | OUTPATIENT
Start: 2023-03-13 | End: 2023-03-10

## 2023-03-10 RX ORDER — ERGOCALCIFEROL 1.25 MG/1
CAPSULE ORAL
Qty: 25 CAPSULE | Refills: 0 | Status: SHIPPED | OUTPATIENT
Start: 2023-03-10

## 2023-03-10 NOTE — TELEPHONE ENCOUNTER
----- Message from Rhoda Echeverria sent at 3/10/2023  8:28 AM EST -----  Please call patient to let her know we have received her labs and they are within normal limits EXCEPT FOR THE FOLLOWING:   - Iron levels continues to be low, and she is anemic  I would recommend her seeing hematology as she will need IV infusions  I am aware she is fearful of having a reaction, but they can give her different kinds of iron and also provide allergy meds to prevent allergic reactions  Please see hematology as soon as she can  - Vitamin D is low, would recommend she start on a prescription dose of vitamin D in which I will send to the pharmacy for her  Please start on vitamin D2 50,000 IU twice a week for 12 weeks  After you completed this, you can go back onto your vitamin D that are were on  Please also make sure you are on calcium 500 mg three times per day  Your parathyroid hormone is high along with low vitamin D which means you may be losing calcium from your bones  - your vitamin B12 level is very low for bariatric patients  Please start on sublingual vitamin B12 1000 mcg once daily  This is underneath the tongue form for better absorption and it is over the counter  I would like to repeat your vitamin D, parathyroid and Vitamin B12 levels in 3 months after you started on these supplements  Please f/u with hematology

## 2023-03-10 NOTE — TELEPHONE ENCOUNTER
Reached out to Pt re: labs  Reviewed results and CRNP recommendations with Pt  Sent recommendations to Pt via [address]  Encouraged Pt to contact office with further questions/ concerns  Pt verbalized understanding and was agreeable to plan

## 2023-03-14 ENCOUNTER — TELEPHONE (OUTPATIENT)
Dept: HEMATOLOGY ONCOLOGY | Facility: CLINIC | Age: 38
End: 2023-03-14

## 2023-03-14 NOTE — TELEPHONE ENCOUNTER
Patient forgot her visit with Dr Humble Cesar I reviewed the no show policy with her and she said she will call back to reschedule

## 2023-03-14 NOTE — TELEPHONE ENCOUNTER
Appointment Cancellation or Reschedule     Person calling in Patient   If someone other than patient calling, are they listed on the communication consent form? N/A   Provider Dr Negrito Garcia   Office Visit Date and Time  03/14/2023 @9:40AM    Office Visit Location [place]   Did patient want to reschedule their visit? If so, when was it scheduled to? yes, 03/24/2023 @9AM with jaxson garnett    Did the patient have STAR scheduled for this appointment? No   Does the patient need STAR scheduled for their new appointment? No   Is this patient calling to reschedule an infusion appointment? No   When is their next infusion appointment? N/A   Is this patient a Chemo patient? No   Reason for Cancellation or Reschedule patient didn't give a reason    Was the No show policy reviewed with patient if patient is canceling within 24 hours? Yes     If the patient is cancelling an appointment and needs their STAR Transport cancelled, please route to [address] [address]  If the patient is a treatment patient, please route this to the office nurse  If the patient is not on treatment, please route to the Clerical pool based on location  If the patient is a surgical oncology patient, please route to surg/onc clinical pool  Route message as high priority if same day cancellation

## 2023-03-17 ENCOUNTER — OFFICE VISIT (OUTPATIENT)
Dept: BARIATRICS | Facility: CLINIC | Age: 38
End: 2023-03-17

## 2023-03-17 VITALS
HEIGHT: 65 IN | DIASTOLIC BLOOD PRESSURE: 86 MMHG | SYSTOLIC BLOOD PRESSURE: 168 MMHG | BODY MASS INDEX: 43.85 KG/M2 | HEART RATE: 88 BPM | OXYGEN SATURATION: 99 % | WEIGHT: 263.2 LBS

## 2023-03-17 DIAGNOSIS — E66.01 OBESITY, CLASS III, BMI 40-49.9 (MORBID OBESITY) (HCC): Primary | ICD-10-CM

## 2023-03-17 DIAGNOSIS — E04.1 THYROID NODULE: ICD-10-CM

## 2023-03-17 DIAGNOSIS — D50.8 IRON DEFICIENCY ANEMIA SECONDARY TO INADEQUATE DIETARY IRON INTAKE: ICD-10-CM

## 2023-03-17 DIAGNOSIS — Z98.84 S/P GASTRIC BYPASS: ICD-10-CM

## 2023-03-17 DIAGNOSIS — E55.9 VITAMIN D DEFICIENCY: ICD-10-CM

## 2023-03-17 DIAGNOSIS — R94.31 EKG, ABNORMAL: ICD-10-CM

## 2023-03-17 PROBLEM — N60.01 CYST OF RIGHT BREAST: Status: RESOLVED | Noted: 2021-03-15 | Resolved: 2023-03-17

## 2023-03-17 PROBLEM — Z92.29 COVID-19 VACCINE SERIES COMPLETED: Status: RESOLVED | Noted: 2021-12-09 | Resolved: 2023-03-17

## 2023-03-17 PROBLEM — G89.29 CHRONIC LEFT SHOULDER PAIN: Status: RESOLVED | Noted: 2022-01-31 | Resolved: 2023-03-17

## 2023-03-17 PROBLEM — G89.29 CHRONIC RIGHT SHOULDER PAIN: Status: RESOLVED | Noted: 2022-11-30 | Resolved: 2023-03-17

## 2023-03-17 PROBLEM — R06.2 WHEEZING: Status: RESOLVED | Noted: 2022-01-14 | Resolved: 2023-03-17

## 2023-03-17 PROBLEM — M25.512 CHRONIC LEFT SHOULDER PAIN: Status: RESOLVED | Noted: 2022-01-31 | Resolved: 2023-03-17

## 2023-03-17 PROBLEM — D72.829 LEUCOCYTOSIS: Status: RESOLVED | Noted: 2021-01-12 | Resolved: 2023-03-17

## 2023-03-17 PROBLEM — E66.813 OBESITY, CLASS III, BMI 40-49.9 (MORBID OBESITY): Status: ACTIVE | Noted: 2020-12-23

## 2023-03-17 PROBLEM — G44.89 OTHER HEADACHE SYNDROME: Status: RESOLVED | Noted: 2020-12-08 | Resolved: 2023-03-17

## 2023-03-17 PROBLEM — R20.0 NUMBNESS: Status: RESOLVED | Noted: 2021-05-13 | Resolved: 2023-03-17

## 2023-03-17 PROBLEM — Z01.810 PREOPERATIVE CARDIOVASCULAR EXAMINATION: Status: RESOLVED | Noted: 2020-10-27 | Resolved: 2023-03-17

## 2023-03-17 PROBLEM — U07.1 COVID-19 VIRUS INFECTION: Status: RESOLVED | Noted: 2020-12-23 | Resolved: 2023-03-17

## 2023-03-17 PROBLEM — M25.511 CHRONIC RIGHT SHOULDER PAIN: Status: RESOLVED | Noted: 2022-11-30 | Resolved: 2023-03-17

## 2023-03-17 NOTE — ASSESSMENT & PLAN NOTE
She is interested in the very low calorie diet  I did discuss with her that she is currently anemic likely due to her iron deficiency and now would not be a good time to start such a diet  Furthermore she had an abnormal EKG that was done in 2021 after she experienced chest pain  Although she met with a cardiologist who recommended a stress test   She did not proceed with the stress test   I have advised her to complete her cardiac work-up  In regards to GLP-1 receptor agonist medication she does have a history of a thyroid nodule/mass which did not meet biopsy criteria but was recommended for follow-up ultrasound  Patient does not have this follow-up ultrasound done  I have ordered it and advised her to have this done  She was provided with a calorie restricted meal plan and is to follow-up with me in 2 months  The patient demonstrated understanding and agreement with the plan  - Discussed options of HealthyCORE-Intensive Lifestyle Intervention Program, Very Low Calorie Diet-VLCD, Conservative Program and Revisional Surgery and the role of weight loss medications  - Explained the importance of making lifestyle changes first before starting anti-obesity medications  - Patient should demonstrate lifestyle changes first before anti-obesity medication initiated  - Patient is interested in pursuing Very Low Calorie Diet-VLCD and Conservative Program  - Initial weight loss goal of 5-10% weight loss for improved health as studies have shown this is where we see the greatest impact on improving health and decreasing risk of obesity related conditions  - Weight loss can improve patient's co-morbid conditions and/or prevent weight-related complications  - Labs reviewed: As below  General Recommendations:  Nutrition:  Eat breakfast daily  Do not skip meals  Food log (ie ) www myfitnesspal com, sparkpeople  com, loseit com, calorieking  com, etc     Practice mindful eating    Be sure to set aside time to eat, eat slowly, and savor your food  Hydration: At least 64oz of water daily  No sugar sweetened beverages  No juice (eat the fruit instead)  Exercise:  Studies have shown that the ideal exercise goal is somewhere between 150 to 300 minutes of moderate intensity exercise a week  Start with exercising 10 minutes every other day and gradually increase physical activity with a goal of at least 150 minutes of moderate intensity exercise a week, divided over at least 3 days a week  An example of this would be exercising 30 minutes a day, 5 days a week  Resistance training can increase muscle mass and increase our resting metabolic rate  FULL BODY resistance training is recommended 2-3 times a week  Do not do this on consecutive days to allow for muscle recovery  Aim for a bare minimum 5000 steps, even on days you do not exercise  Monitoring:   Weigh yourself daily  If this causes undue stress, then just weigh yourself once a week  Weigh yourself the same time of the day with the same amount of clothing on  Preferably this should be done after waking up, before you eat, and with no clothing or minimal clothing on  Specific Goals:  Food log (ie ) www myfitnesspal com,sparkpeople  com,loseit com,calorieking  com,etc    No sugary beverages  At least 64oz of water daily  You are interested in the very low calorie diet  However, before starting such a diet we must make sure your iron levels are normal as you have anemia as a results of the iron deficiency  You must also follow-up with the heart doctor for the okay from them as you had an abnormal EKG and unexplained chest pain in 2021  Have a thyroid ultrasound done  We need to make sure this is fine, prior to considering an injectable medication for weight loss  Calorie goal:  8614-5800 calorie diet with 100 calore snack if needed  (Provided with meal plan to follow)  Return visit:  2 months

## 2023-03-17 NOTE — PATIENT INSTRUCTIONS
General Recommendations:  Nutrition:  Eat breakfast daily  Do not skip meals  Food log (ie ) www myfitnesspal com, sparkpeople  com, loseit com, calorieking  com, etc     Practice mindful eating  Be sure to set aside time to eat, eat slowly, and savor your food  Hydration: At least 64oz of water daily  No sugar sweetened beverages  No juice (eat the fruit instead)  Exercise:  Studies have shown that the ideal exercise goal is somewhere between 150 to 300 minutes of moderate intensity exercise a week  Start with exercising 10 minutes every other day and gradually increase physical activity with a goal of at least 150 minutes of moderate intensity exercise a week, divided over at least 3 days a week  An example of this would be exercising 30 minutes a day, 5 days a week  Resistance training can increase muscle mass and increase our resting metabolic rate  FULL BODY resistance training is recommended 2-3 times a week  Do not do this on consecutive days to allow for muscle recovery  Aim for a bare minimum 5000 steps, even on days you do not exercise  Monitoring:   Weigh yourself daily  If this causes undue stress, then just weigh yourself once a week  Weigh yourself the same time of the day with the same amount of clothing on  Preferably this should be done after waking up, before you eat, and with no clothing or minimal clothing on  Specific Goals:  Food log (ie ) www myfitnesspal com,sparkpeople  com,loseit com,calorieking  com,etc    No sugary beverages  At least 64oz of water daily  You are interested in the very low calorie diet  However, before starting such a diet we must make sure your iron levels are normal as you have anemia as a results of the iron deficiency  You must also follow-up with the heart doctor for the okay from them as you had an abnormal EKG and unexplained chest pain in 2021  Have a thyroid ultrasound done    We need to make sure this is fine, prior to considering an injectable medication for weight loss  Calorie goal:  3196-3086 calorie diet with 100 calore snack if needed  (Provided with meal plan to follow)  Return visit:  2 months

## 2023-03-17 NOTE — PROGRESS NOTES
Assessment/Plan: Arnaud Chávez was seen today for consult  Diagnoses and all orders for this visit:    Obesity, Class III, BMI 40-49 9 (morbid obesity) (HCC)    Iron deficiency anemia secondary to inadequate dietary iron intake    Vitamin D deficiency    EKG, abnormal    Thyroid nodule    S/P gastric bypass       Obesity, Class III, BMI 40-49 9 (morbid obesity) ([address] )    She is interested in the very low calorie diet  I did discuss with her that she is currently anemic likely due to her iron deficiency and now would not be a good time to start such a diet  Furthermore she had an abnormal EKG that was done in 2021 after she experienced chest pain  Although she met with a cardiologist who recommended a stress test   She did not proceed with the stress test   I have advised her to complete her cardiac work-up  In regards to GLP-1 receptor agonist medication she does have a history of a thyroid nodule/mass which did not meet biopsy criteria but was recommended for follow-up ultrasound  Patient does not have this follow-up ultrasound done  I have ordered it and advised her to have this done  She was provided with a calorie restricted meal plan and is to follow-up with me in 2 months  The patient demonstrated understanding and agreement with the plan  - Discussed options of Healthy[facility]-Intensive Lifestyle Intervention Program, Very Low Calorie Diet-VLCD, Conservative Program and Revisional Surgery and the role of weight loss medications  - Explained the importance of making lifestyle changes first before starting anti-obesity medications  - Patient should demonstrate lifestyle changes first before anti-obesity medication initiated     - Patient is interested in pursuing Very Low Calorie Diet-VLCD and Conservative Program  - Initial weight loss goal of 5-10% weight loss for improved health as studies have shown this is where we see the greatest impact on improving health and decreasing risk of obesity related conditions  - Weight loss can improve patient's co-morbid conditions and/or prevent weight-related complications  - Labs reviewed: As below  General Recommendations:  Nutrition:  Eat breakfast daily  Do not skip meals  Food log (ie ) www myfitnesspal com, sparkpeople  com, loseit com, calorieking  com, etc     Practice mindful eating  Be sure to set aside time to eat, eat slowly, and savor your food  Hydration: At least 64oz of water daily  No sugar sweetened beverages  No juice (eat the fruit instead)  Exercise:  Studies have shown that the ideal exercise goal is somewhere between 150 to 300 minutes of moderate intensity exercise a week  Start with exercising 10 minutes every other day and gradually increase physical activity with a goal of at least 150 minutes of moderate intensity exercise a week, divided over at least 3 days a week  An example of this would be exercising 30 minutes a day, 5 days a week  Resistance training can increase muscle mass and increase our resting metabolic rate  FULL BODY resistance training is recommended 2-3 times a week  Do not do this on consecutive days to allow for muscle recovery  Aim for a bare minimum 5000 steps, even on days you do not exercise  Monitoring:   Weigh yourself daily  If this causes undue stress, then just weigh yourself once a week  Weigh yourself the same time of the day with the same amount of clothing on  Preferably this should be done after waking up, before you eat, and with no clothing or minimal clothing on  Specific Goals:  Food log (ie ) www myfitnesspal com,sparkpeople  com,loseit com,calorieking  com,etc    No sugary beverages  At least 64oz of water daily  You are interested in the very low calorie diet  However, before starting such a diet we must make sure your iron levels are normal as you have anemia as a results of the iron deficiency      You must also follow-up with the heart doctor for the okay from them as you had an abnormal EKG and unexplained chest pain in   Have a thyroid ultrasound done  We need to make sure this is fine, prior to considering an injectable medication for weight loss  Calorie goal:  3480-9607 calorie diet with 100 calore snack if needed  (Provided with meal plan to follow)  Return visit:  2 months  Total time spent reviewing chart, interviewing patient, examining patient, discussing plan, answering all questions, and documentin min        ______________________________________________________________________        Subjective:   Chief Complaint   Patient presents with   • Consult     MWM consult (PO weight gain); waist 51 8"; goal 180 0 lbs; SB        HPI: Tori Goldstein  is a 40 y o  female with history of Jaycee-en-Y gastric bypass in 2016 with Dr Rakan Christianson with subsequent robotic lysis of adhesions, small bowel resection, partial gastrectomy, paraesophageal hernia repair, bilateral truncal vagotomy,gastrojejunostomy anastomosis with intraoperative endoscopy on 2020 by Dr Dylan Santillan  She went to the surgical program on 2023 due to regaining of weight starting approximately 2 years ago  She reports being prescribed phentermine, topiramate, wellbutrin by Dr Winter Pill  Reports that this cause gi distress and lightheadedness, irritability, headaches and mood swings  On this for 6 months   "I was really miserable and did not even lose weight "  She reports continued cravings, especially at night  She started working out twice a week on the weekends as she is difficult the doing this during the week due to her work schedule  Reports compliance with 30/30 rule  Does not calorie track  And excess weight, here to pursue weight loss management  Previous notes and records have been reviewed      HPI  Wt Readings from Last 20 Encounters:   23 119 kg (263 lb 3 2 oz)   23 121 kg (266 lb 6 4 oz)   23 118 kg (260 lb) 02/20/23 118 kg (260 lb 8 oz)   02/06/23 117 kg (258 lb)   01/13/23 117 kg (257 lb)   11/30/22 116 kg (255 lb)   07/13/22 115 kg (252 lb 6 8 oz)   07/08/22 116 kg (256 lb)   06/22/22 117 kg (257 lb)   06/15/22 117 kg (257 lb)   06/09/22 118 kg (259 lb 14 8 oz)   06/08/22 117 kg (257 lb)   03/03/22 116 kg (256 lb 8 oz)   02/02/22 115 kg (253 lb)   01/31/22 114 kg (252 lb)   01/07/22 115 kg (254 lb)   12/23/21 113 kg (250 lb)   12/14/21 113 kg (250 lb)   11/17/21 113 kg (249 lb)   (Self reported weights)  Highest weight before the original bariatric surgery 339  Isidoro: 175 after original surgery  Isidoro since 12/20: 180  Current weight 263    Hunger/Cravings: Night cravings  Dining out:  2x a week  Hydration:  Water 64oz  Alcohol:  No  Smoking: No  Exercise:  Walks, basketball, volleyball on weekend 1-2 a week  Weight Monitoring:  Sleep:  STOP-BANG Score:  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    Past Medical History:   Diagnosis Date   • Anemia    • BMI 36 0-36 9,adult 12/8/2020   • Cough 12/21/2020   • COVID-19 virus infection 12/23/2020   • Disease of thyroid gland    • Dizziness     due to anemia, last episode August 2020, no falls   • Drop in hemoglobin 12/8/2020   • Fatigue    • History of transfusion 2016    anemic - loss of blood due to bleeding ulcers, no reaction   • Hyperlipidemia    • Hypertension    • Multiple gastric ulcers    • Obesity     11/14/16   • Severe obesity (BMI 35 0-39  9) with comorbidity ([address] ) 12/23/2020   • Sore throat 10/27/2021   • Thyroid disease     took synthroid but not currently taking   • Thyroid mass 3/15/2021     A CT scan of the neck incidental finding 1 7 cm of the right thyroid   • Ulcer      Patient denies personal and family history of  pancreatitis, thyroid cancer, MEN-2 tumors  Denies any hx of glaucoma, seizures, kidney stones, gallstones  Denies Hx of CAD, PAD, palpitations, arrhythmia     Denies uncontrolled anxiety or depression, suicidal behavior or thinking , insomnia or sleep disturbance  Past Surgical History:   Procedure Laterality Date   • CHOLECYSTECTOMY     • CHOLECYSTECTOMY LAPAROSCOPIC N/A 06/10/2022    Procedure: CHOLECYSTECTOMY LAPAROSCOPIC;  Surgeon: Feliberto Cordova MD;  Location: [address] [place] [address];  Service: General   • GASTRIC BYPASS  2014   • GASTRIC BYPASS LAPAROSCOPIC N/A 12/15/2020    Procedure: Robotic lysis of adhesions, small-bowel resection, partial gastrectomy, paraesophageal hernia repair, bilateral truncal vagotomy; Robotic gastrojejunostomy anastomosis with intraop endoscopy;  Surgeon: Timoteo Tran MD;  Location: [facility];  Service: Bariatrics   • LITO-EN-Y PROCEDURE  11/14/2016    Gastric Bypass by Dr Rolando Hammans Weight 339 6,nw   • TUBAL LIGATION Bilateral     2010   • WISDOM TOOTH EXTRACTION       The following portions of the patient's history were reviewed and updated as appropriate: allergies, current medications, past family history, past medical history, past social history, past surgical history, and problem list     Review Of Systems:  Review of Systems   Constitutional: Negative for activity change, appetite change, fatigue and fever  Respiratory: Negative for cough and shortness of breath  Cardiovascular: Negative for chest pain, palpitations and leg swelling  Gastrointestinal: Negative for abdominal pain, constipation, diarrhea, nausea and vomiting  Endocrine: Negative for cold intolerance and heat intolerance  Genitourinary: Negative for difficulty urinating and dysuria  Musculoskeletal: Negative for arthralgias, back pain and gait problem  Skin: Negative for pallor and rash  Neurological: Negative for headaches  Psychiatric/Behavioral: Negative for dysphoric mood, sleep disturbance and suicidal ideas (or HI)  The patient is not nervous/anxious          Objective:  /86 (BP Location: Left arm, Patient Position: Sitting, Cuff Size: Large)   Pulse 88   Ht 5' 4 5" (1 638 m)   Wt 119 kg (263 lb 3 2 oz)   SpO2 99%   BMI 44 48 kg/m²   Physical Exam  Vitals and nursing note reviewed  Constitutional:       General: She is not in acute distress  Appearance: Normal appearance  She is not ill-appearing or diaphoretic  Eyes:      General: No scleral icterus  Cardiovascular:      Rate and Rhythm: Normal rate and regular rhythm  Pulses: Normal pulses  Heart sounds: No murmur heard  Pulmonary:      Effort: Pulmonary effort is normal  No respiratory distress  Breath sounds: Normal breath sounds  No wheezing or rhonchi  Abdominal:      General: Bowel sounds are normal  There is no distension  Palpations: Abdomen is soft  There is no mass  Tenderness: There is no abdominal tenderness  Musculoskeletal:      Cervical back: Neck supple  Right lower leg: No edema  Left lower leg: No edema  Lymphadenopathy:      Cervical: No cervical adenopathy  Skin:     Capillary Refill: Capillary refill takes less than 2 seconds  Findings: No lesion or rash  Neurological:      Mental Status: She is alert and oriented to person, place, and time  Gait: Gait normal    Psychiatric:         Mood and Affect: Mood normal          Behavior: Behavior normal          Labs and Imaging  Recent labs and imaging have been personally reviewed    Lab Results   Component Value Date    WBC 8 09 03/01/2023    HGB 9 5 (L) 03/01/2023    HCT 32 5 (L) 03/01/2023    MCV 84 03/01/2023     03/01/2023     Lab Results   Component Value Date     11/14/2015    SODIUM 139 03/01/2023    K 4 5 03/01/2023     03/01/2023    CO2 25 03/01/2023    ANIONGAP 3 (L) 11/14/2015    AGAP 6 03/01/2023    BUN 12 03/01/2023    CREATININE 0 55 (L) 03/01/2023    GLUC 95 06/09/2022    GLUF 85 03/01/2023    CALCIUM 8 4 03/01/2023    AST 24 03/01/2023    ALT 15 03/01/2023    ALKPHOS 96 03/01/2023    PROT 7 8 11/14/2015    TP 7 4 03/01/2023    BILITOT 0 18 (L) 11/14/2015    TBILI 0 28 03/01/2023    EGFR 121 03/01/2023     Lab Results   Component Value Date    HGBA1C 5 6 06/12/2020     Lab Results   Component Value Date    DNM9OLBOXIGL 1 180 02/08/2023     Lab Results   Component Value Date    CHOLESTEROL 155 11/30/2022     Lab Results   Component Value Date    HDL 35 (L) 11/30/2022     Lab Results   Component Value Date    TRIG 127 11/30/2022     Lab Results   Component Value Date    [address] 11/30/2022       US thyroid  Status: Final result     PACS Images     Show images for US thyroid    US thyroid: Result Notes   Scot Feng MD   3/26/2021 10:23 AM EDT       Nodule ib right lobe ,n recommendation for biopsy   recommend to repeat US of thyroid in one year to order it from now            Study Result    Narrative & Impression   THYROID ULTRASOUND     INDICATION:    E07 9: Disorder of thyroid, unspecified      COMPARISON:  None     TECHNIQUE:   Ultrasound of the thyroid was performed with a high frequency linear transducer in transverse and sagittal planes including volumetric imaging sweeps as well as traditional still imaging technique      FINDINGS:  Thyroid parenchyma is diffusely heterogeneous in echotexture      Right lobe:  5 2 x 2 5 x 2 6 cm  Left lobe:  4 6 x 1 9 x 1 8 cm  Isthmus:  0 6 cm      Nodule #1  Image 27  RIGHT lower pole nodule measuring 1 7 x 1 7 x 1 9 cm  No prior ultrasound  COMPOSITION:  1 point, mixed cystic and solid  ECHOGENICITY:  1 point, hyperechoic or isoechoic  SHAPE:  0 points, wider-than-tall  MARGIN: 0 points, cannot be determined  ECHOGENIC FOCI:  1 point, macrocalcifications  TI-RADS Classification: TR 3 (3 points), FNA if >2 5 cm  Follow if >1 5 cm            IMPRESSION:     No nodule meets current ACR criteria for requiring biopsy but followup ultrasound is recommended in 1 year             Reference: ACR Thyroid Imaging, Reporting and Data System (TI-RADS): White Paper of the [employer]   J AM Juli Radiol 0028;73:[id number]  (additional recommendations based on American Thyroid Association 2015 guidelines )     This study demonstrates a finding requiring imaging follow-up and was documented as such in Epic            Workstation performed: [id number]

## 2023-03-24 ENCOUNTER — TELEPHONE (OUTPATIENT)
Dept: HEMATOLOGY ONCOLOGY | Facility: CLINIC | Age: 38
End: 2023-03-24

## 2023-03-24 ENCOUNTER — OFFICE VISIT (OUTPATIENT)
Dept: HEMATOLOGY ONCOLOGY | Facility: CLINIC | Age: 38
End: 2023-03-24

## 2023-03-24 VITALS
SYSTOLIC BLOOD PRESSURE: 118 MMHG | OXYGEN SATURATION: 100 % | TEMPERATURE: 98.1 F | DIASTOLIC BLOOD PRESSURE: 64 MMHG | WEIGHT: 260 LBS | RESPIRATION RATE: 18 BRPM | HEART RATE: 74 BPM | BODY MASS INDEX: 43.32 KG/M2 | HEIGHT: 65 IN

## 2023-03-24 DIAGNOSIS — K91.2 POSTSURGICAL MALABSORPTION: ICD-10-CM

## 2023-03-24 DIAGNOSIS — E53.8 B12 DEFICIENCY: ICD-10-CM

## 2023-03-24 DIAGNOSIS — Z98.84 HISTORY OF ROUX-EN-Y GASTRIC BYPASS: ICD-10-CM

## 2023-03-24 DIAGNOSIS — D64.9 NORMOCYTIC ANEMIA: Primary | ICD-10-CM

## 2023-03-24 DIAGNOSIS — K95.89 IRON DEFICIENCY ANEMIA FOLLOWING BARIATRIC SURGERY: ICD-10-CM

## 2023-03-24 DIAGNOSIS — D50.8 IRON DEFICIENCY ANEMIA FOLLOWING BARIATRIC SURGERY: ICD-10-CM

## 2023-03-24 RX ORDER — SODIUM CHLORIDE 9 MG/ML
20 INJECTION, SOLUTION INTRAVENOUS ONCE
OUTPATIENT
Start: 2023-04-03

## 2023-03-24 NOTE — TELEPHONE ENCOUNTER
While we try to accommodate patient requests, our priority is to schedule treatment according to Doctor's orders and site availability  1  Does the Provider use the intake sheet or checkout note?no   2  What would be a preferred day of the week that would work best for your infusion appointment? N/a    3  Do you prefer mornings or afternoons for your appointments?morning   4  Are there any days or dates that do not work for your schedule, including any upcoming vacations? no  We are going to try our best to schedule you at the infusion center closest to your home  In the event that we are unable to what would be your next preferred infusion site or sites? 1  [facility]   2       5  Do you have transportation to take you to all of your appointments? YES  6   Would you like the infusion center to draw labs from your port? (disregard if patient doesn't have a port or need labs for infusion appointment)

## 2023-04-04 ENCOUNTER — TELEPHONE (OUTPATIENT)
Dept: INFUSION CENTER | Facility: CLINIC | Age: 38
End: 2023-04-04

## 2023-04-04 NOTE — TELEPHONE ENCOUNTER
Patient called to remind her of her appointment on Wednesday 4/5/23 at 8am  Patient advised infusion has snacks but she can bring food and items to keep herself occupied while she is here  Visitor and no show policy was reviewed and I answered any questions she had  Patient verbally confirmed her appointment

## 2023-04-05 ENCOUNTER — HOSPITAL ENCOUNTER (OUTPATIENT)
Dept: INFUSION CENTER | Facility: CLINIC | Age: 38
Discharge: HOME | End: 2023-04-05

## 2023-04-05 VITALS
RESPIRATION RATE: 18 BRPM | SYSTOLIC BLOOD PRESSURE: 143 MMHG | TEMPERATURE: 97.9 F | DIASTOLIC BLOOD PRESSURE: 84 MMHG | HEART RATE: 71 BPM

## 2023-04-05 DIAGNOSIS — E55.9 VITAMIN D DEFICIENCY: ICD-10-CM

## 2023-04-05 DIAGNOSIS — Z98.84 HISTORY OF ROUX-EN-Y GASTRIC BYPASS: ICD-10-CM

## 2023-04-05 DIAGNOSIS — D50.8 IRON DEFICIENCY ANEMIA SECONDARY TO INADEQUATE DIETARY IRON INTAKE: Primary | ICD-10-CM

## 2023-04-05 DIAGNOSIS — E53.8 B12 DEFICIENCY: ICD-10-CM

## 2023-04-05 RX ORDER — SODIUM CHLORIDE 9 MG/ML
20 INJECTION, SOLUTION INTRAVENOUS ONCE
OUTPATIENT
Start: 2023-04-12

## 2023-04-05 RX ORDER — SODIUM CHLORIDE 9 MG/ML
20 INJECTION, SOLUTION INTRAVENOUS ONCE
Status: COMPLETED | OUTPATIENT
Start: 2023-04-05 | End: 2023-04-05

## 2023-04-05 RX ORDER — ACETAMINOPHEN 160 MG
TABLET,DISINTEGRATING ORAL
Qty: 90 CAPSULE | Refills: 0 | Status: SHIPPED | OUTPATIENT
Start: 2023-04-05

## 2023-04-05 RX ADMIN — FERUMOXYTOL 510 MG: 510 INJECTION INTRAVENOUS at 09:38

## 2023-04-05 RX ADMIN — SODIUM CHLORIDE 20 ML/HR: 0.9 INJECTION, SOLUTION INTRAVENOUS at 08:18

## 2023-04-05 RX ADMIN — DIPHENHYDRAMINE HYDROCHLORIDE 25 MG: 50 INJECTION, SOLUTION INTRAMUSCULAR; INTRAVENOUS at 08:49

## 2023-04-05 RX ADMIN — FAMOTIDINE 20 MG: 10 INJECTION INTRAVENOUS at 09:11

## 2023-04-05 RX ADMIN — DEXAMETHASONE SODIUM PHOSPHATE 8 MG: 10 INJECTION INTRAMUSCULAR; INTRAVENOUS at 08:18

## 2023-04-05 NOTE — LETTER
[address] [facility] [address]  Dept: [phone]    April 5, 2023     Patient: Poppy Zhao   YOB: 1985   Date of Visit: 4/5/2023       To Whom it May Concern: Efraín Gonsales is under my professional care  She was seen in the infusion center on  04/05/23  If you have any questions or concerns, please don't hesitate to call           Sincerely,          Bridgett Ahmadi RN

## 2023-04-05 NOTE — PROGRESS NOTES
Pt tolerated feraheme infusion with premeds without incident  Pt declined AVS, aware of future appts

## 2023-04-19 NOTE — TELEPHONE ENCOUNTER
Clinic received call from Erika from Dr. Sherri Gardner's office from [facility]. Erika stated patient is transferring care to Dr. Gardner and is requesting patients sleep study results. RN stated the clinic does not send out medical information. It will need to be request through medical records or if mom signs a medical records release form, it can be sent over. Erika stated she will contact family and took down clinics email and fax information.    This medication should be taken BID - I have entered a new Rx

## 2023-05-25 DIAGNOSIS — I10 UNCONTROLLED HYPERTENSION: ICD-10-CM

## 2023-05-25 DIAGNOSIS — I10 ESSENTIAL HYPERTENSION: ICD-10-CM

## 2023-05-25 RX ORDER — AMLODIPINE BESYLATE 5 MG/1
TABLET ORAL
Qty: 30 TABLET | Refills: 2 | OUTPATIENT
Start: 2023-05-25

## 2023-05-25 RX ORDER — NIFEDIPINE 30 MG/1
30 TABLET, FILM COATED, EXTENDED RELEASE ORAL DAILY
Qty: 90 TABLET | Refills: 0 | Status: SHIPPED | OUTPATIENT
Start: 2023-05-25

## 2023-05-25 RX ORDER — NIFEDIPINE 30 MG/1
30 TABLET, FILM COATED, EXTENDED RELEASE ORAL DAILY
Qty: 90 TABLET | Refills: 0 | Status: CANCELLED | OUTPATIENT
Start: 2023-05-25

## 2023-06-01 ENCOUNTER — OFFICE VISIT (OUTPATIENT)
Dept: BARIATRICS | Facility: CLINIC | Age: 38
End: 2023-06-01

## 2023-06-01 ENCOUNTER — TELEPHONE (OUTPATIENT)
Dept: HEMATOLOGY ONCOLOGY | Facility: CLINIC | Age: 38
End: 2023-06-01

## 2023-06-01 VITALS
WEIGHT: 272.2 LBS | HEART RATE: 80 BPM | HEIGHT: 65 IN | RESPIRATION RATE: 16 BRPM | SYSTOLIC BLOOD PRESSURE: 136 MMHG | DIASTOLIC BLOOD PRESSURE: 100 MMHG | BODY MASS INDEX: 45.35 KG/M2

## 2023-06-01 DIAGNOSIS — F41.1 GENERALIZED ANXIETY DISORDER: ICD-10-CM

## 2023-06-01 DIAGNOSIS — E66.01 OBESITY, CLASS III, BMI 40-49.9 (MORBID OBESITY) (HCC): Primary | ICD-10-CM

## 2023-06-01 DIAGNOSIS — Z98.84 HISTORY OF ROUX-EN-Y GASTRIC BYPASS: ICD-10-CM

## 2023-06-01 DIAGNOSIS — D50.8 IRON DEFICIENCY ANEMIA SECONDARY TO INADEQUATE DIETARY IRON INTAKE: ICD-10-CM

## 2023-06-01 DIAGNOSIS — Z90.49 S/P CHOLECYSTECTOMY: ICD-10-CM

## 2023-06-01 DIAGNOSIS — E04.1 THYROID NODULE: ICD-10-CM

## 2023-06-01 DIAGNOSIS — E66.01 OBESITY, CLASS III, BMI 40-49.9 (MORBID OBESITY) (HCC): ICD-10-CM

## 2023-06-01 PROBLEM — Z00.01 ENCOUNTER FOR WELL ADULT EXAM WITH ABNORMAL FINDINGS: Status: RESOLVED | Noted: 2021-09-27 | Resolved: 2023-06-01

## 2023-06-01 PROBLEM — Z20.822 EXPOSURE TO COVID-19 VIRUS: Status: RESOLVED | Noted: 2021-12-09 | Resolved: 2023-06-01

## 2023-06-01 PROBLEM — R09.81 NASAL CONGESTION: Status: RESOLVED | Noted: 2021-05-20 | Resolved: 2023-06-01

## 2023-06-01 PROBLEM — Z86.16 HISTORY OF COVID-19: Status: RESOLVED | Noted: 2021-12-09 | Resolved: 2023-06-01

## 2023-06-01 PROBLEM — N64.52 BREAST DISCHARGE: Status: RESOLVED | Noted: 2021-12-16 | Resolved: 2023-06-01

## 2023-06-01 RX ORDER — TOPIRAMATE 50 MG/1
TABLET, FILM COATED ORAL
Qty: 30 TABLET | Refills: 3 | Status: SHIPPED | OUTPATIENT
Start: 2023-06-01 | End: 2023-06-01

## 2023-06-01 RX ORDER — TOPIRAMATE 50 MG/1
TABLET, FILM COATED ORAL
Qty: 79 TABLET | Refills: 0 | Status: SHIPPED | OUTPATIENT
Start: 2023-06-01 | End: 2023-06-02

## 2023-06-01 NOTE — PATIENT INSTRUCTIONS
General Recommendations:  Nutrition:  Eat breakfast daily  Do not skip meals  Food log (ie ) www myfitnesspal com, sparkpeople  com, loseit com, calorieking  com, etc      Practice mindful eating  Be sure to set aside time to eat, eat slowly, and savor your food  Hydration: At least 64oz of water daily  No sugar sweetened beverages  No juice (eat the fruit instead)  Exercise:  Studies have shown that the ideal exercise goal is somewhere between 150 to 300 minutes of moderate intensity exercise a week  Start with exercising 10 minutes every other day and gradually increase physical activity with a goal of at least 150 minutes of moderate intensity exercise a week, divided over at least 3 days a week  An example of this would be exercising 30 minutes a day, 5 days a week  Resistance training can increase muscle mass and increase our resting metabolic rate  FULL BODY resistance training is recommended 2-3 times a week  Do not do this on consecutive days to allow for muscle recovery  Aim for a bare minimum 5000 steps, even on days you do not exercise  Monitoring:   Weigh yourself daily  If this causes undue stress, then just weigh yourself once a week  Weigh yourself the same time of the day with the same amount of clothing on  Preferably this should be done after waking up, before you eat, and with no clothing or minimal clothing on  Specific Goals:  Food log (ie ) www myfitnesspal com,sparkpeople  com,loseit com,calorieking  com,etc    No sugary beverages  At least 64oz of water daily  Have a thyroid ultrasound done  Take topiramate as directed  Contact the office with an update in 1-2 months  Calorie goal:  2036-8711 calorie diet with 100 calore snack if needed  (Provided with meal plan to follow)  Return visit:  3 months

## 2023-06-01 NOTE — PROGRESS NOTES
Assessment/Plan: Dixie Putnam was seen today for follow-up  Diagnoses and all orders for this visit:    Obesity, Class III, BMI 40-49 9 (morbid obesity) (HCC)  -     topiramate (Topamax) 50 MG tablet; Take 1/2 tablet (25mg) by mouth each evening for 7 days, then increase to 1 tablet (50mg) by mouth each evening thereafter    S/P cholecystectomy    History of Jaycee-en-Y gastric bypass    Iron deficiency anemia secondary to inadequate dietary iron intake    Generalized anxiety disorder    Thyroid nodule  -     US head neck soft tissue; Future      General Recommendations:  Nutrition:  Eat breakfast daily  Do not skip meals  Food log (ie ) www myfitnesspal com, sparkpeople  com, loseit com, calorieking  com, etc      Practice mindful eating  Be sure to set aside time to eat, eat slowly, and savor your food  Hydration: At least 64oz of water daily  No sugar sweetened beverages  No juice (eat the fruit instead)  Exercise:  Studies have shown that the ideal exercise goal is somewhere between 150 to 300 minutes of moderate intensity exercise a week  Start with exercising 10 minutes every other day and gradually increase physical activity with a goal of at least 150 minutes of moderate intensity exercise a week, divided over at least 3 days a week  An example of this would be exercising 30 minutes a day, 5 days a week  Resistance training can increase muscle mass and increase our resting metabolic rate  FULL BODY resistance training is recommended 2-3 times a week  Do not do this on consecutive days to allow for muscle recovery  Aim for a bare minimum 5000 steps, even on days you do not exercise  Monitoring:   Weigh yourself daily  If this causes undue stress, then just weigh yourself once a week  Weigh yourself the same time of the day with the same amount of clothing on  Preferably this should be done after waking up, before you eat, and with no clothing or minimal clothing on  Specific Goals:  Food log (ie ) www myfitnesspal com,sparkpeople  com,loseit com,OpenEd  com,etc    No sugary beverages  At least 64oz of water daily  Have a thyroid ultrasound done  Take topiramate as directed  Contact the office with an update in 1-2 months  Calorie goal:  0885-0931 calorie diet with 100 calore snack if needed  (Provided with meal plan to follow)  Return visit:  3 months          ______________________________________________________________________      Subjective:   Chief Complaint   Patient presents with   • Follow-up     MWM 2mth f/u; waist 47in     Patient here to discuss weight associated problems and nutrition goals  HPI: Kady Carpenter  is a 41 y o  female with history of surgical history as below, and excess weight   status post Laparoscopic RNYGB in 2016 with Dr Manny Zapata with subsequent Robotic lysis of adhesions, Robotic small-bowel resection, Robotic partial gastrectomy, Robotic paraesophageal hernia repair, Robotic bilateral truncal vagotomy Robotic gastrojejunostomy anastomosis with intraop endoscopy on 12/2020 with Dr Pranav Briscoe  Patient has no history of kidney stones or glaucoma  She had a tubal ligation 11 years ago  Recommend against phentermine as she has a history of hypertension on 3 antihypertensive medications and also has a family history of cerebral aneurysm  Most recent notes and records were reviewed  At the time of her surgery the patient was 339lb  Isidoro 175lb  Last OV weight 263  Today her weight is 272  Does not wish to start [address]  Due for thyroid US    Wishes to restart topiramate      Wt Readings from Last 10 Encounters:   06/01/23 123 kg (272 lb 3 2 oz)   03/24/23 118 kg (260 lb)   03/17/23 119 kg (263 lb 3 2 oz)   03/02/23 121 kg (266 lb 6 4 oz)   02/22/23 118 kg (260 lb)   02/20/23 118 kg (260 lb 8 oz)   02/06/23 117 kg (258 lb)   01/13/23 117 kg (257 lb)   11/30/22 116 kg (255 lb)   07/13/22 115 kg (252 lb 6 8 oz) "    Review Of Systems:  Review of Systems   Constitutional: Negative for activity change, appetite change, fatigue and fever  Respiratory: Negative for cough and shortness of breath  Cardiovascular: Negative for chest pain, palpitations and leg swelling  Gastrointestinal: Negative for abdominal pain, constipation, diarrhea, nausea and vomiting  Endocrine: Negative for cold intolerance and heat intolerance  Genitourinary: Negative for difficulty urinating and dysuria  Musculoskeletal: Negative for arthralgias, back pain and gait problem  Skin: Negative for pallor and rash  Neurological: Negative for headaches  Psychiatric/Behavioral: Negative for dysphoric mood, sleep disturbance and suicidal ideas (or HI)  The patient is not nervous/anxious  Objective:  /100   Pulse 80   Resp 16   Ht 5' 4 5\" (1 638 m)   Wt 123 kg (272 lb 3 2 oz)   BMI 46 00 kg/m²   Physical Exam  Vitals and nursing note reviewed  Constitutional:       General: She is not in acute distress  Appearance: Normal appearance  She is not ill-appearing or diaphoretic  Eyes:      General: No scleral icterus  Cardiovascular:      Rate and Rhythm: Normal rate and regular rhythm  Pulses: Normal pulses  Heart sounds: No murmur heard  Pulmonary:      Effort: Pulmonary effort is normal  No respiratory distress  Breath sounds: Normal breath sounds  No wheezing or rhonchi  Abdominal:      General: Bowel sounds are normal  There is no distension  Palpations: Abdomen is soft  There is no mass  Tenderness: There is no abdominal tenderness  Musculoskeletal:      Cervical back: Neck supple  Right lower leg: No edema  Left lower leg: No edema  Lymphadenopathy:      Cervical: No cervical adenopathy  Skin:     Capillary Refill: Capillary refill takes less than 2 seconds  Findings: No lesion or rash     Neurological:      Mental Status: She is alert and oriented to person, place, " and time  Gait: Gait normal    Psychiatric:         Mood and Affect: Mood normal          Behavior: Behavior normal          Labs and Imaging  Recent labs and imaging have been personally reviewed    Lab Results   Component Value Date    HCT 32 5 (L) 03/01/2023    HGB 9 5 (L) 03/01/2023    MCV 84 03/01/2023     03/01/2023    WBC 8 09 03/01/2023     Lab Results   Component Value Date    AGAP 6 03/01/2023    ALKPHOS 96 03/01/2023    ALT 15 03/01/2023    ANIONGAP 3 (L) 11/14/2015    AST 24 03/01/2023    BILITOT 0 18 (L) 11/14/2015    BUN 12 03/01/2023    CALCIUM 8 4 03/01/2023     03/01/2023    CO2 25 03/01/2023    CREATININE 0 55 (L) 03/01/2023    EGFR 121 03/01/2023    GLUC 95 06/09/2022    GLUF 85 03/01/2023    K 4 5 03/01/2023     11/14/2015    PROT 7 8 11/14/2015    SODIUM 139 03/01/2023    TBILI 0 28 03/01/2023    TP 7 4 03/01/2023     Lab Results   Component Value Date    HGBA1C 5 6 06/12/2020     Lab Results   Component Value Date    HGZ2VAGFXMDY 1 180 02/08/2023     Lab Results   Component Value Date    CHOLESTEROL 155 11/30/2022     Lab Results   Component Value Date    HDL 35 (L) 11/30/2022     Lab Results   Component Value Date    TRIG 127 11/30/2022     Lab Results   Component Value Date    [address] 11/30/2022

## 2023-06-01 NOTE — TELEPHONE ENCOUNTER
Spoke to patient regarding need for repeat labs prior to her appointment tomorrow  She will go today

## 2023-06-08 ENCOUNTER — TELEPHONE (OUTPATIENT)
Dept: FAMILY MEDICINE CLINIC | Facility: CLINIC | Age: 38
End: 2023-06-08

## 2023-06-08 LAB
DME PARACHUTE DELIVERY DATE REQUESTED: NORMAL
DME PARACHUTE ITEM DESCRIPTION: NORMAL
DME PARACHUTE ORDER STATUS: NORMAL
DME PARACHUTE SUPPLIER NAME: NORMAL
DME PARACHUTE SUPPLIER PHONE: NORMAL

## 2023-06-08 NOTE — TELEPHONE ENCOUNTER
Incoming call patient requesting nebulizer machine replacement sent to [facility]

## 2023-06-09 ENCOUNTER — TELEMEDICINE (OUTPATIENT)
Dept: FAMILY MEDICINE CLINIC | Facility: CLINIC | Age: 38
End: 2023-06-09
Payer: COMMERCIAL

## 2023-06-09 DIAGNOSIS — E66.01 MORBID OBESITY (HCC): ICD-10-CM

## 2023-06-09 DIAGNOSIS — E78.2 MIXED HYPERLIPIDEMIA: ICD-10-CM

## 2023-06-09 DIAGNOSIS — D50.8 OTHER IRON DEFICIENCY ANEMIA: ICD-10-CM

## 2023-06-09 DIAGNOSIS — I10 ESSENTIAL HYPERTENSION: ICD-10-CM

## 2023-06-09 DIAGNOSIS — U07.1 COVID-19 VIRUS INFECTION: Primary | ICD-10-CM

## 2023-06-09 PROCEDURE — 99213 OFFICE O/P EST LOW 20 MIN: CPT | Performed by: FAMILY MEDICINE

## 2023-06-09 NOTE — LETTER
June 9, 2023     Patient: Tita Zhao  YOB: 1985  Date of Visit: 6/9/2023      To Whom it May Concern: Daquan Bearshivani is under my professional care  Ryan Yu was seen virtually on 6/9/2023  Ryan Chronnie may return to work on 6/12/2023   She will need to follow strict masking for 5 additional days due to covid  If you have any questions or concerns, please don't hesitate to call           Sincerely,   Silvia Mccain MD        CC: No Recipients

## 2023-06-09 NOTE — ASSESSMENT & PLAN NOTE
Patient's symptoms started on 06/05/23 with no cough congestion she did the COVID test and tested positive same    Recommend vitamin D vitamin C zinc well hydration discussed Paxlovid with the patient and she declined in case symptoms get worse to call the office

## 2023-06-09 NOTE — PROGRESS NOTES
COVID-19 Outpatient Progress Note    Assessment/Plan:    Problem List Items Addressed This Visit        Other    Anemia    Relevant Orders    CBC and differential    Hyperlipidemia    Relevant Orders    Lipid panel    COVID-19 virus infection - Primary     Patient's symptoms started on 06/05/23 with no cough congestion she did the COVID test and tested positive same  Recommend vitamin D vitamin C zinc well hydration discussed Paxlovid with the patient and she declined in case symptoms get worse to call the office        Other Visit Diagnoses     Essential hypertension        Relevant Orders    Comprehensive metabolic panel    Morbid obesity ([address] )        Relevant Orders    TSH, 3rd generation with Free T4 reflex         Disposition:     Discussed symptom directed medication options with patient  Discussed vitamin D, vitamin C, and/or zinc supplementation with patient  I have spent a total time of 15 minutes on the day of the encounter for this patient including risks and benefits of treatment options, instructions for management and patient and family education  Encounter provider: Dennis Zambrano MD     Provider located at:   [address]  [address] [facility] [address] [facility] [address] [phone]     Recent Visits  Date Type Provider Dept   06/08/23 Telephone [place] [address]   Showing recent visits within past 7 days and meeting all other requirements  Today's Visits  Date Type Provider Dept   06/09/23 Telemedicine Dennis Zambrano MD [facility]  Primary [facility]   Showing today's visits and meeting all other requirements  Future Appointments  No visits were found meeting these conditions    Showing future appointments within next 150 days and meeting all other requirements       Patient agrees to participate in a virtual check in via telephone or video visit instead of presenting to the office to address urgent/immediate medical needs  Patient is aware this is a billable service  She acknowledged consent and understanding of privacy and security of the video platform  The patient has agreed to participate and understands they can discontinue the visit at any time  After connecting through [facility], the patient was identified by name and date of birth  Abby Martin was informed that this was a telemedicine visit and that the exam was being conducted confidentially over secure lines  My office door was closed  No one else was in the room  Abby Martin acknowledged consent and understanding of privacy and security of the telemedicine visit  I informed the patient that I have reviewed her record in Epic and presented the opportunity for her to ask any questions regarding the visit today  The patient agreed to participate  Verification of patient location:  Patient is located in the following state in which I hold an active license: [place]    Subjective: Abby Martin is a 41 y o  female who is concerned about COVID-19  Patient's symptoms include fever, chills, fatigue, cough, diarrhea and myalgias  Patient denies malaise, congestion, rhinorrhea, sore throat, anosmia, loss of taste, shortness of breath, chest tightness, abdominal pain, nausea, vomiting and headaches       - Date of symptom onset: 6/5/2023      COVID-19 vaccination status: Fully vaccinated (primary series)    Exposure:   Contact with a person who is under investigation (PUI) for or who is positive for COVID-19 within the last 14 days?: No    Hospitalized recently for fever and/or lower respiratory symptoms?: No      Currently a healthcare worker that is involved in direct patient care?: No      Works in a special setting where the risk of COVID-19 transmission may be high? (this may include long-term care, correctional and half-way facilities; homeless shelters; assisted-living facilities and group homes ): No      Lab Results   Component Value Date    SARSCOV2 Negative 01/13/2023    SARSCOV2 Detected (A) 12/21/2020    SARSCOV2 Not Detected 10/24/2020       Review of Systems   Constitutional: Positive for chills, fatigue and fever  HENT: Negative for congestion, rhinorrhea and sore throat  Respiratory: Positive for cough  Negative for chest tightness and shortness of breath  Gastrointestinal: Positive for diarrhea  Negative for abdominal pain, nausea and vomiting  Musculoskeletal: Positive for myalgias  Neurological: Negative for headaches  Current Outpatient Medications on File Prior to Visit   Medication Sig   • NIFEdipine ER (ADALAT CC) 30 MG 24 hr tablet Take 1 tablet (30 mg total) by mouth daily   • albuterol (2 5 mg/3 mL) 0 083 % nebulizer solution TAKE 3 ML VIA NEBULIZER ROUTE EVERY 6 HOURS AS NEEDED FOR WHEEZING OR SHORTNESS OF BREATH   • albuterol (PROVENTIL HFA,VENTOLIN HFA) 90 mcg/act inhaler INHALE 2 PUFFS BY MOUTH EVERY 4 HOURS AS NEEDED FOR WHEEZING   • budesonide-formoterol (Symbicort) 160-4 5 mcg/act inhaler Inhale 2 puffs 2 (two) times a day Rinse mouth after use  • Cholecalciferol (Vitamin D3) 50 MCG (2000 UT) capsule TAKE 1 CAPSULE BY MOUTH EVERY MORNING   • ergocalciferol (VITAMIN D2) 50,000 units TAKE 1 CAPSULE BY MOUTH 2 TIMES A WEEK   • FeroSul 325 (65 Fe) MG tablet TAKE 1 TABLET BY MOUTH TWICE DAILY WITH MEALS   • losartan (COZAAR) 100 MG tablet Take 25 mg by mouth daily   • methocarbamol (ROBAXIN) 500 mg tablet Take 1 tablet (500 mg total) by mouth 3 (three) times a day for 10 days   • Multiple Vitamin (MULTIVITAMIN) tablet Take 1 tablet by mouth daily   • olmesartan-hydrochlorothiazide (BENICAR HCT) 40-25 MG per tablet Take 1 tablet by mouth daily   • topiramate (TOPAMAX) 50 MG tablet TAKE 1/2 TABLET BY MOUTH EVERY EVENING FOR 7 DAYS  INCREASE TO 1 TABLET EVERY EVENING THEREAFTER   • Zinc 100 MG TABS Take by mouth       Objective: There were no vitals taken for this visit  Physical Exam  Constitutional:       General: She is not in acute distress  Appearance: She is well-developed  She is not ill-appearing, toxic-appearing or diaphoretic  HENT:      Head: Normocephalic  Nose: Nose normal  No congestion or rhinorrhea  Mouth/Throat:      Mouth: Mucous membranes are moist       Pharynx: No oropharyngeal exudate or posterior oropharyngeal erythema  Eyes:      General:         Right eye: No discharge  Left eye: No discharge  Conjunctiva/sclera: Conjunctivae normal    Neck:      Vascular: No JVD  Comments: No grossly visible mass   Pulmonary:      Effort: Pulmonary effort is normal  No respiratory distress  Breath sounds: No stridor  No wheezing  Abdominal:      General: There is no distension  Comments: Patient state her abdomen and a soft she also state no tender  Patient deny any visible or palpable bulging to suggest hernia  I was able to visualize abdomen and there is no change in the color no distension no visible mass   Musculoskeletal:         General: Normal range of motion  Cervical back: Normal range of motion and neck supple  Skin:     Findings: No erythema or rash  Neurological:      Mental Status: She is alert and oriented to person, place, and time     Psychiatric:         Mood and Affect: Mood normal        Sg Barrientos MD

## 2023-07-12 ENCOUNTER — OFFICE VISIT (OUTPATIENT)
Dept: FAMILY MEDICINE CLINIC | Facility: CLINIC | Age: 38
End: 2023-07-12
Payer: COMMERCIAL

## 2023-07-12 VITALS
OXYGEN SATURATION: 99 % | DIASTOLIC BLOOD PRESSURE: 80 MMHG | HEIGHT: 65 IN | BODY MASS INDEX: 45.08 KG/M2 | TEMPERATURE: 97.5 F | HEART RATE: 69 BPM | WEIGHT: 270.6 LBS | SYSTOLIC BLOOD PRESSURE: 124 MMHG

## 2023-07-12 DIAGNOSIS — Z98.84 BARIATRIC SURGERY STATUS: ICD-10-CM

## 2023-07-12 DIAGNOSIS — R79.89 HIGH SERUM PARATHYROID HORMONE (PTH): ICD-10-CM

## 2023-07-12 DIAGNOSIS — R05.1 ACUTE COUGH: ICD-10-CM

## 2023-07-12 DIAGNOSIS — L60.8 DISCOLORATION OF NAIL: ICD-10-CM

## 2023-07-12 DIAGNOSIS — D50.8 OTHER IRON DEFICIENCY ANEMIA: ICD-10-CM

## 2023-07-12 DIAGNOSIS — K91.2 POSTSURGICAL MALABSORPTION: ICD-10-CM

## 2023-07-12 DIAGNOSIS — D50.8 IRON DEFICIENCY ANEMIA SECONDARY TO INADEQUATE DIETARY IRON INTAKE: ICD-10-CM

## 2023-07-12 DIAGNOSIS — Z13.29 SCREENING FOR THYROID DISORDER: ICD-10-CM

## 2023-07-12 DIAGNOSIS — E78.2 MIXED HYPERLIPIDEMIA: ICD-10-CM

## 2023-07-12 DIAGNOSIS — B00.2 HERPES GINGIVOSTOMATITIS: Primary | ICD-10-CM

## 2023-07-12 DIAGNOSIS — E55.9 VITAMIN D DEFICIENCY: ICD-10-CM

## 2023-07-12 PROCEDURE — 99214 OFFICE O/P EST MOD 30 MIN: CPT | Performed by: FAMILY MEDICINE

## 2023-07-12 RX ORDER — ERGOCALCIFEROL 1.25 MG/1
50000 CAPSULE ORAL 2 TIMES WEEKLY
Qty: 25 CAPSULE | Refills: 0 | Status: SHIPPED | OUTPATIENT
Start: 2023-07-13

## 2023-07-12 RX ORDER — VALACYCLOVIR HYDROCHLORIDE 500 MG/1
500 TABLET, FILM COATED ORAL 3 TIMES DAILY
Qty: 21 TABLET | Refills: 0 | Status: SHIPPED | OUTPATIENT
Start: 2023-07-12 | End: 2023-07-19

## 2023-07-15 PROBLEM — B00.2 HERPES GINGIVOSTOMATITIS: Status: ACTIVE | Noted: 2023-07-15

## 2023-07-15 PROBLEM — B00.2 HERPES GINGIVOSTOMATITIS: Status: ACTIVE | Noted: 2023-07-12

## 2023-07-15 PROBLEM — L60.8 DISCOLORATION OF NAIL: Status: ACTIVE | Noted: 2023-07-15

## 2023-07-15 PROBLEM — L60.8 DISCOLORATION OF NAIL: Status: ACTIVE | Noted: 2023-07-12

## 2023-07-15 NOTE — ASSESSMENT & PLAN NOTE
New diagnosis discussed the patient currently possible secondary to vitamin D deficiency iron deficiency anemia

## 2023-07-15 NOTE — ASSESSMENT & PLAN NOTE
New diagnosis symptomatic recommend valacyclovir 500 mg 3 times a day for 7 days proper use and possible side effects reviewed discussed with patient the natural of the problem

## 2023-07-15 NOTE — ASSESSMENT & PLAN NOTE
Chronic uncontrolled low vitamin D levels on blood work March 20 2320 recommend topical vitamin D 50,000 IU once a week for 12 weeks most well secondary to malabsorption status post bariatric surgery

## 2023-07-15 NOTE — PROGRESS NOTES
Name: Samaria Taylor      : 1985      MRN: [id number]  Encounter Provider: Leila Saul MD  Encounter Date: 2023   Encounter department: [address] [place] [address]     1. Herpes gingivostomatitis  Assessment & Plan:  New diagnosis symptomatic recommend valacyclovir 500 mg 3 times a day for 7 days proper use and possible side effects reviewed discussed with patient the natural of the problem    Orders:  -     valACYclovir (VALTREX) 500 mg tablet; Take 1 tablet (500 mg total) by mouth 3 (three) times a day for 7 days  -     Basic metabolic panel; Future  -     CBC and differential; Future    2. Discoloration of nail  Assessment & Plan:  New diagnosis discussed the patient currently possible secondary to vitamin D deficiency iron deficiency anemia    Orders:  -     Basic metabolic panel; Future  -     CBC and differential; Future    3. Vitamin D deficiency  Assessment & Plan:  Chronic uncontrolled low vitamin D levels on blood work 2320 recommend topical vitamin D 50,000 IU once a week for 12 weeks most well secondary to malabsorption status post bariatric surgery    Orders:  -     ergocalciferol (VITAMIN D2) 50,000 units; Take 1 capsule (50,000 Units total) by mouth 2 (two) times a week  -     Vitamin D 25 hydroxy; Future  -     Basic metabolic panel; Future  -     CBC and differential; Future    4. Bariatric surgery status  -     ergocalciferol (VITAMIN D2) 50,000 units; Take 1 capsule (50,000 Units total) by mouth 2 (two) times a week  -     Basic metabolic panel; Future  -     CBC and differential; Future    5. Postsurgical malabsorption  -     ergocalciferol (VITAMIN D2) 50,000 units; Take 1 capsule (50,000 Units total) by mouth 2 (two) times a week  -     Basic metabolic panel; Future  -     CBC and differential; Future    6. High serum parathyroid hormone (PTH)  -     ergocalciferol (VITAMIN D2) 50,000 units;  Take 1 capsule (50,000 Units total) by mouth 2 (two) times a week  -     Basic metabolic panel; Future  -     CBC and differential; Future    7. Acute cough  -     Basic metabolic panel; Future  -     CBC and differential; Future    8. Other iron deficiency anemia  -     Ferritin; Future  -     Iron; Future    9. Mixed hyperlipidemia  -     Lipid Panel with Direct LDL reflex; Future    10. Screening for thyroid disorder  -     TSH, 3rd generation with Free T4 reflex; Future    11. Iron deficiency anemia secondary to inadequate dietary iron intake  Assessment & Plan:  Uncontrolled patient is history of bariatric surgery most probably secondary to malabsorption recommend ferrous sulfate 325 mg twice a day with vitamin C to improve absorption             Subjective      Patient here for follow-up and she has multiple concerns concerned about bumps on her lower left started 2 days ago patient was at the beach and exposed to the sun and when she came back she started having tingling sensation and wake up with the response to painful also she concerned about the black line on her knees no trauma no pain no change in the shape of the knee recent blood work reviewed with the patient    Review of Systems   Constitutional: Negative for chills and fever. HENT: Negative for ear pain and sore throat. Eyes: Negative for pain and visual disturbance. Respiratory: Negative for cough and shortness of breath. Cardiovascular: Negative for chest pain and palpitations. Gastrointestinal: Negative for abdominal pain, constipation, diarrhea, nausea and vomiting. Genitourinary: Negative for dysuria and hematuria. Musculoskeletal: Negative for arthralgias and back pain. Skin: Negative for color change and rash. Black line in finger nails   Neurological: Negative for seizures and syncope. All other systems reviewed and are negative.       Current Outpatient Medications on File Prior to Visit   Medication Sig   • albuterol (2.5 mg/3 mL) 0.083 % nebulizer solution TAKE 3 ML VIA NEBULIZER ROUTE EVERY 6 HOURS AS NEEDED FOR WHEEZING OR SHORTNESS OF BREATH   • albuterol (PROVENTIL HFA,VENTOLIN HFA) 90 mcg/act inhaler INHALE 2 PUFFS BY MOUTH EVERY 4 HOURS AS NEEDED FOR WHEEZING   • budesonide-formoterol (Symbicort) 160-4.5 mcg/act inhaler Inhale 2 puffs 2 (two) times a day Rinse mouth after use. • Cholecalciferol (Vitamin D3) 50 MCG (2000 UT) capsule TAKE 1 CAPSULE BY MOUTH EVERY MORNING   • FeroSul 325 (65 Fe) MG tablet TAKE 1 TABLET BY MOUTH TWICE DAILY WITH MEALS   • losartan (COZAAR) 100 MG tablet Take 25 mg by mouth daily   • Multiple Vitamin (MULTIVITAMIN) tablet Take 1 tablet by mouth daily   • NIFEdipine ER (ADALAT CC) 30 MG 24 hr tablet Take 1 tablet (30 mg total) by mouth daily   • olmesartan-hydrochlorothiazide (BENICAR HCT) 40-25 MG per tablet Take 1 tablet by mouth daily   • topiramate (TOPAMAX) 50 MG tablet TAKE 1/2 TABLET BY MOUTH EVERY EVENING FOR 7 DAYS. INCREASE TO 1 TABLET EVERY EVENING THEREAFTER   • Zinc 100 MG TABS Take by mouth   • methocarbamol (ROBAXIN) 500 mg tablet Take 1 tablet (500 mg total) by mouth 3 (three) times a day for 10 days       Objective     /80 (BP Location: Left arm, Patient Position: Sitting, Cuff Size: Standard)   Pulse 69   Temp 97.5 °F (36.4 °C) (Tympanic)   Ht 5' 4.57" (1.64 m)   Wt 123 kg (270 lb 9.6 oz)   SpO2 99%   BMI 45.64 kg/m²     Physical Exam  Vitals and nursing note reviewed. Constitutional:       General: She is not in acute distress. Appearance: She is well-developed. She is not diaphoretic. HENT:      Head: Normocephalic. Right Ear: External ear normal.      Left Ear: External ear normal.      Nose: No congestion or rhinorrhea. Mouth/Throat:      Pharynx: No posterior oropharyngeal erythema. Eyes:      General:         Right eye: No discharge. Left eye: No discharge. Conjunctiva/sclera: Conjunctivae normal.   Neck:      Vascular: No JVD.    Cardiovascular: Rate and Rhythm: Normal rate and regular rhythm. Heart sounds: Normal heart sounds. No murmur heard. No gallop. Pulmonary:      Effort: Pulmonary effort is normal. No respiratory distress. Breath sounds: Normal breath sounds. No stridor. No wheezing or rales. Chest:      Chest wall: No tenderness. Abdominal:      General: There is no distension. Palpations: Abdomen is soft. There is no mass. Tenderness: There is no abdominal tenderness. There is no rebound. Musculoskeletal:         General: No tenderness. Cervical back: Normal range of motion and neck supple. Lymphadenopathy:      Cervical: No cervical adenopathy. Skin:     General: Skin is warm. Findings: No erythema or rash. Neurological:      Mental Status: She is alert and oriented to person, place, and time.        Jatin Munoz MD

## 2023-08-02 ENCOUNTER — OFFICE VISIT (OUTPATIENT)
Dept: FAMILY MEDICINE CLINIC | Facility: CLINIC | Age: 38
End: 2023-08-02
Payer: COMMERCIAL

## 2023-08-02 VITALS
DIASTOLIC BLOOD PRESSURE: 108 MMHG | OXYGEN SATURATION: 100 % | SYSTOLIC BLOOD PRESSURE: 160 MMHG | WEIGHT: 277 LBS | HEART RATE: 57 BPM | HEIGHT: 64 IN | TEMPERATURE: 98.3 F | BODY MASS INDEX: 47.29 KG/M2

## 2023-08-02 DIAGNOSIS — K92.1: Primary | ICD-10-CM

## 2023-08-02 DIAGNOSIS — I10 UNCONTROLLED HYPERTENSION: ICD-10-CM

## 2023-08-02 PROCEDURE — 82270 OCCULT BLOOD FECES: CPT | Performed by: FAMILY MEDICINE

## 2023-08-02 PROCEDURE — 99214 OFFICE O/P EST MOD 30 MIN: CPT | Performed by: FAMILY MEDICINE

## 2023-08-02 NOTE — PROGRESS NOTES
Name: Cintia Matthews      : 1985      MRN: [id number]  Encounter Provider: Jatin uMnoz MD  Encounter Date: 2023   Encounter department: 1 [facility] 270     1. Blood in stool, miranda  Assessment & Plan:  Reviewed diagnosis symptomatic question of blood in the stool Hemoccult stool in the office is negative on the physical exam could not see hemorrhoid recommend CAT scan of the abdomen and pelvic and if is negative we will refer the patient to see GI for colonoscopy discussed with the patient and she agreed    Orders:  -     CT abdomen pelvis wo contrast; Future; Expected date: 2023  -     POCT hemoccult screening    2. Uncontrolled hypertension  Assessment & Plan:  Blood pressure today 160/108 uncontrolled patient has not been taking her medication per patient she forgot to take it discussed with patient important compliant with the medication to prevent the complication including stroke and heart attack continue current regimen             Subjective      Patient here concerned about the blood in the stool that she noticed fresh blood in the right and the commode did happen a couple times last week no fever no abdomen pain no abdomen distention no weight loss no diarrhea no recent travel and no change in the diet no personal family history of colon cancer did not have any colonoscopy previously    Review of Systems   Constitutional: Negative for chills and fever. HENT: Negative for ear pain and sore throat. Eyes: Negative for pain and visual disturbance. Respiratory: Negative for cough and shortness of breath. Cardiovascular: Negative for chest pain and palpitations. Gastrointestinal: Positive for blood in stool. Negative for abdominal distention, abdominal pain, constipation, diarrhea, nausea, rectal pain and vomiting. Genitourinary: Negative for dysuria and hematuria.    Musculoskeletal: Negative for arthralgias and back pain.   Skin: Negative for color change and rash. Neurological: Negative for seizures and syncope. All other systems reviewed and are negative. Current Outpatient Medications on File Prior to Visit   Medication Sig   • Sennosides (PRUNE SENNA CONCENTRATE PO) Take 1 tablet by mouth daily prunelax   • albuterol (2.5 mg/3 mL) 0.083 % nebulizer solution TAKE 3 ML VIA NEBULIZER ROUTE EVERY 6 HOURS AS NEEDED FOR WHEEZING OR SHORTNESS OF BREATH   • albuterol (PROVENTIL HFA,VENTOLIN HFA) 90 mcg/act inhaler INHALE 2 PUFFS BY MOUTH EVERY 4 HOURS AS NEEDED FOR WHEEZING   • budesonide-formoterol (Symbicort) 160-4.5 mcg/act inhaler Inhale 2 puffs 2 (two) times a day Rinse mouth after use. • Cholecalciferol (Vitamin D3) 50 MCG (2000 UT) capsule TAKE 1 CAPSULE BY MOUTH EVERY MORNING   • ergocalciferol (VITAMIN D2) 50,000 units Take 1 capsule (50,000 Units total) by mouth 2 (two) times a week   • FeroSul 325 (65 Fe) MG tablet TAKE 1 TABLET BY MOUTH TWICE DAILY WITH MEALS   • losartan (COZAAR) 100 MG tablet Take 25 mg by mouth daily   • methocarbamol (ROBAXIN) 500 mg tablet Take 1 tablet (500 mg total) by mouth 3 (three) times a day for 10 days   • Multiple Vitamin (MULTIVITAMIN) tablet Take 1 tablet by mouth daily   • NIFEdipine ER (ADALAT CC) 30 MG 24 hr tablet Take 1 tablet (30 mg total) by mouth daily   • olmesartan-hydrochlorothiazide (BENICAR HCT) 40-25 MG per tablet Take 1 tablet by mouth daily   • topiramate (TOPAMAX) 50 MG tablet TAKE 1/2 TABLET BY MOUTH EVERY EVENING FOR 7 DAYS. INCREASE TO 1 TABLET EVERY EVENING THEREAFTER   • Zinc 100 MG TABS Take by mouth       Objective     BP (!) 160/108 (BP Location: Left arm, Patient Position: Sitting)   Pulse 57   Temp 98.3 °F (36.8 °C) (Tympanic)   Ht 5' 3.5" (1.613 m)   Wt 126 kg (277 lb)   LMP 07/24/2023 (Exact Date)   SpO2 100%   Breastfeeding No   BMI 48.30 kg/m²     Physical Exam  Constitutional:       General: She is not in acute distress.      Appearance: She is well-developed. She is not diaphoretic. HENT:      Head: Normocephalic. Right Ear: External ear normal.      Left Ear: External ear normal.      Nose: No rhinorrhea. Mouth/Throat:      Pharynx: No posterior oropharyngeal erythema. Eyes:      General:         Right eye: No discharge. Left eye: No discharge. Conjunctiva/sclera: Conjunctivae normal.   Neck:      Vascular: No JVD. Cardiovascular:      Rate and Rhythm: Normal rate and regular rhythm. Heart sounds: Normal heart sounds. No murmur heard. No gallop. Pulmonary:      Effort: Pulmonary effort is normal. No respiratory distress. Breath sounds: Normal breath sounds. No stridor. No wheezing or rales. Chest:      Chest wall: No tenderness. Abdominal:      General: There is no distension. Palpations: Abdomen is soft. There is no mass. Tenderness: There is no abdominal tenderness. There is no rebound. Musculoskeletal:         General: No tenderness. Cervical back: Normal range of motion and neck supple. Lymphadenopathy:      Cervical: No cervical adenopathy. Skin:     General: Skin is warm. Findings: No erythema or rash. Neurological:      Mental Status: She is alert and oriented to person, place, and time. Sensory: No sensory deficit.        Antonio Jain MD

## 2023-08-04 LAB — SL AMB POCT FECES OCC BLD: NEGATIVE

## 2023-08-04 NOTE — ASSESSMENT & PLAN NOTE
Reviewed diagnosis symptomatic question of blood in the stool Hemoccult stool in the office is negative on the physical exam could not see hemorrhoid recommend CAT scan of the abdomen and pelvic and if is negative we will refer the patient to see GI for colonoscopy discussed with the patient and she agreed

## 2023-08-04 NOTE — ASSESSMENT & PLAN NOTE
Blood pressure today 160/108 uncontrolled patient has not been taking her medication per patient she forgot to take it discussed with patient important compliant with the medication to prevent the complication including stroke and heart attack continue current regimen

## 2023-08-07 ENCOUNTER — TELEPHONE (OUTPATIENT)
Dept: FAMILY MEDICINE CLINIC | Facility: CLINIC | Age: 38
End: 2023-08-07

## 2023-08-07 DIAGNOSIS — K92.1: Primary | ICD-10-CM

## 2023-08-07 NOTE — TELEPHONE ENCOUNTER
Peer to peer was requested by SHERIF and completed for the CT abdomen pelvis. Number to call insurance was [phone] and tracking number for request is [id number].

## 2023-08-07 NOTE — TELEPHONE ENCOUNTER
I spoke with patient that her upcoming Ct scan will be cancelled as insurance denied that test. We will be referring patient to [facility] doctor, their number is [phone].

## 2023-08-21 ENCOUNTER — TELEPHONE (OUTPATIENT)
Dept: FAMILY MEDICINE CLINIC | Facility: CLINIC | Age: 38
End: 2023-08-21

## 2023-08-21 NOTE — TELEPHONE ENCOUNTER
Patient called in stating that she had a tubal ligation done in [facility] years ago and she took a pregnancy test last night and it was positive. Patient would like to know if this is even possible. Please advise.

## 2023-08-22 DIAGNOSIS — Z32.00 ENCOUNTER FOR PREGNANCY TEST, RESULT UNKNOWN: Primary | ICD-10-CM

## 2023-09-06 DIAGNOSIS — I10 UNCONTROLLED HYPERTENSION: ICD-10-CM

## 2023-09-06 RX ORDER — NIFEDIPINE 30 MG/1
TABLET, FILM COATED, EXTENDED RELEASE ORAL
Qty: 90 TABLET | Refills: 0 | Status: SHIPPED | OUTPATIENT
Start: 2023-09-06

## 2023-09-08 ENCOUNTER — TELEPHONE (OUTPATIENT)
Dept: BARIATRICS | Facility: CLINIC | Age: 38
End: 2023-09-08

## 2023-10-03 ENCOUNTER — TELEPHONE (OUTPATIENT)
Dept: GASTROENTEROLOGY | Facility: MEDICAL CENTER | Age: 38
End: 2023-10-03

## 2023-10-03 NOTE — TELEPHONE ENCOUNTER
I called and spoke with patient. Patient aware of 11/7 cancellation and agreeable to new OV for 11/14.

## 2023-10-17 ENCOUNTER — OFFICE VISIT (OUTPATIENT)
Dept: FAMILY MEDICINE CLINIC | Facility: CLINIC | Age: 38
End: 2023-10-17
Payer: COMMERCIAL

## 2023-10-17 VITALS
HEART RATE: 75 BPM | OXYGEN SATURATION: 99 % | HEIGHT: 64 IN | SYSTOLIC BLOOD PRESSURE: 130 MMHG | DIASTOLIC BLOOD PRESSURE: 80 MMHG | BODY MASS INDEX: 47.8 KG/M2 | TEMPERATURE: 98.8 F | WEIGHT: 280 LBS

## 2023-10-17 DIAGNOSIS — H66.002 NON-RECURRENT ACUTE SUPPURATIVE OTITIS MEDIA OF LEFT EAR WITHOUT SPONTANEOUS RUPTURE OF TYMPANIC MEMBRANE: Primary | ICD-10-CM

## 2023-10-17 DIAGNOSIS — R09.81 NASAL CONGESTION: ICD-10-CM

## 2023-10-17 DIAGNOSIS — E66.01 OBESITY, CLASS III, BMI 40-49.9 (MORBID OBESITY) (HCC): ICD-10-CM

## 2023-10-17 PROBLEM — R10.11 RUQ PAIN: Status: RESOLVED | Noted: 2022-06-09 | Resolved: 2023-10-17

## 2023-10-17 PROCEDURE — 99214 OFFICE O/P EST MOD 30 MIN: CPT | Performed by: FAMILY MEDICINE

## 2023-10-17 RX ORDER — FLUTICASONE PROPIONATE 50 MCG
1 SPRAY, SUSPENSION (ML) NASAL DAILY
Qty: 16 G | Refills: 0 | Status: SHIPPED | OUTPATIENT
Start: 2023-10-17

## 2023-10-17 RX ORDER — AMOXICILLIN AND CLAVULANATE POTASSIUM 875; 125 MG/1; MG/1
1 TABLET, FILM COATED ORAL EVERY 12 HOURS SCHEDULED
Qty: 20 TABLET | Refills: 0 | Status: SHIPPED | OUTPATIENT
Start: 2023-10-17 | End: 2023-10-27

## 2023-10-17 NOTE — LETTER
October 17, 2023     Patient: Celeste Zhao  YOB: 1985  Date of Visit: 10/17/2023      To Whom it May Concern: Elpidio Macario is under my professional care. [place] Royal was seen in my office on 10/17/2023. [place] Royal may return to work on 10/18/2023 . If you have any questions or concerns, please don't hesitate to call.          Sincerely,          Delfina Olsen MD        CC: No Recipients
29-Jul-2022 17:41

## 2023-10-18 NOTE — TELEPHONE ENCOUNTER
ANTICOAGULATION MANAGEMENT     Prem Taylor 73 year old male is on warfarin with therapeutic INR result. (Goal INR 2.0-3.0)    Recent labs: (last 7 days)     10/18/23  0748   INR 2.8*       ASSESSMENT     Warfarin Lab Questionnaire    Warfarin Doses Last 7 Days      10/17/2023    10:44 AM   Dose in Tablet or Mg   TAB or MG? - 7.5mg warfarin tablets milligram (mg)     Pt Rptd Dose SINDHU MONDAY TUESDAY WED THURS FRIDAY SATURDAY   10/17/2023  10:44 AM 7.5 7.5 7.5 15 7.5 7.5 7.5         10/17/2023   Warfarin Lab Questionnaire   Missed doses within past 14 days? No   Changes in diet or alcohol within past 14 days? No   Medication changes since last result? No   Injuries or illness since last result? No  still not getting some relief, but has follow-up appt with his Orthopedist.        - s/p lumbar epidural steroid injection of L4-5, on 10/10/23.  (Was off warfarin for 5 days and post-op bridged until INR >=2.0).        - radiculopathy of lumbar spine.     New shortness of breath, severe headaches or sudden changes in vision since last result? No   Abnormal bleeding since last result? No   Upcoming surgery, procedure? No     Previous result: Subtherapeutic last 2 INR results.    Additional findings: None       PLAN     Recommended plan for ongoing change(s) affecting INR     Dosing Instructions: Continue your current warfarin dose with next INR in 2 weeks       Summary  As of 10/18/2023      Full warfarin instructions:  3.75 mg every Tue; 7.5 mg all other days   Next INR check:  10/27/2023               Telephone call with Ryan who verbalizes understanding and agrees to plan    Lab visit scheduled - pt already made appt on 11/7/23 @ [facility].    Education provided:   Taking warfarin: Importance of taking warfarin as instructed  Goal range and lab monitoring: goal range and significance of current result    Plan made per [facility] anticoagulation protocol    Mouna Min RN  Anticoagulation  Spoke with patient advised of results and scheduled 1 year follow up [address] [facility]  10/18/2023    _______________________________________________________________________     Anticoagulation Episode Summary       Current INR goal:  2.0-3.0   TTR:  66.1% (1 y)   Target end date:  Indefinite   Send INR reminders to:  [facility]    Indications    Pulmonary embolism  bilateral (H) (Resolved) [I26.99]  Permanent atrial fibrillation (H) [I48.21]  Pulmonary embolism  bilateral (H) [I26.99]  History of pulmonary embolism [Z86.711]             Comments:  Approved for 8 week checks per -- see encounter from 07/27/21             Anticoagulation Care Providers       Provider Role Specialty Phone number    Mamadou Baez MD Referring Family Medicine [phone]

## 2023-10-20 PROBLEM — H66.002 NON-RECURRENT ACUTE SUPPURATIVE OTITIS MEDIA OF LEFT EAR WITHOUT SPONTANEOUS RUPTURE OF TYMPANIC MEMBRANE: Status: ACTIVE | Noted: 2023-10-20

## 2023-10-20 PROBLEM — R09.81 NASAL CONGESTION: Status: ACTIVE | Noted: 2023-10-20

## 2023-10-20 NOTE — PROGRESS NOTES
Name: Mariaa Ryan      : 1985      MRN: [id number]  Encounter Provider: Rosi Torrez MD  Encounter Date: 10/17/2023   Encounter department: [address] [place] [address]     1. Non-recurrent acute suppurative otitis media of left ear without spontaneous rupture of tympanic membrane  Assessment & Plan:  Acute symptomatic recommend Augmentin 1 tablet twice a day for 10 days proper use and possible side effect discussed with the patient    Orders:  -     amoxicillin-clavulanate (AUGMENTIN) 875-125 mg per tablet; Take 1 tablet by mouth every 12 (twelve) hours for 10 days    2. Obesity, Class III, BMI 40-49.9 (morbid obesity) ([address])  Assessment & Plan:  BMI 48.82 discussed with the patient proper diet portion she been already doing this she been following up with the weight management she already had a gastric surgery and still has problem losing weight recommend the patient to be evaluated by the bariatric surgeon for possible revision    Orders:  -     Ambulatory Referral to Bariatric Surgery; Future    3. Nasal congestion  Assessment & Plan:  Acute symptomatic recommend Flonase nasal spray 2 sprays nostril once a day proper use and possible side effects reviewed    Orders:  -     fluticasone (FLONASE) 50 mcg/act nasal spray; 1 spray into each nostril daily      BMI Counseling: Body mass index is 48.82 kg/m². The BMI is above normal. Nutrition recommendations include decreasing portion sizes, decreasing fast food intake and limiting drinks that contain sugar. Exercise recommendations include exercising 3-5 times per week. Patient referred to bariatric surgery. Rationale for BMI follow-up plan is due to patient being overweight or obese.          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 HENT:  Positive for congestion, postnasal drip and rhinorrhea. Negative for ear pain and sore throat. Eyes:  Negative for pain and visual disturbance. Respiratory:  Positive for cough. Negative for shortness of breath. Cardiovascular:  Negative for chest pain and palpitations. Gastrointestinal:  Negative for abdominal pain and vomiting. Genitourinary:  Negative for dysuria and hematuria. Musculoskeletal:  Negative for arthralgias and back pain. Skin:  Negative for color change and rash. Neurological:  Negative for seizures, syncope and headaches. All other systems reviewed and are negative. Current Outpatient Medications on File Prior to Visit   Medication Sig   • albuterol (2.5 mg/3 mL) 0.083 % nebulizer solution TAKE 3 ML VIA NEBULIZER ROUTE EVERY 6 HOURS AS NEEDED FOR WHEEZING OR SHORTNESS OF BREATH   • albuterol (PROVENTIL HFA,VENTOLIN HFA) 90 mcg/act inhaler INHALE 2 PUFFS BY MOUTH EVERY 4 HOURS AS NEEDED FOR WHEEZING   • budesonide-formoterol (Symbicort) 160-4.5 mcg/act inhaler Inhale 2 puffs 2 (two) times a day Rinse mouth after use.    • Cholecalciferol (Vitamin D3) 50 MCG (2000 UT) capsule TAKE 1 CAPSULE BY MOUTH EVERY MORNING   • FeroSul 325 (65 Fe) MG tablet TAKE 1 TABLET BY MOUTH TWICE DAILY WITH MEALS   • losartan (COZAAR) 100 MG tablet Take 25 mg by mouth daily   • Multiple Vitamin (MULTIVITAMIN) tablet Take 1 tablet by mouth daily   • NIFEdipine ER (ADALAT CC) 30 MG 24 hr tablet TAKE 1 TABLET(30 MG) BY MOUTH DAILY   • olmesartan-hydrochlorothiazide (BENICAR HCT) 40-25 MG per tablet Take 1 tablet by mouth daily   • topiramate (TOPAMAX) 50 MG tablet TAKE 1/2 TABLET BY MOUTH EVERY EVENING FOR 7 DAYS. INCREASE TO 1 TABLET EVERY EVENING THEREAFTER   • Zinc 100 MG TABS Take by mouth   • ergocalciferol (VITAMIN D2) 50,000 units Take 1 capsule (50,000 Units total) by mouth 2 (two) times a week (Patient not taking: Reported on 10/17/2023)   • methocarbamol (ROBAXIN) 500 mg tablet Take 1 tablet (500 mg total) by mouth 3 (three) times a day for 10 days (Patient not taking: Reported on 10/17/2023)   • Sennosides (PRUNE SENNA CONCENTRATE PO) Take 1 tablet by mouth daily prunelax (Patient not taking: Reported on 10/17/2023)       Objective     /80   Pulse 75   Temp 98.8 °F (37.1 °C) (Tympanic)   Ht 5' 3.5" (1.613 m)   Wt 127 kg (280 lb)   SpO2 99%   BMI 48.82 kg/m²     Physical Exam  Vitals and nursing note reviewed. Constitutional:       General: She is not in acute distress. Appearance: She is well-developed. She is not diaphoretic. HENT:      Head: Normocephalic. Right Ear: Tympanic membrane and external ear normal.      Left Ear: External ear normal.      Ears:      Comments: Tympanic membrane in the left ear erythema and bulging no discharge no swelling     Nose: No rhinorrhea. Mouth/Throat:      Pharynx: No posterior oropharyngeal erythema. Eyes:      General:         Right eye: No discharge. Left eye: No discharge. Conjunctiva/sclera: Conjunctivae normal.   Neck:      Vascular: No JVD. Cardiovascular:      Rate and Rhythm: Normal rate and regular rhythm. Heart sounds: Normal heart sounds. No murmur heard. No gallop. Pulmonary:      Effort: Pulmonary effort is normal. No respiratory distress. Breath sounds: Normal breath sounds. No stridor. No wheezing or rales. Chest:      Chest wall: No tenderness. Abdominal:      General: There is no distension. Palpations: Abdomen is soft. There is no mass. Tenderness:  There is no abdominal tenderness. There is no rebound. Musculoskeletal:         General: No tenderness. Cervical back: Normal range of motion and neck supple. Lymphadenopathy:      Cervical: No cervical adenopathy. Skin:     General: Skin is warm. Findings: No erythema or rash. Neurological:      Mental Status: She is alert and oriented to person, place, and time.        Riki Landin MD

## 2023-10-20 NOTE — ASSESSMENT & PLAN NOTE
Acute symptomatic recommend Augmentin 1 tablet twice a day for 10 days proper use and possible side effect discussed with the patient

## 2023-10-20 NOTE — ASSESSMENT & PLAN NOTE
Acute symptomatic recommend Flonase nasal spray 2 sprays nostril once a day proper use and possible side effects reviewed

## 2023-10-20 NOTE — ASSESSMENT & PLAN NOTE
BMI 48.82 discussed with the patient proper diet portion she been already doing this she been following up with the weight management she already had a gastric surgery and still has problem losing weight recommend the patient to be evaluated by the bariatric surgeon for possible revision

## 2023-11-14 ENCOUNTER — APPOINTMENT (OUTPATIENT)
Dept: LAB | Facility: CLINIC | Age: 38
End: 2023-11-14
Payer: COMMERCIAL

## 2023-11-14 DIAGNOSIS — D50.8 OTHER IRON DEFICIENCY ANEMIA: ICD-10-CM

## 2023-11-14 DIAGNOSIS — D64.9 NORMOCYTIC ANEMIA: ICD-10-CM

## 2023-11-14 DIAGNOSIS — B00.2 HERPES GINGIVOSTOMATITIS: ICD-10-CM

## 2023-11-14 DIAGNOSIS — K91.2 POSTSURGICAL MALABSORPTION: ICD-10-CM

## 2023-11-14 DIAGNOSIS — E53.8 B12 DEFICIENCY: ICD-10-CM

## 2023-11-14 DIAGNOSIS — E53.8 VITAMIN B12 DEFICIENCY: ICD-10-CM

## 2023-11-14 DIAGNOSIS — Z98.84 BARIATRIC SURGERY STATUS: ICD-10-CM

## 2023-11-14 DIAGNOSIS — R79.89 HIGH SERUM PARATHYROID HORMONE (PTH): ICD-10-CM

## 2023-11-14 DIAGNOSIS — D50.9 IRON DEFICIENCY ANEMIA: ICD-10-CM

## 2023-11-14 DIAGNOSIS — E66.01 MORBID OBESITY (HCC): ICD-10-CM

## 2023-11-14 DIAGNOSIS — Z13.29 SCREENING FOR THYROID DISORDER: ICD-10-CM

## 2023-11-14 DIAGNOSIS — Z98.84 HISTORY OF ROUX-EN-Y GASTRIC BYPASS: ICD-10-CM

## 2023-11-14 DIAGNOSIS — I10 ESSENTIAL HYPERTENSION: ICD-10-CM

## 2023-11-14 DIAGNOSIS — E78.2 MIXED HYPERLIPIDEMIA: ICD-10-CM

## 2023-11-14 DIAGNOSIS — E55.9 VITAMIN D DEFICIENCY: ICD-10-CM

## 2023-11-14 DIAGNOSIS — L60.8 DISCOLORATION OF NAIL: ICD-10-CM

## 2023-11-14 DIAGNOSIS — D50.8 IRON DEFICIENCY ANEMIA FOLLOWING BARIATRIC SURGERY: ICD-10-CM

## 2023-11-14 DIAGNOSIS — K95.89 IRON DEFICIENCY ANEMIA FOLLOWING BARIATRIC SURGERY: ICD-10-CM

## 2023-11-14 DIAGNOSIS — R05.1 ACUTE COUGH: ICD-10-CM

## 2023-11-14 DIAGNOSIS — Z32.00 ENCOUNTER FOR PREGNANCY TEST, RESULT UNKNOWN: ICD-10-CM

## 2023-11-14 LAB
25(OH)D3 SERPL-MCNC: 16.1 NG/ML (ref 30–100)
ALBUMIN SERPL BCP-MCNC: 3.8 G/DL (ref 3.5–5)
ALP SERPL-CCNC: 83 U/L (ref 34–104)
ALT SERPL W P-5'-P-CCNC: 13 U/L (ref 7–52)
ANION GAP SERPL CALCULATED.3IONS-SCNC: 7 MMOL/L
AST SERPL W P-5'-P-CCNC: 15 U/L (ref 13–39)
BASOPHILS # BLD AUTO: 0.02 THOUSANDS/ÂΜL (ref 0–0.1)
BASOPHILS NFR BLD AUTO: 0 % (ref 0–1)
BILIRUB SERPL-MCNC: 0.41 MG/DL (ref 0.2–1)
BUN SERPL-MCNC: 12 MG/DL (ref 5–25)
CALCIUM SERPL-MCNC: 8.7 MG/DL (ref 8.4–10.2)
CHLORIDE SERPL-SCNC: 106 MMOL/L (ref 96–108)
CHOLEST SERPL-MCNC: 170 MG/DL
CO2 SERPL-SCNC: 27 MMOL/L (ref 21–32)
CREAT SERPL-MCNC: 0.62 MG/DL (ref 0.6–1.3)
EOSINOPHIL # BLD AUTO: 0.07 THOUSAND/ÂΜL (ref 0–0.61)
EOSINOPHIL NFR BLD AUTO: 1 % (ref 0–6)
ERYTHROCYTE [DISTWIDTH] IN BLOOD BY AUTOMATED COUNT: 12.5 % (ref 11.6–15.1)
FERRITIN SERPL-MCNC: 13 NG/ML (ref 11–307)
FOLATE SERPL-MCNC: 12.5 NG/ML
GFR SERPL CREATININE-BSD FRML MDRD: 115 ML/MIN/1.73SQ M
GLUCOSE P FAST SERPL-MCNC: 84 MG/DL (ref 65–99)
HCG SERPL QL: NEGATIVE
HCT VFR BLD AUTO: 38 % (ref 34.8–46.1)
HDLC SERPL-MCNC: 46 MG/DL
HGB BLD-MCNC: 12.6 G/DL (ref 11.5–15.4)
IMM GRANULOCYTES # BLD AUTO: 0.04 THOUSAND/UL (ref 0–0.2)
IMM GRANULOCYTES NFR BLD AUTO: 0 % (ref 0–2)
IRON SATN MFR SERPL: 24 % (ref 15–50)
IRON SERPL-MCNC: 94 UG/DL (ref 50–212)
LDLC SERPL CALC-MCNC: 97 MG/DL (ref 0–100)
LYMPHOCYTES # BLD AUTO: 2.37 THOUSANDS/ÂΜL (ref 0.6–4.47)
LYMPHOCYTES NFR BLD AUTO: 24 % (ref 14–44)
MCH RBC QN AUTO: 31 PG (ref 26.8–34.3)
MCHC RBC AUTO-ENTMCNC: 33.2 G/DL (ref 31.4–37.4)
MCV RBC AUTO: 94 FL (ref 82–98)
MONOCYTES # BLD AUTO: 0.66 THOUSAND/ÂΜL (ref 0.17–1.22)
MONOCYTES NFR BLD AUTO: 7 % (ref 4–12)
NEUTROPHILS # BLD AUTO: 6.72 THOUSANDS/ÂΜL (ref 1.85–7.62)
NEUTS SEG NFR BLD AUTO: 68 % (ref 43–75)
NRBC BLD AUTO-RTO: 0 /100 WBCS
PLATELET # BLD AUTO: 309 THOUSANDS/UL (ref 149–390)
PMV BLD AUTO: 10.9 FL (ref 8.9–12.7)
POTASSIUM SERPL-SCNC: 4.4 MMOL/L (ref 3.5–5.3)
PROT SERPL-MCNC: 7.1 G/DL (ref 6.4–8.4)
PTH-INTACT SERPL-MCNC: 141.9 PG/ML (ref 12–88)
RBC # BLD AUTO: 4.06 MILLION/UL (ref 3.81–5.12)
SODIUM SERPL-SCNC: 140 MMOL/L (ref 135–147)
TIBC SERPL-MCNC: 396 UG/DL (ref 250–450)
TRIGL SERPL-MCNC: 137 MG/DL
TSH SERPL DL<=0.05 MIU/L-ACNC: 2.21 UIU/ML (ref 0.45–4.5)
UIBC SERPL-MCNC: 302 UG/DL (ref 155–355)
VIT B12 SERPL-MCNC: 146 PG/ML (ref 180–914)
WBC # BLD AUTO: 9.88 THOUSAND/UL (ref 4.31–10.16)

## 2023-11-14 PROCEDURE — 80061 LIPID PANEL: CPT

## 2023-11-14 PROCEDURE — 82306 VITAMIN D 25 HYDROXY: CPT

## 2023-11-14 PROCEDURE — 36415 COLL VENOUS BLD VENIPUNCTURE: CPT

## 2023-11-14 PROCEDURE — 85025 COMPLETE CBC W/AUTO DIFF WBC: CPT

## 2023-11-14 PROCEDURE — 83970 ASSAY OF PARATHORMONE: CPT

## 2023-11-14 PROCEDURE — 84443 ASSAY THYROID STIM HORMONE: CPT

## 2023-11-14 PROCEDURE — 80053 COMPREHEN METABOLIC PANEL: CPT

## 2023-11-14 PROCEDURE — 83550 IRON BINDING TEST: CPT

## 2023-11-14 PROCEDURE — 83918 ORGANIC ACIDS TOTAL QUANT: CPT

## 2023-11-14 PROCEDURE — 84703 CHORIONIC GONADOTROPIN ASSAY: CPT

## 2023-11-14 PROCEDURE — 82607 VITAMIN B-12: CPT

## 2023-11-14 PROCEDURE — 82746 ASSAY OF FOLIC ACID SERUM: CPT

## 2023-11-14 PROCEDURE — 82728 ASSAY OF FERRITIN: CPT

## 2023-11-14 PROCEDURE — 83540 ASSAY OF IRON: CPT

## 2023-11-15 ENCOUNTER — TELEPHONE (OUTPATIENT)
Dept: BARIATRICS | Facility: CLINIC | Age: 38
End: 2023-11-15

## 2023-11-15 DIAGNOSIS — R79.89 HIGH SERUM PARATHYROID HORMONE (PTH): ICD-10-CM

## 2023-11-15 DIAGNOSIS — E53.8 VITAMIN B12 DEFICIENCY: Primary | ICD-10-CM

## 2023-11-15 DIAGNOSIS — E55.9 VITAMIN D DEFICIENCY: ICD-10-CM

## 2023-11-15 DIAGNOSIS — Z98.84 BARIATRIC SURGERY STATUS: ICD-10-CM

## 2023-11-15 DIAGNOSIS — K91.2 POSTSURGICAL MALABSORPTION: ICD-10-CM

## 2023-11-15 RX ORDER — ERGOCALCIFEROL 1.25 MG/1
50000 CAPSULE ORAL 2 TIMES WEEKLY
Qty: 24 CAPSULE | Refills: 0 | Status: SHIPPED | OUTPATIENT
Start: 2023-11-16

## 2023-11-15 NOTE — TELEPHONE ENCOUNTER
----- Message from Lesa Abrahamamandorui sent at 11/15/2023 10:32 AM EST -----  Please call pt to ask if she is able to come to our office for B12 injections? Her levels are very low. We would like to start out with weekly shots for 4 weeks then monthly shots for 3 months. In the meantime, please make sure she is on bariatric multivitamins and add vitamin b12 1000 mcg once daily. I will try to send in vitamin B12 to see if her insurance will cover this, if not, please get this over the counter.     -vitamin D is very low now and PTH is high - which can affect her bone health. I will send in a RX for vitamin D2 50,000 IU twice a week for 12 weeks. Please add calcium 500 mg three times per day. Calcium is over the counter. Repeat these levels in 3 months.

## 2023-11-15 NOTE — TELEPHONE ENCOUNTER
Reached out to Pt re: labs. Reviewed with Pt results and provider recommendations. Pt stated she would be willing to come in for B12 shots to improve levels. Pt expressed concern over high PTH. Explained PTH vs Vitamin D. Pt agreeable to prescription vitamin D and adding calcium daily. Pt expressed concern re: increased weight gain. Per Pt's request scheduled appt with provider to addressed.

## 2023-11-19 LAB — METHYLMALONATE SERPL-SCNC: 89 NMOL/L (ref 0–378)

## 2023-11-20 ENCOUNTER — OFFICE VISIT (OUTPATIENT)
Dept: FAMILY MEDICINE CLINIC | Facility: CLINIC | Age: 38
End: 2023-11-20
Payer: COMMERCIAL

## 2023-11-20 VITALS
HEART RATE: 71 BPM | HEIGHT: 64 IN | SYSTOLIC BLOOD PRESSURE: 130 MMHG | DIASTOLIC BLOOD PRESSURE: 76 MMHG | WEIGHT: 281.6 LBS | OXYGEN SATURATION: 99 % | TEMPERATURE: 99.1 F | BODY MASS INDEX: 48.07 KG/M2

## 2023-11-20 DIAGNOSIS — R42 VERTIGO: ICD-10-CM

## 2023-11-20 DIAGNOSIS — G56.03 BILATERAL CARPAL TUNNEL SYNDROME: Primary | ICD-10-CM

## 2023-11-20 DIAGNOSIS — E66.01 OBESITY, CLASS III, BMI 40-49.9 (MORBID OBESITY) (HCC): ICD-10-CM

## 2023-11-20 DIAGNOSIS — E53.8 B12 DEFICIENCY: ICD-10-CM

## 2023-11-20 DIAGNOSIS — E55.9 VITAMIN D DEFICIENCY: ICD-10-CM

## 2023-11-20 DIAGNOSIS — K91.2 POSTSURGICAL MALABSORPTION: ICD-10-CM

## 2023-11-20 DIAGNOSIS — R79.89 INCREASED PTH LEVEL: ICD-10-CM

## 2023-11-20 PROCEDURE — 99214 OFFICE O/P EST MOD 30 MIN: CPT | Performed by: FAMILY MEDICINE

## 2023-11-20 PROCEDURE — 96372 THER/PROPH/DIAG INJ SC/IM: CPT

## 2023-11-20 PROCEDURE — 93000 ELECTROCARDIOGRAM COMPLETE: CPT | Performed by: FAMILY MEDICINE

## 2023-11-20 RX ORDER — ASCORBATE CALCIUM 500 MG
500 TABLET ORAL DAILY
COMMUNITY

## 2023-11-20 RX ORDER — MECLIZINE HYDROCHLORIDE 25 MG/1
25 TABLET ORAL
Qty: 30 TABLET | Refills: 0 | Status: SHIPPED | OUTPATIENT
Start: 2023-11-20

## 2023-11-20 RX ORDER — CYANOCOBALAMIN 1000 UG/ML
1000 INJECTION, SOLUTION INTRAMUSCULAR; SUBCUTANEOUS
Status: SHIPPED | OUTPATIENT
Start: 2023-11-20

## 2023-11-20 RX ADMIN — CYANOCOBALAMIN 1000 MCG: 1000 INJECTION, SOLUTION INTRAMUSCULAR; SUBCUTANEOUS at 08:59

## 2023-11-20 NOTE — PROGRESS NOTES
Name: Carlos Sagastume      : 1985      MRN: [id number]  Encounter Provider: Kaila Montalvo MD  Encounter Date: 2023   Encounter department: [address] [place] [address] 270     1. Bilateral carpal tunnel syndrome  Assessment & Plan:  New diagnosis symptomatic discussed with the patient the problem recommend to wear splint prescription has been given to the patient plan to do MSK the wrist    Orders:  -     Cock Up Wrist [address]  MSK limited; Future; Expected date: 2023    2. Vertigo  Assessment & Plan:  Symptomatic dizziness was nausea patient had similar problem before recommend to take meclizine 25 mg proper use and possible side effects reviewed recommend low caffeine low-salt diet    Orders:  -     meclizine (ANTIVERT) 25 mg tablet; Take 1 tablet (25 mg total) by mouth daily at bedtime  -     POCT ECG    3. Obesity, Class III, BMI 40-49.9 (morbid obesity) ([address])    4. Vitamin D deficiency  Assessment & Plan:  Chronic uncontrolled secondary to malabsorption status post bariatric surgery patient received vitamin D 50,000 IUs twice a week by the weight management recommend to follow recommendation    Orders:  -     Basic metabolic panel; Future  -     Lipid panel; Future  -     PTH, intact; Future    5. Increased PTH level  -     Basic metabolic panel; Future  -     Lipid panel; Future  -     PTH, intact; Future    6. Postsurgical malabsorption  -     cyanocobalamin injection 1,000 mcg    7. B12 deficiency  Assessment & Plan:  Finding on recent blood work and new diagnosis is secondary to malabsorption status post bariatric surgery recommended to take B12 injection weekly for 6 weeks and then monthly for 6 months patient agree we will start first injection today    Orders:  -     cyanocobalamin injection 1,000 mcg        Depression Screening and Follow-up Plan: Patient was screened for depression during today's encounter.  They screened negative with a PHQ-2 score of 0. Subjective      Patient here has had multiple concerns she points concerned about dizziness lightheaded especially when she comes to the detonated that she noted on complete associated to his imaging in the ER nausea no vomiting no headache no blurred vision no double vision no lose control of the urine or stool No head trauma patient has a history of vertigo also patient has numbness tingling sensation tip of her finger worse at nighttime when she is doing this Type on the computer block also she had a history of bariatric surgery recently regular B12 and vitamin D level is low normal parathyroid hormone        Review of Systems   Constitutional:  Negative for chills and fever. HENT:  Negative for congestion, ear pain and sore throat. Eyes:  Negative for pain and visual disturbance. Respiratory:  Negative for cough and shortness of breath. Cardiovascular:  Negative for chest pain and palpitations. Gastrointestinal:  Negative for abdominal pain, constipation, diarrhea and vomiting. Genitourinary:  Negative for dysuria and hematuria. Musculoskeletal:  Negative for arthralgias and back pain. Skin:  Negative for color change and rash. Neurological:  Positive for dizziness and numbness. Negative for seizures, syncope, light-headedness and headaches. All other systems reviewed and are negative. Current Outpatient Medications on File Prior to Visit   Medication Sig   • albuterol (2.5 mg/3 mL) 0.083 % nebulizer solution TAKE 3 ML VIA NEBULIZER ROUTE EVERY 6 HOURS AS NEEDED FOR WHEEZING OR SHORTNESS OF BREATH   • albuterol (PROVENTIL HFA,VENTOLIN HFA) 90 mcg/act inhaler INHALE 2 PUFFS BY MOUTH EVERY 4 HOURS AS NEEDED FOR WHEEZING   • budesonide-formoterol (Symbicort) 160-4.5 mcg/act inhaler Inhale 2 puffs 2 (two) times a day Rinse mouth after use.    • Calcium Ascorbate (VITAMIN C) 500 mg tablet Take 500 mg by mouth daily   • cyanocobalamin (VITAMIN B-12) 1000 MCG tablet Take 1 tablet (1,000 mcg total) by mouth daily   • ergocalciferol (VITAMIN D2) 50,000 units Take 1 capsule (50,000 Units total) by mouth 2 (two) times a week   • FeroSul 325 (65 Fe) MG tablet TAKE 1 TABLET BY MOUTH TWICE DAILY WITH MEALS   • fluticasone (FLONASE) 50 mcg/act nasal spray 1 spray into each nostril daily   • losartan (COZAAR) 100 MG tablet Take 25 mg by mouth daily   • Multiple Vitamin (MULTIVITAMIN) tablet Take 1 tablet by mouth daily   • NIFEdipine ER (ADALAT CC) 30 MG 24 hr tablet TAKE 1 TABLET(30 MG) BY MOUTH DAILY   • olmesartan-hydrochlorothiazide (BENICAR HCT) 40-25 MG per tablet Take 1 tablet by mouth daily   • topiramate (TOPAMAX) 50 MG tablet TAKE 1/2 TABLET BY MOUTH EVERY EVENING FOR 7 DAYS. INCREASE TO 1 TABLET EVERY EVENING THEREAFTER   • Zinc 100 MG TABS Take by mouth   • Cholecalciferol (Vitamin D3) 50 MCG (2000 UT) capsule TAKE 1 CAPSULE BY MOUTH EVERY MORNING (Patient not taking: Reported on 11/20/2023)   • Sennosides (PRUNE SENNA CONCENTRATE PO) Take 1 tablet by mouth daily prunelax (Patient not taking: Reported on 10/17/2023)       Objective     /76 (BP Location: Left arm, Patient Position: Sitting)   Pulse 71   Temp 99.1 °F (37.3 °C) (Tympanic)   Ht 5' 3.5" (1.613 m)   Wt 128 kg (281 lb 9.6 oz)   SpO2 99%   BMI 49.10 kg/m²     Physical Exam  Vitals and nursing note reviewed. Constitutional:       General: She is not in acute distress. Appearance: She is not diaphoretic. HENT:      Head: Normocephalic and atraumatic. Right Ear: Tympanic membrane normal. There is no impacted cerumen. Left Ear: Tympanic membrane normal. There is no impacted cerumen. Nose: Nose normal. No congestion or rhinorrhea. Mouth/Throat:      Pharynx: No posterior oropharyngeal erythema. Eyes:      General: No scleral icterus. Right eye: No discharge. Left eye: No discharge. Cardiovascular:      Rate and Rhythm: Normal rate and regular rhythm. Heart sounds: No murmur heard. Pulmonary:      Effort: Pulmonary effort is normal. No respiratory distress. Abdominal:      General: There is no distension. Tenderness: There is no abdominal tenderness. Musculoskeletal:         General: Normal range of motion. Cervical back: Normal range of motion. Right lower leg: No edema. Left lower leg: No edema. Comments: Positive Tinel sign   Skin:     Findings: No rash. Neurological:      Mental Status: She is alert and oriented to person, place, and time.        Rick Griffin MD

## 2023-11-21 NOTE — ASSESSMENT & PLAN NOTE
New diagnosis symptomatic discussed with the patient the problem recommend to wear splint prescription has been given to the patient plan to do MSK the wrist

## 2023-11-21 NOTE — ASSESSMENT & PLAN NOTE
Symptomatic dizziness was nausea patient had similar problem before recommend to take meclizine 25 mg proper use and possible side effects reviewed recommend low caffeine low-salt diet

## 2023-11-21 NOTE — ASSESSMENT & PLAN NOTE
Finding on recent blood work and new diagnosis is secondary to malabsorption status post bariatric surgery recommended to take B12 injection weekly for 6 weeks and then monthly for 6 months patient agree we will start first injection today

## 2023-11-21 NOTE — ASSESSMENT & PLAN NOTE
Chronic uncontrolled secondary to malabsorption status post bariatric surgery patient received vitamin D 50,000 IUs twice a week by the weight management recommend to follow recommendation

## 2023-11-27 ENCOUNTER — CLINICAL SUPPORT (OUTPATIENT)
Dept: FAMILY MEDICINE CLINIC | Facility: CLINIC | Age: 38
End: 2023-11-27
Payer: COMMERCIAL

## 2023-11-27 DIAGNOSIS — E53.8 B12 DEFICIENCY: Primary | ICD-10-CM

## 2023-11-27 PROCEDURE — 96372 THER/PROPH/DIAG INJ SC/IM: CPT

## 2023-11-27 RX ORDER — CYANOCOBALAMIN 1000 UG/ML
1000 INJECTION, SOLUTION INTRAMUSCULAR; SUBCUTANEOUS
Status: SHIPPED | OUTPATIENT
Start: 2023-11-27

## 2023-11-27 RX ADMIN — CYANOCOBALAMIN 1000 MCG: 1000 INJECTION, SOLUTION INTRAMUSCULAR; SUBCUTANEOUS at 08:55

## 2023-11-28 ENCOUNTER — TELEPHONE (OUTPATIENT)
Dept: FAMILY MEDICINE CLINIC | Facility: CLINIC | Age: 38
End: 2023-11-28

## 2023-11-28 NOTE — TELEPHONE ENCOUNTER
Patient has been having b12 injections. She is experiencing dry throat, numbness in the lips. She wanted to know if that is a side effect or allergic reaction to the shots.

## 2023-11-29 ENCOUNTER — TELEPHONE (OUTPATIENT)
Dept: FAMILY MEDICINE CLINIC | Facility: CLINIC | Age: 38
End: 2023-11-29

## 2023-11-29 ENCOUNTER — HOSPITAL ENCOUNTER (EMERGENCY)
Facility: HOSPITAL | Age: 38
Discharge: HOME | End: 2023-11-29
Attending: EMERGENCY MEDICINE
Payer: COMMERCIAL

## 2023-11-29 ENCOUNTER — APPOINTMENT (EMERGENCY)
Dept: RADIOLOGY | Facility: HOSPITAL | Age: 38
End: 2023-11-29
Payer: COMMERCIAL

## 2023-11-29 VITALS
BODY MASS INDEX: 50.09 KG/M2 | HEART RATE: 101 BPM | WEIGHT: 287.3 LBS | RESPIRATION RATE: 20 BRPM | SYSTOLIC BLOOD PRESSURE: 196 MMHG | OXYGEN SATURATION: 100 % | TEMPERATURE: 97.8 F | DIASTOLIC BLOOD PRESSURE: 124 MMHG

## 2023-11-29 DIAGNOSIS — T78.40XA ALLERGIC REACTION, INITIAL ENCOUNTER: Primary | ICD-10-CM

## 2023-11-29 PROCEDURE — 99284 EMERGENCY DEPT VISIT MOD MDM: CPT | Performed by: EMERGENCY MEDICINE

## 2023-11-29 PROCEDURE — 99284 EMERGENCY DEPT VISIT MOD MDM: CPT

## 2023-11-29 PROCEDURE — 71045 X-RAY EXAM CHEST 1 VIEW: CPT

## 2023-11-29 NOTE — TELEPHONE ENCOUNTER
Patient is now having allergic reaction and wheezing. Recommend to follow up with emergency room, she will be going. She will call us with any updates.

## 2023-11-29 NOTE — Clinical Note
Alpinky Jose was seen and treated in our emergency department on 11/29/2023. No restrictions            Diagnosis:     Kiran Angeles  may return to work on return date. She may return on this date: 11/30/2023         If you have any questions or concerns, please don't hesitate to call.       Jazzmine Liang MD    ______________________________           _______________          _______________  Hospital Representative                              Date                                Time

## 2023-11-29 NOTE — TELEPHONE ENCOUNTER
Patient concerned was discharged from er with a very high blood pressure c/o blurry vision was told something about possibly of having a pre stroke  please advise

## 2023-11-29 NOTE — ED ATTENDING ATTESTATION
11/29/2023  IElpidio MD, saw and evaluated the patient. I have discussed the patient with the resident/non-physician practitioner and agree with the resident's/non-physician practitioner's findings, Plan of Care, and MDM as documented in the resident's/non-physician practitioner's note, except where noted. All available labs and Radiology studies were reviewed. I was present for key portions of any procedure(s) performed by the resident/non-physician practitioner and I was immediately available to provide assistance. At this point I agree with the current assessment done in the Emergency Department. I have conducted an independent evaluation of this patient a history and physical is as follows:    ED Course     41year old female presenting with upper lip parasthesia and shortness of breath. Patient states that she felt off after getting her second B12 shot on Monday. Paresthesia and shortness of breath started today. Patient denies any chest pain. Also reports no numbness or weakness. Patient denies any facial droop or speech difficulty. Reports no headache. Patient suspects that she might be having an allergic reaction to the B12 shot. Denies any other complaints. Denies any chest pain, fever, chills, n/v, diarrhea, abdominal pain. Exam: Patient is well appearing and in no acute distress. No noticeable lip swelling noted and sensation intact. Patient without any focal neurologic deficits. Lungs are clear to auscultation bilaterally. Heart sound without rubs murmurs or gallops. No leg swelling or pain noted     Plan: Patient without any signs of allergic reaction. Not having anaphylactic reaction and maintaining airway. Patient however complaining of shortness of breath despite good oxygenation and normal respiration rate. Will obtain x-ray to rule out any cause of shortness of breath. No wheezing noted concerning for asthma.  If x-ray is normal will plan for discharge upon providing reassurance with return precaution     Critical Care Time  Procedures

## 2023-11-29 NOTE — ED PROVIDER NOTES
History  Chief Complaint   Patient presents with    Allergic Reaction     SOB, swollen lips and heart palpitations after receiving 2nd B12 shot Monday. PCP told her to come to ED for eval.       40 yo female presents with paraesthesia of her upper lip and shortness of breath. In the morning she was feeling dizzy and was feeling rapid palpitations. First shot of B12 on last Monday and she experienced dry throat, itchiness in her throat and face, and dizziness. Second shot on 11/27/23 and experienced itchy lips and shortness of breath 30 minutes later. Has not taken any antihistamine for symptoms. History provided by:  Patient      Prior to Admission Medications   Prescriptions Last Dose Informant Patient Reported? Taking? Calcium Ascorbate (VITAMIN C) 500 mg tablet  Self Yes No   Sig: Take 500 mg by mouth daily   Cholecalciferol (Vitamin D3) 50 MCG (2000 UT) capsule  Self No No   Sig: TAKE 1 CAPSULE BY MOUTH EVERY MORNING   Patient not taking: Reported on 11/20/2023   FeroSul 325 (65 Fe) MG tablet  Self No No   Sig: TAKE 1 TABLET BY MOUTH TWICE DAILY WITH MEALS   Multiple Vitamin (MULTIVITAMIN) tablet  Self Yes No   Sig: Take 1 tablet by mouth daily   NIFEdipine ER (ADALAT CC) 30 MG 24 hr tablet  Self No No   Sig: TAKE 1 TABLET(30 MG) BY MOUTH DAILY   Sennosides (PRUNE SENNA CONCENTRATE PO)  Self Yes No   Sig: Take 1 tablet by mouth daily prunelax   Patient not taking: Reported on 10/17/2023   Zinc 100 MG TABS  Self Yes No   Sig: Take by mouth   albuterol (2.5 mg/3 mL) 0.083 % nebulizer solution  Self No No   Sig: TAKE 3 ML VIA NEBULIZER ROUTE EVERY 6 HOURS AS NEEDED FOR WHEEZING OR SHORTNESS OF BREATH   albuterol (PROVENTIL HFA,VENTOLIN HFA) 90 mcg/act inhaler  Self No No   Sig: INHALE 2 PUFFS BY MOUTH EVERY 4 HOURS AS NEEDED FOR WHEEZING   budesonide-formoterol (Symbicort) 160-4.5 mcg/act inhaler  Self No No   Sig: Inhale 2 puffs 2 (two) times a day Rinse mouth after use.    cyanocobalamin (VITAMIN B-12) 1000 MCG tablet  Self No No   Sig: Take 1 tablet (1,000 mcg total) by mouth daily   ergocalciferol (VITAMIN D2) 50,000 units  Self No No   Sig: Take 1 capsule (50,000 Units total) by mouth 2 (two) times a week   fluticasone (FLONASE) 50 mcg/act nasal spray  Self No No   Si spray into each nostril daily   losartan (COZAAR) 100 MG tablet  Self Yes No   Sig: Take 25 mg by mouth daily   meclizine (ANTIVERT) 25 mg tablet   No No   Sig: Take 1 tablet (25 mg total) by mouth daily at bedtime   olmesartan-hydrochlorothiazide (BENICAR HCT) 40-25 MG per tablet  Self No No   Sig: Take 1 tablet by mouth daily   topiramate (TOPAMAX) 50 MG tablet  Self No No   Sig: TAKE 1/2 TABLET BY MOUTH EVERY EVENING FOR 7 DAYS. INCREASE TO 1 TABLET EVERY EVENING THEREAFTER      Facility-Administered Medications Last Administration Doses Remaining   cyanocobalamin injection 1,000 mcg 2023  8:59 AM    cyanocobalamin injection 1,000 mcg 2023  8:55 AM           Past Medical History:   Diagnosis Date    Anemia     BMI 36.0-36.9,adult 2020    Cough 2020    COVID-19 virus infection 2020    Disease of thyroid gland     Dizziness     due to anemia, last episode 2020, no falls    Drop in hemoglobin 2020    Fatigue     History of transfusion 2016    anemic - loss of blood due to bleeding ulcers, no reaction    Hyperlipidemia     Hypertension     Multiple gastric ulcers     Obesity     16    RUQ pain 2022    Severe obesity (BMI 35.0-39. 9) with comorbidity ([address]) 2020    Sore throat 10/27/2021    Thyroid disease     took synthroid but not currently taking    Thyroid mass 03/15/2021     A CT scan of the neck incidental finding 1.7 cm of the right thyroid    Ulcer        Past Surgical History:   Procedure Laterality Date    CHOLECYSTECTOMY      CHOLECYSTECTOMY LAPAROSCOPIC N/A 06/10/2022    Procedure: CHOLECYSTECTOMY LAPAROSCOPIC;  Surgeon: Jorge Westfall MD;  Location:  MAIN OR;  Service: General    GASTRIC BYPASS  2014    GASTRIC BYPASS LAPAROSCOPIC N/A 12/15/2020    Procedure: Robotic lysis of adhesions, small-bowel resection, partial gastrectomy, paraesophageal hernia repair, bilateral truncal vagotomy; Robotic gastrojejunostomy anastomosis with intraop endoscopy;  Surgeon: Giselle Marrero MD;  Location: [facility];  Service: Bariatrics    LITO-EN-Y PROCEDURE  2016    Gastric Bypass by Dr. John Napier Weight 339.6,nw    TUBAL LIGATION Bilateral         WISDOM TOOTH EXTRACTION         Family History   Problem Relation Age of Onset    Asthma Mother     Coronary artery disease Mother     Diabetes Mother         MELLITUS    Hyperlipidemia Mother     Hypertension Mother     Aneurysm Mother         2018 just diagnosed with two    No Known Problems Father          when she was 1 months old    No Known Problems Sister     No Known Problems Daughter     No Known Problems Daughter     Aneurysm Maternal Grandmother     Aneurysm Maternal Grandfather           of a ruptured abdominal aneurysm    No Known Problems Paternal Grandmother     No Known Problems Paternal Grandfather     No Known Problems Maternal Aunt     No Known Problems Maternal Aunt     No Known Problems Maternal Aunt      I have reviewed and agree with the history as documented.     E-Cigarette/Vaping    E-Cigarette Use Never User      E-Cigarette/Vaping Substances    Nicotine No     THC No     CBD No     Flavoring No     Other No     Unknown No      Social History     Tobacco Use    Smoking status: Former     Packs/day: 0.25     Years: 14.00     Total pack years: 3.50     Types: Cigarettes     Start date: 2004     Quit date: 9/10/2023     Years since quittin.2     Passive exposure: Never    Smokeless tobacco: Never    Tobacco comments:     Started again 3/2023, 1 cig per day   Vaping Use    Vaping Use: Never used   Substance Use Topics    Alcohol use: No    Drug use: No        Review of Systems   Constitutional: Negative for fever. HENT:  Negative for trouble swallowing. Paraesthesia and swelling of upper lip   Eyes:  Negative for redness and itching. Respiratory:  Positive for shortness of breath. Negative for chest tightness. Cardiovascular:  Positive for palpitations. Negative for chest pain. Gastrointestinal:  Positive for diarrhea and nausea. Negative for abdominal pain and vomiting. Skin:  Negative for rash. Neurological:  Positive for light-headedness. Negative for numbness. Physical Exam  ED Triage Vitals [11/29/23 1307]   Temperature Pulse Respirations Blood Pressure SpO2   97.8 °F (36.6 °C) 101 20 (!) 196/124 100 %      Temp Source Heart Rate Source Patient Position - Orthostatic VS BP Location FiO2 (%)   Tympanic Monitor Sitting Left arm --      Pain Score       --             Orthostatic Vital Signs  Vitals:    11/29/23 1307   BP: (!) 196/124   Pulse: 101   Patient Position - Orthostatic VS: Sitting       Physical Exam  HENT:      Mouth/Throat:      Mouth: Mucous membranes are moist.      Pharynx: Oropharynx is clear. No posterior oropharyngeal erythema. Eyes:      Extraocular Movements: Extraocular movements intact. Cardiovascular:      Rate and Rhythm: Normal rate and regular rhythm. Pulses: Normal pulses. Heart sounds: Normal heart sounds. Pulmonary:      Effort: Pulmonary effort is normal. No respiratory distress. Breath sounds: Normal breath sounds. No wheezing. Chest:      Chest wall: No tenderness. Abdominal:      General: Bowel sounds are normal.      Palpations: Abdomen is soft. Tenderness: There is no abdominal tenderness. Musculoskeletal:      Cervical back: Normal range of motion. Right lower leg: No edema. Left lower leg: No edema. Skin:     General: Skin is warm. Findings: No rash. Neurological:      Mental Status: She is alert.          ED Medications  Medications - No data to display    Diagnostic Studies  Results Reviewed       None                   XR chest 1 view portable   ED Interpretation by Last Gama MD (11/29 6631)   No obvious cardiopulmonary disorder            Procedures  Procedures      ED Course                             SBIRT 22yo+      Flowsheet Row Most Recent Value   Initial Alcohol Screen: US AUDIT-C     1. How often do you have a drink containing alcohol? 0 Filed at: 11/29/2023 1304   2. How many drinks containing alcohol do you have on a typical day you are drinking? 0 Filed at: 11/29/2023 1304   3a. Male UNDER 65: How often do you have five or more drinks on one occasion? 0 Filed at: 11/29/2023 1304   3b. FEMALE Any Age, or MALE 65+: How often do you have 4 or more drinks on one occassion? 0 Filed at: 11/29/2023 1304   Audit-C Score 0 Filed at: 11/29/2023 1304   JUAREZ: How many times in the past year have you. .. Used an illegal drug or used a prescription medication for non-medical reasons? Never Filed at: 11/29/2023 1304                  Medical Decision Making  42 yo female presents with paraesthesia of her upper lip and shortness of breath. Hx significant for gastric bypass and vitamin B12 deficiency. She has been taking vitamin B12 shots; first dose was last Monday, most recent dose was 2 days ago. No focal neurologic deficits on exam, no abnormalities in sensation, power 5+ in all extremities. Normal gait without assistance. Stroke is ruled out. Patient in no respiratory distress. No wheezing on auscultation. SaO2 and respiratory rate are WNL. Anaphylaxis is ruled out. Will obtain chest XR to evaluate shortness of breath. CXR: no cardiopulmonary disorder. Amount and/or Complexity of Data Reviewed  Radiology: ordered and independent interpretation performed. Disposition  Final diagnoses:    Allergic reaction, initial encounter     Time reflects when diagnosis was documented in both MDM as applicable and the Disposition within this note       Time User Action Codes Description Comment    11/29/2023  1:57 PM Nehemias Davis Add [T78.40XA] Allergic reaction, initial encounter           ED Disposition       ED Disposition   Discharge    Condition   Stable    Date/Time   Wed Nov 29, 2023 [address] [place] discharge to home/self care. Follow-up Information    None         Patient's Medications   Discharge Prescriptions    No medications on file     No discharge procedures on file. PDMP Review         Value Time User    PDMP Reviewed  Yes 6/29/2021  8:33 AM Kamini العراقي, [address] [place] [address]             ED Provider  Attending physically available and evaluated Lawyer Blackmon. I managed the patient along with the ED Attending.     Electronically Signed by           Nehemias Davis MD  11/29/23 [id number]

## 2023-11-30 ENCOUNTER — HOSPITAL ENCOUNTER (EMERGENCY)
Facility: HOSPITAL | Age: 38
Discharge: HOME | End: 2023-11-30
Attending: EMERGENCY MEDICINE
Payer: COMMERCIAL

## 2023-11-30 VITALS
DIASTOLIC BLOOD PRESSURE: 95 MMHG | HEART RATE: 74 BPM | WEIGHT: 283.29 LBS | OXYGEN SATURATION: 100 % | BODY MASS INDEX: 49.4 KG/M2 | SYSTOLIC BLOOD PRESSURE: 138 MMHG | TEMPERATURE: 97.9 F | RESPIRATION RATE: 18 BRPM

## 2023-11-30 DIAGNOSIS — R03.0 ELEVATED BLOOD PRESSURE READING: Primary | ICD-10-CM

## 2023-11-30 LAB
ALBUMIN SERPL BCP-MCNC: 3.8 G/DL (ref 3.5–5)
ALP SERPL-CCNC: 86 U/L (ref 34–104)
ALT SERPL W P-5'-P-CCNC: 16 U/L (ref 7–52)
ANION GAP SERPL CALCULATED.3IONS-SCNC: 3 MMOL/L
AST SERPL W P-5'-P-CCNC: 15 U/L (ref 13–39)
ATRIAL RATE: 62 BPM
BASOPHILS # BLD AUTO: 0.02 THOUSANDS/ÂΜL (ref 0–0.1)
BASOPHILS NFR BLD AUTO: 0 % (ref 0–1)
BILIRUB DIRECT SERPL-MCNC: 0.05 MG/DL (ref 0–0.2)
BILIRUB SERPL-MCNC: 0.33 MG/DL (ref 0.2–1)
BUN SERPL-MCNC: 9 MG/DL (ref 5–25)
CALCIUM SERPL-MCNC: 8.5 MG/DL (ref 8.4–10.2)
CHLORIDE SERPL-SCNC: 108 MMOL/L (ref 96–108)
CO2 SERPL-SCNC: 27 MMOL/L (ref 21–32)
CREAT SERPL-MCNC: 0.53 MG/DL (ref 0.6–1.3)
EOSINOPHIL # BLD AUTO: 0.11 THOUSAND/ÂΜL (ref 0–0.61)
EOSINOPHIL NFR BLD AUTO: 2 % (ref 0–6)
ERYTHROCYTE [DISTWIDTH] IN BLOOD BY AUTOMATED COUNT: 12.5 % (ref 11.6–15.1)
GFR SERPL CREATININE-BSD FRML MDRD: 121 ML/MIN/1.73SQ M
GLUCOSE SERPL-MCNC: 101 MG/DL (ref 65–140)
HCT VFR BLD AUTO: 36.7 % (ref 34.8–46.1)
HGB BLD-MCNC: 12 G/DL (ref 11.5–15.4)
IMM GRANULOCYTES # BLD AUTO: 0.03 THOUSAND/UL (ref 0–0.2)
IMM GRANULOCYTES NFR BLD AUTO: 0 % (ref 0–2)
LYMPHOCYTES # BLD AUTO: 2.01 THOUSANDS/ÂΜL (ref 0.6–4.47)
LYMPHOCYTES NFR BLD AUTO: 27 % (ref 14–44)
MCH RBC QN AUTO: 30.4 PG (ref 26.8–34.3)
MCHC RBC AUTO-ENTMCNC: 32.7 G/DL (ref 31.4–37.4)
MCV RBC AUTO: 93 FL (ref 82–98)
MONOCYTES # BLD AUTO: 0.44 THOUSAND/ÂΜL (ref 0.17–1.22)
MONOCYTES NFR BLD AUTO: 6 % (ref 4–12)
NEUTROPHILS # BLD AUTO: 4.85 THOUSANDS/ÂΜL (ref 1.85–7.62)
NEUTS SEG NFR BLD AUTO: 65 % (ref 43–75)
NRBC BLD AUTO-RTO: 0 /100 WBCS
P AXIS: 15 DEGREES
PLATELET # BLD AUTO: 334 THOUSANDS/UL (ref 149–390)
PMV BLD AUTO: 9.9 FL (ref 8.9–12.7)
POTASSIUM SERPL-SCNC: 4.5 MMOL/L (ref 3.5–5.3)
PR INTERVAL: 120 MS
PROT SERPL-MCNC: 7.2 G/DL (ref 6.4–8.4)
QRS AXIS: 35 DEGREES
QRSD INTERVAL: 88 MS
QT INTERVAL: 410 MS
QTC INTERVAL: 416 MS
RBC # BLD AUTO: 3.95 MILLION/UL (ref 3.81–5.12)
SODIUM SERPL-SCNC: 138 MMOL/L (ref 135–147)
T WAVE AXIS: 2 DEGREES
VENTRICULAR RATE: 62 BPM
WBC # BLD AUTO: 7.46 THOUSAND/UL (ref 4.31–10.16)

## 2023-11-30 PROCEDURE — 85025 COMPLETE CBC W/AUTO DIFF WBC: CPT | Performed by: EMERGENCY MEDICINE

## 2023-11-30 PROCEDURE — 80076 HEPATIC FUNCTION PANEL: CPT | Performed by: EMERGENCY MEDICINE

## 2023-11-30 PROCEDURE — 99284 EMERGENCY DEPT VISIT MOD MDM: CPT

## 2023-11-30 PROCEDURE — 36415 COLL VENOUS BLD VENIPUNCTURE: CPT | Performed by: EMERGENCY MEDICINE

## 2023-11-30 PROCEDURE — 93005 ELECTROCARDIOGRAM TRACING: CPT

## 2023-11-30 PROCEDURE — 80048 BASIC METABOLIC PNL TOTAL CA: CPT | Performed by: EMERGENCY MEDICINE

## 2023-11-30 PROCEDURE — 99284 EMERGENCY DEPT VISIT MOD MDM: CPT | Performed by: EMERGENCY MEDICINE

## 2023-11-30 NOTE — Clinical Note
Alpinky Garcia was seen and treated in our emergency department on 11/30/2023. No restrictions            Diagnosis:     Kiran Angeles  may return to work on return date. She may return on this date: 12/01/2023         If you have any questions or concerns, please don't hesitate to call.       Davide Beltran MD    ______________________________           _______________          _______________  Hospital Representative                              Date                                Time

## 2023-11-30 NOTE — DISCHARGE INSTRUCTIONS
Continue to take your home medications as prescribed. Follow up with your family doctor for further evaluation and management, they can continue to recheck your pressures.

## 2023-11-30 NOTE — ED PROVIDER NOTES
History  Chief Complaint   Patient presents with    Hypertension     Patient c/o hypertension episodes x1 year but states she was tracking it all night and decided to come in for further evaluation. C/o headache and back pain. 40 YO female presents with high blood pressure, states she was evaluated in another ED yesterday and it was found to be elevated. She notes she was up most of the night rechecking her blood pressures and it remained elevated, up to 190's/120's. She states she doesn't feel quite right, with a headache and some Left thoracic back pain. Patient has a history of HTN, she does take BP medications, states she has been compliant with this. Pt denies CP/SOB/F/C/N/V/D/C, no dysuria, burning on urination or blood in urine. History provided by:  Patient and medical records   used: No        Prior to Admission Medications   Prescriptions Last Dose Informant Patient Reported? Taking?    Calcium Ascorbate (VITAMIN C) 500 mg tablet  Self Yes No   Sig: Take 500 mg by mouth daily   Cholecalciferol (Vitamin D3) 50 MCG (2000 UT) capsule  Self No No   Sig: TAKE 1 CAPSULE BY MOUTH EVERY MORNING   Patient not taking: Reported on 11/20/2023   FeroSul 325 (65 Fe) MG tablet  Self No No   Sig: TAKE 1 TABLET BY MOUTH TWICE DAILY WITH MEALS   Multiple Vitamin (MULTIVITAMIN) tablet  Self Yes No   Sig: Take 1 tablet by mouth daily   NIFEdipine ER (ADALAT CC) 30 MG 24 hr tablet  Self No No   Sig: TAKE 1 TABLET(30 MG) BY MOUTH DAILY   Zinc 100 MG TABS  Self Yes No   Sig: Take by mouth   albuterol (2.5 mg/3 mL) 0.083 % nebulizer solution  Self No Yes   Sig: TAKE 3 ML VIA NEBULIZER ROUTE EVERY 6 HOURS AS NEEDED FOR WHEEZING OR SHORTNESS OF BREATH   albuterol (PROVENTIL HFA,VENTOLIN HFA) 90 mcg/act inhaler  Self No Yes   Sig: INHALE 2 PUFFS BY MOUTH EVERY 4 HOURS AS NEEDED FOR WHEEZING   budesonide-formoterol (Symbicort) 160-4.5 mcg/act inhaler  Self No Yes   Sig: Inhale 2 puffs 2 (two) times a day Rinse mouth after use. cyanocobalamin (VITAMIN B-12) 1000 MCG tablet  Self No No   Sig: Take 1 tablet (1,000 mcg total) by mouth daily   ergocalciferol (VITAMIN D2) 50,000 units  Self No No   Sig: Take 1 capsule (50,000 Units total) by mouth 2 (two) times a week   fluticasone (FLONASE) 50 mcg/act nasal spray  Self No No   Si spray into each nostril daily   losartan (COZAAR) 100 MG tablet  Self Yes No   Sig: Take 25 mg by mouth daily   meclizine (ANTIVERT) 25 mg tablet Not Taking  No No   Sig: Take 1 tablet (25 mg total) by mouth daily at bedtime   Patient not taking: Reported on 2023   olmesartan-hydrochlorothiazide (BENICAR HCT) 40-25 MG per tablet  Self No No   Sig: Take 1 tablet by mouth daily      Facility-Administered Medications Last Administration Doses Remaining   cyanocobalamin injection 1,000 mcg 2023  8:59 AM    cyanocobalamin injection 1,000 mcg 2023  8:55 AM           Past Medical History:   Diagnosis Date    Anemia     BMI 36.0-36.9,adult 2020    Cough 2020    COVID-19 virus infection 2020    Disease of thyroid gland     Dizziness     due to anemia, last episode 2020, no falls    Drop in hemoglobin 2020    Fatigue     History of transfusion     anemic - loss of blood due to bleeding ulcers, no reaction    Hyperlipidemia     Hypertension     Multiple gastric ulcers     Obesity     17    RUQ pain 2022    Severe obesity (BMI 35.0-39. 9) with comorbidity ([address]) 2020    Sore throat 10/27/2021    Thyroid disease     took synthroid but not currently taking    Thyroid mass 03/15/2021     A CT scan of the neck incidental finding 1.7 cm of the right thyroid    Ulcer        Past Surgical History:   Procedure Laterality Date    CHOLECYSTECTOMY      CHOLECYSTECTOMY LAPAROSCOPIC N/A 06/10/2022    Procedure: CHOLECYSTECTOMY LAPAROSCOPIC;  Surgeon: Laurie Miranda MD;  Location: [address] [place] [address];  Service: General    GASTRIC BYPASS      GASTRIC BYPASS LAPAROSCOPIC N/A 12/15/2020    Procedure: Robotic lysis of adhesions, small-bowel resection, partial gastrectomy, paraesophageal hernia repair, bilateral truncal vagotomy; Robotic gastrojejunostomy anastomosis with intraop endoscopy;  Surgeon: Kyaw Lewis MD;  Location: [facility];  Service: Bariatrics    LITO-EN-Y PROCEDURE  2016    Gastric Bypass by Dr. Ady Engel Weight 339.6,nw    TUBAL LIGATION Bilateral         WISDOM TOOTH EXTRACTION         Family History   Problem Relation Age of Onset    Asthma Mother     Coronary artery disease Mother     Diabetes Mother         MELLITUS    Hyperlipidemia Mother     Hypertension Mother     Aneurysm Mother         2018 just diagnosed with two    No Known Problems Father          when she was 1 months old    No Known Problems Sister     No Known Problems Daughter     No Known Problems Daughter     Aneurysm Maternal Grandmother     Aneurysm Maternal Grandfather           of a ruptured abdominal aneurysm    No Known Problems Paternal Grandmother     No Known Problems Paternal Grandfather     No Known Problems Maternal Aunt     No Known Problems Maternal Aunt     No Known Problems Maternal Aunt      I have reviewed and agree with the history as documented. E-Cigarette/Vaping    E-Cigarette Use Never User      E-Cigarette/Vaping Substances    Nicotine No     THC No     CBD No     Flavoring No     Other No     Unknown No      Social History     Tobacco Use    Smoking status: Former     Packs/day: 0.25     Years: 14.00     Total pack years: 3.50     Types: Cigarettes     Start date: 2004     Quit date: 9/10/2023     Years since quittin.2     Passive exposure: Never    Smokeless tobacco: Never    Tobacco comments:     Started again 3/2023, 1 cig per day   Vaping Use    Vaping Use: Never used   Substance Use Topics    Alcohol use: No    Drug use: No       Review of Systems   Constitutional:  Negative for chills, fatigue and fever. HENT:  Negative for dental problem. Eyes:  Negative for visual disturbance. Respiratory:  Negative for shortness of breath. Cardiovascular:  Negative for chest pain. Gastrointestinal:  Negative for abdominal pain, diarrhea and vomiting. Genitourinary:  Negative for dysuria and frequency. Musculoskeletal:  Positive for back pain. Negative for arthralgias. Skin:  Negative for rash. Neurological:  Positive for headaches. Negative for dizziness, weakness and light-headedness. Psychiatric/Behavioral:  Negative for agitation, behavioral problems and confusion. All other systems reviewed and are negative. Physical Exam  Physical Exam  Vitals and nursing note reviewed. Constitutional:       Appearance: Normal appearance. HENT:      Head: Normocephalic and atraumatic. Eyes:      Extraocular Movements: Extraocular movements intact. Conjunctiva/sclera: Conjunctivae normal.   Cardiovascular:      Rate and Rhythm: Normal rate and regular rhythm. Pulses: Normal pulses. Heart sounds: Normal heart sounds. Pulmonary:      Effort: Pulmonary effort is normal.      Breath sounds: Normal breath sounds. Abdominal:      General: There is no distension. Musculoskeletal:         General: Normal range of motion. Cervical back: Normal range of motion and neck supple. Skin:     Findings: No rash. Neurological:      General: No focal deficit present. Mental Status: She is alert. Cranial Nerves: No cranial nerve deficit.    Psychiatric:         Mood and Affect: Mood normal.         Vital Signs  ED Triage Vitals [11/30/23 0812]   Temperature Pulse Respirations Blood Pressure SpO2   97.9 °F (36.6 °C) 74 18 138/95 100 %      Temp Source Heart Rate Source Patient Position - Orthostatic VS BP Location FiO2 (%)   Oral Monitor Lying Right arm --      Pain Score       --           Vitals:    11/30/23 0812   BP: 138/95   Pulse: 74   Patient Position - Orthostatic VS: Lying Visual Acuity      ED Medications  Medications - No data to display    Diagnostic Studies  Results Reviewed       Procedure Component Value Units Date/Time    Basic metabolic panel [[id number]]  (Abnormal) Collected: 11/30/23 0834    Lab Status: Final result Specimen: Blood from Arm, Left Updated: 11/30/23 0857     Sodium 138 mmol/L      Potassium 4.5 mmol/L      Chloride 108 mmol/L      CO2 27 mmol/L      ANION GAP 3 mmol/L      BUN 9 mg/dL      Creatinine 0.53 mg/dL      Glucose 101 mg/dL      Calcium 8.5 mg/dL      eGFR 121 ml/min/1.73sq m     Narrative:      [facility] (CKD):     Stage 1 with normal or high GFR (GFR > 90 mL/min/1.73 square meters)    Stage 2 Mild CKD (GFR = 60-89 mL/min/1.73 square meters)    Stage 3A Moderate CKD (GFR = 45-59 mL/min/1.73 square meters)    Stage 3B Moderate CKD (GFR = 30-44 mL/min/1.73 square meters)    Stage 4 Severe CKD (GFR = 15-29 mL/min/1.73 square meters)    Stage 5 End Stage CKD (GFR <15 mL/min/1.73 square meters)  Note: GFR calculation is accurate only with a steady state creatinine    Hepatic function panel [[id number]]  (Normal) Collected: 11/30/23 0834    Lab Status: Final result Specimen: Blood from Arm, Left Updated: 11/30/23 0857     Total Bilirubin 0.33 mg/dL      Bilirubin, Direct 0.05 mg/dL      Alkaline Phosphatase 86 U/L      AST 15 U/L      ALT 16 U/L      Total Protein 7.2 g/dL      Albumin 3.8 g/dL     CBC and differential [[id number]] Collected: 11/30/23 0834    Lab Status: Final result Specimen: Blood from Arm, Left Updated: 11/30/23 0845     WBC 7.46 Thousand/uL      RBC 3.95 Million/uL      Hemoglobin 12.0 g/dL      Hematocrit 36.7 %      MCV 93 fL      MCH 30.4 pg      MCHC 32.7 g/dL      RDW 12.5 %      MPV 9.9 fL      Platelets 131 Thousands/uL      nRBC 0 /100 WBCs      Neutrophils Relative 65 %      Immat GRANS % 0 %      Lymphocytes Relative 27 %      Monocytes Relative 6 % Eosinophils Relative 2 %      Basophils Relative 0 %      Neutrophils Absolute 4.85 Thousands/µL      Immature Grans Absolute 0.03 Thousand/uL      Lymphocytes Absolute 2.01 Thousands/µL      Monocytes Absolute 0.44 Thousand/µL      Eosinophils Absolute 0.11 Thousand/µL      Basophils Absolute 0.02 Thousands/µL     Narrative: This is an appended report. These results have been appended to a previously verified report. No orders to display              Procedures  ECG 12 Lead Documentation Only    Date/Time: 11/30/2023 8:38 AM    Performed by: Woo Askew MD  Authorized by: Woo Askew MD    ECG reviewed by me, the ED Provider: yes    Patient location:  ED  Interpretation:     Interpretation: normal    Rate:     ECG rate:  62    ECG rate assessment: normal    Rhythm:     Rhythm: sinus rhythm    QRS:     QRS axis:  Normal    QRS intervals:  Normal  Conduction:     Conduction: normal    ST segments:     ST segments:  Normal  T waves:     T waves: normal             ED Course                                             Medical Decision Making  1. Elevated blood pressure reading - Patient with Hx of HTN. Will check ECG to assess signs of acute strain, CBC, metabolic panel for electrolyte abnormalities and dehydration. Did instruct patient to follow up closely for rechecks with her PCP. Problems Addressed:  Elevated blood pressure reading: acute illness or injury    Amount and/or Complexity of Data Reviewed  Labs: ordered. ECG/medicine tests: ordered and independent interpretation performed. Details: ECG interpretation located in procedure section on note.              Disposition  Final diagnoses:   Elevated blood pressure reading     Time reflects when diagnosis was documented in both MDM as applicable and the Disposition within this note       Time User Action Codes Description Comment    11/30/2023  9:10 AM Fady Bueno Add [R03.0] Elevated blood pressure reading ED Disposition       ED Disposition   Discharge    Condition   Stable    Date/Time   Thu Nov 30, 2023  9:10 AM    Comment   Omayra Zhao discharge to home/self care. Follow-up Information       Follow up With Specialties Details Why Kandy Sumner MD Family Medicine Schedule an appointment as soon as possible for a visit   [id number] [facility].   [id number] [address]              Discharge Medication List as of 11/30/2023  9:12 AM        CONTINUE these medications which have NOT CHANGED    Details   albuterol (2.5 mg/3 mL) 0.083 % nebulizer solution TAKE 3 ML VIA NEBULIZER ROUTE EVERY 6 HOURS AS NEEDED FOR WHEEZING OR SHORTNESS OF BREATH, Normal      albuterol (PROVENTIL HFA,VENTOLIN HFA) 90 mcg/act inhaler INHALE 2 PUFFS BY MOUTH EVERY 4 HOURS AS NEEDED FOR WHEEZING, Normal      budesonide-formoterol (Symbicort) 160-4.5 mcg/act inhaler Inhale 2 puffs 2 (two) times a day Rinse mouth after use., Starting Mon 1/31/2022, Normal      Calcium Ascorbate (VITAMIN C) 500 mg tablet Take 500 mg by mouth daily, Historical Med      Cholecalciferol (Vitamin D3) 50 MCG (2000 UT) capsule TAKE 1 CAPSULE BY MOUTH EVERY MORNING, Normal      cyanocobalamin (VITAMIN B-12) 1000 MCG tablet Take 1 tablet (1,000 mcg total) by mouth daily, Starting Wed 11/15/2023, Normal      ergocalciferol (VITAMIN D2) 50,000 units Take 1 capsule (50,000 Units total) by mouth 2 (two) times a week, Starting Thu 11/16/2023, Normal      FeroSul 325 (65 Fe) MG tablet TAKE 1 TABLET BY MOUTH TWICE DAILY WITH MEALS, Normal      fluticasone (FLONASE) 50 mcg/act nasal spray 1 spray into each nostril daily, Starting Tue 10/17/2023, Normal      losartan (COZAAR) 100 MG tablet Take 25 mg by mouth daily, Historical Med      meclizine (ANTIVERT) 25 mg tablet Take 1 tablet (25 mg total) by mouth daily at bedtime, Starting Mon 11/20/2023, Normal      Multiple Vitamin (MULTIVITAMIN) tablet Take 1 tablet by mouth daily, Historical Med      NIFEdipine ER (ADALAT CC) 30 MG 24 hr tablet TAKE 1 TABLET(30 MG) BY MOUTH DAILY, Normal      olmesartan-hydrochlorothiazide (BENICAR HCT) 40-25 MG per tablet Take 1 tablet by mouth daily, Starting Tue 1/17/2023, Normal      Zinc 100 MG TABS Take by mouth, Historical Med             No discharge procedures on file.     PDMP Review         Value Time User    PDMP Reviewed  Yes 6/29/2021  8:33 AM Chiquis Dennis, [address] [place] [address]            ED Provider  Electronically Signed by             Isidro Chiang MD  11/30/23 6182

## 2023-12-04 ENCOUNTER — TELEPHONE (OUTPATIENT)
Dept: HEMATOLOGY ONCOLOGY | Facility: CLINIC | Age: 38
End: 2023-12-04

## 2023-12-04 NOTE — TELEPHONE ENCOUNTER
Called and spoke with apt for 1/17/2024 @11:00 with PANCHO ANTHONY. Patient mentioned that this was ok and if we had a cancellation that she would like to come in sooner.

## 2023-12-10 ENCOUNTER — APPOINTMENT (EMERGENCY)
Dept: RADIOLOGY | Facility: HOSPITAL | Age: 38
End: 2023-12-10
Payer: COMMERCIAL

## 2023-12-10 ENCOUNTER — HOSPITAL ENCOUNTER (EMERGENCY)
Facility: HOSPITAL | Age: 38
Discharge: HOME | End: 2023-12-10
Attending: EMERGENCY MEDICINE | Admitting: EMERGENCY MEDICINE
Payer: COMMERCIAL

## 2023-12-10 VITALS
WEIGHT: 283.95 LBS | BODY MASS INDEX: 49.51 KG/M2 | TEMPERATURE: 97.8 F | OXYGEN SATURATION: 99 % | SYSTOLIC BLOOD PRESSURE: 153 MMHG | HEART RATE: 80 BPM | RESPIRATION RATE: 18 BRPM | DIASTOLIC BLOOD PRESSURE: 70 MMHG

## 2023-12-10 DIAGNOSIS — R00.2 PALPITATIONS: Primary | ICD-10-CM

## 2023-12-10 LAB
2HR DELTA HS TROPONIN: 3 NG/L
ALBUMIN SERPL BCP-MCNC: 4.1 G/DL (ref 3.5–5)
ALP SERPL-CCNC: 95 U/L (ref 34–104)
ALT SERPL W P-5'-P-CCNC: 13 U/L (ref 7–52)
ANION GAP SERPL CALCULATED.3IONS-SCNC: 7 MMOL/L
AST SERPL W P-5'-P-CCNC: 14 U/L (ref 13–39)
ATRIAL RATE: 102 BPM
ATRIAL RATE: 71 BPM
BACTERIA UR QL AUTO: ABNORMAL /HPF
BASOPHILS # BLD MANUAL: 0 THOUSAND/UL (ref 0–0.1)
BASOPHILS NFR MAR MANUAL: 0 % (ref 0–1)
BILIRUB SERPL-MCNC: 0.34 MG/DL (ref 0.2–1)
BILIRUB UR QL STRIP: NEGATIVE
BUN SERPL-MCNC: 11 MG/DL (ref 5–25)
CALCIUM SERPL-MCNC: 8.7 MG/DL (ref 8.4–10.2)
CARDIAC TROPONIN I PNL SERPL HS: 3 NG/L
CARDIAC TROPONIN I PNL SERPL HS: 6 NG/L
CHLORIDE SERPL-SCNC: 107 MMOL/L (ref 96–108)
CLARITY UR: ABNORMAL
CO2 SERPL-SCNC: 25 MMOL/L (ref 21–32)
COLOR UR: YELLOW
CREAT SERPL-MCNC: 0.65 MG/DL (ref 0.6–1.3)
EOSINOPHIL # BLD MANUAL: 0 THOUSAND/UL (ref 0–0.4)
EOSINOPHIL NFR BLD MANUAL: 0 % (ref 0–6)
ERYTHROCYTE [DISTWIDTH] IN BLOOD BY AUTOMATED COUNT: 12.8 % (ref 11.6–15.1)
FLUAV RNA RESP QL NAA+PROBE: NEGATIVE
FLUBV RNA RESP QL NAA+PROBE: NEGATIVE
GFR SERPL CREATININE-BSD FRML MDRD: 113 ML/MIN/1.73SQ M
GLUCOSE SERPL-MCNC: 102 MG/DL (ref 65–140)
GLUCOSE UR STRIP-MCNC: NEGATIVE MG/DL
HCT VFR BLD AUTO: 38.7 % (ref 34.8–46.1)
HGB BLD-MCNC: 12.5 G/DL (ref 11.5–15.4)
HGB UR QL STRIP.AUTO: NEGATIVE
KETONES UR STRIP-MCNC: ABNORMAL MG/DL
LEUKOCYTE ESTERASE UR QL STRIP: NEGATIVE
LG PLATELETS BLD QL SMEAR: PRESENT
LYMPHOCYTES # BLD AUTO: 18 % (ref 14–44)
LYMPHOCYTES # BLD AUTO: 2.3 THOUSAND/UL (ref 0.6–4.47)
MCH RBC QN AUTO: 30.2 PG (ref 26.8–34.3)
MCHC RBC AUTO-ENTMCNC: 32.3 G/DL (ref 31.4–37.4)
MCV RBC AUTO: 94 FL (ref 82–98)
MONOCYTES # BLD AUTO: 0.64 THOUSAND/UL (ref 0–1.22)
MONOCYTES NFR BLD: 5 % (ref 4–12)
MUCOUS THREADS UR QL AUTO: ABNORMAL
NEUTROPHILS # BLD MANUAL: 9.83 THOUSAND/UL (ref 1.85–7.62)
NEUTS SEG NFR BLD AUTO: 77 % (ref 43–75)
NITRITE UR QL STRIP: NEGATIVE
NON-SQ EPI CELLS URNS QL MICRO: ABNORMAL /HPF
P AXIS: 54 DEGREES
P AXIS: 56 DEGREES
PH UR STRIP.AUTO: 5 [PH]
PLATELET # BLD AUTO: 332 THOUSANDS/UL (ref 149–390)
PLATELET BLD QL SMEAR: ADEQUATE
PMV BLD AUTO: 10 FL (ref 8.9–12.7)
POTASSIUM SERPL-SCNC: 3.6 MMOL/L (ref 3.5–5.3)
PR INTERVAL: 110 MS
PR INTERVAL: 146 MS
PROT SERPL-MCNC: 7.2 G/DL (ref 6.4–8.4)
PROT UR STRIP-MCNC: ABNORMAL MG/DL
QRS AXIS: 43 DEGREES
QRS AXIS: 46 DEGREES
QRSD INTERVAL: 80 MS
QRSD INTERVAL: 90 MS
QT INTERVAL: 370 MS
QT INTERVAL: 388 MS
QTC INTERVAL: 421 MS
QTC INTERVAL: 482 MS
RBC # BLD AUTO: 4.14 MILLION/UL (ref 3.81–5.12)
RBC #/AREA URNS AUTO: ABNORMAL /HPF
RBC MORPH BLD: NORMAL
RSV RNA RESP QL NAA+PROBE: NEGATIVE
SARS-COV-2 RNA RESP QL NAA+PROBE: NEGATIVE
SODIUM SERPL-SCNC: 139 MMOL/L (ref 135–147)
SP GR UR STRIP.AUTO: 1.04 (ref 1–1.03)
T WAVE AXIS: 144 DEGREES
T WAVE AXIS: 60 DEGREES
UROBILINOGEN UR STRIP-ACNC: 3 MG/DL
VENTRICULAR RATE: 102 BPM
VENTRICULAR RATE: 71 BPM
WBC # BLD AUTO: 12.77 THOUSAND/UL (ref 4.31–10.16)
WBC #/AREA URNS AUTO: ABNORMAL /HPF

## 2023-12-10 PROCEDURE — 71045 X-RAY EXAM CHEST 1 VIEW: CPT

## 2023-12-10 PROCEDURE — 36415 COLL VENOUS BLD VENIPUNCTURE: CPT | Performed by: EMERGENCY MEDICINE

## 2023-12-10 PROCEDURE — 0241U HB NFCT DS VIR RESP RNA 4 TRGT: CPT

## 2023-12-10 PROCEDURE — 85027 COMPLETE CBC AUTOMATED: CPT | Performed by: EMERGENCY MEDICINE

## 2023-12-10 PROCEDURE — 99285 EMERGENCY DEPT VISIT HI MDM: CPT

## 2023-12-10 PROCEDURE — 84484 ASSAY OF TROPONIN QUANT: CPT | Performed by: EMERGENCY MEDICINE

## 2023-12-10 PROCEDURE — 80053 COMPREHEN METABOLIC PANEL: CPT | Performed by: EMERGENCY MEDICINE

## 2023-12-10 PROCEDURE — 93005 ELECTROCARDIOGRAM TRACING: CPT

## 2023-12-10 PROCEDURE — 81001 URINALYSIS AUTO W/SCOPE: CPT

## 2023-12-10 PROCEDURE — 99284 EMERGENCY DEPT VISIT MOD MDM: CPT

## 2023-12-10 PROCEDURE — 85007 BL SMEAR W/DIFF WBC COUNT: CPT | Performed by: EMERGENCY MEDICINE

## 2023-12-10 NOTE — ED PROVIDER NOTES
History  Chief Complaint   Patient presents with    Palpitations     Patient brought in by EMS d/t palpitations. Patient states she woke up this morning with a cough, head, dizziness. She took tylenol that helped. About an hour ago is when she started having chest pressure and palpitations. 81-year-old female with PMH of anemia, HTN, HLD presents for evaluation of palpitations. States that tonight she felt her heart racing, checked her watch and said that her HR was ~115 bpm, laid down to rest and states it did not improve, she became worried and called EMS to come to the ER. She states that this morning she was also feeling dizzy and coughing a bit. Now when she is coughing she feels some pain in her left flank. Does also feel some left-sided chest pressure. States her symptoms have been improving since onset, heart rate has improved on arrival in ER. Prior to Admission Medications   Prescriptions Last Dose Informant Patient Reported? Taking?    Calcium Ascorbate (VITAMIN C) 500 mg tablet  Self Yes No   Sig: Take 500 mg by mouth daily   Cholecalciferol (Vitamin D3) 50 MCG (2000 UT) capsule  Self No No   Sig: TAKE 1 CAPSULE BY MOUTH EVERY MORNING   Patient not taking: Reported on 11/20/2023   FeroSul 325 (65 Fe) MG tablet  Self No No   Sig: TAKE 1 TABLET BY MOUTH TWICE DAILY WITH MEALS   Multiple Vitamin (MULTIVITAMIN) tablet  Self Yes No   Sig: Take 1 tablet by mouth daily   NIFEdipine ER (ADALAT CC) 30 MG 24 hr tablet  Self No No   Sig: TAKE 1 TABLET(30 MG) BY MOUTH DAILY   Zinc 100 MG TABS  Self Yes No   Sig: Take by mouth   albuterol (2.5 mg/3 mL) 0.083 % nebulizer solution  Self No No   Sig: TAKE 3 ML VIA NEBULIZER ROUTE EVERY 6 HOURS AS NEEDED FOR WHEEZING OR SHORTNESS OF BREATH   albuterol (PROVENTIL HFA,VENTOLIN HFA) 90 mcg/act inhaler  Self No No   Sig: INHALE 2 PUFFS BY MOUTH EVERY 4 HOURS AS NEEDED FOR WHEEZING   budesonide-formoterol (Symbicort) 160-4.5 mcg/act inhaler  Self No No   Sig: Inhale 2 puffs 2 (two) times a day Rinse mouth after use. cyanocobalamin (VITAMIN B-12) 1000 MCG tablet  Self No No   Sig: Take 1 tablet (1,000 mcg total) by mouth daily   ergocalciferol (VITAMIN D2) 50,000 units  Self No No   Sig: Take 1 capsule (50,000 Units total) by mouth 2 (two) times a week   fluticasone (FLONASE) 50 mcg/act nasal spray  Self No No   Si spray into each nostril daily   losartan (COZAAR) 100 MG tablet  Self Yes No   Sig: Take 25 mg by mouth daily   meclizine (ANTIVERT) 25 mg tablet   No No   Sig: Take 1 tablet (25 mg total) by mouth daily at bedtime   Patient not taking: Reported on 2023   olmesartan-hydrochlorothiazide (BENICAR HCT) 40-25 MG per tablet  Self No No   Sig: Take 1 tablet by mouth daily      Facility-Administered Medications Last Administration Doses Remaining   cyanocobalamin injection 1,000 mcg 2023  8:59 AM    cyanocobalamin injection 1,000 mcg 2023  8:55 AM           Past Medical History:   Diagnosis Date    Anemia     BMI 36.0-36.9,adult 2020    Cough 2020    COVID-19 virus infection 2020    Disease of thyroid gland     Dizziness     due to anemia, last episode 2020, no falls    Drop in hemoglobin 2020    Fatigue     History of transfusion     anemic - loss of blood due to bleeding ulcers, no reaction    Hyperlipidemia     Hypertension     Multiple gastric ulcers     Obesity     17    RUQ pain 2022    Severe obesity (BMI 35.0-39. 9) with comorbidity ([address]) 2020    Sore throat 10/27/2021    Thyroid disease     took synthroid but not currently taking    Thyroid mass 03/15/2021     A CT scan of the neck incidental finding 1.7 cm of the right thyroid    Ulcer        Past Surgical History:   Procedure Laterality Date    CHOLECYSTECTOMY      CHOLECYSTECTOMY LAPAROSCOPIC N/A 06/10/2022    Procedure: CHOLECYSTECTOMY LAPAROSCOPIC;  Surgeon: Sola Ace MD;  Location:  MAIN OR;  Service: General    GASTRIC BYPASS  2014    GASTRIC BYPASS LAPAROSCOPIC N/A 12/15/2020    Procedure: Robotic lysis of adhesions, small-bowel resection, partial gastrectomy, paraesophageal hernia repair, bilateral truncal vagotomy; Robotic gastrojejunostomy anastomosis with intraop endoscopy;  Surgeon: Alisia Knutson MD;  Location: [facility];  Service: Bariatrics    LITO-EN-Y PROCEDURE  2016    Gastric Bypass by Dr. Peter Adler Weight 339.6,nw    TUBAL LIGATION Bilateral         WISDOM TOOTH EXTRACTION         Family History   Problem Relation Age of Onset    Asthma Mother     Coronary artery disease Mother     Diabetes Mother         MELLITUS    Hyperlipidemia Mother     Hypertension Mother     Aneurysm Mother         2018 just diagnosed with two    No Known Problems Father          when she was 1 months old    No Known Problems Sister     No Known Problems Daughter     No Known Problems Daughter     Aneurysm Maternal Grandmother     Aneurysm Maternal Grandfather           of a ruptured abdominal aneurysm    No Known Problems Paternal Grandmother     No Known Problems Paternal Grandfather     No Known Problems Maternal Aunt     No Known Problems Maternal Aunt     No Known Problems Maternal Aunt      I have reviewed and agree with the history as documented.     E-Cigarette/Vaping    E-Cigarette Use Never User      E-Cigarette/Vaping Substances    Nicotine No     THC No     CBD No     Flavoring No     Other No     Unknown No      Social History     Tobacco Use    Smoking status: Former     Packs/day: 0.25     Years: 14.00     Total pack years: 3.50     Types: Cigarettes     Start date: 2004     Quit date: 9/10/2023     Years since quittin.2     Passive exposure: Never    Smokeless tobacco: Never    Tobacco comments:     Started again 3/2023, 1 cig per day   Vaping Use    Vaping Use: Never used   Substance Use Topics    Alcohol use: No    Drug use: No       Review of Systems   Constitutional:  Negative for chills and fever. HENT:  Negative for ear pain and sore throat. Eyes:  Negative for pain and visual disturbance. Respiratory:  Positive for cough. Negative for shortness of breath. Cardiovascular:  Positive for palpitations. Negative for chest pain. Gastrointestinal:  Negative for abdominal pain and vomiting. Genitourinary:  Negative for dysuria and hematuria. Musculoskeletal:  Negative for arthralgias and back pain. Skin:  Negative for color change and rash. Neurological:  Positive for dizziness. Negative for seizures and syncope. All other systems reviewed and are negative. Physical Exam  Physical Exam  Vitals and nursing note reviewed. Constitutional:       General: She is not in acute distress. Appearance: She is well-developed. HENT:      Head: Normocephalic and atraumatic. Eyes:      Conjunctiva/sclera: Conjunctivae normal.   Cardiovascular:      Rate and Rhythm: Normal rate and regular rhythm. Heart sounds: No murmur heard. Pulmonary:      Effort: Pulmonary effort is normal. No respiratory distress. Breath sounds: Normal breath sounds. Abdominal:      Palpations: Abdomen is soft. Tenderness: There is no abdominal tenderness. Musculoskeletal:         General: No swelling. Cervical back: Neck supple. Skin:     General: Skin is warm and dry. Capillary Refill: Capillary refill takes less than 2 seconds. Neurological:      Mental Status: She is alert. Psychiatric:         Mood and Affect: Mood is anxious.          Vital Signs  ED Triage Vitals [12/10/23 0054]   Temperature Pulse Respirations Blood Pressure SpO2   97.8 °F (36.6 °C) 96 20 (!) 179/116 100 %      Temp Source Heart Rate Source Patient Position - Orthostatic VS BP Location FiO2 (%)   Oral Monitor Lying Right arm --      Pain Score       No Pain           Vitals:    12/10/23 0054 12/10/23 0251   BP: (!) 179/116 153/70   Pulse: 96 80   Patient Position - Orthostatic VS: Lying Lying Visual Acuity  Visual Acuity      Flowsheet Row Most Recent Value   L Pupil Size (mm) 4   R Pupil Size (mm) 4            ED Medications  Medications - No data to display    Diagnostic Studies  Results Reviewed       Procedure Component Value Units Date/Time    HS Troponin I 2hr [[id number]]  (Normal) Collected: 12/10/23 0249    Lab Status: Final result Specimen: Blood from Arm, Left Updated: 12/10/23 0321     hs TnI 2hr 6 ng/L      Delta 2hr hsTnI 3 ng/L     Manual Differential(PHLEBS Do Not Order) [[id number]]  (Abnormal) Collected: 12/10/23 0042    Lab Status: Final result Specimen: Blood from Arm, Left Updated: 12/10/23 0205     Segmented % 77 %      Lymphocytes % 18 %      Monocytes % 5 %      Eosinophils, % 0 %      Basophils % 0 %      Absolute Neutrophils 9.83 Thousand/uL      Lymphocytes Absolute 2.30 Thousand/uL      Monocytes Absolute 0.64 Thousand/uL      Eosinophils Absolute 0.00 Thousand/uL      Basophils Absolute 0.00 Thousand/uL      Total Counted --     RBC Morphology Normal     Platelet Estimate Adequate     Large Platelet Present    FLU/RSV/COVID - if FLU/RSV clinically relevant [[id number]]  (Normal) Collected: 12/10/23 0112    Lab Status: Final result Specimen: Nares from Nose Updated: 12/10/23 0156     SARS-CoV-2 Negative     INFLUENZA A PCR Negative     INFLUENZA B PCR Negative     RSV PCR Negative    Narrative:      FOR PEDIATRIC PATIENTS - copy/paste COVID Guidelines URL to browser: [email or web address]    SARS-CoV-2 assay is a Nucleic Acid Amplification assay intended for the  qualitative detection of nucleic acid from SARS-CoV-2 in nasopharyngeal  swabs. Results are for the presumptive identification of SARS-CoV-2 RNA. Positive results are indicative of infection with SARS-CoV-2, the virus  causing COVID-19, but do not rule out bacterial infection or co-infection  with other viruses.  Laboratories within the [facility] and its  territories are required to report all positive results to the appropriate  public health authorities. Negative results do not preclude SARS-CoV-2  infection and should not be used as the sole basis for treatment or other  patient management decisions. Negative results must be combined with  clinical observations, patient history, and epidemiological information. This test has not been FDA cleared or approved. This test has been authorized by FDA under an Emergency Use Authorization  (EUA). This test is only authorized for the duration of time the  declaration that circumstances exist justifying the authorization of the  emergency use of an in vitro diagnostic tests for detection of SARS-CoV-2  virus and/or diagnosis of COVID-19 infection under section 564(b)(1) of  the Act, 21 U. S.C. 452DZU-6(Y)(0), unless the authorization is terminated  or revoked sooner. The test has been validated but independent review by FDA  and CLIA is pending. Test performed using [employer] GeneXpert: This RT-PCR assay targets N2,  a region unique to SARS-CoV-2. A conserved region in the E-gene was chosen  for pan-Sarbecovirus detection which includes SARS-CoV-2. According to CMS-2020-01-R, this platform meets the definition of high-throughput technology.     Urinalysis with microscopic [[id number]]  (Abnormal) Collected: 12/10/23 0123    Lab Status: Final result Specimen: Urine, Clean Catch Updated: 12/10/23 0146     Color, UA Yellow     Clarity, UA Turbid     Specific Gravity, UA 1.041     pH, UA 5.0     Leukocytes, UA Negative     Nitrite, UA Negative     Protein, UA 30 (1+) mg/dl      Glucose, UA Negative mg/dl      Ketones, UA Trace mg/dl      Urobilinogen, UA 3.0 mg/dl      Bilirubin, UA Negative     Occult Blood, UA Negative     RBC, UA 2-4 /hpf      WBC, UA 4-10 /hpf      Epithelial Cells Moderate /hpf      Bacteria, UA Moderate /hpf      MUCUS THREADS Moderate    HS Troponin 0hr (reflex protocol) [[id number]]  (Normal) Collected: 12/10/23 0042    Lab Status: Final result Specimen: Blood from Arm, Left Updated: 12/10/23 0109     hs TnI 0hr 3 ng/L     Comprehensive metabolic panel [[id number]] Collected: 12/10/23 0042    Lab Status: Final result Specimen: Blood from Arm, Left Updated: 12/10/23 0101     Sodium 139 mmol/L      Potassium 3.6 mmol/L      Chloride 107 mmol/L      CO2 25 mmol/L      ANION GAP 7 mmol/L      BUN 11 mg/dL      Creatinine 0.65 mg/dL      Glucose 102 mg/dL      Calcium 8.7 mg/dL      AST 14 U/L      ALT 13 U/L      Alkaline Phosphatase 95 U/L      Total Protein 7.2 g/dL      Albumin 4.1 g/dL      Total Bilirubin 0.34 mg/dL      eGFR 113 ml/min/1.73sq m     Narrative:      [facility] (CKD):     Stage 1 with normal or high GFR (GFR > 90 mL/min/1.73 square meters)    Stage 2 Mild CKD (GFR = 60-89 mL/min/1.73 square meters)    Stage 3A Moderate CKD (GFR = 45-59 mL/min/1.73 square meters)    Stage 3B Moderate CKD (GFR = 30-44 mL/min/1.73 square meters)    Stage 4 Severe CKD (GFR = 15-29 mL/min/1.73 square meters)    Stage 5 End Stage CKD (GFR <15 mL/min/1.73 square meters)  Note: GFR calculation is accurate only with a steady state creatinine    CBC and differential [[id number]]  (Abnormal) Collected: 12/10/23 0042    Lab Status: Final result Specimen: Blood from Arm, Left Updated: 12/10/23 0055     WBC 12.77 Thousand/uL      RBC 4.14 Million/uL      Hemoglobin 12.5 g/dL      Hematocrit 38.7 %      MCV 94 fL      MCH 30.2 pg      MCHC 32.3 g/dL      RDW 12.8 %      MPV 10.0 fL      Platelets 117 Thousands/uL                    XR chest 1 view portable    (Results Pending)              Procedures  Procedures         ED Course                               SBIRT 22yo+      Flowsheet Row Most Recent Value   Initial Alcohol Screen: US AUDIT-C     1. How often do you have a drink containing alcohol? 0 Filed at: 12/10/2023 0038   2.  How many drinks containing alcohol do you have on a typical day you are drinking? 0 Filed at: 12/10/2023 0038   3a. Male UNDER 65: How often do you have five or more drinks on one occasion? 0 Filed at: 12/10/2023 0038   3b. FEMALE Any Age, or MALE 65+: How often do you have 4 or more drinks on one occassion? 0 Filed at: 12/10/2023 0038   Audit-C Score 0 Filed at: 12/10/2023 8508   JUAREZ: How many times in the past year have you. .. Used an illegal drug or used a prescription medication for non-medical reasons? Never Filed at: 12/10/2023 0038                      Medical Decision Making  27-year-old female presents for evaluation of palpitations, dizziness, cough. On exam she appears a bit anxious but in NAD, AOx3, hypertensive, heart RRR, lungs CTAB, normal respiratory effort. Workup: CBC, CMP, troponin, COVID/flu/RSV, UA, ECG. Lab work is grossly unremarkable. Mild leukocytosis of 12.7, nonspecific. UA positive for bacteria and WBCs, however is also positive for moderate epithelial cells and is likely contaminated. Discussed this result with patient, she is adamant that she is not experiencing any dysuria, difficulty urinating, etc.  Without symptoms have very low suspicion for UTI and will not treat with antibiotics at this time. Recommend follow-up with PCP or ER if urinary symptoms develop. Rest of lab work was unremarkable. No evidence of acute ischemic changes. Patient remained hemodynamically stable throughout remainder of ER visit. She is stable for continued evaluation on an outpatient basis. Advised her to call her cardiologist this coming week to set up evaluation and return to ER if condition acutely worsening. . Patient expresses understanding of the condition, treatment plan, follow-up instructions, and return precautions. Amount and/or Complexity of Data Reviewed  Labs: ordered. Radiology: ordered.              Disposition  Final diagnoses:   Palpitations     Time reflects when diagnosis was documented in both MDM as applicable and the Disposition within this note       Time User Action Codes Description Comment    12/10/2023  4:01 AM Pablo Marin Add [R00.2] Palpitations           ED Disposition       ED Disposition   Discharge    Condition   Stable    Date/Time   Sun Dec 10, 2023 0401    Comment   Debbie Dennise RahmanRaziamilton discharge to home/self care. Follow-up Information       Follow up With Specialties Details Why Contact Info Additional Information    Marthena Denver, MD Family Medicine Schedule an appointment as soon as possible for a visit   [address] [facility].   [address] [facility] Emergency Department Emergency Medicine  If symptoms worsen [address] [address]  [address] [facility] Emergency Department, [address] [place], [place], [address]            Discharge Medication List as of 12/10/2023  4:12 AM        CONTINUE these medications which have NOT CHANGED    Details   albuterol (2.5 mg/3 mL) 0.083 % nebulizer solution TAKE 3 ML VIA NEBULIZER ROUTE EVERY 6 HOURS AS NEEDED FOR WHEEZING OR SHORTNESS OF BREATH, Normal      albuterol (PROVENTIL HFA,VENTOLIN HFA) 90 mcg/act inhaler INHALE 2 PUFFS BY MOUTH EVERY 4 HOURS AS NEEDED FOR WHEEZING, Normal      budesonide-formoterol (Symbicort) 160-4.5 mcg/act inhaler Inhale 2 puffs 2 (two) times a day Rinse mouth after use., Starting Mon 1/31/2022, Normal      Calcium Ascorbate (VITAMIN C) 500 mg tablet Take 500 mg by mouth daily, Historical Med      Cholecalciferol (Vitamin D3) 50 MCG (2000 UT) capsule TAKE 1 CAPSULE BY MOUTH EVERY MORNING, Normal      cyanocobalamin (VITAMIN B-12) 1000 MCG tablet Take 1 tablet (1,000 mcg total) by mouth daily, Starting Wed 11/15/2023, Normal      ergocalciferol (VITAMIN D2) 50,000 units Take 1 capsule (50,000 Units total) by mouth 2 (two) times a week, Starting Thu 11/16/2023, Normal      FeroSul 325 (65 Fe) MG tablet TAKE 1 TABLET BY MOUTH TWICE DAILY WITH MEALS, Normal      fluticasone (FLONASE) 50 mcg/act nasal spray 1 spray into each nostril daily, Starting Tue 10/17/2023, Normal      losartan (COZAAR) 100 MG tablet Take 25 mg by mouth daily, Historical Med      meclizine (ANTIVERT) 25 mg tablet Take 1 tablet (25 mg total) by mouth daily at bedtime, Starting Mon 11/20/2023, Normal      Multiple Vitamin (MULTIVITAMIN) tablet Take 1 tablet by mouth daily, Historical Med      NIFEdipine ER (ADALAT CC) 30 MG 24 hr tablet TAKE 1 TABLET(30 MG) BY MOUTH DAILY, Normal      olmesartan-hydrochlorothiazide (BENICAR HCT) 40-25 MG per tablet Take 1 tablet by mouth daily, Starting Tue 1/17/2023, Normal      Zinc 100 MG TABS Take by mouth, Historical Med             No discharge procedures on file.     PDMP Review         Value Time User    PDMP Reviewed  Yes 6/29/2021  8:33 AM Cece Herrera            ED Provider  Electronically Signed by             Sandra Tipton PA-C  12/10/23 6414

## 2023-12-10 NOTE — DISCHARGE INSTRUCTIONS
We didn't see anything concerning on your workup tonight. Please touch base with your cardiologist this week to discuss if you continue experiencing palpations.

## 2023-12-11 ENCOUNTER — VBI (OUTPATIENT)
Dept: ADMINISTRATIVE | Facility: OTHER | Age: 38
End: 2023-12-11

## 2023-12-19 PROBLEM — H66.002 NON-RECURRENT ACUTE SUPPURATIVE OTITIS MEDIA OF LEFT EAR WITHOUT SPONTANEOUS RUPTURE OF TYMPANIC MEMBRANE: Status: RESOLVED | Noted: 2023-10-20 | Resolved: 2023-12-19

## 2024-01-17 ENCOUNTER — TELEPHONE (OUTPATIENT)
Dept: HEMATOLOGY ONCOLOGY | Facility: CLINIC | Age: 39
End: 2024-01-17

## 2024-01-17 NOTE — TELEPHONE ENCOUNTER
Called patient for missed appointment and got her rescheduled. Patient verbally confirmed and agreed to appointment on March 5, 2024 @ 11am.

## 2024-02-05 ENCOUNTER — HOSPITAL ENCOUNTER (EMERGENCY)
Facility: HOSPITAL | Age: 39
Discharge: HOME | End: 2024-02-05
Attending: EMERGENCY MEDICINE
Payer: COMMERCIAL

## 2024-02-05 ENCOUNTER — APPOINTMENT (EMERGENCY)
Dept: CT IMAGING | Facility: HOSPITAL | Age: 39
End: 2024-02-05
Payer: COMMERCIAL

## 2024-02-05 VITALS
DIASTOLIC BLOOD PRESSURE: 76 MMHG | OXYGEN SATURATION: 100 % | HEART RATE: 74 BPM | RESPIRATION RATE: 16 BRPM | TEMPERATURE: 97.9 F | HEIGHT: 64 IN | BODY MASS INDEX: 48.59 KG/M2 | WEIGHT: 284.6 LBS | SYSTOLIC BLOOD PRESSURE: 136 MMHG

## 2024-02-05 DIAGNOSIS — M54.50 LOW BACK PAIN: Primary | ICD-10-CM

## 2024-02-05 LAB
ALBUMIN SERPL BCP-MCNC: 4 G/DL (ref 3.5–5)
ALP SERPL-CCNC: 83 U/L (ref 34–104)
ALT SERPL W P-5'-P-CCNC: 11 U/L (ref 7–52)
ANION GAP SERPL CALCULATED.3IONS-SCNC: 3 MMOL/L
AST SERPL W P-5'-P-CCNC: 15 U/L (ref 13–39)
BACTERIA UR QL AUTO: ABNORMAL /HPF
BASOPHILS # BLD MANUAL: 0 THOUSAND/UL (ref 0–0.1)
BASOPHILS NFR MAR MANUAL: 0 % (ref 0–1)
BILIRUB SERPL-MCNC: 0.24 MG/DL (ref 0.2–1)
BILIRUB UR QL STRIP: NEGATIVE
BUN SERPL-MCNC: 14 MG/DL (ref 5–25)
CALCIUM SERPL-MCNC: 8.5 MG/DL (ref 8.4–10.2)
CHLORIDE SERPL-SCNC: 109 MMOL/L (ref 96–108)
CLARITY UR: CLEAR
CO2 SERPL-SCNC: 26 MMOL/L (ref 21–32)
COLOR UR: YELLOW
CREAT SERPL-MCNC: 0.59 MG/DL (ref 0.6–1.3)
EOSINOPHIL # BLD MANUAL: 0 THOUSAND/UL (ref 0–0.4)
EOSINOPHIL NFR BLD MANUAL: 0 % (ref 0–6)
ERYTHROCYTE [DISTWIDTH] IN BLOOD BY AUTOMATED COUNT: 12.2 % (ref 11.6–15.1)
EXT PREGNANCY TEST URINE: NEGATIVE
EXT. CONTROL: NORMAL
GFR SERPL CREATININE-BSD FRML MDRD: 117 ML/MIN/1.73SQ M
GLUCOSE SERPL-MCNC: 76 MG/DL (ref 65–140)
GLUCOSE UR STRIP-MCNC: NEGATIVE MG/DL
HCT VFR BLD AUTO: 38.3 % (ref 34.8–46.1)
HGB BLD-MCNC: 12.4 G/DL (ref 11.5–15.4)
HGB UR QL STRIP.AUTO: NEGATIVE
KETONES UR STRIP-MCNC: NEGATIVE MG/DL
LEUKOCYTE ESTERASE UR QL STRIP: ABNORMAL
LG PLATELETS BLD QL SMEAR: PRESENT
LIPASE SERPL-CCNC: 25 U/L (ref 11–82)
LYMPHOCYTES # BLD AUTO: 2.52 THOUSAND/UL (ref 0.6–4.47)
LYMPHOCYTES # BLD AUTO: 21 % (ref 14–44)
MCH RBC QN AUTO: 30.2 PG (ref 26.8–34.3)
MCHC RBC AUTO-ENTMCNC: 32.4 G/DL (ref 31.4–37.4)
MCV RBC AUTO: 93 FL (ref 82–98)
MONOCYTES # BLD AUTO: 0.88 THOUSAND/UL (ref 0–1.22)
MONOCYTES NFR BLD: 8 % (ref 4–12)
MUCOUS THREADS UR QL AUTO: ABNORMAL
NEUTROPHILS # BLD MANUAL: 7.56 THOUSAND/UL (ref 1.85–7.62)
NEUTS BAND NFR BLD MANUAL: 1 % (ref 0–8)
NEUTS SEG NFR BLD AUTO: 68 % (ref 43–75)
NITRITE UR QL STRIP: NEGATIVE
NON-SQ EPI CELLS URNS QL MICRO: ABNORMAL /HPF
PH UR STRIP.AUTO: 5.5 [PH] (ref 4.5–8)
PLATELET # BLD AUTO: 331 THOUSANDS/UL (ref 149–390)
PLATELET BLD QL SMEAR: ADEQUATE
PMV BLD AUTO: 10.2 FL (ref 8.9–12.7)
POTASSIUM SERPL-SCNC: 4.4 MMOL/L (ref 3.5–5.3)
PROT SERPL-MCNC: 7.3 G/DL (ref 6.4–8.4)
PROT UR STRIP-MCNC: ABNORMAL MG/DL
RBC # BLD AUTO: 4.1 MILLION/UL (ref 3.81–5.12)
RBC #/AREA URNS AUTO: ABNORMAL /HPF
RBC MORPH BLD: NORMAL
SODIUM SERPL-SCNC: 138 MMOL/L (ref 135–147)
SP GR UR STRIP.AUTO: >=1.03 (ref 1–1.03)
UROBILINOGEN UR QL STRIP.AUTO: 1 E.U./DL
VARIANT LYMPHS # BLD AUTO: 2 %
WBC # BLD AUTO: 10.95 THOUSAND/UL (ref 4.31–10.16)
WBC #/AREA URNS AUTO: ABNORMAL /HPF

## 2024-02-05 PROCEDURE — 81025 URINE PREGNANCY TEST: CPT

## 2024-02-05 PROCEDURE — 80053 COMPREHEN METABOLIC PANEL: CPT

## 2024-02-05 PROCEDURE — 99284 EMERGENCY DEPT VISIT MOD MDM: CPT

## 2024-02-05 PROCEDURE — 36415 COLL VENOUS BLD VENIPUNCTURE: CPT

## 2024-02-05 PROCEDURE — 99284 EMERGENCY DEPT VISIT MOD MDM: CPT | Performed by: EMERGENCY MEDICINE

## 2024-02-05 PROCEDURE — 96375 TX/PRO/DX INJ NEW DRUG ADDON: CPT

## 2024-02-05 PROCEDURE — 96376 TX/PRO/DX INJ SAME DRUG ADON: CPT

## 2024-02-05 PROCEDURE — 87086 URINE CULTURE/COLONY COUNT: CPT

## 2024-02-05 PROCEDURE — 96374 THER/PROPH/DIAG INJ IV PUSH: CPT

## 2024-02-05 PROCEDURE — 85007 BL SMEAR W/DIFF WBC COUNT: CPT

## 2024-02-05 PROCEDURE — 74176 CT ABD & PELVIS W/O CONTRAST: CPT

## 2024-02-05 PROCEDURE — G1004 CDSM NDSC: HCPCS

## 2024-02-05 PROCEDURE — 85027 COMPLETE CBC AUTOMATED: CPT

## 2024-02-05 PROCEDURE — 81001 URINALYSIS AUTO W/SCOPE: CPT

## 2024-02-05 PROCEDURE — 83690 ASSAY OF LIPASE: CPT

## 2024-02-05 RX ORDER — ONDANSETRON 2 MG/ML
4 INJECTION INTRAMUSCULAR; INTRAVENOUS ONCE
Status: COMPLETED | OUTPATIENT
Start: 2024-02-05 | End: 2024-02-05

## 2024-02-05 RX ORDER — MORPHINE SULFATE 4 MG/ML
4 INJECTION, SOLUTION INTRAMUSCULAR; INTRAVENOUS ONCE
Status: COMPLETED | OUTPATIENT
Start: 2024-02-05 | End: 2024-02-05

## 2024-02-05 RX ORDER — METHOCARBAMOL 500 MG/1
500 TABLET, FILM COATED ORAL 2 TIMES DAILY
Qty: 20 TABLET | Refills: 0 | Status: SHIPPED | OUTPATIENT
Start: 2024-02-05 | End: 2024-02-06 | Stop reason: SDUPTHER

## 2024-02-05 RX ADMIN — MORPHINE SULFATE 4 MG: 4 INJECTION INTRAVENOUS at 12:02

## 2024-02-05 RX ADMIN — ONDANSETRON 4 MG: 2 INJECTION INTRAMUSCULAR; INTRAVENOUS at 12:01

## 2024-02-05 RX ADMIN — MORPHINE SULFATE 4 MG: 4 INJECTION INTRAVENOUS at 14:50

## 2024-02-05 NOTE — ED PROVIDER NOTES
"History  Chief Complaint   Patient presents with    Flank Pain     Left flank pain, described as sharp, began 1 day ago. Pain radiates around to abdomen, denies urinary complaints. Pain is constant.      Becky is a 37 yo female presenting with left sided flank pain that started Friday. She says the pain began as a dull pain on the left side of her back on Friday and remained that way until this morning when it progressed to a sharp, intermittent \"stabbing\" pain. The pain radiates around the left side of her abdomen. She also complains of nausea but has not vomited. She reports intermittent chills and sweats but no recorded temperatures. She says yesterday she had some burning with urination and frequency but denies any difficulty urinating or blood in her urine. She denies any urinary symptoms today. She is eating and drinking. She denies any light-headedness, headaches, shortness of breath, diarrhea, or constipation. She has had a gastric bypass surgery and a cholecystectomy.       History provided by:  Patient   used: No    Flank Pain  Associated symptoms: chills, dysuria and nausea    Associated symptoms: no chest pain, no constipation, no diarrhea, no fatigue, no fever, no hematuria, no shortness of breath, no sore throat and no vomiting        Prior to Admission Medications   Prescriptions Last Dose Informant Patient Reported? Taking?   Calcium Ascorbate (VITAMIN C) 500 mg tablet  Self Yes Yes   Sig: Take 500 mg by mouth daily   FeroSul 325 (65 Fe) MG tablet  Self No Yes   Sig: TAKE 1 TABLET BY MOUTH TWICE DAILY WITH MEALS   Multiple Vitamin (MULTIVITAMIN) tablet  Self Yes Yes   Sig: Take 1 tablet by mouth daily   NIFEdipine ER (ADALAT CC) 30 MG 24 hr tablet  Self No Yes   Sig: TAKE 1 TABLET(30 MG) BY MOUTH DAILY   Zinc 100 MG TABS  Self Yes Yes   Sig: Take by mouth   albuterol (2.5 mg/3 mL) 0.083 % nebulizer solution  Self No Yes   Sig: TAKE 3 ML VIA NEBULIZER ROUTE EVERY 6 HOURS AS " NEEDED FOR WHEEZING OR SHORTNESS OF BREATH   albuterol (PROVENTIL HFA,VENTOLIN HFA) 90 mcg/act inhaler  Self No Yes   Sig: INHALE 2 PUFFS BY MOUTH EVERY 4 HOURS AS NEEDED FOR WHEEZING   budesonide-formoterol (Symbicort) 160-4.5 mcg/act inhaler  Self No Yes   Sig: Inhale 2 puffs 2 (two) times a day Rinse mouth after use.   cyanocobalamin (VITAMIN B-12) 1000 MCG tablet  Self No Yes   Sig: Take 1 tablet (1,000 mcg total) by mouth daily   ergocalciferol (VITAMIN D2) 50,000 units  Self No Yes   Sig: Take 1 capsule (50,000 Units total) by mouth 2 (two) times a week   fluticasone (FLONASE) 50 mcg/act nasal spray  Self No Yes   Si spray into each nostril daily   losartan (COZAAR) 100 MG tablet  Self Yes Yes   Sig: Take 25 mg by mouth daily   olmesartan-hydrochlorothiazide (BENICAR HCT) 40-25 MG per tablet  Self No Yes   Sig: Take 1 tablet by mouth daily      Facility-Administered Medications Last Administration Doses Remaining   cyanocobalamin injection 1,000 mcg 2023  8:59 AM    cyanocobalamin injection 1,000 mcg 2023  8:55 AM           Past Medical History:   Diagnosis Date    Anemia     BMI 36.0-36.9,adult 2020    Cough 2020    COVID-19 virus infection 2020    Disease of thyroid gland     Dizziness     due to anemia, last episode 2020, no falls    Drop in hemoglobin 2020    Fatigue     History of transfusion 2016    anemic - loss of blood due to bleeding ulcers, no reaction    Hyperlipidemia     Hypertension     Multiple gastric ulcers     Obesity     16    RUQ pain 2022    Severe obesity (BMI 35.0-39.9) with comorbidity (HCC) 2020    Sore throat 10/27/2021    Thyroid disease     took synthroid but not currently taking    Thyroid mass 03/15/2021     A CT scan of the neck incidental finding 1.7 cm of the right thyroid    Ulcer        Past Surgical History:   Procedure Laterality Date    CHOLECYSTECTOMY      CHOLECYSTECTOMY LAPAROSCOPIC N/A 06/10/2022     Procedure: CHOLECYSTECTOMY LAPAROSCOPIC;  Surgeon: Dameon Iverson MD;  Location:  MAIN OR;  Service: General    GASTRIC BYPASS      GASTRIC BYPASS LAPAROSCOPIC N/A 12/15/2020    Procedure: Robotic lysis of adhesions, small-bowel resection, partial gastrectomy, paraesophageal hernia repair, bilateral truncal vagotomy; Robotic gastrojejunostomy anastomosis with intraop endoscopy;  Surgeon: Arianna Lyons MD;  Location: [facility];  Service: Bariatrics    LITO-EN-Y PROCEDURE  2016    Gastric Bypass by Dr. Gay;Pre-Op Weight 339.6,nw    TUBAL LIGATION Bilateral         WISDOM TOOTH EXTRACTION         Family History   Problem Relation Age of Onset    Asthma Mother     Coronary artery disease Mother     Diabetes Mother         MELLITUS    Hyperlipidemia Mother     Hypertension Mother     Aneurysm Mother         2018 just diagnosed with two    No Known Problems Father          when she was 3 months old    No Known Problems Sister     No Known Problems Daughter     No Known Problems Daughter     Aneurysm Maternal Grandmother     Aneurysm Maternal Grandfather           of a ruptured abdominal aneurysm    No Known Problems Paternal Grandmother     No Known Problems Paternal Grandfather     No Known Problems Maternal Aunt     No Known Problems Maternal Aunt     No Known Problems Maternal Aunt      I have reviewed and agree with the history as documented.    E-Cigarette/Vaping    E-Cigarette Use Never User      E-Cigarette/Vaping Substances    Nicotine No     THC No     CBD No     Flavoring No     Other No     Unknown No      Social History     Tobacco Use    Smoking status: Former     Current packs/day: 0.00     Average packs/day: 0.3 packs/day for 19.2 years (4.8 ttl pk-yrs)     Types: Cigarettes     Start date: 2004     Quit date: 9/10/2023     Years since quittin.4     Passive exposure: Never    Smokeless tobacco: Never    Tobacco comments:     Started again 3/2023, 1 cig per day    Vaping Use    Vaping status: Never Used   Substance Use Topics    Alcohol use: No    Drug use: No       Review of Systems   Constitutional:  Positive for chills. Negative for appetite change, fatigue and fever.   HENT:  Negative for congestion and sore throat.    Respiratory:  Negative for shortness of breath.    Cardiovascular:  Negative for chest pain.   Gastrointestinal:  Positive for abdominal pain and nausea. Negative for constipation, diarrhea and vomiting.   Genitourinary:  Positive for dysuria and flank pain. Negative for frequency, hematuria and pelvic pain.   Musculoskeletal:  Positive for back pain.   Skin:  Negative for pallor.   Neurological:  Negative for dizziness, light-headedness and headaches.   All other systems reviewed and are negative.      Physical Exam  Physical Exam  Vitals and nursing note reviewed.   Constitutional:       General: She is not in acute distress.     Appearance: Normal appearance. She is not ill-appearing.   HENT:      Head: Normocephalic and atraumatic.      Right Ear: External ear normal.      Left Ear: External ear normal.      Nose: Nose normal.      Mouth/Throat:      Mouth: Mucous membranes are moist.      Pharynx: Oropharynx is clear.   Eyes:      Extraocular Movements: Extraocular movements intact.      Conjunctiva/sclera: Conjunctivae normal.   Cardiovascular:      Rate and Rhythm: Normal rate and regular rhythm.      Pulses: Normal pulses.      Heart sounds: Normal heart sounds. No murmur heard.     No friction rub. No gallop.   Pulmonary:      Effort: Pulmonary effort is normal.      Breath sounds: Normal breath sounds. No wheezing, rhonchi or rales.   Abdominal:      Palpations: Abdomen is soft.      Tenderness: There is no abdominal tenderness. There is left CVA tenderness. There is no right CVA tenderness, guarding or rebound.   Musculoskeletal:         General: Normal range of motion.      Cervical back: Normal range of motion. No tenderness.   Skin:      General: Skin is warm and dry.      Capillary Refill: Capillary refill takes less than 2 seconds.   Neurological:      General: No focal deficit present.      Mental Status: She is alert. Mental status is at baseline.   Psychiatric:         Mood and Affect: Mood normal.         Behavior: Behavior normal.         Vital Signs  ED Triage Vitals   Temperature Pulse Respirations Blood Pressure SpO2   02/05/24 1141 02/05/24 1141 02/05/24 1141 02/05/24 1202 02/05/24 1141   97.9 °F (36.6 °C) 90 19 (!) 174/102 99 %      Temp src Heart Rate Source Patient Position - Orthostatic VS BP Location FiO2 (%)   -- 02/05/24 1454 02/05/24 1454 02/05/24 1143 --    Monitor Sitting Right arm       Pain Score       02/05/24 1141       9           Vitals:    02/05/24 1141 02/05/24 1202 02/05/24 1454 02/05/24 1500   BP:  (!) 174/102 136/76 136/76   Pulse: 90  80 74   Patient Position - Orthostatic VS:   Sitting Sitting         Visual Acuity      ED Medications  Medications   ondansetron (ZOFRAN) injection 4 mg (4 mg Intravenous Given 2/5/24 1201)   morphine injection 4 mg (4 mg Intravenous Given 2/5/24 1202)   morphine injection 4 mg (4 mg Intravenous Given 2/5/24 1450)       Diagnostic Studies  Results Reviewed       Procedure Component Value Units Date/Time    Urine Microscopic [[id number]]  (Abnormal) Collected: 02/05/24 1528    Lab Status: Final result Specimen: Urine, Clean Catch Updated: 02/05/24 1603     RBC, UA 4-10 /hpf      WBC, UA 10-20 /hpf      Epithelial Cells Moderate /hpf      Bacteria, UA Occasional /hpf      MUCUS THREADS Moderate    Urine culture [[id number]] Collected: 02/05/24 1528    Lab Status: In process Specimen: Urine, Clean Catch Updated: 02/05/24 1603    Urine Macroscopic, POC [[id number]]  (Abnormal) Collected: 02/05/24 1528    Lab Status: Final result Specimen: Urine Updated: 02/05/24 1529     Color, UA Yellow     Clarity, UA Clear     pH, UA 5.5     Leukocytes, UA Small     Nitrite, UA Negative     Protein, UA  Trace mg/dl      Glucose, UA Negative mg/dl      Ketones, UA Negative mg/dl      Urobilinogen, UA 1.0 E.U./dl      Bilirubin, UA Negative     Occult Blood, UA Negative     Specific Gravity, UA >=1.030    Narrative:      CLINITEK RESULT    POCT pregnancy, urine [[id number]]  (Normal) Resulted: 02/05/24 1315    Lab Status: Final result Updated: 02/05/24 1316     EXT Preg Test, Ur Negative     Control Valid    Manual Differential(PHLEBS Do Not Order) [[id number]]  (Abnormal) Collected: 02/05/24 1201    Lab Status: Final result Specimen: Blood from Arm, Left Updated: 02/05/24 1309     Segmented % 68 %      Bands % 1 %      Lymphocytes % 21 %      Monocytes % 8 %      Eosinophils, % 0 %      Basophils % 0 %      Atypical Lymphocytes % 2 %      Absolute Neutrophils 7.56 Thousand/uL      Lymphocytes Absolute 2.52 Thousand/uL      Monocytes Absolute 0.88 Thousand/uL      Eosinophils Absolute 0.00 Thousand/uL      Basophils Absolute 0.00 Thousand/uL      Total Counted --     RBC Morphology Normal     Platelet Estimate Adequate     Large Platelet Present    Comprehensive metabolic panel [[id number]]  (Abnormal) Collected: 02/05/24 1201    Lab Status: Final result Specimen: Blood from Arm, Left Updated: 02/05/24 1223     Sodium 138 mmol/L      Potassium 4.4 mmol/L      Chloride 109 mmol/L      CO2 26 mmol/L      ANION GAP 3 mmol/L      BUN 14 mg/dL      Creatinine 0.59 mg/dL      Glucose 76 mg/dL      Calcium 8.5 mg/dL      AST 15 U/L      ALT 11 U/L      Alkaline Phosphatase 83 U/L      Total Protein 7.3 g/dL      Albumin 4.0 g/dL      Total Bilirubin 0.24 mg/dL      eGFR 117 ml/min/1.73sq m     Narrative:      National Kid[employer] Foundation guidelines for Chronic Kidney Disease (CKD):     Stage 1 with normal or high GFR (GFR > 90 mL/min/1.73 square meters)    Stage 2 Mild CKD (GFR = 60-89 mL/min/1.73 square meters)    Stage 3A Moderate CKD (GFR = 45-59 mL/min/1.73 square meters)    Stage 3B Moderate CKD (GFR = 30-44  mL/min/1.73 square meters)    Stage 4 Severe CKD (GFR = 15-29 mL/min/1.73 square meters)    Stage 5 End Stage CKD (GFR <15 mL/min/1.73 square meters)  Note: GFR calculation is accurate only with a steady state creatinine    Lipase [[id number]]  (Normal) Collected: 02/05/24 1201    Lab Status: Final result Specimen: Blood from Arm, Left Updated: 02/05/24 1223     Lipase 25 u/L     CBC and differential [[id number]]  (Abnormal) Collected: 02/05/24 1201    Lab Status: Final result Specimen: Blood from Arm, Left Updated: 02/05/24 1211     WBC 10.95 Thousand/uL      RBC 4.10 Million/uL      Hemoglobin 12.4 g/dL      Hematocrit 38.3 %      MCV 93 fL      MCH 30.2 pg      MCHC 32.4 g/dL      RDW 12.2 %      MPV 10.2 fL      Platelets 331 Thousands/uL                    CT abdomen pelvis wo contrast   Final Result by Randall Dawson MD (02/05 1457)      No acute findings.      Workstation performed: [id number]                    Procedures  Procedures         ED Course  ED Course as of 02/05/24 1646   Mon Feb 05, 2024   1212 CBC and differential(!)  WBC 10.95   1215 Patient reassessed. Pain and nausea improving after medications.    1224 Comprehensive metabolic panel(!)   1224 Lipase  25   1459 CT abdomen pelvis wo contrast  IMPRESSION:     No acute findings.   1532 Urine Macroscopic, POC(!)                                             Medical Decision Making  37 yo female presenting with left-sided flank pain. She says the pain started as a dull ache and has progressed to a sharper stabbing pain. She has associated nausea w/o vomiting. She denies any diarrhea, constipation, recorded fevers. On physical exam she is overall well-appearing. She has tenderness at left CVA and along lower back. Abdomen soft, non-tender.     Plan: CBC, CMP, lipase, CT abdomen and pelvis wo contrast, urinalysis   Labs unremarkable. Ct abdomen/pelvis wo acute abnormality.     All results discussed with patient. Patient understands we will  contact her with any abnormal urine results.  Patient was given a prescription for Robaxin, precautions discussed and she understands how to take this medication. She was also given an ambulatory referral to [facility] and understands to follow with them for further evaluation and treatment of back pain. ED return precautions discussed. Patient is in agreement and understanding with this plan.     Amount and/or Complexity of Data Reviewed  Labs: ordered. Decision-making details documented in ED Course.  Radiology: ordered. Decision-making details documented in ED Course.    Risk  Prescription drug management.             Disposition  Final diagnoses:   Low back pain     Time reflects when diagnosis was documented in both MDM as applicable and the Disposition within this note       Time User Action Codes Description Comment    2/5/2024  3:25 PM Dorcas Mcdaniels [M54.50] Low back pain           ED Disposition       ED Disposition   Discharge    Condition   Stable    Date/Time   Mon Feb 5, 2024  3:25 PM    Comment   Becky Zhao discharge to home/self care.                   Follow-up Information       Follow up With Specialties Details Why Contact Info Additional Information    William Collins MD Family Medicine Schedule an appointment as soon as possible for a visit   [address] [facility].  [address]  [facility] [address]  [phone]       [facility] Emergency Department Emergency Medicine  If symptoms worsen [address] [place] [id number] [phone] [facility] Emergency Department, [address], [place], [address]            Discharge Medication List as of 2/5/2024  3:28 PM        START taking these medications    Details   methocarbamol (ROBAXIN) 500 mg tablet Take 1 tablet (500 mg total) by mouth 2 (two) times a day, Starting Mon 2/5/2024, Normal           CONTINUE these medications which have NOT CHANGED    Details    albuterol (2.5 mg/3 mL) 0.083 % nebulizer solution TAKE 3 ML VIA NEBULIZER ROUTE EVERY 6 HOURS AS NEEDED FOR WHEEZING OR SHORTNESS OF BREATH, Normal      albuterol (PROVENTIL HFA,VENTOLIN HFA) 90 mcg/act inhaler INHALE 2 PUFFS BY MOUTH EVERY 4 HOURS AS NEEDED FOR WHEEZING, Normal      budesonide-formoterol (Symbicort) 160-4.5 mcg/act inhaler Inhale 2 puffs 2 (two) times a day Rinse mouth after use., Starting Mon 1/31/2022, Normal      Calcium Ascorbate (VITAMIN C) 500 mg tablet Take 500 mg by mouth daily, Historical Med      cyanocobalamin (VITAMIN B-12) 1000 MCG tablet Take 1 tablet (1,000 mcg total) by mouth daily, Starting Wed 11/15/2023, Normal      ergocalciferol (VITAMIN D2) 50,000 units Take 1 capsule (50,000 Units total) by mouth 2 (two) times a week, Starting Thu 11/16/2023, Normal      FeroSul 325 (65 Fe) MG tablet TAKE 1 TABLET BY MOUTH TWICE DAILY WITH MEALS, Normal      fluticasone (FLONASE) 50 mcg/act nasal spray 1 spray into each nostril daily, Starting Tue 10/17/2023, Normal      losartan (COZAAR) 100 MG tablet Take 25 mg by mouth daily, Historical Med      Multiple Vitamin (MULTIVITAMIN) tablet Take 1 tablet by mouth daily, Historical Med      NIFEdipine ER (ADALAT CC) 30 MG 24 hr tablet TAKE 1 TABLET(30 MG) BY MOUTH DAILY, Normal      olmesartan-hydrochlorothiazide (BENICAR HCT) 40-25 MG per tablet Take 1 tablet by mouth daily, Starting Tue 1/17/2023, Normal      Zinc 100 MG TABS Take by mouth, Historical Med                 PDMP Review         Value Time User    PDMP Reviewed  Yes 6/29/2021  8:33 AM EN Fox            ED Provider  Electronically Signed by             Dorcas Mcdaniels PA-C  02/05/24 8949

## 2024-02-05 NOTE — ED ATTENDING ATTESTATION
2/5/2024  I, Anurag Sweet MD, saw and evaluated the patient. I have discussed the patient with the resident/non-physician practitioner and agree with the resident's/non-physician practitioner's findings, Plan of Care, and MDM as documented in the resident's/non-physician practitioner's note, except where noted. All available labs and Radiology studies were reviewed.  I was present for key portions of any procedure(s) performed by the resident/non-physician practitioner and I was immediately available to provide assistance.       At this point I agree with the current assessment done in the Emergency Department.  I have conducted an independent evaluation of this patient a history and physical is as follows:    38-year-old female, presents with pain in left flank, back.  On exam, patient comfortable and in no distress.  Abdomen soft and nontender, mild left CVA tenderness.    ED Course     CT scan and lab results reviewed, no acute abnormality.  Pain likely musculoskeletal, given prescription for muscle relaxer and ambulatory referral for comprehensive spine PT.  Patient instructed to follow-up or return for any worsening or new concerning symptoms.    Critical Care Time  Procedures

## 2024-02-06 ENCOUNTER — OFFICE VISIT (OUTPATIENT)
Dept: FAMILY MEDICINE CLINIC | Facility: CLINIC | Age: 39
End: 2024-02-06
Payer: COMMERCIAL

## 2024-02-06 VITALS
HEART RATE: 82 BPM | BODY MASS INDEX: 48.38 KG/M2 | TEMPERATURE: 98.1 F | OXYGEN SATURATION: 99 % | HEIGHT: 64 IN | SYSTOLIC BLOOD PRESSURE: 140 MMHG | WEIGHT: 283.4 LBS | DIASTOLIC BLOOD PRESSURE: 82 MMHG

## 2024-02-06 DIAGNOSIS — Z12.4 CERVICAL CANCER SCREENING: ICD-10-CM

## 2024-02-06 DIAGNOSIS — Z98.84 HISTORY OF ROUX-EN-Y GASTRIC BYPASS: ICD-10-CM

## 2024-02-06 DIAGNOSIS — E66.01 OBESITY, CLASS III, BMI 40-49.9 (MORBID OBESITY) (HCC): ICD-10-CM

## 2024-02-06 DIAGNOSIS — Z00.01 ENCOUNTER FOR WELL ADULT EXAM WITH ABNORMAL FINDINGS: Primary | ICD-10-CM

## 2024-02-06 DIAGNOSIS — G89.29 ACUTE EXACERBATION OF CHRONIC LOW BACK PAIN: ICD-10-CM

## 2024-02-06 DIAGNOSIS — R10.9 ACUTE FLANK PAIN: ICD-10-CM

## 2024-02-06 DIAGNOSIS — M54.50 LOW BACK PAIN: ICD-10-CM

## 2024-02-06 DIAGNOSIS — M54.50 ACUTE EXACERBATION OF CHRONIC LOW BACK PAIN: ICD-10-CM

## 2024-02-06 PROCEDURE — 99395 PREV VISIT EST AGE 18-39: CPT | Performed by: FAMILY MEDICINE

## 2024-02-06 PROCEDURE — 99214 OFFICE O/P EST MOD 30 MIN: CPT | Performed by: FAMILY MEDICINE

## 2024-02-06 RX ORDER — CEPHALEXIN 500 MG/1
500 CAPSULE ORAL EVERY 8 HOURS SCHEDULED
Qty: 15 CAPSULE | Refills: 0 | Status: SHIPPED | OUTPATIENT
Start: 2024-02-06 | End: 2024-02-11

## 2024-02-06 RX ORDER — METHOCARBAMOL 500 MG/1
500 TABLET, FILM COATED ORAL 2 TIMES DAILY
Qty: 20 TABLET | Refills: 0 | Status: SHIPPED | OUTPATIENT
Start: 2024-02-06

## 2024-02-06 NOTE — ASSESSMENT & PLAN NOTE
Acute on chronic back pain no recent fall or trauma on physical exam positive muscle spasm recommend Robaxin 500 twice a day proper use and possible side effect discussed with patient

## 2024-02-06 NOTE — PROGRESS NOTES
ADULT ANNUAL PHYSICAL  [facility] PRIMARY CARE [facility]    NAME: Becky Zhao  AGE: 38 y.o. SEX: female  : 1985     DATE: 2024     Assessment and Plan:     Problem List Items Addressed This Visit     History of Jaycee-en-Y gastric bypass    Obesity, Class III, BMI 40-49.9 (morbid obesity) (HCC)     Chronic and controlled with status post bariatric surgery BMI today 49.4 discussed portion control low-carb low-fat diet increase physical activity patient continue to follow-up with the weight management         Acute exacerbation of chronic low back pain     Acute on chronic back pain no recent fall or trauma on physical exam positive muscle spasm recommend Robaxin 500 twice a day proper use and possible side effect discussed with patient         Relevant Medications    methocarbamol (ROBAXIN) 500 mg tablet    Encounter for well adult exam with abnormal findings - Primary     Advice and education were given regarding nutrition, aerobic exercises, weight-bearing exercises, cardiovascular risk reduction, fall risk reduction, and age-appropriate supplements.     The patient was counseled regarding instructions for management, risk factor reductions, prognosis, risks and benefits of treatment options, patient and family education, and importance of compliance with treatment.         Cervical cancer screening     Patient overdue for cervical cancer screening refer the patient to see GYN         Relevant Orders    Ambulatory Referral to Obstetrics / Gynecology    Acute flank pain     Acute symptomatic left flank pain in the left side patient was seen in the ER CAT scan abdomen pelvic review patient had urine dip abnormal positive for white blood cell red blood cell recommend the patient to start antibiotic Keflex 500 t 3 times a day for 7 days will follow-up with the urine culture         Relevant Medications    cephalexin (KEFLEX) 500 mg capsule   Other  Visit Diagnoses     Low back pain              Immunizations and preventive care screenings were discussed with patient today. Appropriate education was printed on patient's after visit summary.    Counseling:  Alcohol/drug use: discussed moderation in alcohol intake, the recommendations for healthy alcohol use, and avoidance of illicit drug use.  Dental Health: discussed importance of regular tooth brushing, flossing, and dental visits.  Injury prevention: discussed safety/seat belts, safety helmets, smoke detectors, carbon dioxide detectors, and smoking near bedding or upholstery.  Sexual health: discussed sexually transmitted diseases, partner selection, use of condoms, avoidance of unintended pregnancy, and contraceptive alternatives.  Exercise: the importance of regular exercise/physical activity was discussed. Recommend exercise 3-5 times per week for at least 30 minutes.       Depression Screening and Follow-up Plan: Patient was screened for depression during today's encounter. They screened negative with a PHQ-2 score of 0.        Return in about 1 year (around 2/6/2025).     Chief Complaint:     Chief Complaint   Patient presents with   • Back Pain     Started on Friday, every day pain is getting worse. Sharp pain lower back and moves to the front.    • Physical Exam      History of Present Illness:     Adult Annual Physical   Patient here for a comprehensive physical exam. The patient reports problems - patient concerned about the left flank pain she describes as a dull radiated to her lower back and to her lower leg and no nausea vomiting no fever patient was in the emergency room on February 5 records reviewed she had a CAT scan of the abdomen pelvic no kidney stone no hydronephrosis urine dip in ER was abnormal positive for red blood cells and white blood cells patient was not started on any antibiotic patient today also concerned about the low back pain she described it as achy graded as 6/10 radiated  from the back to her bilateral buttock area no fall no trauma no numbness no muscle weakness no drop in the foot patient noted to have history of chronic low back pain.    Diet and Physical Activity  Diet/Nutrition:  portion control .   Exercise: no formal exercise.      Depression Screening  PHQ-2/9 Depression Screening    Little interest or pleasure in doing things: 0 - not at all  Feeling down, depressed, or hopeless: 0 - not at all  PHQ-2 Score: 0  PHQ-2 Interpretation: Negative depression screen       General Health  Sleep: sleeps well.   Hearing: normal - bilateral.  Vision: wears glasses.   Dental: brushes teeth twice daily.       /GYN Health  Follows with gynecology? No    Contraceptive method:  tubal ligation .  History of STDs?: no.     Advanced Care Planning  Do you have an advanced directive? no  Do you have a durable medical power of ? no     Review of Systems:     Review of Systems   Constitutional:  Negative for chills and fever.   HENT:  Negative for ear pain and sore throat.    Eyes:  Negative for pain and visual disturbance.   Respiratory:  Negative for cough and shortness of breath.    Cardiovascular:  Negative for chest pain and palpitations.   Gastrointestinal:  Negative for abdominal pain, constipation, diarrhea and vomiting.   Genitourinary:  Positive for flank pain. Negative for dysuria and hematuria.   Musculoskeletal:  Positive for back pain.   Skin:  Negative for color change and rash.   Neurological:  Negative for seizures and syncope.   All other systems reviewed and are negative.     Past Medical History:     Past Medical History:   Diagnosis Date   • Anemia    • BMI 36.0-36.9,adult 12/08/2020   • Cough 12/21/2020   • COVID-19 virus infection 12/23/2020   • Disease of thyroid gland    • Dizziness     due to anemia, last episode August 2020, no falls   • Drop in hemoglobin 12/08/2020   • Fatigue    • History of transfusion 2016    anemic - loss of blood due to bleeding ulcers,  no reaction   • Hyperlipidemia    • Hypertension    • Multiple gastric ulcers    • Obesity     16   • RUQ pain 2022   • Severe obesity (BMI 35.0-39.9) with comorbidity (HCC) 2020   • Sore throat 10/27/2021   • Thyroid disease     took synthroid but not currently taking   • Thyroid mass 03/15/2021     A CT scan of the neck incidental finding 1.7 cm of the right thyroid   • Ulcer       Past Surgical History:     Past Surgical History:   Procedure Laterality Date   • CHOLECYSTECTOMY     • CHOLECYSTECTOMY LAPAROSCOPIC N/A 06/10/2022    Procedure: CHOLECYSTECTOMY LAPAROSCOPIC;  Surgeon: Dameon Iverson MD;  Location:  MAIN OR;  Service: General   • GASTRIC BYPASS     • GASTRIC BYPASS LAPAROSCOPIC N/A 12/15/2020    Procedure: Robotic lysis of adhesions, small-bowel resection, partial gastrectomy, paraesophageal hernia repair, bilateral truncal vagotomy; Robotic gastrojejunostomy anastomosis with intraop endoscopy;  Surgeon: Arianna Lyons MD;  Location: [facility] OR;  Service: Bariatrics   • LITO-EN-Y PROCEDURE  2016    Gastric Bypass by Dr. Gay;Pre-Op Weight 339.6,nw   • TUBAL LIGATION Bilateral        • WISDOM TOOTH EXTRACTION        Social History:     Social History     Socioeconomic History   • Marital status: /Civil Union     Spouse name: None   • Number of children: None   • Years of education: None   • Highest education level: None   Occupational History   • None   Tobacco Use   • Smoking status: Former     Current packs/day: 0.00     Average packs/day: 0.3 packs/day for 19.2 years (4.8 ttl pk-yrs)     Types: Cigarettes     Start date: 2004     Quit date: 9/10/2023     Years since quittin.4     Passive exposure: Never   • Smokeless tobacco: Never   • Tobacco comments:     Started again 3/2023, 1 cig per day   Vaping Use   • Vaping status: Never Used   Substance and Sexual Activity   • Alcohol use: No   • Drug use: No   • Sexual activity: Yes     Partners: Male      Birth control/protection: Female Sterilization   Other Topics Concern   • None   Social History Narrative    fhx not asked in triage     Social Determinants of Health     Financial Resource Strain: Low Risk  (5/19/2021)    Overall Financial Resource Strain (CARDIA)    • Difficulty of Paying Living Expenses: Not hard at all   Food Insecurity: No Food Insecurity (5/19/2021)    Hunger Vital Sign    • Worried About Running Out of Food in the Last Year: Never true    • Ran Out of Food in the Last Year: Never true   Transportation Needs: No Transportation Needs (5/19/2021)    PRAPARE - Transportation    • Lack of Transportation (Medical): No    • Lack of Transportation (Non-Medical): No   Physical Activity: Sufficiently Active (3/15/2021)    Exercise Vital Sign    • Days of Exercise per Week: 7 days    • Minutes of Exercise per Session: 30 min   Stress: No Stress Concern Present (3/15/2021)    [employer] [place] [employer] - Occupational Stress Questionnaire    • Feeling of Stress : Only a little   Social Connections: Socially Integrated (3/15/2021)    Social Connection and Isolation Panel [NHANES]    • Frequency of Communication with Friends and Family: More than three times a week    • Frequency of Social Gatherings with Friends and Family: Never    • Attends Gnosticist Services: More than 4 times per year    • Active Member of Clubs or Organizations: Yes    • Attends Club or Organization Meetings: More than 4 times per year    • Marital Status:    Intimate Partner Violence: Not At Risk (3/15/2021)    Humiliation, Afraid, Rape, and Kick questionnaire    • Fear of Current or Ex-Partner: No    • Emotionally Abused: No    • Physically Abused: No    • Sexually Abused: No   Housing Stability: Not on file      Family History:     Family History   Problem Relation Age of Onset   • Asthma Mother    • Coronary artery disease Mother    • Diabetes Mother         MELLITUS   • Hyperlipidemia Mother    • Hypertension  Mother    • Aneurysm Mother         2018 just diagnosed with two   • No Known Problems Father          when she was 3 months old   • No Known Problems Sister    • No Known Problems Daughter    • No Known Problems Daughter    • Aneurysm Maternal Grandmother    • Aneurysm Maternal Grandfather           of a ruptured abdominal aneurysm   • No Known Problems Paternal Grandmother    • No Known Problems Paternal Grandfather    • No Known Problems Maternal Aunt    • No Known Problems Maternal Aunt    • No Known Problems Maternal Aunt       Current Medications:     Current Outpatient Medications   Medication Sig Dispense Refill   • albuterol (2.5 mg/3 mL) 0.083 % nebulizer solution TAKE 3 ML VIA NEBULIZER ROUTE EVERY 6 HOURS AS NEEDED FOR WHEEZING OR SHORTNESS OF BREATH 180 mL 2   • albuterol (PROVENTIL HFA,VENTOLIN HFA) 90 mcg/act inhaler INHALE 2 PUFFS BY MOUTH EVERY 4 HOURS AS NEEDED FOR WHEEZING 8.5 g 2   • budesonide-formoterol (Symbicort) 160-4.5 mcg/act inhaler Inhale 2 puffs 2 (two) times a day Rinse mouth after use. 10.2 g 0   • Calcium Ascorbate (VITAMIN C) 500 mg tablet Take 500 mg by mouth daily     • cephalexin (KEFLEX) 500 mg capsule Take 1 capsule (500 mg total) by mouth every 8 (eight) hours for 5 days 15 capsule 0   • cyanocobalamin (VITAMIN B-12) 1000 MCG tablet Take 1 tablet (1,000 mcg total) by mouth daily 90 tablet 3   • ergocalciferol (VITAMIN D2) 50,000 units Take 1 capsule (50,000 Units total) by mouth 2 (two) times a week 24 capsule 0   • FeroSul 325 (65 Fe) MG tablet TAKE 1 TABLET BY MOUTH TWICE DAILY WITH MEALS 180 tablet 0   • losartan (COZAAR) 100 MG tablet Take 25 mg by mouth daily     • methocarbamol (ROBAXIN) 500 mg tablet Take 1 tablet (500 mg total) by mouth 2 (two) times a day 20 tablet 0   • Multiple Vitamin (MULTIVITAMIN) tablet Take 1 tablet by mouth daily     • NIFEdipine ER (ADALAT CC) 30 MG 24 hr tablet TAKE 1 TABLET(30 MG) BY MOUTH DAILY 90 tablet 0   •  "olmesartan-hydrochlorothiazide (BENICAR HCT) 40-25 MG per tablet Take 1 tablet by mouth daily 30 tablet 2   • Zinc 100 MG TABS Take by mouth     • fluticasone (FLONASE) 50 mcg/act nasal spray 1 spray into each nostril daily (Patient not taking: Reported on 2/6/2024) 16 g 0     Current Facility-Administered Medications   Medication Dose Route Frequency Provider Last Rate Last Admin   • cyanocobalamin injection 1,000 mcg  1,000 mcg Intramuscular Q30 Days William Collins MD   1,000 mcg at 11/20/23 0859   • cyanocobalamin injection 1,000 mcg  1,000 mcg Intramuscular Q30 Days William Collins MD   1,000 mcg at 11/27/23 0855      Allergies:     Allergies   Allergen Reactions   • [place] Oil - Food Allergy Shortness Of Breath   • Venofer [Iron Sucrose]      Chest pressure after 1st dose. Tachycardia and blurred vision after 8 minutes of dose #2.    • B-12 [Cyanocobalamin] Palpitations and Facial Swelling      Physical Exam:     /82 (BP Location: Left arm, Patient Position: Sitting)   Pulse 82   Temp 98.1 °F (36.7 °C) (Tympanic)   Ht 5' 3.5\" (1.613 m)   Wt 129 kg (283 lb 6.4 oz)   LMP 01/29/2024 (Approximate)   SpO2 99%   BMI 49.41 kg/m²     Physical Exam  Vitals and nursing note reviewed.   Constitutional:       General: She is not in acute distress.     Appearance: She is well-developed.   HENT:      Head: Normocephalic and atraumatic.      Right Ear: Tympanic membrane normal.      Left Ear: Tympanic membrane normal.      Nose: No congestion or rhinorrhea.      Mouth/Throat:      Pharynx: No posterior oropharyngeal erythema.   Eyes:      General:         Right eye: No discharge.         Left eye: No discharge.      Conjunctiva/sclera: Conjunctivae normal.   Cardiovascular:      Rate and Rhythm: Normal rate and regular rhythm.      Heart sounds: No murmur heard.  Pulmonary:      Effort: Pulmonary effort is normal. No respiratory distress.      Breath sounds: Normal breath sounds.   Abdominal:      Palpations: " Abdomen is soft.      Tenderness: There is no abdominal tenderness. There is left CVA tenderness.   Musculoskeletal:         General: No swelling.      Cervical back: Neck supple.      Lumbar back: Spasms and tenderness present. No bony tenderness. Negative right straight leg raise test and negative left straight leg raise test.   Skin:     General: Skin is warm and dry.      Capillary Refill: Capillary refill takes less than 2 seconds.   Neurological:      Mental Status: She is alert.   Psychiatric:         Mood and Affect: Mood normal.          William Collins MD   [place] [facility]

## 2024-02-06 NOTE — ASSESSMENT & PLAN NOTE
Acute symptomatic left flank pain in the left side patient was seen in the ER CAT scan abdomen pelvic review patient had urine dip abnormal positive for white blood cell red blood cell recommend the patient to start antibiotic Keflex 500 t 3 times a day for 7 days will follow-up with the urine culture

## 2024-02-07 ENCOUNTER — NURSE TRIAGE (OUTPATIENT)
Dept: PHYSICAL THERAPY | Facility: OTHER | Age: 39
End: 2024-02-07

## 2024-02-07 DIAGNOSIS — M54.42 ACUTE LEFT-SIDED LOW BACK PAIN WITH LEFT-SIDED SCIATICA: Primary | ICD-10-CM

## 2024-02-07 LAB — BACTERIA UR CULT: NORMAL

## 2024-02-07 NOTE — TELEPHONE ENCOUNTER
Additional Information   Negative: Is this related to a work injury?   Negative: Is this related to an MVA?   Negative: Are you currently recieving homecare services?   Negative: Has the patient had unexplained weight loss?   Negative: Does the patient have a fever?   Negative: Is the patient experiencing urine retention?   Negative: Is the patient experiencing acute drop foot or paralysis?   Negative: Has the patient experienced major trauma? (fall from height, high speed collision, direct blow to spine) and is also experiencing nausea, light-headedness, or loss of consciousness?   Negative: Is the patient experiencing blood in sputum?   Negative: Is this a chronic condition?    Background - Initial Assessment  Clinical complaint: left lower back pain which radiates to the abdomen and left leg. Numbness of the left toes. States this pain started 2/2/24 with NKI.  Date of onset: 2/2/24  Frequency of pain: constant  Quality of pain: sharp, shooting, and stabbing    Protocols used: [facility] Protocol    This RN did review in detail the [facility] and what we can provide for their back pain.  Patient is agreeable to being triaged by this RN and would like to proceed with Physical Therapy.    Referral was placed for Physical Therapy at the [place] site. Patients information was sent to the  to make evaluation appointment. Patient made aware that the PT office  will be calling to schedule the appointment.  Patient was provided with the phone number to the PT office.    No further questions and/or concerns were voiced by the patient at this time. Patient states understanding of the referral that was placed.    Referral Closed.

## 2024-02-08 ENCOUNTER — TELEPHONE (OUTPATIENT)
Age: 39
End: 2024-02-08

## 2024-02-08 DIAGNOSIS — R82.90 ABNORMAL URINALYSIS: Primary | ICD-10-CM

## 2024-02-21 PROBLEM — Z12.4 CERVICAL CANCER SCREENING: Status: RESOLVED | Noted: 2024-02-06 | Resolved: 2024-02-21

## 2024-02-22 ENCOUNTER — TELEPHONE (OUTPATIENT)
Dept: BARIATRICS | Facility: CLINIC | Age: 39
End: 2024-02-22

## 2024-03-05 ENCOUNTER — TELEPHONE (OUTPATIENT)
Dept: HEMATOLOGY ONCOLOGY | Facility: CLINIC | Age: 39
End: 2024-03-05

## 2024-03-05 NOTE — TELEPHONE ENCOUNTER
Spoke w/ PT regarding Consult appt that was missed today. She was driving and unable to write down the [facility] phone # but advised would put the phone# in message for her to see in [facility].     [facility] phone# to reschedule [phone].

## 2024-06-10 NOTE — ASSESSMENT & PLAN NOTE
53 year old female presents for follow up of Barretts Esophagus .Patient admits continuing Pantoprazole 40mg with/without control of symptoms . Patient denies dysphagia , abdominal pain  or nausea . Patient denies any breakthrough episodes . Uncontrolled patient is history of bariatric surgery most probably secondary to malabsorption recommend ferrous sulfate 325 mg twice a day with vitamin C to improve absorption

## 2024-09-30 ENCOUNTER — HOSPITAL ENCOUNTER (EMERGENCY)
Facility: HOSPITAL | Age: 39
Discharge: HOME | End: 2024-09-30
Attending: EMERGENCY MEDICINE | Admitting: EMERGENCY MEDICINE
Payer: COMMERCIAL

## 2024-09-30 ENCOUNTER — APPOINTMENT (EMERGENCY)
Dept: CT IMAGING | Facility: HOSPITAL | Age: 39
End: 2024-09-30
Payer: COMMERCIAL

## 2024-09-30 VITALS
SYSTOLIC BLOOD PRESSURE: 179 MMHG | BODY MASS INDEX: 49.59 KG/M2 | OXYGEN SATURATION: 100 % | RESPIRATION RATE: 16 BRPM | DIASTOLIC BLOOD PRESSURE: 107 MMHG | TEMPERATURE: 98.1 F | HEART RATE: 68 BPM | WEIGHT: 284.39 LBS

## 2024-09-30 DIAGNOSIS — R19.7 DIARRHEA: Primary | ICD-10-CM

## 2024-09-30 LAB
ALBUMIN SERPL BCG-MCNC: 3.8 G/DL (ref 3.5–5)
ALP SERPL-CCNC: 86 U/L (ref 34–104)
ALT SERPL W P-5'-P-CCNC: 12 U/L (ref 7–52)
ANION GAP SERPL CALCULATED.3IONS-SCNC: 4 MMOL/L (ref 4–13)
AST SERPL W P-5'-P-CCNC: 15 U/L (ref 13–39)
BACTERIA UR QL AUTO: ABNORMAL /HPF
BASOPHILS # BLD MANUAL: 0 THOUSAND/UL (ref 0–0.1)
BASOPHILS NFR MAR MANUAL: 0 % (ref 0–1)
BILIRUB SERPL-MCNC: 0.25 MG/DL (ref 0.2–1)
BILIRUB UR QL STRIP: NEGATIVE
BUN SERPL-MCNC: 10 MG/DL (ref 5–25)
CALCIUM SERPL-MCNC: 8.3 MG/DL (ref 8.4–10.2)
CHLORIDE SERPL-SCNC: 111 MMOL/L (ref 96–108)
CLARITY UR: CLEAR
CO2 SERPL-SCNC: 23 MMOL/L (ref 21–32)
COLOR UR: YELLOW
CREAT SERPL-MCNC: 0.61 MG/DL (ref 0.6–1.3)
EOSINOPHIL # BLD MANUAL: 0.22 THOUSAND/UL (ref 0–0.4)
EOSINOPHIL NFR BLD MANUAL: 2 % (ref 0–6)
ERYTHROCYTE [DISTWIDTH] IN BLOOD BY AUTOMATED COUNT: 15.8 % (ref 11.6–15.1)
EXT PREGNANCY TEST URINE: NEGATIVE
GFR SERPL CREATININE-BSD FRML MDRD: 115 ML/MIN/1.73SQ M
GLUCOSE SERPL-MCNC: 87 MG/DL (ref 65–140)
GLUCOSE UR STRIP-MCNC: NEGATIVE MG/DL
HCT VFR BLD AUTO: 33.5 % (ref 34.8–46.1)
HGB BLD-MCNC: 10.7 G/DL (ref 11.5–15.4)
HGB UR QL STRIP.AUTO: ABNORMAL
KETONES UR STRIP-MCNC: NEGATIVE MG/DL
LEUKOCYTE ESTERASE UR QL STRIP: NEGATIVE
LIPASE SERPL-CCNC: 20 U/L (ref 11–82)
LYMPHOCYTES # BLD AUTO: 27 % (ref 14–44)
LYMPHOCYTES # BLD AUTO: 3 THOUSAND/UL (ref 0.6–4.47)
MCH RBC QN AUTO: 27 PG (ref 26.8–34.3)
MCHC RBC AUTO-ENTMCNC: 31.9 G/DL (ref 31.4–37.4)
MCV RBC AUTO: 85 FL (ref 82–98)
MONOCYTES # BLD AUTO: 0.56 THOUSAND/UL (ref 0–1.22)
MONOCYTES NFR BLD: 5 % (ref 4–12)
MUCOUS THREADS UR QL AUTO: ABNORMAL
NEUTROPHILS # BLD MANUAL: 7.33 THOUSAND/UL (ref 1.85–7.62)
NEUTS SEG NFR BLD AUTO: 66 % (ref 43–75)
NITRITE UR QL STRIP: NEGATIVE
NON-SQ EPI CELLS URNS QL MICRO: ABNORMAL /HPF
PH UR STRIP.AUTO: 6 [PH] (ref 4.5–8)
PLATELET # BLD AUTO: 317 THOUSANDS/UL (ref 149–390)
PLATELET BLD QL SMEAR: ADEQUATE
PMV BLD AUTO: 10 FL (ref 8.9–12.7)
POTASSIUM SERPL-SCNC: 4.1 MMOL/L (ref 3.5–5.3)
PROT SERPL-MCNC: 7.3 G/DL (ref 6.4–8.4)
PROT UR STRIP-MCNC: NEGATIVE MG/DL
RBC # BLD AUTO: 3.96 MILLION/UL (ref 3.81–5.12)
RBC #/AREA URNS AUTO: ABNORMAL /HPF
RBC MORPH BLD: NORMAL
SODIUM SERPL-SCNC: 138 MMOL/L (ref 135–147)
SP GR UR STRIP.AUTO: >=1.03 (ref 1–1.03)
UROBILINOGEN UR QL STRIP.AUTO: 0.2 E.U./DL
WBC # BLD AUTO: 11.11 THOUSAND/UL (ref 4.31–10.16)
WBC #/AREA URNS AUTO: ABNORMAL /HPF

## 2024-09-30 PROCEDURE — 96374 THER/PROPH/DIAG INJ IV PUSH: CPT

## 2024-09-30 PROCEDURE — 83690 ASSAY OF LIPASE: CPT

## 2024-09-30 PROCEDURE — 81025 URINE PREGNANCY TEST: CPT

## 2024-09-30 PROCEDURE — 85027 COMPLETE CBC AUTOMATED: CPT

## 2024-09-30 PROCEDURE — 85007 BL SMEAR W/DIFF WBC COUNT: CPT

## 2024-09-30 PROCEDURE — 80053 COMPREHEN METABOLIC PANEL: CPT

## 2024-09-30 PROCEDURE — 96361 HYDRATE IV INFUSION ADD-ON: CPT

## 2024-09-30 PROCEDURE — 99284 EMERGENCY DEPT VISIT MOD MDM: CPT

## 2024-09-30 PROCEDURE — 99285 EMERGENCY DEPT VISIT HI MDM: CPT

## 2024-09-30 PROCEDURE — 81001 URINALYSIS AUTO W/SCOPE: CPT

## 2024-09-30 PROCEDURE — 36415 COLL VENOUS BLD VENIPUNCTURE: CPT

## 2024-09-30 PROCEDURE — 74177 CT ABD & PELVIS W/CONTRAST: CPT

## 2024-09-30 PROCEDURE — 87493 C DIFF AMPLIFIED PROBE: CPT

## 2024-09-30 RX ORDER — ONDANSETRON 2 MG/ML
4 INJECTION INTRAMUSCULAR; INTRAVENOUS ONCE
Status: COMPLETED | OUTPATIENT
Start: 2024-09-30 | End: 2024-09-30

## 2024-09-30 RX ORDER — ACETAMINOPHEN 325 MG/1
650 TABLET ORAL ONCE
Status: COMPLETED | OUTPATIENT
Start: 2024-09-30 | End: 2024-09-30

## 2024-09-30 RX ADMIN — ACETAMINOPHEN 650 MG: 325 TABLET ORAL at 13:03

## 2024-09-30 RX ADMIN — IOHEXOL 100 ML: 350 INJECTION, SOLUTION INTRAVENOUS at 14:22

## 2024-09-30 RX ADMIN — SODIUM CHLORIDE 1000 ML: 0.9 INJECTION, SOLUTION INTRAVENOUS at 13:07

## 2024-09-30 RX ADMIN — ONDANSETRON 4 MG: 2 INJECTION INTRAMUSCULAR; INTRAVENOUS at 13:07

## 2024-09-30 NOTE — DISCHARGE INSTRUCTIONS
Patient advised to follow-up with PCP for today's ED visit.  Patient advised to follow-up with gastroenterology.  Ambulatory referral placed.  Patient given strict return to ED protocols with any worsening symptoms.  Outpatient labs for stool samples provided to patient.    Patient was advised to return to the ED with any worsening symptoms that were explained on discharge including but not limited to chest pain, shortness of breath, irretractable vomiting or diarrhea, vision loss, loss of function, loss of sensation, syncope, hemoptysis, hematochezia, hematemesis, melena, decreased oral intake, feeling ill.

## 2024-09-30 NOTE — ED PROVIDER NOTES
Final diagnoses:   Diarrhea     ED Disposition       ED Disposition   Discharge    Condition   Stable    Date/Time   Mon Sep 30, 2024  3:48 PM    Comment   Becky Zhao discharge to home/self care.                   Assessment & Plan       Medical Decision Making  39-year-old female presented ED with a chief complaint of diarrhea over the past 5 days.  Denies fevers chills diaphoresis.  Denies nausea or vomiting.  Cardiopulmonary exam is benign.  On examination of patient's abdomen having diffuse lower abdominal tenderness no peritoneal signs on exam.  No erythema edema or ecchymosis noted over the abdomen.  Patient is well-appearing on exam in no acute distress.  Patient given a liter of saline.  Baseline labs show slight leukocytosis at 1100.  Patient also hyperkalemic likely in the setting of acute diarrhea.  Otherwise baseline labs benign.  Lipase normal on CT abdomen pelvis no acute abnormalities in the abdomen or pelvis.  Tiny focus of hyperdensity in the cecum which may be melena per radiology.  Patient not having any dark stools per history.  Patient overall having unremarkable vitals throughout ED visit.  She is a little hypertensive in which she states she has been following with her PCP she is not symptomatic at this point.  Stool samples obtained in ED.  Patient advised to be notified of any positive results and be treated accordingly.  Patient given ambulatory referral to GI.  No inflammatory changes seen throughout the bowel likely infectious diarrhea.  Patient was notified she would be contacted with any positive results.  Patient given strict return to ED protocol with any worsening symptoms thoroughly explained on discharge.  Disposition was explained with follow-ups.Patient understood diagnosis and treatment plan and had no further questions.  Patient was discharged in stable condition.  Patient was advised to follow-up with her PCP in 1 to 2 days.  Patient was advised to return to the  "ED with any worsening symptoms that were explained on discharge including but not limited to chest pain, shortness of breath, irretractable vomiting or diarrhea, vision loss, loss of function, loss of sensation, syncope, hemoptysis, hematochezia, hematemesis, melena, decreased oral intake, feeling ill.     Ddx-diarrhea, infectious diarrhea, inflammatory diarrhea, pancreatitis, colitis    Portions of the record may have been created with voice recognition software. Occasional wrong word or \"sound a like\" substitutions may have occurred due to the inherent limitations of voice recognition software. Read the chart carefully and recognize, using context, where substitutions have occurred.      Amount and/or Complexity of Data Reviewed  Labs: ordered.     Details: See [facility]  Radiology: ordered.     Details: See [facility]    Risk  OTC drugs.  Prescription drug management.  Risk Details: Risk of worsening symptoms along with signs and symptoms worsening symptoms were thoroughly explained on discharge.  Risk of incomplete follow-up was discussed.  Patient had full understanding of all risks had no further questions and was discharged in stable condition.              Medications   sodium chloride 0.9 % bolus 1,000 mL (0 mL Intravenous Stopped 9/30/24 1357)   acetaminophen (TYLENOL) tablet 650 mg (650 mg Oral Given 9/30/24 1303)   ondansetron (ZOFRAN) injection 4 mg (4 mg Intravenous Given 9/30/24 1307)   iohexol (OMNIPAQUE) 350 MG/ML injection (SINGLE-DOSE) 100 mL (100 mL Intravenous Given 9/30/24 1422)   iohexol (OMNIPAQUE) 240 MG/ML solution 50 mL (50 mL Oral Given 9/30/24 1424)       ED Risk Strat Scores                           SBIRT 20yo+      Flowsheet Row Most Recent Value   Initial Alcohol Screen: US AUDIT-C     1. How often do you have a drink containing alcohol? 0 Filed at: 09/30/2024 1423   2. How many drinks containing alcohol do you have on a typical day you are drinking?  0 Filed at: 09/30/2024 1423   3a. Male UNDER " "65: How often do you have five or more drinks on one occasion? 0 Filed at: 09/30/2024 1423   3b. FEMALE Any Age, or MALE 65+: How often do you have 4 or more drinks on one occassion? 0 Filed at: 09/30/2024 1423   Audit-C Score 0 Filed at: 09/30/2024 1423   JUAREZ: How many times in the past year have you...    Used an illegal drug or used a prescription medication for non-medical reasons? Never Filed at: 09/30/2024 1423                            History of Present Illness       Chief Complaint   Patient presents with    Diarrhea     Pt reports \"meanest\" diarrhea x5 days with generalized abd pain, reports foul smelling and went 4 times today already        Past Medical History:   Diagnosis Date    Anemia     BMI 36.0-36.9,adult 12/08/2020    Cough 12/21/2020    COVID-19 virus infection 12/23/2020    Disease of thyroid gland     Dizziness     due to anemia, last episode August 2020, no falls    Drop in hemoglobin 12/08/2020    Fatigue     History of transfusion 2016    anemic - loss of blood due to bleeding ulcers, no reaction    Hyperlipidemia     Hypertension     Multiple gastric ulcers     Obesity     11/14/16    RUQ pain 06/09/2022    Severe obesity (BMI 35.0-39.9) with comorbidity (HCC) 12/23/2020    Sore throat 10/27/2021    Thyroid disease     took synthroid but not currently taking    Thyroid mass 03/15/2021     A CT scan of the neck incidental finding 1.7 cm of the right thyroid    Ulcer       Past Surgical History:   Procedure Laterality Date    CHOLECYSTECTOMY      CHOLECYSTECTOMY LAPAROSCOPIC N/A 06/10/2022    Procedure: CHOLECYSTECTOMY LAPAROSCOPIC;  Surgeon: Dameon Iverson MD;  Location:  MAIN OR;  Service: General    GASTRIC BYPASS  2014    GASTRIC BYPASS LAPAROSCOPIC N/A 12/15/2020    Procedure: Robotic lysis of adhesions, small-bowel resection, partial gastrectomy, paraesophageal hernia repair, bilateral truncal vagotomy; Robotic gastrojejunostomy anastomosis with intraop endoscopy;  Surgeon: Arianna Chowdhury" MD Serena;  Location: [facility] OR;  Service: Bariatrics    LITO-EN-Y PROCEDURE  2016    Gastric Bypass by Dr. Gay;Pre-Op Weight 339.6,nw    TUBAL LIGATION Bilateral         WISDOM TOOTH EXTRACTION        Family History   Problem Relation Age of Onset    Asthma Mother     Coronary artery disease Mother     Diabetes Mother         MELLITUS    Hyperlipidemia Mother     Hypertension Mother     Aneurysm Mother         2018 just diagnosed with two    No Known Problems Father          when she was 3 months old    No Known Problems Sister     No Known Problems Daughter     No Known Problems Daughter     Aneurysm Maternal Grandmother     Aneurysm Maternal Grandfather           of a ruptured abdominal aneurysm    No Known Problems Paternal Grandmother     No Known Problems Paternal Grandfather     No Known Problems Maternal Aunt     No Known Problems Maternal Aunt     No Known Problems Maternal Aunt       Social History     Tobacco Use    Smoking status: Former     Current packs/day: 0.00     Average packs/day: 0.3 packs/day for 19.2 years (4.8 ttl pk-yrs)     Types: Cigarettes     Start date: 2004     Quit date: 9/10/2023     Years since quittin.0     Passive exposure: Never    Smokeless tobacco: Never    Tobacco comments:     Started again 3/2023, 1 cig per day   Vaping Use    Vaping status: Never Used   Substance Use Topics    Alcohol use: No    Drug use: No      E-Cigarette/Vaping    E-Cigarette Use Never User       E-Cigarette/Vaping Substances    Nicotine No     THC No     CBD No     Flavoring No     Other No     Unknown No       I have reviewed and agree with the history as documented.     39-year-old female presented ED with a chief complaint of generalized abdominal pain and diarrhea x 5 days.  Patient states that she has had multiple bouts of diarrhea throughout the day for the past 5 days.  She stated that before coming to the ED today she has already had 5 bowel movements.  She  denies any recent antibiotic use or recent hospital visits.  States that initially the diarrhea started a greenish color and has now changed to a normal brown color.  States that she has had significant abdominal pain with this.  She has a history of bariatric surgery and stated that she has not had any complications from this.  Patient stated that basically anything she eats she immediately feels like she has to go to the bathroom.  She denies any recent sick contacts or recent illnesses.  Denies any chills fevers diaphoresis.  Denies any nausea or vomiting.  She rates her pain a 7 out of 10.Patient denies any chest pain, shortness of breath, vomiting, chills, diaphoresis, fevers, loss of consciousness, syncope, urinary and bowel changes, visual symptoms, vision loss, loss of function, loss of sensation, decreased oral intake, hemoptysis, hematochezia, hematemesis, melena, confusion.         Review of Systems   Constitutional:  Negative for activity change, appetite change, chills, diaphoresis, fatigue and fever.   HENT:  Negative for congestion, ear discharge, ear pain, postnasal drip, rhinorrhea, sinus pressure, sinus pain and sore throat.    Eyes:  Negative for photophobia, pain, discharge, redness, itching and visual disturbance.   Respiratory:  Negative for cough, chest tightness and shortness of breath.    Cardiovascular:  Negative for chest pain and palpitations.   Gastrointestinal:  Positive for abdominal pain, diarrhea and nausea. Negative for abdominal distention, constipation and vomiting.   Genitourinary:  Negative for difficulty urinating, dysuria, flank pain, frequency and hematuria.   Musculoskeletal:  Negative for arthralgias, back pain, joint swelling, myalgias, neck pain and neck stiffness.   Skin:  Negative for rash and wound.   Neurological:  Negative for dizziness, tremors, syncope, facial asymmetry, weakness, light-headedness, numbness and headaches.           Objective       ED Triage Vitals    Temperature Pulse Blood Pressure Respirations SpO2 Patient Position - Orthostatic VS   09/30/24 1159 09/30/24 1159 09/30/24 1159 09/30/24 1159 09/30/24 1159 09/30/24 1159   98.1 °F (36.7 °C) 81 (!) 198/105 16 100 % Sitting      Temp Source Heart Rate Source BP Location FiO2 (%) Pain Score    09/30/24 1159 09/30/24 1159 09/30/24 1159 -- 09/30/24 1303    Oral Monitor Right arm  10 - Worst Possible Pain      Vitals      Date and Time Temp Pulse SpO2 Resp BP Pain Score FACES Pain Rating User   09/30/24 1602 -- 68 100 % 16 179/107 -- -- LD   09/30/24 1303 -- -- -- -- -- 10 - Worst Possible Pain -- AB   09/30/24 1159 98.1 °F (36.7 °C) 81 100 % 16 198/105 -- -- NICKY            Physical Exam  Constitutional:       General: She is not in acute distress.     Appearance: Normal appearance. She is not ill-appearing, toxic-appearing or diaphoretic.   HENT:      Head: Normocephalic.      Right Ear: Tympanic membrane, ear canal and external ear normal.      Left Ear: Tympanic membrane, ear canal and external ear normal.      Nose: Nose normal. No congestion or rhinorrhea.      Mouth/Throat:      Mouth: Mucous membranes are moist.      Pharynx: Oropharynx is clear. No oropharyngeal exudate or posterior oropharyngeal erythema.   Eyes:      General:         Right eye: No discharge.         Left eye: No discharge.      Extraocular Movements: Extraocular movements intact.      Conjunctiva/sclera: Conjunctivae normal.      Pupils: Pupils are equal, round, and reactive to light.   Cardiovascular:      Rate and Rhythm: Normal rate and regular rhythm.      Pulses: Normal pulses.   Pulmonary:      Effort: Pulmonary effort is normal. No respiratory distress.      Breath sounds: Normal breath sounds. No stridor. No wheezing, rhonchi or rales.   Chest:      Chest wall: No tenderness.   Abdominal:      General: Bowel sounds are normal. There is no distension.      Palpations: Abdomen is soft.      Tenderness: There is abdominal tenderness.  There is no right CVA tenderness, left CVA tenderness, guarding or rebound.      Comments: Diffuse lower abdominal tenderness no peritoneal signs on exam.   Musculoskeletal:         General: No tenderness. Normal range of motion.      Cervical back: Neck supple. No rigidity or tenderness.   Lymphadenopathy:      Cervical: No cervical adenopathy.   Skin:     General: Skin is warm and dry.      Capillary Refill: Capillary refill takes less than 2 seconds.      Findings: No rash.   Neurological:      Mental Status: She is alert and oriented to person, place, and time.   Psychiatric:         Mood and Affect: Mood normal.         Results Reviewed       Procedure Component Value Units Date/Time    Clostridium difficile toxin by PCR with EIA [[id number]] Collected: 09/30/24 1556    Lab Status: In process Specimen: Stool from Per Rectum Updated: 09/30/24 1607    Manual Differential(PHLEBS Do Not Order) [[id number]] Collected: 09/30/24 1302    Lab Status: Final result Specimen: Blood from Arm, Right Updated: 09/30/24 1533     Segmented % 66 %      Lymphocytes % 27 %      Monocytes % 5 %      Eosinophils % 2 %      Basophils % 0 %      Absolute Neutrophils 7.33 Thousand/uL      Absolute Lymphocytes 3.00 Thousand/uL      Absolute Monocytes 0.56 Thousand/uL      Absolute Eosinophils 0.22 Thousand/uL      Absolute Basophils 0.00 Thousand/uL      Total Counted --     RBC Morphology Normal     Platelet Estimate Adequate    Urine Microscopic [[id number]]  (Abnormal) Collected: 09/30/24 1357    Lab Status: Final result Specimen: Urine, Clean Catch Updated: 09/30/24 1428     RBC, UA 1-2 /hpf      WBC, UA 1-2 /hpf      Epithelial Cells Moderate /hpf      Bacteria, UA None Seen /hpf      MUCUS THREADS Occasional    POCT pregnancy, urine [[id number]]  (Normal) Resulted: 09/30/24 1401    Lab Status: Final result Updated: 09/30/24 1401     EXT Preg Test, Ur Negative     Control --    Urine Macroscopic, POC [[id number]]  (Abnormal) Collected:  09/30/24 1357    Lab Status: Final result Specimen: Urine Updated: 09/30/24 1358     Color, UA Yellow     Clarity, UA Clear     pH, UA 6.0     Leukocytes, UA Negative     Nitrite, UA Negative     Protein, UA Negative mg/dl      Glucose, UA Negative mg/dl      Ketones, UA Negative mg/dl      Urobilinogen, UA 0.2 E.U./dl      Bilirubin, UA Negative     Occult Blood, UA Trace     Specific Gravity, UA >=1.030    Narrative:      CLINITEK RESULT    CBC and differential [[id number]]  (Abnormal) Collected: 09/30/24 1302    Lab Status: Final result Specimen: Blood from Arm, Right Updated: 09/30/24 1349     WBC 11.11 Thousand/uL      RBC 3.96 Million/uL      Hemoglobin 10.7 g/dL      Hematocrit 33.5 %      MCV 85 fL      MCH 27.0 pg      MCHC 31.9 g/dL      RDW 15.8 %      MPV 10.0 fL      Platelets 317 Thousands/uL     Narrative:      This is an appended report.  These results have been appended to a previously verified report.    Comprehensive metabolic panel [[id number]]  (Abnormal) Collected: 09/30/24 1302    Lab Status: Final result Specimen: Blood from Arm, Right Updated: 09/30/24 1328     Sodium 138 mmol/L      Potassium 4.1 mmol/L      Chloride 111 mmol/L      CO2 23 mmol/L      ANION GAP 4 mmol/L      BUN 10 mg/dL      Creatinine 0.61 mg/dL      Glucose 87 mg/dL      Calcium 8.3 mg/dL      AST 15 U/L      ALT 12 U/L      Alkaline Phosphatase 86 U/L      Total Protein 7.3 g/dL      Albumin 3.8 g/dL      Total Bilirubin 0.25 mg/dL      eGFR 115 ml/min/1.73sq m     Narrative:      National Kid[employer] guidelines for Chronic Kidney Disease (CKD):     Stage 1 with normal or high GFR (GFR > 90 mL/min/1.73 square meters)    Stage 2 Mild CKD (GFR = 60-89 mL/min/1.73 square meters)    Stage 3A Moderate CKD (GFR = 45-59 mL/min/1.73 square meters)    Stage 3B Moderate CKD (GFR = 30-44 mL/min/1.73 square meters)    Stage 4 Severe CKD (GFR = 15-29 mL/min/1.73 square meters)    Stage 5 End Stage CKD (GFR <15  mL/min/1.73 square meters)  Note: GFR calculation is accurate only with a steady state creatinine    Lipase [[id number]]  (Normal) Collected: 09/30/24 1302    Lab Status: Final result Specimen: Blood from Arm, Right Updated: 09/30/24 1328     Lipase 20 u/L     Stool Enteric Bacterial Panel by PCR [[id number]]     Lab Status: No result Specimen: Stool from Rectum             CT abdomen pelvis with contrast   Final Interpretation by Altaf Black MD (09/30 1519)      1. No acute abnormality in the abdomen or pelvis.      2.  Tiny focus of hyperdensity in the cecum, presumably ingested. Correlate with oral intake or less likely melena.         Workstation performed: [id number]             Procedures    ED Medication and Procedure Management   Prior to Admission Medications   Prescriptions Last Dose Informant Patient Reported? Taking?   Calcium Ascorbate (VITAMIN C) 500 mg tablet  Self Yes No   Sig: Take 500 mg by mouth daily   FeroSul 325 (65 Fe) MG tablet  Self No No   Sig: TAKE 1 TABLET BY MOUTH TWICE DAILY WITH MEALS   Multiple Vitamin (MULTIVITAMIN) tablet  Self Yes No   Sig: Take 1 tablet by mouth daily   NIFEdipine ER (ADALAT CC) 30 MG 24 hr tablet  Self No No   Sig: TAKE 1 TABLET(30 MG) BY MOUTH DAILY   Zinc 100 MG TABS  Self Yes No   Sig: Take by mouth   albuterol (2.5 mg/3 mL) 0.083 % nebulizer solution  Self No No   Sig: TAKE 3 ML VIA NEBULIZER ROUTE EVERY 6 HOURS AS NEEDED FOR WHEEZING OR SHORTNESS OF BREATH   albuterol (PROVENTIL HFA,VENTOLIN HFA) 90 mcg/act inhaler  Self No No   Sig: INHALE 2 PUFFS BY MOUTH EVERY 4 HOURS AS NEEDED FOR WHEEZING   budesonide-formoterol (Symbicort) 160-4.5 mcg/act inhaler  Self No No   Sig: Inhale 2 puffs 2 (two) times a day Rinse mouth after use.   cyanocobalamin (VITAMIN B-12) 1000 MCG tablet  Self No No   Sig: Take 1 tablet (1,000 mcg total) by mouth daily   ergocalciferol (VITAMIN D2) 50,000 units  Self No No   Sig: Take 1 capsule (50,000 Units total) by mouth 2 (two)  times a week   fluticasone (FLONASE) 50 mcg/act nasal spray  Self No No   Si spray into each nostril daily   Patient not taking: Reported on 2024   losartan (COZAAR) 100 MG tablet  Self Yes No   Sig: Take 25 mg by mouth daily   methocarbamol (ROBAXIN) 500 mg tablet   No No   Sig: Take 1 tablet (500 mg total) by mouth 2 (two) times a day   olmesartan-hydrochlorothiazide (BENICAR HCT) 40-25 MG per tablet  Self No No   Sig: Take 1 tablet by mouth daily      Facility-Administered Medications Last Administration Doses Remaining   cyanocobalamin injection 1,000 mcg 2023  8:59 AM    cyanocobalamin injection 1,000 mcg 2023  8:55 AM         Discharge Medication List as of 2024  3:53 PM        CONTINUE these medications which have NOT CHANGED    Details   albuterol (2.5 mg/3 mL) 0.083 % nebulizer solution TAKE 3 ML VIA NEBULIZER ROUTE EVERY 6 HOURS AS NEEDED FOR WHEEZING OR SHORTNESS OF BREATH, Normal      albuterol (PROVENTIL HFA,VENTOLIN HFA) 90 mcg/act inhaler INHALE 2 PUFFS BY MOUTH EVERY 4 HOURS AS NEEDED FOR WHEEZING, Normal      budesonide-formoterol (Symbicort) 160-4.5 mcg/act inhaler Inhale 2 puffs 2 (two) times a day Rinse mouth after use., Starting Mon 2022, Normal      Calcium Ascorbate (VITAMIN C) 500 mg tablet Take 500 mg by mouth daily, Historical Med      cyanocobalamin (VITAMIN B-12) 1000 MCG tablet Take 1 tablet (1,000 mcg total) by mouth daily, Starting Wed 11/15/2023, Normal      ergocalciferol (VITAMIN D2) 50,000 units Take 1 capsule (50,000 Units total) by mouth 2 (two) times a week, Starting Thu 2023, Normal      FeroSul 325 (65 Fe) MG tablet TAKE 1 TABLET BY MOUTH TWICE DAILY WITH MEALS, Normal      fluticasone (FLONASE) 50 mcg/act nasal spray 1 spray into each nostril daily, Starting Tue 10/17/2023, Normal      losartan (COZAAR) 100 MG tablet Take 25 mg by mouth daily, Historical Med      methocarbamol (ROBAXIN) 500 mg tablet Take 1 tablet (500 mg total) by mouth 2  (two) times a day, Starting Tue 2/6/2024, Normal      Multiple Vitamin (MULTIVITAMIN) tablet Take 1 tablet by mouth daily, Historical Med      NIFEdipine ER (ADALAT CC) 30 MG 24 hr tablet TAKE 1 TABLET(30 MG) BY MOUTH DAILY, Normal      olmesartan-hydrochlorothiazide (BENICAR HCT) 40-25 MG per tablet Take 1 tablet by mouth daily, Starting Tue 1/17/2023, Normal      Zinc 100 MG TABS Take by mouth, Historical Med           Outpatient Discharge Orders   Stool Enteric Bacterial Panel by PCR   Standing Status: Future Standing Exp. Date: 09/30/25     Clostridium difficile toxin by PCR with EIA   Standing Status: Future Standing Exp. Date: 09/30/25     ED SEPSIS DOCUMENTATION   Time reflects when diagnosis was documented in both MDM as applicable and the Disposition within this note       Time User Action Codes Description Comment    9/30/2024  3:49 PM Jw Mcclain Add [R19.7] Diarrhea                  Jw Mcclain PA-C  09/30/24 2808

## 2024-09-30 NOTE — Clinical Note
Becky Zhao was seen and treated in our emergency department on 9/30/2024.                Diagnosis: Diarrhea    Becky  .    She may return on this date: 10/02/2024         If you have any questions or concerns, please don't hesitate to call.      Jw Mcclain PA-C    ______________________________           _______________          _______________  Hospital Representative                              Date                                Time Clear bilaterally, pupils equal, round and reactive to light.

## 2024-10-01 LAB — C DIFF TOX GENS STL QL NAA+PROBE: NEGATIVE

## 2024-11-26 NOTE — TELEPHONE ENCOUNTER
No Patient called and would like to go over results of her Urine culture. Please call her at [phone].

## 2025-01-05 ENCOUNTER — HOSPITAL ENCOUNTER (INPATIENT)
Facility: HOSPITAL | Age: 40
LOS: 1 days | Discharge: HOME | DRG: 223 | End: 2025-01-08
Attending: EMERGENCY MEDICINE | Admitting: INTERNAL MEDICINE
Payer: COMMERCIAL

## 2025-01-05 DIAGNOSIS — R19.7 INTRACTABLE DIARRHEA: ICD-10-CM

## 2025-01-05 DIAGNOSIS — K56.600 PARTIAL SMALL BOWEL OBSTRUCTION (HCC): ICD-10-CM

## 2025-01-05 DIAGNOSIS — R11.10 VOMITING AND DIARRHEA: ICD-10-CM

## 2025-01-05 DIAGNOSIS — Z98.84 HISTORY OF ROUX-EN-Y GASTRIC BYPASS: ICD-10-CM

## 2025-01-05 DIAGNOSIS — R10.12 LEFT UPPER QUADRANT ABDOMINAL PAIN: ICD-10-CM

## 2025-01-05 DIAGNOSIS — R19.7 VOMITING AND DIARRHEA: ICD-10-CM

## 2025-01-05 DIAGNOSIS — R11.2 NAUSEA AND VOMITING: Primary | ICD-10-CM

## 2025-01-05 PROCEDURE — 99284 EMERGENCY DEPT VISIT MOD MDM: CPT

## 2025-01-05 RX ORDER — KETOROLAC TROMETHAMINE 30 MG/ML
1 INJECTION, SOLUTION INTRAMUSCULAR; INTRAVENOUS ONCE
Status: COMPLETED | OUTPATIENT
Start: 2025-01-06 | End: 2025-01-06

## 2025-01-05 RX ORDER — ONDANSETRON 2 MG/ML
1 INJECTION INTRAMUSCULAR; INTRAVENOUS ONCE
Status: COMPLETED | OUTPATIENT
Start: 2025-01-06 | End: 2025-01-06

## 2025-01-05 NOTE — LETTER
[place] [id number]  [address] [facility] [address]  Dept: [phone]    January 8, 2025     Patient: Becky Mccord   YOB: 1985   Date of Visit: 1/5/2025       To Whom it May Concern:    Becky Mccord is under my professional care. She was seen in the hospital from 1/5/2025 to 01/08/25. She may return to work on 1/13/2025 without limitations.    If you have any questions or concerns, please don't hesitate to call.         Sincerely,          Miguel Corbin MD

## 2025-01-06 ENCOUNTER — ANESTHESIA (OUTPATIENT)
Dept: PERIOP | Facility: HOSPITAL | Age: 40
DRG: 223 | End: 2025-01-06
Payer: COMMERCIAL

## 2025-01-06 ENCOUNTER — APPOINTMENT (OUTPATIENT)
Dept: RADIOLOGY | Facility: HOSPITAL | Age: 40
DRG: 223 | End: 2025-01-06
Payer: COMMERCIAL

## 2025-01-06 ENCOUNTER — ANESTHESIA EVENT (OUTPATIENT)
Dept: PERIOP | Facility: HOSPITAL | Age: 40
DRG: 223 | End: 2025-01-06
Payer: COMMERCIAL

## 2025-01-06 ENCOUNTER — APPOINTMENT (EMERGENCY)
Dept: CT IMAGING | Facility: HOSPITAL | Age: 40
DRG: 223 | End: 2025-01-06
Payer: COMMERCIAL

## 2025-01-06 PROBLEM — E66.01 MORBID OBESITY WITH BMI OF 50.0-59.9, ADULT (HCC): Chronic | Status: ACTIVE | Noted: 2020-12-23

## 2025-01-06 PROBLEM — K56.609 SMALL BOWEL OBSTRUCTION (HCC): Status: ACTIVE | Noted: 2025-01-06

## 2025-01-06 PROBLEM — K56.600 PARTIAL SMALL BOWEL OBSTRUCTION (HCC): Status: ACTIVE | Noted: 2025-01-06

## 2025-01-06 PROBLEM — K52.9 GASTROENTERITIS: Status: ACTIVE | Noted: 2025-01-06

## 2025-01-06 LAB
ALBUMIN SERPL BCG-MCNC: 4.3 G/DL (ref 3.5–5)
ALP SERPL-CCNC: 86 U/L (ref 34–104)
ALT SERPL W P-5'-P-CCNC: 20 U/L (ref 7–52)
ANION GAP SERPL CALCULATED.3IONS-SCNC: 6 MMOL/L (ref 4–13)
ANION GAP SERPL CALCULATED.3IONS-SCNC: 6 MMOL/L (ref 4–13)
AST SERPL W P-5'-P-CCNC: 42 U/L (ref 13–39)
ATRIAL RATE: 92 BPM
BASOPHILS # BLD MANUAL: 0 THOUSAND/UL (ref 0–0.1)
BASOPHILS NFR MAR MANUAL: 0 % (ref 0–1)
BILIRUB SERPL-MCNC: 0.48 MG/DL (ref 0.2–1)
BUN SERPL-MCNC: 21 MG/DL (ref 5–25)
BUN SERPL-MCNC: 22 MG/DL (ref 5–25)
CALCIUM SERPL-MCNC: 8 MG/DL (ref 8.4–10.2)
CALCIUM SERPL-MCNC: 8 MG/DL (ref 8.4–10.2)
CHLORIDE SERPL-SCNC: 105 MMOL/L (ref 96–108)
CHLORIDE SERPL-SCNC: 108 MMOL/L (ref 96–108)
CO2 SERPL-SCNC: 20 MMOL/L (ref 21–32)
CO2 SERPL-SCNC: 21 MMOL/L (ref 21–32)
CREAT SERPL-MCNC: 0.77 MG/DL (ref 0.6–1.3)
CREAT SERPL-MCNC: 0.79 MG/DL (ref 0.6–1.3)
EOSINOPHIL # BLD MANUAL: 0 THOUSAND/UL (ref 0–0.4)
EOSINOPHIL NFR BLD MANUAL: 0 % (ref 0–6)
ERYTHROCYTE [DISTWIDTH] IN BLOOD BY AUTOMATED COUNT: 14.8 % (ref 11.6–15.1)
GFR SERPL CREATININE-BSD FRML MDRD: 95 ML/MIN/1.73SQ M
GFR SERPL CREATININE-BSD FRML MDRD: 98 ML/MIN/1.73SQ M
GIANT PLATELETS BLD QL SMEAR: PRESENT
GLUCOSE SERPL-MCNC: 128 MG/DL (ref 65–140)
GLUCOSE SERPL-MCNC: 152 MG/DL (ref 65–140)
HCG SERPL QL: NEGATIVE
HCT VFR BLD AUTO: 36.6 % (ref 34.8–46.1)
HGB BLD-MCNC: 11.5 G/DL (ref 11.5–15.4)
HYPERCHROMIA BLD QL SMEAR: PRESENT
LACTATE SERPL-SCNC: 0.6 MMOL/L (ref 0.5–2)
LG PLATELETS BLD QL SMEAR: PRESENT
LIPASE SERPL-CCNC: 8 U/L (ref 11–82)
LYMPHOCYTES # BLD AUTO: 0.73 THOUSAND/UL (ref 0.6–4.47)
LYMPHOCYTES # BLD AUTO: 5 % (ref 14–44)
MCH RBC QN AUTO: 25.8 PG (ref 26.8–34.3)
MCHC RBC AUTO-ENTMCNC: 31.4 G/DL (ref 31.4–37.4)
MCV RBC AUTO: 82 FL (ref 82–98)
MONOCYTES # BLD AUTO: 0.21 THOUSAND/UL (ref 0–1.22)
MONOCYTES NFR BLD: 2 % (ref 4–12)
NEUTROPHILS # BLD MANUAL: 9.52 THOUSAND/UL (ref 1.85–7.62)
NEUTS SEG NFR BLD AUTO: 91 % (ref 43–75)
P AXIS: 24 DEGREES
PLATELET # BLD AUTO: 343 THOUSANDS/UL (ref 149–390)
PLATELET BLD QL SMEAR: ADEQUATE
PMV BLD AUTO: 11 FL (ref 8.9–12.7)
POTASSIUM SERPL-SCNC: 3.8 MMOL/L (ref 3.5–5.3)
POTASSIUM SERPL-SCNC: 6.1 MMOL/L (ref 3.5–5.3)
PR INTERVAL: 138 MS
PROT SERPL-MCNC: 8.1 G/DL (ref 6.4–8.4)
QRS AXIS: 24 DEGREES
QRSD INTERVAL: 84 MS
QT INTERVAL: 352 MS
QTC INTERVAL: 435 MS
RBC # BLD AUTO: 4.46 MILLION/UL (ref 3.81–5.12)
RBC MORPH BLD: PRESENT
SODIUM SERPL-SCNC: 131 MMOL/L (ref 135–147)
SODIUM SERPL-SCNC: 135 MMOL/L (ref 135–147)
T WAVE AXIS: -19 DEGREES
VARIANT LYMPHS # BLD AUTO: 2 %
VENTRICULAR RATE: 92 BPM
WBC # BLD AUTO: 10.46 THOUSAND/UL (ref 4.31–10.16)

## 2025-01-06 PROCEDURE — 74018 RADEX ABDOMEN 1 VIEW: CPT

## 2025-01-06 PROCEDURE — 87505 NFCT AGENT DETECTION GI: CPT | Performed by: NURSE PRACTITIONER

## 2025-01-06 PROCEDURE — 80053 COMPREHEN METABOLIC PANEL: CPT

## 2025-01-06 PROCEDURE — 80048 BASIC METABOLIC PNL TOTAL CA: CPT

## 2025-01-06 PROCEDURE — 85027 COMPLETE CBC AUTOMATED: CPT

## 2025-01-06 PROCEDURE — 0DJ08ZZ INSPECTION OF UPPER INTESTINAL TRACT, VIA NATURAL OR ARTIFICIAL OPENING ENDOSCOPIC: ICD-10-PCS | Performed by: SURGERY

## 2025-01-06 PROCEDURE — 36415 COLL VENOUS BLD VENIPUNCTURE: CPT

## 2025-01-06 PROCEDURE — 93010 ELECTROCARDIOGRAM REPORT: CPT | Performed by: INTERNAL MEDICINE

## 2025-01-06 PROCEDURE — 84703 CHORIONIC GONADOTROPIN ASSAY: CPT

## 2025-01-06 PROCEDURE — 99285 EMERGENCY DEPT VISIT HI MDM: CPT | Performed by: EMERGENCY MEDICINE

## 2025-01-06 PROCEDURE — 43235 EGD DIAGNOSTIC BRUSH WASH: CPT | Performed by: SURGERY

## 2025-01-06 PROCEDURE — 96375 TX/PRO/DX INJ NEW DRUG ADDON: CPT

## 2025-01-06 PROCEDURE — 83605 ASSAY OF LACTIC ACID: CPT

## 2025-01-06 PROCEDURE — 0DJD4ZZ INSPECTION OF LOWER INTESTINAL TRACT, PERCUTANEOUS ENDOSCOPIC APPROACH: ICD-10-PCS | Performed by: SURGERY

## 2025-01-06 PROCEDURE — 49320 DIAG LAPARO SEPARATE PROC: CPT | Performed by: SURGERY

## 2025-01-06 PROCEDURE — 49320 DIAG LAPARO SEPARATE PROC: CPT | Performed by: STUDENT IN AN ORGANIZED HEALTH CARE EDUCATION/TRAINING PROGRAM

## 2025-01-06 PROCEDURE — 96361 HYDRATE IV INFUSION ADD-ON: CPT

## 2025-01-06 PROCEDURE — 83690 ASSAY OF LIPASE: CPT

## 2025-01-06 PROCEDURE — 99223 1ST HOSP IP/OBS HIGH 75: CPT | Performed by: STUDENT IN AN ORGANIZED HEALTH CARE EDUCATION/TRAINING PROGRAM

## 2025-01-06 PROCEDURE — 96374 THER/PROPH/DIAG INJ IV PUSH: CPT

## 2025-01-06 PROCEDURE — 99215 OFFICE O/P EST HI 40 MIN: CPT | Performed by: STUDENT IN AN ORGANIZED HEALTH CARE EDUCATION/TRAINING PROGRAM

## 2025-01-06 PROCEDURE — 93005 ELECTROCARDIOGRAM TRACING: CPT

## 2025-01-06 PROCEDURE — 85007 BL SMEAR W/DIFF WBC COUNT: CPT

## 2025-01-06 PROCEDURE — 74177 CT ABD & PELVIS W/CONTRAST: CPT

## 2025-01-06 RX ORDER — ONDANSETRON 2 MG/ML
4 INJECTION INTRAMUSCULAR; INTRAVENOUS ONCE
Status: DISCONTINUED | OUTPATIENT
Start: 2025-01-06 | End: 2025-01-06

## 2025-01-06 RX ORDER — HYDROMORPHONE HCL/PF 1 MG/ML
0.5 SYRINGE (ML) INJECTION
Status: DISCONTINUED | OUTPATIENT
Start: 2025-01-06 | End: 2025-01-06 | Stop reason: HOSPADM

## 2025-01-06 RX ORDER — PROPOFOL 10 MG/ML
INJECTION, EMULSION INTRAVENOUS AS NEEDED
Status: DISCONTINUED | OUTPATIENT
Start: 2025-01-06 | End: 2025-01-06

## 2025-01-06 RX ORDER — ACETAMINOPHEN 10 MG/ML
1000 INJECTION, SOLUTION INTRAVENOUS ONCE
Status: DISCONTINUED | OUTPATIENT
Start: 2025-01-06 | End: 2025-01-06 | Stop reason: HOSPADM

## 2025-01-06 RX ORDER — MIDAZOLAM HYDROCHLORIDE 2 MG/2ML
INJECTION, SOLUTION INTRAMUSCULAR; INTRAVENOUS AS NEEDED
Status: DISCONTINUED | OUTPATIENT
Start: 2025-01-06 | End: 2025-01-06

## 2025-01-06 RX ORDER — FENTANYL CITRATE 50 UG/ML
INJECTION, SOLUTION INTRAMUSCULAR; INTRAVENOUS AS NEEDED
Status: DISCONTINUED | OUTPATIENT
Start: 2025-01-06 | End: 2025-01-06

## 2025-01-06 RX ORDER — CEFAZOLIN SODIUM 1 G/3ML
INJECTION, POWDER, FOR SOLUTION INTRAMUSCULAR; INTRAVENOUS AS NEEDED
Status: DISCONTINUED | OUTPATIENT
Start: 2025-01-06 | End: 2025-01-06

## 2025-01-06 RX ORDER — AMLODIPINE BESYLATE 10 MG/1
10 TABLET ORAL DAILY
COMMUNITY

## 2025-01-06 RX ORDER — HYDROMORPHONE HCL/PF 1 MG/ML
0.5 SYRINGE (ML) INJECTION EVERY 4 HOURS PRN
Refills: 0 | Status: DISCONTINUED | OUTPATIENT
Start: 2025-01-06 | End: 2025-01-08 | Stop reason: HOSPADM

## 2025-01-06 RX ORDER — SODIUM CHLORIDE, SODIUM GLUCONATE, SODIUM ACETATE, POTASSIUM CHLORIDE, MAGNESIUM CHLORIDE, SODIUM PHOSPHATE, DIBASIC, AND POTASSIUM PHOSPHATE .53; .5; .37; .037; .03; .012; .00082 G/100ML; G/100ML; G/100ML; G/100ML; G/100ML; G/100ML; G/100ML
100 INJECTION, SOLUTION INTRAVENOUS CONTINUOUS
Status: DISCONTINUED | OUTPATIENT
Start: 2025-01-06 | End: 2025-01-06

## 2025-01-06 RX ORDER — SUCCINYLCHOLINE/SOD CL,ISO/PF 100 MG/5ML
SYRINGE (ML) INTRAVENOUS AS NEEDED
Status: DISCONTINUED | OUTPATIENT
Start: 2025-01-06 | End: 2025-01-06

## 2025-01-06 RX ORDER — DIATRIZOATE MEGLUMINE AND DIATRIZOATE SODIUM 660; 100 MG/ML; MG/ML
120 SOLUTION ORAL; RECTAL
Status: DISCONTINUED | OUTPATIENT
Start: 2025-01-06 | End: 2025-01-06

## 2025-01-06 RX ORDER — DICYCLOMINE HCL 20 MG
20 TABLET ORAL ONCE
Status: COMPLETED | OUTPATIENT
Start: 2025-01-06 | End: 2025-01-06

## 2025-01-06 RX ORDER — SODIUM CHLORIDE, SODIUM GLUCONATE, SODIUM ACETATE, POTASSIUM CHLORIDE, MAGNESIUM CHLORIDE, SODIUM PHOSPHATE, DIBASIC, AND POTASSIUM PHOSPHATE .53; .5; .37; .037; .03; .012; .00082 G/100ML; G/100ML; G/100ML; G/100ML; G/100ML; G/100ML; G/100ML
100 INJECTION, SOLUTION INTRAVENOUS CONTINUOUS
Status: DISPENSED | OUTPATIENT
Start: 2025-01-06 | End: 2025-01-07

## 2025-01-06 RX ORDER — ENOXAPARIN SODIUM 100 MG/ML
40 INJECTION SUBCUTANEOUS EVERY 12 HOURS
Status: DISCONTINUED | OUTPATIENT
Start: 2025-01-06 | End: 2025-01-08 | Stop reason: HOSPADM

## 2025-01-06 RX ORDER — OXYCODONE HYDROCHLORIDE 5 MG/1
5 TABLET ORAL EVERY 4 HOURS PRN
Refills: 0 | Status: DISCONTINUED | OUTPATIENT
Start: 2025-01-06 | End: 2025-01-08 | Stop reason: HOSPADM

## 2025-01-06 RX ORDER — FENTANYL CITRATE/PF 50 MCG/ML
50 SYRINGE (ML) INJECTION
Refills: 0 | Status: DISCONTINUED | OUTPATIENT
Start: 2025-01-06 | End: 2025-01-06 | Stop reason: HOSPADM

## 2025-01-06 RX ORDER — SODIUM CHLORIDE 9 MG/ML
100 INJECTION, SOLUTION INTRAVENOUS CONTINUOUS
Status: DISCONTINUED | OUTPATIENT
Start: 2025-01-06 | End: 2025-01-06

## 2025-01-06 RX ORDER — ACETAMINOPHEN 325 MG/1
975 TABLET ORAL EVERY 6 HOURS PRN
Status: DISCONTINUED | OUTPATIENT
Start: 2025-01-06 | End: 2025-01-08 | Stop reason: HOSPADM

## 2025-01-06 RX ORDER — BUPIVACAINE HYDROCHLORIDE 5 MG/ML
INJECTION, SOLUTION EPIDURAL; INTRACAUDAL AS NEEDED
Status: DISCONTINUED | OUTPATIENT
Start: 2025-01-06 | End: 2025-01-06 | Stop reason: HOSPADM

## 2025-01-06 RX ORDER — MAGNESIUM HYDROXIDE 1200 MG/15ML
LIQUID ORAL AS NEEDED
Status: DISCONTINUED | OUTPATIENT
Start: 2025-01-06 | End: 2025-01-06 | Stop reason: HOSPADM

## 2025-01-06 RX ORDER — HYDROMORPHONE HCL/PF 1 MG/ML
SYRINGE (ML) INJECTION AS NEEDED
Status: DISCONTINUED | OUTPATIENT
Start: 2025-01-06 | End: 2025-01-06

## 2025-01-06 RX ORDER — SODIUM CHLORIDE 9 MG/ML
INJECTION INTRAVENOUS AS NEEDED
Status: DISCONTINUED | OUTPATIENT
Start: 2025-01-06 | End: 2025-01-06 | Stop reason: HOSPADM

## 2025-01-06 RX ORDER — HYDROMORPHONE HCL/PF 1 MG/ML
1 SYRINGE (ML) INJECTION ONCE
Status: COMPLETED | OUTPATIENT
Start: 2025-01-06 | End: 2025-01-06

## 2025-01-06 RX ORDER — HEPARIN SODIUM 5000 [USP'U]/ML
5000 INJECTION, SOLUTION INTRAVENOUS; SUBCUTANEOUS ONCE
Status: DISCONTINUED | OUTPATIENT
Start: 2025-01-06 | End: 2025-01-06 | Stop reason: HOSPADM

## 2025-01-06 RX ORDER — AMLODIPINE BESYLATE 10 MG/1
10 TABLET ORAL DAILY
Status: DISCONTINUED | OUTPATIENT
Start: 2025-01-06 | End: 2025-01-08 | Stop reason: HOSPADM

## 2025-01-06 RX ORDER — FAMOTIDINE 10 MG/ML
20 INJECTION, SOLUTION INTRAVENOUS ONCE
Status: COMPLETED | OUTPATIENT
Start: 2025-01-06 | End: 2025-01-06

## 2025-01-06 RX ORDER — ONDANSETRON 2 MG/ML
4 INJECTION INTRAMUSCULAR; INTRAVENOUS ONCE AS NEEDED
Status: DISCONTINUED | OUTPATIENT
Start: 2025-01-06 | End: 2025-01-06 | Stop reason: HOSPADM

## 2025-01-06 RX ORDER — SIMETHICONE 80 MG
80 TABLET,CHEWABLE ORAL 4 TIMES DAILY PRN
Status: DISCONTINUED | OUTPATIENT
Start: 2025-01-06 | End: 2025-01-08 | Stop reason: HOSPADM

## 2025-01-06 RX ORDER — LIDOCAINE HYDROCHLORIDE 20 MG/ML
INJECTION, SOLUTION EPIDURAL; INFILTRATION; INTRACAUDAL; PERINEURAL AS NEEDED
Status: DISCONTINUED | OUTPATIENT
Start: 2025-01-06 | End: 2025-01-06

## 2025-01-06 RX ORDER — DEXAMETHASONE SODIUM PHOSPHATE 10 MG/ML
INJECTION, SOLUTION INTRAMUSCULAR; INTRAVENOUS AS NEEDED
Status: DISCONTINUED | OUTPATIENT
Start: 2025-01-06 | End: 2025-01-06

## 2025-01-06 RX ORDER — MAGNESIUM HYDROXIDE/ALUMINUM HYDROXICE/SIMETHICONE 120; 1200; 1200 MG/30ML; MG/30ML; MG/30ML
30 SUSPENSION ORAL EVERY 6 HOURS PRN
Status: DISCONTINUED | OUTPATIENT
Start: 2025-01-06 | End: 2025-01-08 | Stop reason: HOSPADM

## 2025-01-06 RX ORDER — SODIUM CHLORIDE, SODIUM LACTATE, POTASSIUM CHLORIDE, CALCIUM CHLORIDE 600; 310; 30; 20 MG/100ML; MG/100ML; MG/100ML; MG/100ML
INJECTION, SOLUTION INTRAVENOUS CONTINUOUS PRN
Status: DISCONTINUED | OUTPATIENT
Start: 2025-01-06 | End: 2025-01-06

## 2025-01-06 RX ORDER — ONDANSETRON 2 MG/ML
4 INJECTION INTRAMUSCULAR; INTRAVENOUS EVERY 6 HOURS PRN
Status: DISCONTINUED | OUTPATIENT
Start: 2025-01-06 | End: 2025-01-08 | Stop reason: HOSPADM

## 2025-01-06 RX ORDER — ONDANSETRON 2 MG/ML
INJECTION INTRAMUSCULAR; INTRAVENOUS AS NEEDED
Status: DISCONTINUED | OUTPATIENT
Start: 2025-01-06 | End: 2025-01-06

## 2025-01-06 RX ORDER — ROCURONIUM BROMIDE 10 MG/ML
INJECTION, SOLUTION INTRAVENOUS AS NEEDED
Status: DISCONTINUED | OUTPATIENT
Start: 2025-01-06 | End: 2025-01-06

## 2025-01-06 RX ADMIN — DEXAMETHASONE SODIUM PHOSPHATE 10 MG: 10 INJECTION INTRAMUSCULAR; INTRAVENOUS at 16:24

## 2025-01-06 RX ADMIN — DICYCLOMINE HYDROCHLORIDE 20 MG: 20 TABLET ORAL at 00:17

## 2025-01-06 RX ADMIN — SODIUM CHLORIDE, SODIUM LACTATE, POTASSIUM CHLORIDE, AND CALCIUM CHLORIDE: .6; .31; .03; .02 INJECTION, SOLUTION INTRAVENOUS at 16:17

## 2025-01-06 RX ADMIN — ROCURONIUM 40 MG: 50 INJECTION, SOLUTION INTRAVENOUS at 16:25

## 2025-01-06 RX ADMIN — SODIUM CHLORIDE, SODIUM GLUCONATE, SODIUM ACETATE, POTASSIUM CHLORIDE, MAGNESIUM CHLORIDE, SODIUM PHOSPHATE, DIBASIC, AND POTASSIUM PHOSPHATE 100 ML/HR: .53; .5; .37; .037; .03; .012; .00082 INJECTION, SOLUTION INTRAVENOUS at 18:37

## 2025-01-06 RX ADMIN — HYDROMORPHONE HYDROCHLORIDE 0.5 MG: 1 INJECTION, SOLUTION INTRAMUSCULAR; INTRAVENOUS; SUBCUTANEOUS at 16:52

## 2025-01-06 RX ADMIN — ROCURONIUM 10 MG: 50 INJECTION, SOLUTION INTRAVENOUS at 16:24

## 2025-01-06 RX ADMIN — SODIUM CHLORIDE 12 MCG: 9 INJECTION, SOLUTION INTRAVENOUS at 16:39

## 2025-01-06 RX ADMIN — LIDOCAINE HYDROCHLORIDE 100 MG: 20 INJECTION, SOLUTION EPIDURAL; INFILTRATION; INTRACAUDAL at 16:24

## 2025-01-06 RX ADMIN — ONDANSETRON 4 MG: 2 INJECTION INTRAMUSCULAR; INTRAVENOUS at 08:50

## 2025-01-06 RX ADMIN — OXYCODONE 5 MG: 5 TABLET ORAL at 06:22

## 2025-01-06 RX ADMIN — MIDAZOLAM 2 MG: 1 INJECTION INTRAMUSCULAR; INTRAVENOUS at 16:21

## 2025-01-06 RX ADMIN — SUGAMMADEX 300 MG: 100 INJECTION, SOLUTION INTRAVENOUS at 17:12

## 2025-01-06 RX ADMIN — ENOXAPARIN SODIUM 40 MG: 40 INJECTION SUBCUTANEOUS at 06:22

## 2025-01-06 RX ADMIN — PROPOFOL 120 MG: 10 INJECTION, EMULSION INTRAVENOUS at 16:25

## 2025-01-06 RX ADMIN — SODIUM CHLORIDE, SODIUM GLUCONATE, SODIUM ACETATE, POTASSIUM CHLORIDE, MAGNESIUM CHLORIDE, SODIUM PHOSPHATE, DIBASIC, AND POTASSIUM PHOSPHATE 100 ML/HR: .53; .5; .37; .037; .03; .012; .00082 INJECTION, SOLUTION INTRAVENOUS at 12:01

## 2025-01-06 RX ADMIN — Medication 200 MG: at 16:24

## 2025-01-06 RX ADMIN — ONDANSETRON 4 MG: 2 INJECTION INTRAMUSCULAR; INTRAVENOUS at 17:22

## 2025-01-06 RX ADMIN — IOHEXOL 50 ML: 240 INJECTION, SOLUTION INTRATHECAL; INTRAVASCULAR; INTRAVENOUS; ORAL at 03:14

## 2025-01-06 RX ADMIN — OXYCODONE 5 MG: 5 TABLET ORAL at 19:38

## 2025-01-06 RX ADMIN — IOHEXOL 100 ML: 350 INJECTION, SOLUTION INTRAVENOUS at 03:15

## 2025-01-06 RX ADMIN — ONDANSETRON 4 MG: 2 INJECTION INTRAMUSCULAR; INTRAVENOUS at 16:33

## 2025-01-06 RX ADMIN — SODIUM CHLORIDE 1000 ML: 0.9 INJECTION, SOLUTION INTRAVENOUS at 00:20

## 2025-01-06 RX ADMIN — OXYCODONE 5 MG: 5 TABLET ORAL at 23:59

## 2025-01-06 RX ADMIN — CEFAZOLIN 3000 MG: 1 INJECTION, POWDER, FOR SOLUTION INTRAMUSCULAR; INTRAVENOUS; PARENTERAL at 16:33

## 2025-01-06 RX ADMIN — SODIUM CHLORIDE 100 ML/HR: 0.9 INJECTION, SOLUTION INTRAVENOUS at 06:28

## 2025-01-06 RX ADMIN — FAMOTIDINE 20 MG: 10 INJECTION, SOLUTION INTRAVENOUS at 00:26

## 2025-01-06 RX ADMIN — HYDROMORPHONE HYDROCHLORIDE 1 MG: 1 INJECTION, SOLUTION INTRAMUSCULAR; INTRAVENOUS; SUBCUTANEOUS at 01:41

## 2025-01-06 RX ADMIN — AMLODIPINE BESYLATE 10 MG: 10 TABLET ORAL at 08:41

## 2025-01-06 RX ADMIN — FENTANYL CITRATE 100 MCG: 50 INJECTION INTRAMUSCULAR; INTRAVENOUS at 16:24

## 2025-01-06 RX ADMIN — PROPOFOL 200 MG: 10 INJECTION, EMULSION INTRAVENOUS at 16:24

## 2025-01-06 RX ADMIN — FENTANYL CITRATE 50 MCG: 50 INJECTION INTRAMUSCULAR; INTRAVENOUS at 17:33

## 2025-01-06 NOTE — OP NOTE
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    Complications:   None    Procedure and Technique:  The patient was brought to the operating room and placed in a supine position. The patient received a dose of IV antibiotics.  SCDs were placed.  The patient was induced under general endotracheal anesthesia. The abdominal wall was prepped and draped under sterile conditions in the usual fashion.     The procedure was started by obtaining access to the abdominal cavity using a Veress needle to the left side of the midline around 6 inches from the xiphoid the abdominal cavity was insufflated with CO2 to a pressure of 15 mmHg. After that, the abdomen was entered with an 10 mm trocar using an Optiview trocar under direct visualization. At that point, an 5-mm trocar and a 10-mm trocar were placed on the right side of the abdominal wall, under direct visualization, and 2 additional 5-mm trocars were placed on the left side of the abdominal wall,  also under direct visualization. 20 ml of Exparel mixed with saline and 0.5 % Marcaine was used for a total of 100 ml of local.  Local was used for TAP blocks and also infiltrated around the port sites prior to placement.     We ran the small bowel from the terminal ileum to the JJ anastomosis without evidence of hernia.  We then ran the small bowel from the Jaycee limb down to the JJ anastomosis.  We did note there were some marked dilation of the JJ anastomosis.    We then evaluated the JJ anastomosis and Petersons defect which were both obliterated.    We did not identify any bands or adhesions that could be causing a small bowel obstruction.    We then proceeded to perform an EGD which was normal.  There were no evidence of marginal ulcerations.    The 10 mm periumbilical port site was closed using 0 Vicryl on a suture passer.  The abdomen was desufflated and all port sites were closed using 4-0 Monocryl and skin glue.    Dr. Trenton Lyons was present and scrubbed for the entirety of the case.    Patient Disposition:  PACU     This procedure was not performed to treat colon cancer through resection           SIGNATURE: Candido Tello MD  DATE: January 6, 2025  TIME: 5:31 PM

## 2025-01-06 NOTE — ASSESSMENT & PLAN NOTE
PTA: Amlodipine 10 mg daily and Losartan 25 mg daily. Continue amlodipine if able to tolerate p.o  Hold losartan for now

## 2025-01-06 NOTE — ED ATTENDING ATTESTATION
1/5/2025  I, Ben Palomares MD, saw and evaluated the patient. I have discussed the patient with the resident/non-physician practitioner and agree with the resident's/non-physician practitioner's findings, Plan of Care, and MDM as documented in the resident's/non-physician practitioner's note, except where noted. All available labs and Radiology studies were reviewed.  I was present for key portions of any procedure(s) performed by the resident/non-physician practitioner and I was immediately available to provide assistance.       At this point I agree with the current assessment done in the Emergency Department.  I have conducted an independent evaluation of this patient a history and physical is as follows:    39-year-old female presents for evaluation of several days of abdominal pain, nausea, vomiting, diarrhea.  History of bariatric surgery 10 systems reviewed and otherwise negative.  On exam no distress, HEENT normal, lungs normal, cardiac normal, abdomen diffusely tender to palpation.  Medical decision making;-acute abdominal pain nausea, vomiting, diarrhea in the setting of bariatric surgery-will check abdominal labs are out hepatitis, pancreatitis, acute kidney injury, urine dip to rule out UTI, urine pregnancy, CT abdomen pelvis for acute surgical pathology such as but not limited to small bowel obstruction, perforated viscus, internal hernia, IV fluids for dehydration, as needed pain meds.    ED Course         Critical Care Time  Procedures

## 2025-01-06 NOTE — ANESTHESIA POSTPROCEDURE EVALUATION
Post-Op Assessment Note    CV Status:  Stable    Pain management: adequate       Mental Status:  Awake   Hydration Status:  Euvolemic   PONV Controlled:  Controlled   Airway Patency:  Patent  Two or more mitigation strategies used for obstructive sleep apnea   Post Op Vitals Reviewed: Yes    No anethesia notable event occurred.    Staff: Anesthesiologist, CRNA           Last Filed PACU Vitals:  Vitals Value Taken Time   Temp     Pulse     BP     Resp     SpO2

## 2025-01-06 NOTE — ASSESSMENT & PLAN NOTE
CT A/P:   Abnormal dilated fluid-filled loops of proximal small bowel within the left upper abdomen which gradually taper to normal caliber distally  Several loops of nondilated fluid-filled small bowel also noted throughout the abdomen and pelvis as well as liquid stool within the colon  Positive sick contact. Son at home but with milder illness  Check stool studies

## 2025-01-06 NOTE — ASSESSMENT & PLAN NOTE
Patient with history of Jaycee-en-Y, postsurgical malabsorption, morbid obesity presents with complaint of abdominal pain, emesis and diarrhea.  Son sick at home with gastroenteritis.    CT AP:   Abnormal dilated fluid-filled loops of proximal small bowel within the left upper abdomen which gradually taper to normal caliber distally.   Several loops of nondilated fluid-filled small bowel also noted throughout the abdomen and pelvis as well as liquid stool within the colon.  Status post gastric bypass surgery with only a small amount of the administered oral contrast located within the stomach remnant and proximal small bowel.   Differential considerations include early/partial small bowel obstruction, ileus versus gastroenteritis  NPO IVF  PRN antiemetics; analgesics  Serial abdominal exams  Monitor lytes  Bariatric surgery consult

## 2025-01-06 NOTE — ED PROVIDER NOTES
Time reflects when diagnosis was documented in both MDM as applicable and the Disposition within this note       Time User Action Codes Description Comment    1/6/2025  4:22 AM OFElovebacilioMindy holdenhi Add [R11.2] Nausea and vomiting     1/6/2025  4:22 AM Elayne Burnett Add [R11.10,  R19.7] Vomiting and diarrhea     1/6/2025  5:00 AM Elvie Bailey Add [K56.600] Partial small bowel obstruction (HCC)     1/6/2025  1:07 PM Marion Dodd Add [Z98.84] History of Jaycee-en-Y gastric bypass     1/6/2025  1:07 PM Marion Dodd Add [R10.12] Left upper quadrant abdominal pain           ED Disposition       ED Disposition   Admit    Condition   Stable    Date/Time   Mon Jan 6, 2025  4:22 AM    Comment                  Assessment & Plan       Medical Decision Making  Amount and/or Complexity of Data Reviewed  Labs: ordered.  Radiology: ordered.    Risk  Prescription drug management.  Decision regarding hospitalization.    Pt is a 39-year-old female, history of gastric bypass, morbid obesity, presents for nausea emesis, abdominal pain, diarrhea since yesterday.  States that son had similar symptoms but she has worse symptoms.  Denies fever, chest pain, shortness of breath, urinary symptoms.    Initial presentation pt is nontoxic    On exam   General: VSS, NAD, awake, alert. Well-nourished, well-developed.morbid obesity  Speaking normally in full sentences.   Head: Normocephalic, atraumatic, nontender.  Eyes: PERRL, EOM-I. No diplopia.   No hyphema.   No subconjunctival hemorrhages.  Symmetrical lids.   ENT: Atraumatic external nose and ears.    MMM  No malocclusion. No stridor. Normal phonation. No drooling. Normal swallowing.   Neck: Symmetric, trachea midline. No JVD.  CV: RRR. +S1/S2  No murmurs or gallops  Peripheral pulses +2 throughout. No chest wall tenderness.   Lungs:   Unlabored No retractions  CTAB, lungs sounds equal bilateral.   No tachypnea.   Abd: Diffuse tenderness, no rebound or guarding but also given body  habitus unable to get a good abdominal exam.  MSK:   FROM   Back:   No rashes  Skin: Dry, intact.   Neuro: AAOx3, GCS 15, CN II-XII grossly intact.   Motor grossly intact.  Psychiatric/Behavioral: Appropriate mood and affect   Exam: deferred      Ddx: SBO versus gastroenteritis, other acute intra-abdominal pathology    Plan: Patient was evaluated with abdominal labs and CT abdomen.  Symptomatically treated with GI cocktail and fluids.  CT concerning for developing SBO versus gastroenteritis.  Discussed with bariatric fellow and admitted to [facility] for further evaluation management in stable condition.           Medications   oxyCODONE (ROXICODONE) IR tablet 5 mg (has no administration in time range)   HYDROmorphone (DILAUDID) injection 0.5 mg (has no administration in time range)   acetaminophen (TYLENOL) tablet 975 mg (has no administration in time range)   ondansetron (ZOFRAN) injection 4 mg (has no administration in time range)   ondansetron (FOR EMS ONLY) (ZOFRAN) 4 mg/2 mL injection 4 mg (0 mg Does not apply Given to EMS 1/6/25 0000)   ketorolac (FOR EMS ONLY) (TORADOL) injection 30 mg (0 mg Does not apply Given to EMS 1/6/25 0000)   sodium chloride 0.9 % bolus 1,000 mL (0 mL Intravenous Stopped 1/6/25 0241)   dicyclomine (BENTYL) tablet 20 mg (20 mg Oral Given 1/6/25 0017)   Famotidine (PF) (PEPCID) injection 20 mg (20 mg Intravenous Given 1/6/25 0026)   HYDROmorphone (DILAUDID) injection 1 mg (1 mg Intravenous Given 1/6/25 0141)   iohexol (OMNIPAQUE) 350 MG/ML injection (MULTI-DOSE) 100 mL (100 mL Intravenous Given 1/6/25 0315)   iohexol (OMNIPAQUE) 240 MG/ML solution 50 mL (50 mL Oral Given 1/6/25 0314)       ED Risk Strat Scores                          SBIRT 20yo+      Flowsheet Row Most Recent Value   Initial Alcohol Screen: US AUDIT-C     1. How often do you have a drink containing alcohol? 0 Filed at: 01/05/2025 1181   2. How many drinks containing alcohol do you have on a typical day you are drinking?   0 Filed at: 01/05/2025 6105   3a. Male UNDER 65: How often do you have five or more drinks on one occasion? 0 Filed at: 01/05/2025 7812   3b. FEMALE Any Age, or MALE 65+: How often do you have 4 or more drinks on one occassion? 0 Filed at: 01/05/2025 4733   Audit-C Score 0 Filed at: 01/05/2025 4133   JUAREZ: How many times in the past year have you...    Used an illegal drug or used a prescription medication for non-medical reasons? Never Filed at: 01/05/2025 4114                            History of Present Illness       Chief Complaint   Patient presents with    Vomiting     Patient brought in by EMS d/t vomiting, diarrhea, and abdominal pain for the last 2days. Patient states here son just had the same thing.       Past Medical History:   Diagnosis Date    Anemia     BMI 36.0-36.9,adult 12/08/2020    Cough 12/21/2020    COVID-19 virus infection 12/23/2020    Disease of thyroid gland     Dizziness     due to anemia, last episode August 2020, no falls    Drop in hemoglobin 12/08/2020    Fatigue     History of transfusion 2016    anemic - loss of blood due to bleeding ulcers, no reaction    Hyperlipidemia     Hypertension     Multiple gastric ulcers     Obesity     11/14/16    RUQ pain 06/09/2022    Severe obesity (BMI 35.0-39.9) with comorbidity (HCC) 12/23/2020    Sore throat 10/27/2021    Thyroid disease     took synthroid but not currently taking    Thyroid mass 03/15/2021     A CT scan of the neck incidental finding 1.7 cm of the right thyroid    Ulcer       Past Surgical History:   Procedure Laterality Date    CHOLECYSTECTOMY      CHOLECYSTECTOMY LAPAROSCOPIC N/A 06/10/2022    Procedure: CHOLECYSTECTOMY LAPAROSCOPIC;  Surgeon: Dameon Iverson MD;  Location:  MAIN OR;  Service: General    GASTRIC BYPASS  2014    GASTRIC BYPASS LAPAROSCOPIC N/A 12/15/2020    Procedure: Robotic lysis of adhesions, small-bowel resection, partial gastrectomy, paraesophageal hernia repair, bilateral truncal vagotomy; Robotic  gastrojejunostomy anastomosis with intraop endoscopy;  Surgeon: Arianna Lyons MD;  Location: [facility];  Service: Bariatrics    LITO-EN-Y PROCEDURE  2016    Gastric Bypass by Dr. Gay;Pre-Op Weight 339.6,nw    TUBAL LIGATION Bilateral         WISDOM TOOTH EXTRACTION        Family History   Problem Relation Age of Onset    Asthma Mother     Coronary artery disease Mother     Diabetes Mother         MELLITUS    Hyperlipidemia Mother     Hypertension Mother     Aneurysm Mother         2018 just diagnosed with two    No Known Problems Father          when she was 3 months old    No Known Problems Sister     No Known Problems Daughter     No Known Problems Daughter     Aneurysm Maternal Grandmother     Aneurysm Maternal Grandfather           of a ruptured abdominal aneurysm    No Known Problems Paternal Grandmother     No Known Problems Paternal Grandfather     No Known Problems Maternal Aunt     No Known Problems Maternal Aunt     No Known Problems Maternal Aunt       Social History     Tobacco Use    Smoking status: Former     Current packs/day: 0.00     Average packs/day: 0.3 packs/day for 19.2 years (4.8 ttl pk-yrs)     Types: Cigarettes     Start date: 2004     Quit date: 9/10/2023     Years since quittin.3     Passive exposure: Never    Smokeless tobacco: Never    Tobacco comments:     Started again 3/2023, 1 cig per day   Vaping Use    Vaping status: Never Used   Substance Use Topics    Alcohol use: No    Drug use: No      E-Cigarette/Vaping    E-Cigarette Use Never User       E-Cigarette/Vaping Substances    Nicotine No     THC No     CBD No     Flavoring No     Other No     Unknown No       I have reviewed and agree with the history as documented.     HPI    Review of Systems        Objective       ED Triage Vitals   Temperature Pulse Blood Pressure Respirations SpO2 Patient Position - Orthostatic VS   25 2350 25 2353 25 2353 25 2353 25 2353  01/05/25 2353   98.6 °F (37 °C) (!) 108 117/96 18 98 % Lying      Temp Source Heart Rate Source BP Location FiO2 (%) Pain Score    01/05/25 2350 01/05/25 2353 01/05/25 2353 -- 01/05/25 2353    Oral Monitor Right arm  8      Vitals      Date and Time Temp Pulse SpO2 Resp BP Pain Score FACES Pain Rating User   01/06/25 1938 -- -- -- -- -- 6 -- CR   01/06/25 1756 97.3 °F (36.3 °C) 82 100 % 13 142/83 5 -- LT   01/06/25 1740 -- 84 98 % 14 139/81 -- -- LT   01/06/25 1733 -- -- -- -- -- 8 -- LT   01/06/25 1724 98 °F (36.7 °C) 105 96 % 17 145/86 8 -- LT   01/06/25 1622 -- -- -- -- -- 5 -- SB   01/06/25 0845 -- -- -- -- -- 6 -- NA   01/06/25 0622 -- -- -- -- -- 5 -- SC   01/06/25 0540 -- 91 100 % 18 129/79 5 -- SC   01/06/25 0523 -- 106 98 % 18 117/63 5 -- [facility]   01/06/25 0245 -- 88 99 % 18 113/54 5 -- [facility]   01/06/25 0141 -- -- -- -- -- 8 -- [facility]   01/05/25 2353 -- 108 98 % 18 117/96 8 -- [facility]   01/05/25 2350 98.6 °F (37 °C) -- -- -- -- -- -- [facility]            Physical Exam    Results Reviewed       Procedure Component Value Units Date/Time    Stool Enteric Bacterial Panel by PCR [[id number]] Collected: 01/06/25 1041    Lab Status: In process Specimen: Stool from Rectum Updated: 01/06/25 1046    Clostridium difficile toxin by PCR with EIA [[id number]] Collected: 01/06/25 1041    Lab Status: In process Specimen: Stool from Rectum Updated: 01/06/25 1045    Lactic acid, plasma (w/reflex if result > 2.0) [[id number]]  (Normal) Collected: 01/06/25 0330    Lab Status: Final result Specimen: Blood from Arm, Left Updated: 01/06/25 0351     LACTIC ACID 0.6 mmol/L     Narrative:      Result may be elevated if tourniquet was used during collection.    Basic metabolic panel [[id number]]  (Abnormal) Collected: 01/06/25 0123    Lab Status: Final result Specimen: Blood from Arm, Left Updated: 01/06/25 0205     Sodium 135 mmol/L      Potassium 3.8 mmol/L      Chloride 108 mmol/L      CO2 21 mmol/L      ANION GAP 6 mmol/L      BUN 22 mg/dL       Creatinine 0.79 mg/dL      Glucose 128 mg/dL      Calcium 8.0 mg/dL      eGFR 95 ml/min/1.73sq m     Narrative:      National Kidney Disease Foundation guidelines for Chronic Kidney Disease (CKD):     Stage 1 with normal or high GFR (GFR > 90 mL/min/1.73 square meters)    Stage 2 Mild CKD (GFR = 60-89 mL/min/1.73 square meters)    Stage 3A Moderate CKD (GFR = 45-59 mL/min/1.73 square meters)    Stage 3B Moderate CKD (GFR = 30-44 mL/min/1.73 square meters)    Stage 4 Severe CKD (GFR = 15-29 mL/min/1.73 square meters)    Stage 5 End Stage CKD (GFR <15 mL/min/1.73 square meters)  Note: GFR calculation is accurate only with a steady state creatinine    Manual Differential(PHLEBS Do Not Order) [[id number]]  (Abnormal) Collected: 01/06/25 0017    Lab Status: Final result Specimen: Blood from Arm, Left Updated: 01/06/25 0150     Segmented % 91 %      Lymphocytes % 5 %      Monocytes % 2 %      Eosinophils % 0 %      Basophils % 0 %      Atypical Lymphocytes % 2 %      Absolute Neutrophils 9.52 Thousand/uL      Absolute Lymphocytes 0.73 Thousand/uL      Absolute Monocytes 0.21 Thousand/uL      Absolute Eosinophils 0.00 Thousand/uL      Absolute Basophils 0.00 Thousand/uL      Total Counted --     RBC Morphology Present     Platelet Estimate Adequate     Giant PLTs Present     Large Platelet Present     Hypochromia Present    hCG, qualitative pregnancy [[id number]]  (Normal) Collected: 01/06/25 0019    Lab Status: Final result Specimen: Blood from Arm, Left Updated: 01/06/25 0058     Preg, Serum Negative    Comprehensive metabolic panel [[id number]]  (Abnormal) Collected: 01/06/25 0017    Lab Status: Final result Specimen: Blood from Arm, Left Updated: 01/06/25 0050     Sodium 131 mmol/L      Potassium 6.1 mmol/L      Chloride 105 mmol/L      CO2 20 mmol/L      ANION GAP 6 mmol/L      BUN 21 mg/dL      Creatinine 0.77 mg/dL      Glucose 152 mg/dL      Calcium 8.0 mg/dL      AST 42 U/L      ALT 20 U/L      Alkaline Phosphatase  86 U/L      Total Protein 8.1 g/dL      Albumin 4.3 g/dL      Total Bilirubin 0.48 mg/dL      eGFR 98 ml/min/1.73sq m     Narrative:      Specimen is moderately hemolyzed, results may be affected  National Kidney Disease Foundation guidelines for Chronic Kidney Disease (CKD):     Stage 1 with normal or high GFR (GFR > 90 mL/min/1.73 square meters)    Stage 2 Mild CKD (GFR = 60-89 mL/min/1.73 square meters)    Stage 3A Moderate CKD (GFR = 45-59 mL/min/1.73 square meters)    Stage 3B Moderate CKD (GFR = 30-44 mL/min/1.73 square meters)    Stage 4 Severe CKD (GFR = 15-29 mL/min/1.73 square meters)    Stage 5 End Stage CKD (GFR <15 mL/min/1.73 square meters)  Note: GFR calculation is accurate only with a steady state creatinine    Lipase [[id number]]  (Abnormal) Collected: 01/06/25 0017    Lab Status: Final result Specimen: Blood from Arm, Left Updated: 01/06/25 0050     Lipase 8 u/L     CBC and differential [[id number]]  (Abnormal) Collected: 01/06/25 0017    Lab Status: Final result Specimen: Blood from Arm, Left Updated: 01/06/25 0037     WBC 10.46 Thousand/uL      RBC 4.46 Million/uL      Hemoglobin 11.5 g/dL      Hematocrit 36.6 %      MCV 82 fL      MCH 25.8 pg      MCHC 31.4 g/dL      RDW 14.8 %      MPV 11.0 fL      Platelets 343 Thousands/uL     Narrative:      This is an appended report.  These results have been appended to a previously verified report.            XR abdomen 1 view kub   Final Interpretation by Kaveh Aviles MD (01/06 1300)      Redemonstration of dilated small bowel loops within the upper abdomen.               Workstation performed: [id number]         CT abdomen pelvis with contrast   Final Interpretation by Deepak Garner MD (01/06 0338)      Abnormal dilated fluid-filled loops of proximal small bowel within the left upper abdomen which gradually taper to normal caliber distally. Several loops of nondilated fluid-filled small bowel are also noted throughout the abdomen and  pelvis as well as    liquid stool within the colon. The patient is status post gastric bypass surgery with only a small amount of the administered oral contrast located within the stomach remnant and proximal small bowel. Differential considerations include early/partial    small bowel obstruction, ileus versus gastroenteritis. Surgical consultation is recommended.      No free air or free fluid.               I personally discussed this study with Elayne Burnett on 1/6/2025 3:24 AM.            Workstation performed: [id number]         XR abdomen 1 view kub    (Results Pending)       Procedures    ED Medication and Procedure Management   Prior to Admission Medications   Prescriptions Last Dose Informant Patient Reported? Taking?   Calcium Ascorbate (VITAMIN C) 500 mg tablet 1/4/2025 Self Yes Yes   Sig: Take 500 mg by mouth daily   FeroSul 325 (65 Fe) MG tablet 1/4/2025 Self No No   Sig: TAKE 1 TABLET BY MOUTH TWICE DAILY WITH MEALS   Multiple Vitamin (MULTIVITAMIN) tablet 1/4/2025 Self Yes Yes   Sig: Take 1 tablet by mouth daily   Zinc 100 MG TABS 1/4/2025 Self Yes Yes   Sig: Take by mouth   albuterol (2.5 mg/3 mL) 0.083 % nebulizer solution Past Month Self No Yes   Sig: TAKE 3 ML VIA NEBULIZER ROUTE EVERY 6 HOURS AS NEEDED FOR WHEEZING OR SHORTNESS OF BREATH   albuterol (PROVENTIL HFA,VENTOLIN HFA) 90 mcg/act inhaler Past Month Self No Yes   Sig: INHALE 2 PUFFS BY MOUTH EVERY 4 HOURS AS NEEDED FOR WHEEZING   amLODIPine (NORVASC) 10 mg tablet 1/4/2025  Yes Yes   Sig: Take 10 mg by mouth daily   cyanocobalamin (VITAMIN B-12) 1000 MCG tablet 1/5/2025 Self No Yes   Sig: Take 1 tablet (1,000 mcg total) by mouth daily   ergocalciferol (VITAMIN D2) 50,000 units 1/4/2025 Self No No   Sig: Take 1 capsule (50,000 Units total) by mouth 2 (two) times a week   losartan (COZAAR) 100 MG tablet 1/4/2025 Self Yes Yes   Sig: Take 25 mg by mouth daily   methocarbamol (ROBAXIN) 500 mg tablet Not Taking  No No   Sig: Take 1 tablet  (500 mg total) by mouth 2 (two) times a day   Patient not taking: Reported on 1/5/2025      Facility-Administered Medications Last Administration Doses Remaining   cyanocobalamin injection 1,000 mcg 11/20/2023  8:59 AM    cyanocobalamin injection 1,000 mcg 11/27/2023  8:55 AM         Current Discharge Medication List        CONTINUE these medications which have NOT CHANGED    Details   albuterol (2.5 mg/3 mL) 0.083 % nebulizer solution TAKE 3 ML VIA NEBULIZER ROUTE EVERY 6 HOURS AS NEEDED FOR WHEEZING OR SHORTNESS OF BREATH  Qty: 180 mL, Refills: 2    Associated Diagnoses: Wheezing      albuterol (PROVENTIL HFA,VENTOLIN HFA) 90 mcg/act inhaler INHALE 2 PUFFS BY MOUTH EVERY 4 HOURS AS NEEDED FOR WHEEZING  Qty: 8.5 g, Refills: 2    Associated Diagnoses: COVID-19      amLODIPine (NORVASC) 10 mg tablet Take 10 mg by mouth daily      Calcium Ascorbate (VITAMIN C) 500 mg tablet Take 500 mg by mouth daily      cyanocobalamin (VITAMIN B-12) 1000 MCG tablet Take 1 tablet (1,000 mcg total) by mouth daily  Qty: 90 tablet, Refills: 3    Associated Diagnoses: Bariatric surgery status; Postsurgical malabsorption; Vitamin B12 deficiency      losartan (COZAAR) 100 MG tablet Take 25 mg by mouth daily      Multiple Vitamin (MULTIVITAMIN) tablet Take 1 tablet by mouth daily      Zinc 100 MG TABS Take by mouth      ergocalciferol (VITAMIN D2) 50,000 units Take 1 capsule (50,000 Units total) by mouth 2 (two) times a week  Qty: 24 capsule, Refills: 0    Comments: **Patient requests 90 days supply**  Associated Diagnoses: Bariatric surgery status; Postsurgical malabsorption; Vitamin D deficiency; High serum parathyroid hormone (PTH)      FeroSul 325 (65 Fe) MG tablet TAKE 1 TABLET BY MOUTH TWICE DAILY WITH MEALS  Qty: 180 tablet, Refills: 0    Associated Diagnoses: Other iron deficiency anemia      methocarbamol (ROBAXIN) 500 mg tablet Take 1 tablet (500 mg total) by mouth 2 (two) times a day  Qty: 20 tablet, Refills: 0    Associated  Diagnoses: Acute exacerbation of chronic low back pain           No discharge procedures on file.  ED SEPSIS DOCUMENTATION   Time reflects when diagnosis was documented in both MDM as applicable and the Disposition within this note       Time User Action Codes Description Comment    1/6/2025  4:22 AM Elayne Burnett Add [R11.2] Nausea and vomiting     1/6/2025  4:22 AM Elayne Burnett Add [R11.10,  R19.7] Vomiting and diarrhea     1/6/2025  5:00 AM Elvie Bailey Add [K56.600] Partial small bowel obstruction (HCC)     1/6/2025  1:07 PM Marion Dodd Add [Z98.84] History of Jaycee-en-Y gastric bypass     1/6/2025  1:07 PM Marion Dodd Add [R10.12] Left upper quadrant abdominal pain                  Elayne Burnett DO  01/06/25 2344

## 2025-01-06 NOTE — ASSESSMENT & PLAN NOTE
Status post Laparoscopic RNYGB in 2016 with Dr. Napoles with subsequent Robotic lysis of adhesions, Robotic small-bowel resection, Robotic partial gastrectomy, Robotic paraesophageal hernia repair, Robotic bilateral truncal vagotomy Robotic gastrojejunostomy anastomosis with intraop endoscopy on 12/2020 with Dr. Trenton Lyons  Patient has not seen primary bariatric surgeon since 2023.  Was seen recently at [facility] but plans to switch back to [facility] bariatric surgery

## 2025-01-06 NOTE — H&P
H&P - Hospitalist   Name: Becky Zhao 39 y.o. female I MRN: [id number]  Unit/Bed#: ED-13 I Date of Admission: 1/5/2025   Date of Service: 1/6/2025 I Hospital Day: 0     Assessment & Plan  Partial small bowel obstruction (HCC)  Patient with history of Jaycee-en-Y, postsurgical malabsorption, morbid obesity presents with complaint of abdominal pain, emesis and diarrhea.  Son sick at home with gastroenteritis.    CT AP:   Abnormal dilated fluid-filled loops of proximal small bowel within the left upper abdomen which gradually taper to normal caliber distally.   Several loops of nondilated fluid-filled small bowel also noted throughout the abdomen and pelvis as well as liquid stool within the colon.  Status post gastric bypass surgery with only a small amount of the administered oral contrast located within the stomach remnant and proximal small bowel.   Differential considerations include early/partial small bowel obstruction, ileus versus gastroenteritis  NPO IVF  PRN antiemetics; analgesics  Serial abdominal exams  Monitor lytes  Bariatric surgery consult  Gastroenteritis  CT A/P:   Abnormal dilated fluid-filled loops of proximal small bowel within the left upper abdomen which gradually taper to normal caliber distally  Several loops of nondilated fluid-filled small bowel also noted throughout the abdomen and pelvis as well as liquid stool within the colon  Positive sick contact. Son at home but with milder illness  Check stool studies  Primary hypertension  PTA: Amlodipine 10 mg daily and Losartan 25 mg daily. Continue amlodipine if able to tolerate p.o  Hold losartan for now  History of Jaycee-en-Y gastric bypass  Status post Laparoscopic RNYGB in 2016 with Dr. Napoles with subsequent Robotic lysis of adhesions, Robotic small-bowel resection, Robotic partial gastrectomy, Robotic paraesophageal hernia repair, Robotic bilateral truncal vagotomy Robotic gastrojejunostomy anastomosis with intraop endoscopy on  "12/2020 with Dr. Trenton Lyons  Patient has not seen primary bariatric surgeon since 2023.  Was seen recently at [facility] but plans to switch back to [facility] bariatric surgery  Postsurgical malabsorption  Supplements on hold given early SBO  Morbid obesity with BMI of 50.0-59.9, adult (HCC)  Status post Jaycee-en-Y  Weight loss, dietary, and lifestyle modifications  Mixed hyperlipidemia  Not maintained on statin  Iron deficiency anemia secondary to inadequate dietary iron intake  Stable      VTE Pharmacologic Prophylaxis: VTE Score: 3 Moderate Risk (Score 3-4) - Pharmacological DVT Prophylaxis Ordered: enoxaparin (Lovenox).  Code Status: Prior FC  Discussion with family: Patient declined call to .     Anticipated Length of Stay: Patient will be admitted on an inpatient basis with an anticipated length of stay of greater than 2 midnights secondary to SBO.    History of Present Illness   Chief Complaint: \"Pain in my stomach, diarrhea and vomiting\"    Becky Zhao is a 39 y.o. female with a PMH as above who presents with c/o abdominal pain, emesis and diarrhea.   Symptoms started on Sunday, had no appetite, only drank and tolerated water.  Reports fever, abdominal bloating and brown watery diarrhea.  Reports sick contact, son at home with gastroenteritis but with milder illness.     Review of Systems   Constitutional:  Positive for appetite change, fatigue and fever.        Unable to tolerate food   Respiratory: Negative.     Cardiovascular: Negative.    Gastrointestinal:  Positive for abdominal distention, abdominal pain, diarrhea, nausea and vomiting. Negative for blood in stool.   Genitourinary: Negative.    Musculoskeletal:  Positive for myalgias.   Neurological:  Positive for weakness.       Historical Information   Past Medical History:   Diagnosis Date    Anemia     BMI 36.0-36.9,adult 12/08/2020    Cough 12/21/2020    COVID-19 virus infection 12/23/2020    Disease of thyroid gland     Dizziness  "    due to anemia, last episode 2020, no falls    Drop in hemoglobin 2020    Fatigue     History of transfusion     anemic - loss of blood due to bleeding ulcers, no reaction    Hyperlipidemia     Hypertension     Multiple gastric ulcers     Obesity     17    RUQ pain 2022    Severe obesity (BMI 35.0-39.9) with comorbidity (HCC) 2020    Sore throat 10/27/2021    Thyroid disease     took synthroid but not currently taking    Thyroid mass 03/15/2021     A CT scan of the neck incidental finding 1.7 cm of the right thyroid    Ulcer      Past Surgical History:   Procedure Laterality Date    CHOLECYSTECTOMY      CHOLECYSTECTOMY LAPAROSCOPIC N/A 06/10/2022    Procedure: CHOLECYSTECTOMY LAPAROSCOPIC;  Surgeon: Dameon Iverson MD;  Location:  MAIN OR;  Service: General    GASTRIC BYPASS      GASTRIC BYPASS LAPAROSCOPIC N/A 12/15/2020    Procedure: Robotic lysis of adhesions, small-bowel resection, partial gastrectomy, paraesophageal hernia repair, bilateral truncal vagotomy; Robotic gastrojejunostomy anastomosis with intraop endoscopy;  Surgeon: Arianna Lyons MD;  Location: [facility];  Service: Bariatrics    LITO-EN-Y PROCEDURE  2016    Gastric Bypass by Dr. Gay;Pre-Op Weight 339.6,nw    TUBAL LIGATION Bilateral         WISDOM TOOTH EXTRACTION       Social History     Tobacco Use    Smoking status: Former     Current packs/day: 0.00     Average packs/day: 0.3 packs/day for 19.2 years (4.8 ttl pk-yrs)     Types: Cigarettes     Start date: 2004     Quit date: 9/10/2023     Years since quittin.3     Passive exposure: Never    Smokeless tobacco: Never    Tobacco comments:     Started again 3/2023, 1 cig per day   Vaping Use    Vaping status: Never Used   Substance and Sexual Activity    Alcohol use: No    Drug use: No    Sexual activity: Yes     Partners: Male     Birth control/protection: Female Sterilization     E-Cigarette/Vaping    E-Cigarette Use Never User       E-Cigarette/Vaping Substances    Nicotine No     THC No     CBD No     Flavoring No     Other No     Unknown No      Family History   Problem Relation Age of Onset    Asthma Mother     Coronary artery disease Mother     Diabetes Mother         MELLITUS    Hyperlipidemia Mother     Hypertension Mother     Aneurysm Mother         2018 just diagnosed with two    No Known Problems Father          when she was 3 months old    No Known Problems Sister     No Known Problems Daughter     No Known Problems Daughter     Aneurysm Maternal Grandmother     Aneurysm Maternal Grandfather           of a ruptured abdominal aneurysm    No Known Problems Paternal Grandmother     No Known Problems Paternal Grandfather     No Known Problems Maternal Aunt     No Known Problems Maternal Aunt     No Known Problems Maternal Aunt      Social History:  Marital Status: /Civil Union   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  Prior to Admission medications    Medication Sig Start Date End Date Taking? Authorizing Provider   albuterol (2.5 mg/3 mL) 0.083 % nebulizer solution TAKE 3 ML VIA NEBULIZER ROUTE EVERY 6 HOURS AS NEEDED FOR WHEEZING OR SHORTNESS OF BREATH 23  Yes William Collins MD   albuterol (PROVENTIL HFA,VENTOLIN HFA) 90 mcg/act inhaler INHALE 2 PUFFS BY MOUTH EVERY 4 HOURS AS NEEDED FOR WHEEZING 1/3/23  Yes William Collins MD   budesonide-formoterol (Symbicort) 160-4.5 mcg/act inhaler Inhale 2 puffs 2 (two) times a day Rinse mouth after use. 22  Yes William Collins MD   Calcium Ascorbate (VITAMIN C) 500 mg tablet Take 500 mg by mouth daily   Yes Historical Provider, MD   cyanocobalamin (VITAMIN B-12) 1000 MCG tablet Take 1 tablet (1,000 mcg total) by  mouth daily 11/15/23  Yes EN Rodriguez   losartan (COZAAR) 100 MG tablet Take 25 mg by mouth daily   Yes Historical Provider, MD   Multiple Vitamin (MULTIVITAMIN) tablet Take 1 tablet by mouth daily   Yes Historical Provider, MD   NIFEdipine ER (ADALAT CC) 30 MG 24 hr tablet TAKE 1 TABLET(30 MG) BY MOUTH DAILY 9/6/23  Yes William Collins MD   olmesartan-hydrochlorothiazide (BENICAR HCT) 40-25 MG per tablet Take 1 tablet by mouth daily 1/17/23  Yes William Collins MD   Zinc 100 MG TABS Take by mouth   Yes Historical Provider, MD   ergocalciferol (VITAMIN D2) 50,000 units Take 1 capsule (50,000 Units total) by mouth 2 (two) times a week 11/16/23   EN Rodriguez   FeroSul 325 (65 Fe) MG tablet TAKE 1 TABLET BY MOUTH TWICE DAILY WITH MEALS 1/19/23   William Collins MD   fluticasone (FLONASE) 50 mcg/act nasal spray 1 spray into each nostril daily  Patient not taking: Reported on 2/6/2024 10/17/23   William Collins MD   methocarbamol (ROBAXIN) 500 mg tablet Take 1 tablet (500 mg total) by mouth 2 (two) times a day  Patient not taking: Reported on 1/5/2025 2/6/24   William Collins MD     Allergies   Allergen Reactions    [place] [employer] - Food Allergy Shortness Of Breath    Venofer [Iron Sucrose]      Chest pressure after 1st dose. Tachycardia and blurred vision after 8 minutes of dose #2.     B-12 [Cyanocobalamin] Palpitations and Facial Swelling       Objective :  Temp:  [98.6 °F (37 °C)] 98.6 °F (37 °C)  HR:  [] 88  BP: (113-117)/(54-96) 113/54  Resp:  [18] 18  SpO2:  [98 %-99 %] 99 %  O2 Device: None (Room air)    Physical Exam  Constitutional:       General: She is not in acute distress.     Appearance: Normal appearance. She is obese. She is not ill-appearing, toxic-appearing or diaphoretic.      Comments: Morbid obesity   HENT:      Head: Normocephalic and atraumatic.      Mouth/Throat:      Mouth: Mucous membranes are moist.   Cardiovascular:      Rate and Rhythm: Normal rate and regular rhythm.      Heart  sounds: Normal heart sounds.   Pulmonary:      Effort: Pulmonary effort is normal.      Breath sounds: Normal breath sounds.   Abdominal:      Palpations: Abdomen is soft.      Tenderness: There is abdominal tenderness.      Comments: Moderate tenderness to palpation LUQ> LLQ  Mild TTP on R   Musculoskeletal:      Right lower leg: No edema.      Left lower leg: No edema.   Skin:     General: Skin is warm and dry.   Neurological:      Mental Status: She is alert and oriented to person, place, and time.   Psychiatric:         Mood and Affect: Mood normal.         Behavior: Behavior normal.         Thought Content: Thought content normal.         Judgment: Judgment normal.            Lines/Drains:            Lab Results: I have reviewed the following results:  Results from last 7 days   Lab Units 01/06/25  0017   WBC Thousand/uL 10.46*   HEMOGLOBIN g/dL 11.5   HEMATOCRIT % 36.6   PLATELETS Thousands/uL 343   LYMPHO PCT % 5*   MONO PCT % 2*   EOS PCT % 0     Results from last 7 days   Lab Units 01/06/25  0123 01/06/25  0017   SODIUM mmol/L 135 131*   POTASSIUM mmol/L 3.8 6.1*   CHLORIDE mmol/L 108 105   CO2 mmol/L 21 20*   BUN mg/dL 22 21   CREATININE mg/dL 0.79 0.77   ANION GAP mmol/L 6 6   CALCIUM mg/dL 8.0* 8.0*   ALBUMIN g/dL  --  4.3   TOTAL BILIRUBIN mg/dL  --  0.48   ALK PHOS U/L  --  86   ALT U/L  --  20   AST U/L  --  42*   GLUCOSE RANDOM mg/dL 128 152*             Lab Results   Component Value Date    HGBA1C 5.7 (H) 06/05/2024    HGBA1C 5.6 06/12/2020    HGBA1C 5.2 08/26/2019     Results from last 7 days   Lab Units 01/06/25  0330   LACTIC ACID mmol/L 0.6       Imaging Results Review: I reviewed radiology reports from this admission including: CT abdomen/pelvis.  Other Study Results Review: EKG was reviewed.     Administrative Statements       ** Please Note: This note has been constructed using a voice recognition system. **

## 2025-01-06 NOTE — ANESTHESIA POSTPROCEDURE EVALUATION
Post-Op Assessment Note    CV Status:  Stable    Pain management: adequate       Mental Status:  Alert and awake   Hydration Status:  Euvolemic   PONV Controlled:  Controlled   Airway Patency:  Patent     Post Op Vitals Reviewed: Yes    No anethesia notable event occurred.    Staff: Anesthesiologist           Last Filed PACU Vitals:  Vitals Value Taken Time   Temp 97.3 °F (36.3 °C) 01/06/25 1756   Pulse 86 01/06/25 1802   /83 01/06/25 1756   Resp 15 01/06/25 1802   SpO2 97 % 01/06/25 1802   Vitals shown include unfiled device data.    Modified Rossy:     Vitals Value Taken Time   Activity 2 01/06/25 1756   Respiration 2 01/06/25 1756   Circulation 2 01/06/25 1756   Consciousness 2 01/06/25 1756   Oxygen Saturation 1 01/06/25 1756     Modified Rossy Score: 9

## 2025-01-06 NOTE — PLAN OF CARE
Problem: PAIN - ADULT  Goal: Verbalizes/displays adequate comfort level or baseline comfort level  Description: Interventions:  - Encourage patient to monitor pain and request assistance  - Assess pain using appropriate pain scale  - Administer analgesics based on type and severity of pain and evaluate response  - Implement non-pharmacological measures as appropriate and evaluate response  - Consider cultural and social influences on pain and pain management  - Notify physician/advanced practitioner if interv  Problem: INFECTION - ADULT  Goal: Absence or prevention of progression during hospitalization  Description: INTERVENTIONS:  - Assess and monitor for signs and symptoms of infection  - Monitor lab/diagnostic results  - Monitor all insertion sites, i.e. indwelling lines, tubes, and drains  - Monitor endotracheal if appropriate and nasal secretions for changes in amount and color  - [place] appropriate cooling/warming therapies per order  - Administer medications as ordered  - Instruct and encourage patient and family to use good hand hygiene technique  - Identify and instruct in appropriate isolation precautions for identified infection/condition  Outcome: Progressing  Goal: Absence of fever/infection during neutropenic period  Description: INTERVENTIONS:  - Monitor WBC    Outcome: Progressing     Problem: SAFETY ADULT  Goal: Patient will remain free of falls  Description: INTERVENTIONS:  - Educate patient/family on patient safety including physical limitations  - Instruct patient to call for assistance with activity   - Consult OT/PT to assist with strengthening/mobility   - Keep Call bell within reach  - Keep bed low and locked with side rails adjusted as appropriate  - Keep care items and personal belongings within reach  - Initiate and maintain comfort rounds  - Make Fall Risk Sign visible to staff  - Offer Toileting every  Hours, in advance of need  - Initiate/[place] alarm  - Obtain necessary fall risk  management equipment:   - Apply yellow socks and bracelet for high fall risk patients  - Consider moving patient to room near nurses station  Outcome: Progressing  Goal: Maintain or return to baseline ADL function  Description: INTERVENTIONS:  -  Assess patient's ability to carry out ADLs; assess patient's baseline for ADL function and identify physical deficits which impact ability to perform ADLs (bathing, care of mouth/teeth, toileting, grooming, dressing, etc.)  - Assess/evaluate cause of self-care deficits   - Assess range of motion  - Assess patient's mobility; develop plan if impaired  - Assess patient's need for assistive devices and provide as appropriate  - Encourage maximum independence but intervene and supervise when necessary  - Involve family in performance of ADLs  - Assess for home care needs following discharge   - Consider OT consult to assist with ADL evaluation and planning for discharge  - Provide patient education as appropriate  Outcome: Progressing  Goal: Maintains/Returns to pre admission functional level  Description: INTERVENTIONS:  - Perform AM-PAC 6 Click Basic Mobility/ Daily Activity assessment daily.  - Set and communicate daily mobility goal to care team and patient/family/caregiver.   - Collaborate with rehabilitation services on mobility goals if consulted  - Perform Range of Motion  times a day.  - Reposition patient every  hours.  - Dangle patient times a day  - Stand patient  times a day  - Ambulate patient  times a day  - Out of bed to chair  times a day   - Out of bed for meals  times a day  - Out of bed for toileting  - Record patient progress and toleration of activity level   Outcome: Progressing     Problem: DISCHARGE PLANNING  Goal: Discharge to home or other facility with appropriate resources  Description: INTERVENTIONS:  - Identify barriers to discharge w/patient and caregiver  - Arrange for needed discharge resources and transportation as appropriate  - Identify  discharge learning needs (meds, wound care, etc.)  - Arrange for interpretive services to assist at discharge as needed  - Refer to Case Management Department for coordinating discharge planning if the patient needs post-hospital services based on physician/advanced practitioner order or complex needs related to functional status, cognitive ability, or social support system  Outcome: Progressing     Problem: Knowledge Deficit  Goal: Patient/family/caregiver demonstrates understanding of disease process, treatment plan, medications, and discharge instructions  Description: Complete learning assessment and assess knowledge base.  Interventions:  - Provide teaching at level of understanding  - Provide teaching via preferred learning methods  Outcome: Progressing   entions unsuccessful or patient reports new pain  Outcome: Progressing

## 2025-01-06 NOTE — CONSULTS
Consultation - Bariatric Surgery   Becky Zhao 39 y.o. female MRN: [id number]  Unit/Bed#: E4 -01 Encounter: [id number]    Assessment & Plan     Assessment:  Patient is a 39 year F w/ hx of lap RYGB in 2016 who underwent revision by Dr. Trenton Lyons in 2020 (redo GJ 2/2 perforated GJ anastomosis and PEHr). Patient presents with abd pain, diarrhea, n/v.    CT scan with dilated loops of bowel. WBC 10    Plan:  - NPO, mIVFs  - Gastrografin challenge  - Ordered thiamine/folate  - Will plan for diagnostic lap    History of Present Illness     HPI:  Becky Zhao is a 39 y.o. female Body mass index is 51.35 kg/m². lap RYGB in 2016 who underwent revision by Dr. Trenton Lyons in 2020 (redo GJ 2/2 perforated GJ anastomosis and PEHr).    Patient reports epigastric pain that migrated to the left side. Pain started on Friday and continues. Pain is constant, sharp in nature. Pain associated with diarrhea, nausea, and vomiting.     Prior lap shanta, and above procedure  Not taking blood thinners.    Inpatient consult to Bariatric Surgery  Consult performed by: Candido Tello MD  Consult ordered by: EN Barkley        Review of Systems   Constitutional:  Negative for chills and fever.   HENT:  Negative for sore throat.    Eyes:  Negative for visual disturbance.   Respiratory:  Negative for cough and shortness of breath.    Gastrointestinal:  Positive for abdominal distention, abdominal pain, diarrhea, nausea and vomiting.   Musculoskeletal:  Negative for arthralgias.   Skin:  Negative for color change and rash.   Neurological:  Negative for seizures and syncope.   All other systems reviewed and are negative.      Historical Information   Past Medical History:   Diagnosis Date    Anemia     BMI 36.0-36.9,adult 12/08/2020    Cough 12/21/2020    COVID-19 virus infection 12/23/2020    Disease of thyroid gland     Dizziness     due to anemia, last episode August 2020, no falls    Drop in hemoglobin  2020    Fatigue     History of transfusion     anemic - loss of blood due to bleeding ulcers, no reaction    Hyperlipidemia     Hypertension     Multiple gastric ulcers     Obesity     16    RUQ pain 2022    Severe obesity (BMI 35.0-39.9) with comorbidity (HCC) 2020    Sore throat 10/27/2021    Thyroid disease     took synthroid but not currently taking    Thyroid mass 03/15/2021     A CT scan of the neck incidental finding 1.7 cm of the right thyroid    Ulcer      Past Surgical History:   Procedure Laterality Date    CHOLECYSTECTOMY      CHOLECYSTECTOMY LAPAROSCOPIC N/A 06/10/2022    Procedure: CHOLECYSTECTOMY LAPAROSCOPIC;  Surgeon: Dameon Iverson MD;  Location:  MAIN OR;  Service: General    GASTRIC BYPASS      GASTRIC BYPASS LAPAROSCOPIC N/A 12/15/2020    Procedure: Robotic lysis of adhesions, small-bowel resection, partial gastrectomy, paraesophageal hernia repair, bilateral truncal vagotomy; Robotic gastrojejunostomy anastomosis with intraop endoscopy;  Surgeon: Arianna Lyons MD;  Location: [facility];  Service: Bariatrics    LITO-EN-Y PROCEDURE  2016    Gastric Bypass by Dr. Gay;Pre-Op Weight 339.6,nw    TUBAL LIGATION Bilateral         WISDOM TOOTH EXTRACTION       Social History   Social History     Substance and Sexual Activity   Alcohol Use No     Social History     Substance and Sexual Activity   Drug Use No     Social History     Tobacco Use   Smoking Status Former    Current packs/day: 0.00    Average packs/day: 0.3 packs/day for 19.2 years (4.8 ttl pk-yrs)    Types: Cigarettes    Start date: 2004    Quit date: 9/10/2023    Years since quittin.3    Passive exposure: Never   Smokeless Tobacco Never   Tobacco Comments    Started again 3/2023, 1 cig per day     Family History: Family history non-contributory    Meds/Allergies   all current active meds have been reviewed  Allergies   Allergen Reactions    [place] Oil - Food Allergy Shortness Of Breath  "   Venofer [Iron Sucrose]      Chest pressure after 1st dose. Tachycardia and blurred vision after 8 minutes of dose #2.     B-12 [Cyanocobalamin] Palpitations and Facial Swelling       Objective   First Vitals:   Blood Pressure: 117/96 (01/05/25 2353)  Pulse: (!) 108 (01/05/25 2353)  Temperature: 98.6 °F (37 °C) (01/05/25 2350)  Temp Source: Oral (01/05/25 2350)  Respirations: 18 (01/05/25 2353)  Height: 5' 2.5\" (158.8 cm) (01/05/25 2353)  Weight - Scale: 129 kg (285 lb 4.4 oz) (01/05/25 2353)  SpO2: 98 % (01/05/25 2353)    Current Vitals:   Blood Pressure: 129/79 (01/06/25 0540)  Pulse: 91 (01/06/25 0540)  Temperature: 98.6 °F (37 °C) (01/05/25 2350)  Temp Source: Oral (01/05/25 2350)  Respirations: 18 (01/06/25 0540)  Height: 5' 2.5\" (158.8 cm) (01/05/25 2353)  Weight - Scale: 129 kg (285 lb 4.4 oz) (01/05/25 2353)  SpO2: 100 % (01/06/25 0540)      Intake/Output Summary (Last 24 hours) at 1/6/2025 0636  Last data filed at 1/6/2025 0241  Gross per 24 hour   Intake 1100 ml   Output --   Net 1100 ml       Invasive Devices       Peripheral Intravenous Line  Duration             Peripheral IV 01/05/25 Left Antecubital 1 day                    Physical Exam  Vitals reviewed.   Constitutional:       Appearance: Normal appearance.   HENT:      Head: Atraumatic.      Nose: Nose normal.   Cardiovascular:      Pulses: Normal pulses.   Pulmonary:      Effort: Pulmonary effort is normal.   Abdominal:      Comments: TTP LUQ.   Musculoskeletal:         General: Normal range of motion.   Neurological:      General: No focal deficit present.   Psychiatric:         Behavior: Behavior normal.         Lab Results: I have personally reviewed pertinent lab results.    Imaging: Results Review Statement: I personally reviewed the following image studies/reports in PACS and discussed pertinent findings with Radiology:  . My interpretation of the radiology images/reports is:  .  EKG, Pathology, and Other Studies: Results Review Statement: " I personally reviewed the following image studies/reports in PACS and discussed pertinent findings with Radiology: CT scan. My interpretation of the radiology images/reports is: below.  IMPRESSION:     Abnormal dilated fluid-filled loops of proximal small bowel within the left upper abdomen which gradually taper to normal caliber distally. Several loops of nondilated fluid-filled small bowel are also noted throughout the abdomen and pelvis as well as   liquid stool within the colon. The patient is status post gastric bypass surgery with only a small amount of the administered oral contrast located within the stomach remnant and proximal small bowel. Differential considerations include early/partial   small bowel obstruction, ileus versus gastroenteritis. Surgical consultation is recommended.     No free air or free fluid.    Counseling / Coordination of Care  Total floor / unit time spent today 30 minutes.  Greater than 50% of total time was spent with the patient and / or family counseling and / or coordination of care.

## 2025-01-06 NOTE — ANESTHESIA PREPROCEDURE EVALUATION
Procedure:  LAPAROSCOPY DIAGNOSTIC (Abdomen)    Relevant Problems   CARDIO   (+) Breast pain   (+) Mixed hyperlipidemia   (+) Other chest pain   (+) Primary hypertension   (+) SOB (shortness of breath) on exertion      GI/HEPATIC   (+) Acute duodenal ulcer   (+) Gastric outlet obstruction   (+) Hiatal hernia   (+) Hyperinsulinemia   (+) Other dysphagia   (+) Partial small bowel obstruction (HCC)      HEMATOLOGY   (+) Anemia   (+) Iron deficiency anemia secondary to inadequate dietary iron intake      MUSCULOSKELETAL   (+) Acute exacerbation of chronic low back pain   (+) Hiatal hernia   (+) Strain of left trapezius muscle      NEURO/PSYCH   (+) Acute exacerbation of chronic low back pain   (+) Generalized anxiety disorder   (+) Situational anxiety      PULMONARY   (+) SOB (shortness of breath) on exertion        Physical Exam    Airway    Mallampati score: III  TM Distance: >3 FB  Neck ROM: full     Dental       Cardiovascular  Cardiovascular exam normal    Pulmonary  Pulmonary exam normal     Other Findings  post-pubertal.      Anesthesia Plan  ASA Score- 4 Emergent    Anesthesia Type- general with ASA Monitors.         Additional Monitors:     Airway Plan: ETT.           Plan Factors-Exercise tolerance (METS): >4 METS.    Chart reviewed. EKG reviewed. Imaging results reviewed. Existing labs reviewed. Patient summary reviewed.    Patient is not a current smoker. Patient not instructed to abstain from smoking on day of procedure. Patient did not smoke on day of surgery.            Induction- intravenous and rapid sequence induction.    Postoperative Plan- Plan for postoperative opioid use.     Perioperative Resuscitation Plan - Level 1 - Full Code.       Informed Consent- Anesthetic plan and risks discussed with patient.  I personally reviewed this patient with the CRNA. Discussed and agreed on the Anesthesia Plan with the CRNA..      Lab Results   Component Value Date    HGBA1C 5.7 (H) 06/05/2024       Lab Results    Component Value Date     11/14/2015    K 3.8 01/06/2025     01/06/2025    CO2 21 01/06/2025    ANIONGAP 3 (L) 11/14/2015    BUN 22 01/06/2025    CREATININE 0.79 01/06/2025    GLUCOSE 93 08/29/2016    GLUF 84 11/14/2023    CALCIUM 8.0 (L) 01/06/2025    CORRECTEDCA 9.1 11/21/2020    AST 42 (H) 01/06/2025    ALT 20 01/06/2025    ALKPHOS 86 01/06/2025    PROT 7.8 11/14/2015    BILITOT 0.18 (L) 11/14/2015    EGFR 95 01/06/2025       Lab Results   Component Value Date    WBC 10.46 (H) 01/06/2025    HGB 11.5 01/06/2025    HCT 36.6 01/06/2025    MCV 82 01/06/2025     01/06/2025

## 2025-01-07 LAB
ANION GAP SERPL CALCULATED.3IONS-SCNC: 4 MMOL/L (ref 4–13)
BASOPHILS # BLD AUTO: 0 THOUSANDS/ΜL (ref 0–0.1)
BASOPHILS NFR BLD AUTO: 0 % (ref 0–1)
BUN SERPL-MCNC: 10 MG/DL (ref 5–25)
C DIFF TOX GENS STL QL NAA+PROBE: NEGATIVE
CALCIUM SERPL-MCNC: 8.3 MG/DL (ref 8.4–10.2)
CHLORIDE SERPL-SCNC: 109 MMOL/L (ref 96–108)
CO2 SERPL-SCNC: 25 MMOL/L (ref 21–32)
CREAT SERPL-MCNC: 0.56 MG/DL (ref 0.6–1.3)
EOSINOPHIL # BLD AUTO: 0 THOUSAND/ΜL (ref 0–0.61)
EOSINOPHIL NFR BLD AUTO: 0 % (ref 0–6)
ERYTHROCYTE [DISTWIDTH] IN BLOOD BY AUTOMATED COUNT: 14.9 % (ref 11.6–15.1)
GFR SERPL CREATININE-BSD FRML MDRD: 118 ML/MIN/1.73SQ M
GLUCOSE P FAST SERPL-MCNC: 108 MG/DL (ref 65–99)
GLUCOSE SERPL-MCNC: 108 MG/DL (ref 65–140)
HCT VFR BLD AUTO: 31 % (ref 34.8–46.1)
HGB BLD-MCNC: 9.8 G/DL (ref 11.5–15.4)
IMM GRANULOCYTES # BLD AUTO: 0.03 THOUSAND/UL (ref 0–0.2)
IMM GRANULOCYTES NFR BLD AUTO: 0 % (ref 0–2)
LYMPHOCYTES # BLD AUTO: 0.86 THOUSANDS/ΜL (ref 0.6–4.47)
LYMPHOCYTES NFR BLD AUTO: 12 % (ref 14–44)
MAGNESIUM SERPL-MCNC: 2.1 MG/DL (ref 1.9–2.7)
MCH RBC QN AUTO: 26 PG (ref 26.8–34.3)
MCHC RBC AUTO-ENTMCNC: 31.6 G/DL (ref 31.4–37.4)
MCV RBC AUTO: 82 FL (ref 82–98)
MONOCYTES # BLD AUTO: 0.32 THOUSAND/ΜL (ref 0.17–1.22)
MONOCYTES NFR BLD AUTO: 4 % (ref 4–12)
NEUTROPHILS # BLD AUTO: 6.2 THOUSANDS/ΜL (ref 1.85–7.62)
NEUTS SEG NFR BLD AUTO: 84 % (ref 43–75)
NRBC BLD AUTO-RTO: 0 /100 WBCS
PLATELET # BLD AUTO: 306 THOUSANDS/UL (ref 149–390)
PMV BLD AUTO: 10.1 FL (ref 8.9–12.7)
POTASSIUM SERPL-SCNC: 4.4 MMOL/L (ref 3.5–5.3)
RBC # BLD AUTO: 3.77 MILLION/UL (ref 3.81–5.12)
SODIUM SERPL-SCNC: 138 MMOL/L (ref 135–147)
WBC # BLD AUTO: 7.41 THOUSAND/UL (ref 4.31–10.16)

## 2025-01-07 PROCEDURE — 99024 POSTOP FOLLOW-UP VISIT: CPT | Performed by: STUDENT IN AN ORGANIZED HEALTH CARE EDUCATION/TRAINING PROGRAM

## 2025-01-07 PROCEDURE — 80048 BASIC METABOLIC PNL TOTAL CA: CPT

## 2025-01-07 PROCEDURE — 85025 COMPLETE CBC W/AUTO DIFF WBC: CPT

## 2025-01-07 PROCEDURE — 83735 ASSAY OF MAGNESIUM: CPT

## 2025-01-07 PROCEDURE — 99232 SBSQ HOSP IP/OBS MODERATE 35: CPT | Performed by: STUDENT IN AN ORGANIZED HEALTH CARE EDUCATION/TRAINING PROGRAM

## 2025-01-07 RX ADMIN — ENOXAPARIN SODIUM 40 MG: 40 INJECTION SUBCUTANEOUS at 16:18

## 2025-01-07 RX ADMIN — AMLODIPINE BESYLATE 10 MG: 10 TABLET ORAL at 08:39

## 2025-01-07 RX ADMIN — OXYCODONE 5 MG: 5 TABLET ORAL at 23:55

## 2025-01-07 RX ADMIN — ACETAMINOPHEN 975 MG: 325 TABLET, FILM COATED ORAL at 04:40

## 2025-01-07 RX ADMIN — ENOXAPARIN SODIUM 40 MG: 40 INJECTION SUBCUTANEOUS at 04:40

## 2025-01-07 RX ADMIN — HYDROMORPHONE HYDROCHLORIDE 0.5 MG: 1 INJECTION, SOLUTION INTRAMUSCULAR; INTRAVENOUS; SUBCUTANEOUS at 08:44

## 2025-01-07 RX ADMIN — OXYCODONE 5 MG: 5 TABLET ORAL at 15:49

## 2025-01-07 RX ADMIN — THIAMINE HYDROCHLORIDE: 100 INJECTION, SOLUTION INTRAMUSCULAR; INTRAVENOUS at 13:58

## 2025-01-07 RX ADMIN — SODIUM CHLORIDE, SODIUM GLUCONATE, SODIUM ACETATE, POTASSIUM CHLORIDE, MAGNESIUM CHLORIDE, SODIUM PHOSPHATE, DIBASIC, AND POTASSIUM PHOSPHATE 100 ML/HR: .53; .5; .37; .037; .03; .012; .00082 INJECTION, SOLUTION INTRAVENOUS at 05:00

## 2025-01-07 NOTE — PROGRESS NOTES
"Progress Note - Bariatric Surgery   Becky Zhao 39 y.o. female MRN: [id number]  Unit/Bed#: E4 -01 Encounter: [id number]      Subjective/Objective     Subjective:  Patient POD1 s/p diagnostic lap and EGD    Pt cont to have diarrhea.    Objective:    /92 (BP Location: Left arm)   Pulse 82   Temp 97.5 °F (36.4 °C) (Temporal)   Resp 18   Ht 5' 2.5\" (1.588 m)   Wt 129 kg (284 lb 6.3 oz)   LMP 12/20/2024 (Exact Date)   SpO2 98%   BMI 51.19 kg/m²       Intake/Output Summary (Last 24 hours) at 1/7/2025 1654  Last data filed at 1/7/2025 0459  Gross per 24 hour   Intake 3680 ml   Output --   Net 3680 ml       Invasive Devices       Peripheral Intravenous Line  Duration             Peripheral IV 01/07/25 Left;Proximal;Ventral (anterior) Forearm <1 day                    ROS: 10-point system completed. All negative except see HPI.    Physical Exam    General Appearance:    Alert, cooperative, no distress, appears stated age   Head:    Normocephalic, without obvious abnormality, atraumatic   Lungs:     Respirations unlabored   Heart:    Regular rate and rhythm   Abdomen:     appropriate tenderness   Extremities:   Extremities normal, atraumatic, no cyanosis or edema   Neurologic:  Incision:  Psych:   Normal strength and sensation    Clean, dry, and intact, no bleeding, no drain    Normal mood and affect       Lab, Imaging and other studies:I have personally reviewed pertinent lab results.       VTE Mechanical Prophylaxis: sequential compression device    Assessment/Plan  1) Patient is a 39 year F w/ hx of lap RYGB in 2016 who underwent revision by Dr. Trenton Lyons in 2020 (redo GJ 2/2 perforated GJ anastomosis and PEHr). Patient presents with abd pain, diarrhea, n/v. She cont to have diarrhea, a component likely due to the gastrografin challenge.    Gastrografin challenge with contrast in the colon.  Diag lap w/o evidence of internal hernia.  EGD unremarkable.    - OK for bariatric maintenance " diet.  - Bariatrics will sign-off. Please reach out with issues    Plan of care was discussed with patient.  Care plan discussed with Dr. Trenton Tello MD  Bariatric Surgery  1/7/2025  4:54 PM

## 2025-01-07 NOTE — PLAN OF CARE
Problem: PAIN - ADULT  Goal: Verbalizes/displays adequate comfort level or baseline comfort level  Description: Interventions:  - Encourage patient to monitor pain and request assistance  - Assess pain using appropriate pain scale  - Administer analgesics based on type and severity of pain and evaluate response  - Implement non-pharmacological measures as appropriate and evaluate response  - Consider cultural and social influences on pain and pain management  - Notify physician/advanced practitioner if interventions unsuccessful or patient reports new pain  Outcome: Progressing     Problem: INFECTION - ADULT  Goal: Absence or prevention of progression during hospitalization  Description: INTERVENTIONS:  - Assess and monitor for signs and symptoms of infection  - Monitor lab/diagnostic results  - Monitor all insertion sites, i.e. indwelling lines, tubes, and drains  - Monitor endotracheal if appropriate and nasal secretions for changes in amount and color  - Egan appropriate cooling/warming therapies per order  - Administer medications as ordered  - Instruct and encourage patient and family to use good hand hygiene technique  - Identify and instruct in appropriate isolation precautions for identified infection/condition  Outcome: Progressing     Problem: SAFETY ADULT  Goal: Patient will remain free of falls  Description: INTERVENTIONS:  - Educate patient/family on patient safety including physical limitations  - Instruct patient to call for assistance with activity   - Consult OT/PT to assist with strengthening/mobility   - Keep Call bell within reach  - Keep bed low and locked with side rails adjusted as appropriate  - Keep care items and personal belongings within reach  - Initiate and maintain comfort rounds  Outcome: Progressing  Goal: Maintain or return to baseline ADL function  Description: INTERVENTIONS:  -  Assess patient's ability to carry out ADLs; assess patient's baseline for ADL function and  identify physical deficits which impact ability to perform ADLs (bathing, care of mouth/teeth, toileting, grooming, dressing, etc.)  - Assess/evaluate cause of self-care deficits   - Assess range of motion  - Assess patient's mobility; develop plan if impaired  - Assess patient's need for assistive devices and provide as appropriate  - Encourage maximum independence but intervene and supervise when necessary  - Involve family in performance of ADLs  - Assess for home care needs following discharge   - Consider OT consult to assist with ADL evaluation and planning for discharge  - Provide patient education as appropriate  Outcome: Progressing  Goal: Maintains/Returns to pre admission functional level  Description: INTERVENTIONS:  - Perform AM-PAC 6 Click Basic Mobility/ Daily Activity assessment daily.  - Set and communicate daily mobility goal to care team and patient/family/caregiver.   - Collaborate with rehabilitation services on mobility goals if consulted  - Ambulate patient 5 times a day  - Out of bed to chair 3 times a day   - Out of bed for meals 3 times a day  - Out of bed for toileting  - Record patient progress and toleration of activity level   Outcome: Progressing     Problem: DISCHARGE PLANNING  Goal: Discharge to home or other facility with appropriate resources  Description: INTERVENTIONS:  - Identify barriers to discharge w/patient and caregiver  - Arrange for needed discharge resources and transportation as appropriate  - Identify discharge learning needs (meds, wound care, etc.)  - Arrange for interpretive services to assist at discharge as needed  - Refer to Case Management Department for coordinating discharge planning if the patient needs post-hospital services based on physician/advanced practitioner order or complex needs related to functional status, cognitive ability, or social support system  Outcome: Progressing     Problem: Knowledge Deficit  Goal: Patient/family/caregiver demonstrates  understanding of disease process, treatment plan, medications, and discharge instructions  Description: Complete learning assessment and assess knowledge base.  Interventions:  - Provide teaching at level of understanding  - Provide teaching via preferred learning methods  Outcome: Progressing     Problem: GASTROINTESTINAL - ADULT  Goal: Minimal or absence of nausea and/or vomiting  Description: INTERVENTIONS:  - Administer IV fluids if ordered to ensure adequate hydration  - Maintain NPO status until nausea and vomiting are resolved  - Nasogastric tube if ordered  - Administer ordered antiemetic medications as needed  - Provide nonpharmacologic comfort measures as appropriate  - Advance diet as tolerated, if ordered  - Consider nutrition services referral to assist patient with adequate nutrition and appropriate food choices  Outcome: Progressing  Goal: Maintains or returns to baseline bowel function  Description: INTERVENTIONS:  - Assess bowel function  - Encourage oral fluids to ensure adequate hydration  - Administer IV fluids if ordered to ensure adequate hydration  - Administer ordered medications as needed  - Encourage mobilization and activity  - Consider nutritional services referral to assist patient with adequate nutrition and appropriate food choices  Outcome: Progressing  Goal: Maintains adequate nutritional intake  Description: INTERVENTIONS:  - Monitor percentage of each meal consumed  - Identify factors contributing to decreased intake, treat as appropriate  - Assist with meals as needed  - Monitor I&O, weight, and lab values if indicated  - Obtain nutrition services referral as needed  Outcome: Progressing

## 2025-01-07 NOTE — ASSESSMENT & PLAN NOTE
Status post Laparoscopic RNYGB in 2016 with Dr. Napoles with subsequent Robotic lysis of adhesions, Robotic small-bowel resection, Robotic partial gastrectomy, Robotic paraesophageal hernia repair, Robotic bilateral truncal vagotomy Robotic gastrojejunostomy anastomosis with intraop endoscopy on 12/2020 with Dr. Trenton Lyons  Patient has not seen primary bariatric surgeon since 2023.  Was seen recently at [facility] but plans to switch back to [facility] bariatric surgery

## 2025-01-07 NOTE — PROGRESS NOTES
Progress Note - Hospitalist   Name: Becky Zhao 39 y.o. female I MRN: [id number]  Unit/Bed#: E4 -01 I Date of Admission: 1/5/2025   Date of Service: 1/7/2025 I Hospital Day: 0    Assessment & Plan  Partial small bowel obstruction (HCC)  Patient with history of Jaycee-en-Y, postsurgical malabsorption, morbid obesity presents with complaint of abdominal pain, emesis and diarrhea.  Son sick at home with gastroenteritis.    CT AP:   Abnormal dilated fluid-filled loops of proximal small bowel within the left upper abdomen which gradually taper to normal caliber distally.   Several loops of nondilated fluid-filled small bowel also noted throughout the abdomen and pelvis as well as liquid stool within the colon.  Status post gastric bypass surgery with only a small amount of the administered oral contrast located within the stomach remnant and proximal small bowel.   Differential considerations include early/partial small bowel obstruction, ileus versus gastroenteritis  Patient is s/p LAPAROSCOPY DIAGNOSTIC WITH INTRAOPERATIVE EGD by bariatrics. No   Continues to have diarrhea, C. Diff negative. Enteric stool panel pending. GI consulted. Maintain IV hydration for now  Gastroenteritis  CT A/P:   Abnormal dilated fluid-filled loops of proximal small bowel within the left upper abdomen which gradually taper to normal caliber distally  Several loops of nondilated fluid-filled small bowel also noted throughout the abdomen and pelvis as well as liquid stool within the colon  Positive sick contact. Son at home but with milder illness. Daughter without any symptoms  C. Diff negative, Enteric stool panel pending  Primary hypertension  PTA: Amlodipine 10 mg daily and Losartan 25 mg daily. Continue amlodipine if able to tolerate p.o  Hold losartan for now  History of Jaycee-en-Y gastric bypass  Status post Laparoscopic RNYGB in 2016 with Dr. Napoles with subsequent Robotic lysis of adhesions, Robotic small-bowel  resection, Robotic partial gastrectomy, Robotic paraesophageal hernia repair, Robotic bilateral truncal vagotomy Robotic gastrojejunostomy anastomosis with intraop endoscopy on 2020 with Dr. Trenton Lyons  Patient has not seen primary bariatric surgeon since .  Was seen recently at [facility] but plans to switch back to [facility] bariatric surgery  Postsurgical malabsorption  Supplements on hold given early SBO  Morbid obesity with BMI of 50.0-59.9, adult (HCC)  Status post Jaycee-en-Y  Weight loss, dietary, and lifestyle modifications  Mixed hyperlipidemia  Not maintained on statin  Iron deficiency anemia secondary to inadequate dietary iron intake  Stable    VTE Pharmacologic Prophylaxis: VTE Score: 3 Moderate Risk (Score 3-4) - Pharmacological DVT Prophylaxis Ordered: enoxaparin (Lovenox).    Mobility:   Basic Mobility Inpatient Raw Score: 24  JH-HLM Goal: 8: Walk 250 feet or more  JH-HLM Achieved: 7: Walk 25 feet or more    Discussions with Specialists or Other Care Team Provider: bariatrics    Education and Discussions with Family / Patient: patient, daughter    Current Length of Stay: 0 day(s)  Current Patient Status: Outpatient Surgery   Certification Statement: The patient will continue to require additional inpatient hospital stay due to gi evaluation, persistent diarrhea  Discharge Plan: Anticipate discharge in 24-48 hrs to home.    Code Status: Level 1 - Full Code    Subjective   Patient seen and examined. She continues to have continuous / intractable diarrhea.     Objective   Vitals:   Temp (24hrs), Av.7 °F (36.5 °C), Min:97.3 °F (36.3 °C), Max:98.2 °F (36.8 °C)    Temp:  [97.3 °F (36.3 °C)-98.2 °F (36.8 °C)] 97.5 °F (36.4 °C)  HR:  [] 82  Resp:  [13-20] 18  BP: (136-145)/(81-92) 136/92  SpO2:  [96 %-100 %] 98 %  Body mass index is 51.19 kg/m².     Input and Output Summary (last 24 hours):     Intake/Output Summary (Last 24 hours) at 2025 9052  Last data filed at 2025 0459  Gross per 24 hour    Intake 3680 ml   Output --   Net 3680 ml       Physical Exam  Vitals reviewed.   HENT:      Head: Normocephalic.      Nose: Nose normal.      Mouth/Throat:      Mouth: Mucous membranes are moist.   Eyes:      General: No scleral icterus.  Cardiovascular:      Rate and Rhythm: Normal rate.   Pulmonary:      Effort: Pulmonary effort is normal. No respiratory distress.   Abdominal:      General: There is no distension.      Palpations: Abdomen is soft.      Tenderness: There is abdominal tenderness.   Skin:     General: Skin is warm.   Neurological:      Mental Status: She is alert.   Psychiatric:         Mood and Affect: Mood normal.         Behavior: Behavior normal.       Lines/Drains:              Lab Results: I have reviewed the following results:   Results from last 7 days   Lab Units 01/07/25  0448 01/06/25  0017   WBC Thousand/uL 7.41 10.46*   HEMOGLOBIN g/dL 9.8* 11.5   PLATELETS Thousands/uL 306 343   MCV fL 82 82   TOTAL NEUT ABS Thousand/uL  --  9.52*     Results from last 7 days   Lab Units 01/07/25  0448 01/06/25  0123 01/06/25  0017   SODIUM mmol/L 138 135 131*   POTASSIUM mmol/L 4.4 3.8 6.1*   CHLORIDE mmol/L 109* 108 105   CO2 mmol/L 25 21 20*   ANION GAP mmol/L 4 6 6   BUN mg/dL 10 22 21   CREATININE mg/dL 0.56* 0.79 0.77   CALCIUM mg/dL 8.3* 8.0* 8.0*   ALBUMIN g/dL  --   --  4.3   TOTAL BILIRUBIN mg/dL  --   --  0.48   ALK PHOS U/L  --   --  86   ALT U/L  --   --  20   AST U/L  --   --  42*   EGFR ml/min/1.73sq m 118 95 98   GLUCOSE RANDOM mg/dL 108 128 152*     Results from last 7 days   Lab Units 01/07/25  0448   MAGNESIUM mg/dL 2.1                  Results from last 7 days   Lab Units 01/06/25  0330   LACTIC ACID mmol/L 0.6                   Recent Cultures (last 7 days):   Results from last 7 days   Lab Units 01/06/25  1041   C DIFF TOXIN B BY PCR  Negative       Imaging:  Reviewed radiology reports from this admission: xr abdomen    Last 24 Hours Medication List:     Current  Facility-Administered Medications:     acetaminophen (TYLENOL) tablet 975 mg, Q6H PRN    aluminum-magnesium hydroxide-simethicone (MAALOX) oral suspension 30 mL, Q6H PRN    amLODIPine (NORVASC) tablet 10 mg, Daily    enoxaparin (LOVENOX) subcutaneous injection 40 mg, Q12H    folic acid 1 mg, thiamine (VITAMIN B1) 100 mg in sodium chloride 0.9 % 100 mL IV piggyback, Q24H    HYDROmorphone (DILAUDID) injection 0.5 mg, Q4H PRN    ondansetron (ZOFRAN) injection 4 mg, Q6H PRN    oxyCODONE (ROXICODONE) IR tablet 5 mg, Q4H PRN    simethicone (MYLICON) chewable tablet 80 mg, 4x Daily PRN      **Please Note: This note may have been constructed using a voice recognition system.**

## 2025-01-07 NOTE — ASSESSMENT & PLAN NOTE
CT A/P:   Abnormal dilated fluid-filled loops of proximal small bowel within the left upper abdomen which gradually taper to normal caliber distally  Several loops of nondilated fluid-filled small bowel also noted throughout the abdomen and pelvis as well as liquid stool within the colon  Positive sick contact. Son at home but with milder illness. Daughter without any symptoms  C. Diff negative, Enteric stool panel pending

## 2025-01-07 NOTE — ASSESSMENT & PLAN NOTE
Patient with history of Jaycee-en-Y, postsurgical malabsorption, morbid obesity presents with complaint of abdominal pain, emesis and diarrhea.  Son sick at home with gastroenteritis.    CT AP:   Abnormal dilated fluid-filled loops of proximal small bowel within the left upper abdomen which gradually taper to normal caliber distally.   Several loops of nondilated fluid-filled small bowel also noted throughout the abdomen and pelvis as well as liquid stool within the colon.  Status post gastric bypass surgery with only a small amount of the administered oral contrast located within the stomach remnant and proximal small bowel.   Differential considerations include early/partial small bowel obstruction, ileus versus gastroenteritis  Patient is s/p LAPAROSCOPY DIAGNOSTIC WITH INTRAOPERATIVE EGD by bariatrics. No   Continues to have diarrhea, C. Diff negative. Enteric stool panel pending. GI consulted. Maintain IV hydration for now

## 2025-01-07 NOTE — PLAN OF CARE
Problem: PAIN - ADULT  Goal: Verbalizes/displays adequate comfort level or baseline comfort level  Description: Interventions:  - Encourage patient to monitor pain and request assistance  - Assess pain using appropriate pain scale  - Administer analgesics based on type and severity of pain and evaluate response  - Implement non-pharmacological measures as appropriate and evaluate response  - Consider cultural and social influences on pain and pain management  - Notify physician/advanced practitioner if interventions unsuccessful or patient reports new pain  Outcome: Progressing     Problem: INFECTION - ADULT  Goal: Absence or prevention of progression during hospitalization  Description: INTERVENTIONS:  - Assess and monitor for signs and symptoms of infection  - Monitor lab/diagnostic results  - Monitor all insertion sites, i.e. indwelling lines, tubes, and drains  - Monitor endotracheal if appropriate and nasal secretions for changes in amount and color  - [place] appropriate cooling/warming therapies per order  - Administer medications as ordered  - Instruct and encourage patient and family to use good hand hygiene technique  - Identify and instruct in appropriate isolation precautions for identified infection/condition  Outcome: Progressing     Problem: SAFETY ADULT  Goal: Patient will remain free of falls  Description: INTERVENTIONS:  - Educate patient/family on patient safety including physical limitations  - Instruct patient to call for assistance with activity   - Consult OT/PT to assist with strengthening/mobility   - Keep Call bell within reach  - Keep bed low and locked with side rails adjusted as appropriate  - Keep care items and personal belongings within reach  - Initiate and maintain comfort rounds  Outcome: Progressing  Goal: Maintain or return to baseline ADL function  Description: INTERVENTIONS:  -  Assess patient's ability to carry out ADLs; assess patient's baseline for ADL function and  identify physical deficits which impact ability to perform ADLs (bathing, care of mouth/teeth, toileting, grooming, dressing, etc.)  - Assess/evaluate cause of self-care deficits   - Assess range of motion  - Assess patient's mobility; develop plan if impaired  - Assess patient's need for assistive devices and provide as appropriate  - Encourage maximum independence but intervene and supervise when necessary  - Involve family in performance of ADLs  - Assess for home care needs following discharge   - Consider OT consult to assist with ADL evaluation and planning for discharge  - Provide patient education as appropriate  Outcome: Progressing  Goal: Maintains/Returns to pre admission functional level  Description: INTERVENTIONS:  - Perform AM-PAC 6 Click Basic Mobility/ Daily Activity assessment daily.  - Set and communicate daily mobility goal to care team and patient/family/caregiver.   - Collaborate with rehabilitation services on mobility goals if consulted  - Ambulate patient 5 times a day  - Out of bed to chair 3 times a day   - Out of bed for meals 3 times a day  - Out of bed for toileting  - Record patient progress and toleration of activity level   Outcome: Progressing     Problem: DISCHARGE PLANNING  Goal: Discharge to home or other facility with appropriate resources  Description: INTERVENTIONS:  - Identify barriers to discharge w/patient and caregiver  - Arrange for needed discharge resources and transportation as appropriate  - Identify discharge learning needs (meds, wound care, etc.)  - Arrange for interpretive services to assist at discharge as needed  - Refer to Case Management Department for coordinating discharge planning if the patient needs post-hospital services based on physician/advanced practitioner order or complex needs related to functional status, cognitive ability, or social support system  Outcome: Progressing     Problem: Knowledge Deficit  Goal: Patient/family/caregiver demonstrates  understanding of disease process, treatment plan, medications, and discharge instructions  Description: Complete learning assessment and assess knowledge base.  Interventions:  - Provide teaching at level of understanding  - Provide teaching via preferred learning methods  Outcome: Progressing     Problem: GASTROINTESTINAL - ADULT  Goal: Minimal or absence of nausea and/or vomiting  Description: INTERVENTIONS:  - Administer IV fluids if ordered to ensure adequate hydration  - Maintain NPO status until nausea and vomiting are resolved  - Nasogastric tube if ordered  - Administer ordered antiemetic medications as needed  - Provide nonpharmacologic comfort measures as appropriate  - Advance diet as tolerated, if ordered  - Consider nutrition services referral to assist patient with adequate nutrition and appropriate food choices  Outcome: Progressing  Goal: Maintains or returns to baseline bowel function  Description: INTERVENTIONS:  - Assess bowel function  - Encourage oral fluids to ensure adequate hydration  - Administer IV fluids if ordered to ensure adequate hydration  - Administer ordered medications as needed  - Encourage mobilization and activity  - Consider nutritional services referral to assist patient with adequate nutrition and appropriate food choices  Outcome: Progressing  Goal: Maintains adequate nutritional intake  Description: INTERVENTIONS:  - Monitor percentage of each meal consumed  - Identify factors contributing to decreased intake, treat as appropriate  - Assist with meals as needed  - Monitor I&O, weight, and lab values if indicated  - Obtain nutrition services referral as needed  Outcome: Progressing

## 2025-01-07 NOTE — UTILIZATION REVIEW
Initial Clinical Review    Elective OP surgical procedure  Age/Sex: 39 y.o. 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    POD#1 Progress Note:   No further leukocytosis, using IV and PO analgesia.  IV fluids d/c.  On IV Thiamine and folic acid daily.     Admission Orders: Date/Time/Statement:   Admission Orders (From admission, onward)       Ordered        01/06/25 0445  Place in Observation  Once                          Orders Placed This Encounter   Procedures    Place in Observation     Standing Status:   Standing     Number of Occurrences:   1     Level of Care:   Med Surg [16]     Diet: [place] maintenance diet   Mobility: OOB  DVT Prophylaxis: Lovenox     Medications/Pain Control:   Scheduled Medications:  amLODIPine, 10 mg, Oral, Daily  enoxaparin, 40 mg, Subcutaneous, Q12H  folic acid 1 mg, thiamine (VITAMIN B1) 100 mg in sodium chloride 0.9 % 100 mL IV piggyback, , Intravenous, Q24H      Continuous IV Infusions:    IV fluids d/c 1/7     PRN Meds:  acetaminophen, 975 mg, Oral, Q6H PRN - x 1 1/7  aluminum-magnesium hydroxide-simethicone, 30 mL, Oral, Q6H PRN  HYDROmorphone, 0.5 mg, Intravenous, Q4H PRN - x 1 1/7  ondansetron, 4 mg, Intravenous, Q6H PRN - x 1 1/6  oxyCODONE, 5 mg, Oral, Q4H PRN - x 3 1/6, x 1 1/7  simethicone, 80 mg, Oral, 4x Daily PRN      Vital Signs (last 3 days)       Date/Time Temp Pulse Resp BP MAP (mmHg) SpO2 Calculated FIO2 (%) - Nasal Cannula Nasal  Cannula O2 Flow Rate (L/min) O2 Device Patient Position - Orthostatic VS [place] Coma Scale Score Pain    01/07/25 1549 -- -- -- -- -- -- -- -- -- -- -- 6    01/07/25 1532 97.5 °F (36.4 °C) 82 18 136/92 110 98 % -- -- None (Room air) Lying -- --    01/07/25 0844 -- -- -- -- -- -- -- -- -- -- -- 7    01/07/25 0810 98.2 °F (36.8 °C) 75 20 136/82 102 99 % -- -- None (Room air) Lying -- --    01/07/25 0730 -- -- -- -- -- -- -- -- None (Room air) -- 15 No Pain    01/07/25 0440 -- -- -- -- -- -- -- -- -- -- -- 5 01/07/25 0004 97.3 °F (36.3 °C) 81 20 139/85 -- 96 % -- -- Nasal cannula Lying -- --    01/06/25 2359 -- -- -- -- -- -- -- -- -- -- -- 7 01/06/25 1938 -- -- -- -- -- -- 28 2 L/min Nasal cannula -- 15 6    01/06/25 1756 97.3 °F (36.3 °C) 82 13 142/83 -- 100 % 28 2 L/min Nasal cannula -- -- 5 01/06/25 1740 -- 84 14 139/81 -- 98 % -- -- None (Room air) -- -- --    01/06/25 1733 -- -- -- -- -- -- -- -- -- -- -- 8 01/06/25 1724 98 °F (36.7 °C) 105 17 145/86 -- 96 % -- -- None (Room air) -- -- 8 01/06/25 1622 -- -- -- -- -- -- -- -- -- -- -- 5 01/06/25 0845 -- -- -- -- -- -- -- -- -- -- -- 6    01/06/25 0622 -- -- -- -- -- -- -- -- -- -- -- 5 01/06/25 0540 -- 91 18 129/79 -- 100 % -- -- None (Room air) Lying -- 5 01/06/25 0523 -- 106 18 117/63 -- 98 % -- -- None (Room air) Sitting -- 5 01/06/25 0245 -- 88 18 113/54 -- 99 % -- -- None (Room air) Lying -- 5 01/06/25 0141 -- -- -- -- -- -- -- -- -- -- -- 8 01/05/25 2353 -- 108 18 117/96 -- 98 % -- -- None (Room air) Lying -- 8 01/05/25 2350 98.6 °F (37 °C) -- -- -- -- -- -- -- -- -- -- --          Weight (last 2 days)       Date/Time Weight    01/06/25 2038 129 (284.39)    01/05/25 2353 129 (285.28)            Pertinent Labs/Diagnostic Test Results:   Radiology:  XR abdomen 1 view kub   Final Interpretation by Messi Garcia MD (01/07 1334)      Contrast has reached the colon. Persistently dilated small bowel loops throughout the  abdomen.               Workstation performed: [id number]         XR abdomen 1 view kub   Final Interpretation by Kaveh Aviles MD (01/06 1300)      Redemonstration of dilated small bowel loops within the upper abdomen.               Workstation performed: [id number]         CT abdomen pelvis with contrast   Final Interpretation by Deepak Garner MD (01/06 0337)      Abnormal dilated fluid-filled loops of proximal small bowel within the left upper abdomen which gradually taper to normal caliber distally. Several loops of nondilated fluid-filled small bowel are also noted throughout the abdomen and pelvis as well as    liquid stool within the colon. The patient is status post gastric bypass surgery with only a small amount of the administered oral contrast located within the stomach remnant and proximal small bowel. Differential considerations include early/partial    small bowel obstruction, ileus versus gastroenteritis. Surgical consultation is recommended.      No free air or free fluid.               I personally discussed this study with Elayne Burnett on 1/6/2025 3:24 AM.            Workstation performed: [id number]           Cardiology:  ECG 12 lead   Final Result by Khoi Avendano DO (01/06 1300)   Normal sinus rhythm   Cannot rule out Inferior infarct , age undetermined   Abnormal ECG   When compared with ECG of 10-Dec-2023 02:47,   Borderline criteria for Inferior infarct are now Present   Nonspecific T wave abnormality now evident in Anterior leads   Nonspecific T wave abnormality, improved in Lateral leads   Confirmed by Khoi Avendano ([id number]) on 1/6/2025 1:02:47 PM        GI:  No orders to display           Results from last 7 days   Lab Units 01/07/25  0448 01/06/25  0017   WBC Thousand/uL 7.41 10.46*   HEMOGLOBIN g/dL 9.8* 11.5   HEMATOCRIT % 31.0* 36.6   PLATELETS Thousands/uL 306 343   TOTAL NEUT ABS Thousands/µL 6.20  --          Results from last 7 days   Lab Units 01/07/25  0448  01/06/25  0123 01/06/25  0017   SODIUM mmol/L 138 135 131*   POTASSIUM mmol/L 4.4 3.8 6.1*   CHLORIDE mmol/L 109* 108 105   CO2 mmol/L 25 21 20*   ANION GAP mmol/L 4 6 6   BUN mg/dL 10 22 21   CREATININE mg/dL 0.56* 0.79 0.77   EGFR ml/min/1.73sq m 118 95 98   CALCIUM mg/dL 8.3* 8.0* 8.0*   MAGNESIUM mg/dL 2.1  --   --      Results from last 7 days   Lab Units 01/06/25  0017   AST U/L 42*   ALT U/L 20   ALK PHOS U/L 86   TOTAL PROTEIN g/dL 8.1   ALBUMIN g/dL 4.3   TOTAL BILIRUBIN mg/dL 0.48         Results from last 7 days   Lab Units 01/07/25  0448 01/06/25  0123 01/06/25  0017   GLUCOSE RANDOM mg/dL 108 128 152*           Results from last 7 days   Lab Units 01/06/25  0330   LACTIC ACID mmol/L 0.6         Results from last 7 days   Lab Units 01/06/25  0017   LIPASE u/L 8*         Results from last 7 days   Lab Units 01/06/25  1041   C DIFF TOXIN B BY PCR  Negative         Network Utilization Review Department  ATTENTION: Please call with any questions or concerns to [phone] and carefully listen to the prompts so that you are directed to the right person. All voicemails are confidential.   For Discharge needs, contact Care Management DC Support Team at [phone] opt. 2  Send all requests for admission clinical reviews, approved or denied determinations and any other requests to dedicated fax number below belonging to the campus where the patient is receiving treatment. List of dedicated fax numbers for the Facilities:  FACILITY NAME UR FAX NUMBER   ADMISSION DENIALS (Administrative/Medical Necessity) [phone]   DISCHARGE SUPPORT TEAM (NETWORK) [phone]   PARENT CHILD HEALTH (Maternity/NICU/Pediatrics) [phone]   [facility] [phone]   [facility] [phone]   [facility] [phone]   [facility] [phone]   [facility] [phone]   [facility]  [facility] [phone]   [facility] [phone]   [facility] [phone]   [facility] [phone]   [facility] [phone]   [facility] [phone]   [facility] [phone]

## 2025-01-08 VITALS
HEART RATE: 74 BPM | HEIGHT: 63 IN | TEMPERATURE: 97 F | SYSTOLIC BLOOD PRESSURE: 131 MMHG | OXYGEN SATURATION: 99 % | RESPIRATION RATE: 18 BRPM | BODY MASS INDEX: 50.39 KG/M2 | WEIGHT: 284.39 LBS | DIASTOLIC BLOOD PRESSURE: 87 MMHG

## 2025-01-08 LAB
ALBUMIN SERPL BCG-MCNC: 3.4 G/DL (ref 3.5–5)
ALP SERPL-CCNC: 75 U/L (ref 34–104)
ALT SERPL W P-5'-P-CCNC: 54 U/L (ref 7–52)
ANION GAP SERPL CALCULATED.3IONS-SCNC: 4 MMOL/L (ref 4–13)
AST SERPL W P-5'-P-CCNC: 25 U/L (ref 13–39)
BILIRUB SERPL-MCNC: 0.19 MG/DL (ref 0.2–1)
BUN SERPL-MCNC: 9 MG/DL (ref 5–25)
C COLI+JEJUNI TUF STL QL NAA+PROBE: NEGATIVE
CALCIUM ALBUM COR SERPL-MCNC: 8.3 MG/DL (ref 8.3–10.1)
CALCIUM SERPL-MCNC: 7.8 MG/DL (ref 8.4–10.2)
CHLORIDE SERPL-SCNC: 108 MMOL/L (ref 96–108)
CO2 SERPL-SCNC: 28 MMOL/L (ref 21–32)
CREAT SERPL-MCNC: 0.63 MG/DL (ref 0.6–1.3)
EC STX1+STX2 GENES STL QL NAA+PROBE: NEGATIVE
ERYTHROCYTE [DISTWIDTH] IN BLOOD BY AUTOMATED COUNT: 14.8 % (ref 11.6–15.1)
GFR SERPL CREATININE-BSD FRML MDRD: 114 ML/MIN/1.73SQ M
GLUCOSE SERPL-MCNC: 91 MG/DL (ref 65–140)
HCT VFR BLD AUTO: 29.5 % (ref 34.8–46.1)
HGB BLD-MCNC: 9.3 G/DL (ref 11.5–15.4)
MAGNESIUM SERPL-MCNC: 1.8 MG/DL (ref 1.9–2.7)
MCH RBC QN AUTO: 25.8 PG (ref 26.8–34.3)
MCHC RBC AUTO-ENTMCNC: 31.5 G/DL (ref 31.4–37.4)
MCV RBC AUTO: 82 FL (ref 82–98)
PLATELET # BLD AUTO: 286 THOUSANDS/UL (ref 149–390)
PMV BLD AUTO: 10.2 FL (ref 8.9–12.7)
POTASSIUM SERPL-SCNC: 3.8 MMOL/L (ref 3.5–5.3)
PROT SERPL-MCNC: 6.1 G/DL (ref 6.4–8.4)
RBC # BLD AUTO: 3.61 MILLION/UL (ref 3.81–5.12)
SALMONELLA SP SPAO STL QL NAA+PROBE: NEGATIVE
SHIGELLA SP+EIEC IPAH STL QL NAA+PROBE: NEGATIVE
SODIUM SERPL-SCNC: 140 MMOL/L (ref 135–147)
WBC # BLD AUTO: 8.05 THOUSAND/UL (ref 4.31–10.16)

## 2025-01-08 PROCEDURE — 80053 COMPREHEN METABOLIC PANEL: CPT | Performed by: STUDENT IN AN ORGANIZED HEALTH CARE EDUCATION/TRAINING PROGRAM

## 2025-01-08 PROCEDURE — 85027 COMPLETE CBC AUTOMATED: CPT | Performed by: STUDENT IN AN ORGANIZED HEALTH CARE EDUCATION/TRAINING PROGRAM

## 2025-01-08 PROCEDURE — 99239 HOSP IP/OBS DSCHRG MGMT >30: CPT | Performed by: STUDENT IN AN ORGANIZED HEALTH CARE EDUCATION/TRAINING PROGRAM

## 2025-01-08 PROCEDURE — 99223 1ST HOSP IP/OBS HIGH 75: CPT | Performed by: INTERNAL MEDICINE

## 2025-01-08 PROCEDURE — 83993 ASSAY FOR CALPROTECTIN FECAL: CPT

## 2025-01-08 PROCEDURE — 83735 ASSAY OF MAGNESIUM: CPT | Performed by: STUDENT IN AN ORGANIZED HEALTH CARE EDUCATION/TRAINING PROGRAM

## 2025-01-08 RX ADMIN — AMLODIPINE BESYLATE 10 MG: 10 TABLET ORAL at 09:23

## 2025-01-08 RX ADMIN — ENOXAPARIN SODIUM 40 MG: 40 INJECTION SUBCUTANEOUS at 06:11

## 2025-01-08 RX ADMIN — THIAMINE HYDROCHLORIDE: 100 INJECTION, SOLUTION INTRAMUSCULAR; INTRAVENOUS at 13:53

## 2025-01-08 NOTE — ASSESSMENT & PLAN NOTE
Patient with history of Jaycee-en-Y, postsurgical malabsorption, morbid obesity presents with complaint of abdominal pain, emesis and diarrhea.  Son sick at home with gastroenteritis.    CT AP:   Abnormal dilated fluid-filled loops of proximal small bowel within the left upper abdomen which gradually taper to normal caliber distally.   Several loops of nondilated fluid-filled small bowel also noted throughout the abdomen and pelvis as well as liquid stool within the colon.  Status post gastric bypass surgery with only a small amount of the administered oral contrast located within the stomach remnant and proximal small bowel.   Differential considerations include early/partial small bowel obstruction, ileus versus gastroenteritis  Patient is s/p LAPAROSCOPY DIAGNOSTIC WITH INTRAOPERATIVE EGD by bariatrics. No further inpatient workup indicated  C Diff and enteric stool panel negative.   Cleared by GI for discharge given clinical improvement, they will assist in arranging her outpatient follow-up for further work-up.

## 2025-01-08 NOTE — PLAN OF CARE
Problem: PAIN - ADULT  Goal: Verbalizes/displays adequate comfort level or baseline comfort level  Description: Interventions:  - Encourage patient to monitor pain and request assistance  - Assess pain using appropriate pain scale  - Administer analgesics based on type and severity of pain and evaluate response  - Implement non-pharmacological measures as appropriate and evaluate response  - Consider cultural and social influences on pain and pain management  - Notify physician/advanced practitioner if interventions unsuccessful or patient reports new pain  Outcome: Progressing     Problem: INFECTION - ADULT  Goal: Absence or prevention of progression during hospitalization  Description: INTERVENTIONS:  - Assess and monitor for signs and symptoms of infection  - Monitor lab/diagnostic results  - Monitor all insertion sites, i.e. indwelling lines, tubes, and drains  - Monitor endotracheal if appropriate and nasal secretions for changes in amount and color  - [place] appropriate cooling/warming therapies per order  - Administer medications as ordered  - Instruct and encourage patient and family to use good hand hygiene technique  - Identify and instruct in appropriate isolation precautions for identified infection/condition  Outcome: Progressing     Problem: SAFETY ADULT  Goal: Patient will remain free of falls  Description: INTERVENTIONS:  - Educate patient/family on patient safety including physical limitations  - Instruct patient to call for assistance with activity   - Consult OT/PT to assist with strengthening/mobility   - Keep Call bell within reach  - Keep bed low and locked with side rails adjusted as appropriate  - Keep care items and personal belongings within reach  - Initiate and maintain comfort rounds  Outcome: Progressing  Goal: Maintain or return to baseline ADL function  Description: INTERVENTIONS:  -  Assess patient's ability to carry out ADLs; assess patient's baseline for ADL function and  identify physical deficits which impact ability to perform ADLs (bathing, care of mouth/teeth, toileting, grooming, dressing, etc.)  - Assess/evaluate cause of self-care deficits   - Assess range of motion  - Assess patient's mobility; develop plan if impaired  - Assess patient's need for assistive devices and provide as appropriate  - Encourage maximum independence but intervene and supervise when necessary  - Involve family in performance of ADLs  - Assess for home care needs following discharge   - Consider OT consult to assist with ADL evaluation and planning for discharge  - Provide patient education as appropriate  Outcome: Progressing  Goal: Maintains/Returns to pre admission functional level  Description: INTERVENTIONS:  - Perform AM-PAC 6 Click Basic Mobility/ Daily Activity assessment daily.  - Set and communicate daily mobility goal to care team and patient/family/caregiver.   - Collaborate with rehabilitation services on mobility goals if consulted  - Ambulate patient 5 times a day  - Out of bed to chair 3 times a day   - Out of bed for meals 3 times a day  - Out of bed for toileting  - Record patient progress and toleration of activity level   Outcome: Progressing     Problem: DISCHARGE PLANNING  Goal: Discharge to home or other facility with appropriate resources  Description: INTERVENTIONS:  - Identify barriers to discharge w/patient and caregiver  - Arrange for needed discharge resources and transportation as appropriate  - Identify discharge learning needs (meds, wound care, etc.)  - Arrange for interpretive services to assist at discharge as needed  - Refer to Case Management Department for coordinating discharge planning if the patient needs post-hospital services based on physician/advanced practitioner order or complex needs related to functional status, cognitive ability, or social support system  Outcome: Progressing     Problem: Knowledge Deficit  Goal: Patient/family/caregiver demonstrates  understanding of disease process, treatment plan, medications, and discharge instructions  Description: Complete learning assessment and assess knowledge base.  Interventions:  - Provide teaching at level of understanding  - Provide teaching via preferred learning methods  Outcome: Progressing     Problem: GASTROINTESTINAL - ADULT  Goal: Minimal or absence of nausea and/or vomiting  Description: INTERVENTIONS:  - Administer IV fluids if ordered to ensure adequate hydration  - Maintain NPO status until nausea and vomiting are resolved  - Nasogastric tube if ordered  - Administer ordered antiemetic medications as needed  - Provide nonpharmacologic comfort measures as appropriate  - Advance diet as tolerated, if ordered  - Consider nutrition services referral to assist patient with adequate nutrition and appropriate food choices  Outcome: Progressing  Goal: Maintains or returns to baseline bowel function  Description: INTERVENTIONS:  - Assess bowel function  - Encourage oral fluids to ensure adequate hydration  - Administer IV fluids if ordered to ensure adequate hydration  - Administer ordered medications as needed  - Encourage mobilization and activity  - Consider nutritional services referral to assist patient with adequate nutrition and appropriate food choices  Outcome: Progressing, however patient continues to have intractable diarrhea  Goal: Maintains adequate nutritional intake  Description: INTERVENTIONS:  - Monitor percentage of each meal consumed  - Identify factors contributing to decreased intake, treat as appropriate  - Assist with meals as needed  - Monitor I&O, weight, and lab values if indicated  - Obtain nutrition services referral as needed  Outcome: Progressing

## 2025-01-08 NOTE — DISCHARGE SUMMARY
Discharge Summary - Hospitalist   Name: Becky Mccord 39 y.o. female I MRN: [id number]  Unit/Bed#: E4 -01 I Date of Admission: 1/5/2025   Date of Service: 1/8/2025 I Hospital Day: 0     Assessment & Plan  Partial small bowel obstruction (HCC)  Patient with history of Jaycee-en-Y, postsurgical malabsorption, morbid obesity presents with complaint of abdominal pain, emesis and diarrhea.  Son sick at home with gastroenteritis.    CT AP:   Abnormal dilated fluid-filled loops of proximal small bowel within the left upper abdomen which gradually taper to normal caliber distally.   Several loops of nondilated fluid-filled small bowel also noted throughout the abdomen and pelvis as well as liquid stool within the colon.  Status post gastric bypass surgery with only a small amount of the administered oral contrast located within the stomach remnant and proximal small bowel.   Differential considerations include early/partial small bowel obstruction, ileus versus gastroenteritis  Patient is s/p LAPAROSCOPY DIAGNOSTIC WITH INTRAOPERATIVE EGD by bariatrics. No further inpatient workup indicated  C Diff and enteric stool panel negative.   Cleared by GI for discharge given clinical improvement, they will assist in arranging her outpatient follow-up for further work-up.  Gastroenteritis  CT A/P:   Abnormal dilated fluid-filled loops of proximal small bowel within the left upper abdomen which gradually taper to normal caliber distally  Several loops of nondilated fluid-filled small bowel also noted throughout the abdomen and pelvis as well as liquid stool within the colon  Positive sick contact. Son at home but with milder illness. Daughter without any symptoms  Primary hypertension  PTA: Amlodipine 10 mg daily and Losartan 25 mg daily  History of Jaycee-en-Y gastric bypass  Status post Laparoscopic RNYGB in 2016 with Dr. Napoles with subsequent Robotic lysis of adhesions, Robotic small-bowel resection, Robotic partial  gastrectomy, Robotic paraesophageal hernia repair, Robotic bilateral truncal vagotomy Robotic gastrojejunostomy anastomosis with intraop endoscopy on 12/2020 with Dr. Trenton Lyons  Patient has not seen primary bariatric surgeon since 2023.  Was seen recently at [facility] but plans to switch back to [facility] bariatric surgery  Postsurgical malabsorption  Continue supportive care  Morbid obesity with BMI of 50.0-59.9, adult (HCC)  Status post Jaycee-en-Y  Weight loss, dietary, and lifestyle modifications  Mixed hyperlipidemia  Not maintained on statin  Iron deficiency anemia secondary to inadequate dietary iron intake    Results from last 7 days   Lab Units 01/08/25  0557 01/07/25  0448 01/06/25  0017   HEMOGLOBIN g/dL 9.3* 9.8* 11.5   MCV fL 82 82 82   RDW % 14.8 14.9 14.8         Discharging Physician / Practitioner: Miguel Corbin MD  PCP: William Collins MD  Admission Date:   Admission Orders (From admission, onward)       Ordered        01/08/25 0907  INPATIENT ADMISSION  Once            01/06/25 0445  Place in Observation  Once                          Discharge Date: 01/08/25    Medical Problems       Resolved Problems  Date Reviewed: 1/6/2025   None         Consultations During Hospital Stay:  GI, bariatrics    Procedures Performed:   LAPAROSCOPY DIAGNOSTIC WITH INTRAOPERATIVE EGD     Significant Findings / Test Results:   Imaging  CT A/P:    Abnormal dilated fluid-filled loops of proximal small bowel within the left upper abdomen which gradually taper to normal caliber distally. Several loops of nondilated fluid-filled small bowel are also noted throughout the abdomen and pelvis as well as   liquid stool within the colon. The patient is status post gastric bypass surgery with only a small amount of the administered oral contrast located within the stomach remnant and proximal small bowel. Differential considerations include early/partial   small bowel obstruction, ileus versus gastroenteritis. Surgical consultation is  recommended.     No free air or free fluid.    XR abdomen: Redemonstration of dilated small bowel loops within the upper abdomen.     XR abdomen:    Contrast has reached the colon. Persistently dilated small bowel loops throughout the abdomen.     Incidental Findings:   As written above     Test Results Pending at Discharge (will require follow up):   Feacl calprotectin     Outpatient Tests Requested:  PCP, GI follow-up  CBC, BMP    Complications:  none    Reason for Admission: partial SBO    Hospital Course:     Becky Mccord is a 39 y.o. female patient who originally presented to the hospital on 1/5/2025 due to abdominal pain, emesis and diarrhea.    Patient is a 39-year-old female with history of hypertension, Jaycee-en-Y gastric bypass, and iron deficiency anemia who presented here due to abdominal pain, emesis, and diarrhea.  CT abdomen and pelvis was performed with findings concerning for early/partial small bowel obstruction, ileus, versus gastroenteritis.  Bariatric surgery was consulted who then performed diagnostic laparoscopically with intraoperative EGD.  Gastrografin challenge with contrast noted in the colon, diagnostic laparoscopy without evidence of internal hernia, and unremarkable EGD prompted bariatrics to clear for discharge.  Patient continued to have significant diarrhea for which GI was consulted.  Eventually with supportive care patient now had some improvement and is starting to have firm stools. She was cleared by GI for discharge. They will assist in coordinating her outpatient follow-up for further evaluation.    Patient agrees to follow-up with her providers as scheduled and to take her medications as prescribed. All questions were addressed.    she understood the need to seek immediate medical attention should she develop any chest pain, shortness of breath, severe pain, fever, chills, or any other concerning symptoms.    Please see above list of diagnoses and related plan for  "additional information.     Condition at Discharge: fair     Discharge Day Visit / Exam:     Subjective:  patient seen and examined at bedside. Comfortable, No new issues overnight. Diarrhea much improved.  Vitals: Blood Pressure: 131/87 (01/08/25 1454)  Pulse: 74 (01/08/25 1454)  Temperature: (!) 97 °F (36.1 °C) (01/08/25 1454)  Temp Source: Temporal (01/08/25 1454)  Respirations: 18 (01/08/25 1454)  Height: 5' 2.5\" (158.8 cm) (01/06/25 2038)  Weight - Scale: 129 kg (284 lb 6.3 oz) (01/06/25 2038)  SpO2: 99 % (01/08/25 1454)  Exam:   Physical Exam  Vitals reviewed.   Constitutional:       General: She is not in acute distress.  HENT:      Head: Normocephalic.      Nose: Nose normal.      Mouth/Throat:      Mouth: Mucous membranes are moist.   Eyes:      General: No scleral icterus.  Cardiovascular:      Rate and Rhythm: Normal rate and regular rhythm.   Pulmonary:      Effort: Pulmonary effort is normal. No respiratory distress.      Breath sounds: No wheezing.   Abdominal:      General: There is no distension.      Palpations: Abdomen is soft.      Tenderness: There is no abdominal tenderness.   Skin:     General: Skin is warm.   Neurological:      Mental Status: She is alert and oriented to person, place, and time.   Psychiatric:         Mood and Affect: Mood normal.         Behavior: Behavior normal.       Discharge instructions/Information to patient and family:   See after visit summary for information provided to patient and family.      Provisions for Follow-Up Care:  See after visit summary for information related to follow-up care and any pertinent home health orders.      Disposition:     Home    Planned Readmission: No     Discharge Statement:  I spent 40 minutes discharging the patient. This time was spent on the day of discharge. I had direct contact with the patient on the day of discharge. Greater than 50% of the total time was spent examining patient, answering all patient questions, arranging and " discussing plan of care with patient as well as directly providing post-discharge instructions.  Additional time then spent on discharge activities.    Discharge Medications:  See after visit summary for reconciled discharge medications provided to patient and family.      ** Please Note: This note has been constructed using a voice recognition system **

## 2025-01-08 NOTE — ASSESSMENT & PLAN NOTE
history of Jaycee-en-Y in 2016 with revision in 2020 secondary to perforated GJ anastomosis. She has had 2 EGDs since her perforated ulcer in 2020 with last being in October 2021 which revealed pouchitis and no signs of ulcer/fistula/structure

## 2025-01-08 NOTE — ASSESSMENT & PLAN NOTE
Results from last 7 days   Lab Units 01/08/25  0557 01/07/25  0448 01/06/25  0017   HEMOGLOBIN g/dL 9.3* 9.8* 11.5   MCV fL 82 82 82   RDW % 14.8 14.9 14.8

## 2025-01-08 NOTE — ASSESSMENT & PLAN NOTE
Diarrhea has slowed down today with approximately 5 loose watery bowel movements in the past 24 hours.  She states since this morning only 2 loose bowel movements and her pain has improved.  Denies any nausea or vomiting.  Stool enteric panel and C. difficile negative    -Check fecal calprotectin  -Cleared for discharge from GI perspective  -Supportive care with IV fluids and antiemetics  -Bentyl has helped her pain, she may be discharged on a course of bentyl  -Patient GI follow-up in 2 weeks

## 2025-01-08 NOTE — CONSULTS
Consultation - Gastroenterology   Name: Becky Mccord 39 y.o. female I MRN: [id number]  Unit/Bed#: E4 -01 I Date of Admission: 1/5/2025   Date of Service: 1/8/2025 I Hospital Day: 0      Inpatient consult to gastroenterology     Date/Time  1/8/2025 8:33 AM     Performed by  Newton Burger DO   Authorized by  Miguel Corbin MD           Physician Requesting Evaluation: Miguel Corbin MD   Reason for Evaluation / Principal Problem: Diarrhea    Assessment & Plan  Partial small bowel obstruction (HCC)  Patient with history of Jaycee-en-Y in 2016 with revision in 2020 secondary to perforated GJ anastomosis with complaint of abdominal pain, nausea, vomiting, diarrhea that began Saturday.  Her son had the same symptoms and resolved after a day.  CT with abnormal dilated fluid loops of proximal small bowel. Several loops of nondilated fluid-filled small bowel are also noted throughout the abdomen and pelvis as well as   liquid stool within the colon. The patient is status post gastric bypass surgery with only a small amount of the administered oral contrast located within the stomach remnant and proximal small bowel.     Bariatrics was consulted and Gastrografin challenge was performed with contrast reaching the colon.  On 1/6 she was taken to the OR for ex lap with intraoperative EGD.  Petersons and JJ defect was obliterated no internal hernia.  She was noted to have a dilated JJ anastomosis and normal EGD with out evidence of marginal ulcer    She has had 2 EGDs since her perforated ulcer in 2020 with last being in October 2021 which revealed pouchitis and no signs of ulcer/fistula/structure    -Check fecal calprotectin  -Cleared for discharge from GI perspective  -Supportive care with IV fluids and antiemetics  -Bentyl has helped her pain, she may be discharged on a course of bentyl  -Patient GI follow-up in 2 weeks  Gastroenteritis  Diarrhea has slowed down today with approximately 5 loose watery bowel  movements in the past 24 hours.  She states since this morning only 2 loose bowel movements and her pain has improved.  Denies any nausea or vomiting.  Stool enteric panel and C. difficile negative    -Check fecal calprotectin  -Cleared for discharge from GI perspective  -Supportive care with IV fluids and antiemetics  -Bentyl has helped her pain, she may be discharged on a course of bentyl  -Patient GI follow-up in 2 weeks      History of Jaycee-en-Y gastric bypass  history of Jaycee-en-Y in 2016 with revision in 2020 secondary to perforated GJ anastomosis. She has had 2 EGDs since her perforated ulcer in 2020 with last being in October 2021 which revealed pouchitis and no signs of ulcer/fistula/structure  Postsurgical malabsorption  May resume home bariatric supplements      History of Present Illness   Becky Mccord is a 39 y.o. female with past medical history of Jaycee-en-Y in 2016 with revision in 2020 secondary to perforated GJ anastomosis who presents abdominal pain, nausea, vomiting, diarrhea that began Saturday.  Her son had the same symptoms and resolved after a day.  CT with abnormal dilated fluid loops of proximal small bowel. Several loops of nondilated fluid-filled small bowel are also noted throughout the abdomen and pelvis as well as   liquid stool within the colon. The patient is status post gastric bypass surgery with only a small amount of the administered oral contrast located within the stomach remnant and proximal small bowel.     Bariatrics was consulted and Gastrografin challenge was performed with contrast reaching the colon.  On 1/6 she was taken to the OR for ex lap with intraoperative EGD.  Petersons and JJ defect was obliterated no internal hernia.  She was noted to have a dilated JJ anastomosis and normal EGD with out evidence of marginal ulcer  .    Review of Systems   Constitutional:  Negative for chills and fever.   HENT:  Negative for ear pain and sore throat.    Eyes:   Negative for pain and visual disturbance.   Respiratory:  Negative for cough and shortness of breath.    Cardiovascular:  Negative for chest pain and palpitations.   Gastrointestinal:  Positive for abdominal pain and diarrhea. Negative for vomiting.   Genitourinary:  Negative for dysuria and hematuria.   Musculoskeletal:  Negative for arthralgias and back pain.   Skin:  Negative for color change and rash.   Neurological:  Negative for seizures and syncope.   All other systems reviewed and are negative.    I have reviewed the patient's PMH, PSH, Social History, Family History, Meds, and Allergies    Objective :  Temp:  [97 °F (36.1 °C)-97.3 °F (36.3 °C)] 97 °F (36.1 °C)  HR:  [74-75] 74  BP: (122-145)/(77-91) 131/87  Resp:  [18] 18  SpO2:  [98 %-99 %] 99 %  O2 Device: None (Room air)    Physical Exam  Vitals and nursing note reviewed.   Constitutional:       General: She is not in acute distress.     Appearance: She is well-developed. She is not ill-appearing.   HENT:      Head: Normocephalic and atraumatic.   Eyes:      Conjunctiva/sclera: Conjunctivae normal.   Cardiovascular:      Rate and Rhythm: Normal rate and regular rhythm.      Heart sounds: No murmur heard.  Pulmonary:      Effort: Pulmonary effort is normal. No respiratory distress.      Breath sounds: Normal breath sounds.   Abdominal:      General: Abdomen is flat. There is no distension.      Palpations: Abdomen is soft.      Tenderness: There is abdominal tenderness.      Comments: Left lower quadrant tender to palpation  Incision sites clean dry and intact   Musculoskeletal:         General: No swelling.      Cervical back: Neck supple.   Skin:     General: Skin is warm and dry.      Capillary Refill: Capillary refill takes less than 2 seconds.   Neurological:      General: No focal deficit present.      Mental Status: She is alert.   Psychiatric:         Mood and Affect: Mood normal.            Lab Results: I have reviewed the following  results:

## 2025-01-08 NOTE — ASSESSMENT & PLAN NOTE
Status post Laparoscopic RNYGB in 2016 with Dr. Napoles with subsequent Robotic lysis of adhesions, Robotic small-bowel resection, Robotic partial gastrectomy, Robotic paraesophageal hernia repair, Robotic bilateral truncal vagotomy Robotic gastrojejunostomy anastomosis with intraop endoscopy on 12/2020 with Dr. Trenton Lyons  Patient has not seen primary bariatric surgeon since 2023.  Was seen recently at [facility] but plans to switch back to [facility] bariatric surgery

## 2025-01-08 NOTE — PLAN OF CARE
Problem: PAIN - ADULT  Goal: Verbalizes/displays adequate comfort level or baseline comfort level  Description: Interventions:  - Encourage patient to monitor pain and request assistance  - Assess pain using appropriate pain scale  - Administer analgesics based on type and severity of pain and evaluate response  - Implement non-pharmacological measures as appropriate and evaluate response  - Consider cultural and social influences on pain and pain management  - Notify physician/advanced practitioner if interventions unsuccessful or patient reports new pain  Outcome: Progressing     Problem: INFECTION - ADULT  Goal: Absence or prevention of progression during hospitalization  Description: INTERVENTIONS:  - Assess and monitor for signs and symptoms of infection  - Monitor lab/diagnostic results  - Monitor all insertion sites, i.e. indwelling lines, tubes, and drains  - Monitor endotracheal if appropriate and nasal secretions for changes in amount and color  - [place] appropriate cooling/warming therapies per order  - Administer medications as ordered  - Instruct and encourage patient and family to use good hand hygiene technique  - Identify and instruct in appropriate isolation precautions for identified infection/condition  Outcome: Progressing     Problem: SAFETY ADULT  Goal: Patient will remain free of falls  Description: INTERVENTIONS:  - Educate patient/family on patient safety including physical limitations  - Instruct patient to call for assistance with activity   - Consult OT/PT to assist with strengthening/mobility   - Keep Call bell within reach  - Keep bed low and locked with side rails adjusted as appropriate  - Keep care items and personal belongings within reach  - Initiate and maintain comfort rounds  Outcome: Progressing  Goal: Maintain or return to baseline ADL function  Description: INTERVENTIONS:  -  Assess patient's ability to carry out ADLs; assess patient's baseline for ADL function and  identify physical deficits which impact ability to perform ADLs (bathing, care of mouth/teeth, toileting, grooming, dressing, etc.)  - Assess/evaluate cause of self-care deficits   - Assess range of motion  - Assess patient's mobility; develop plan if impaired  - Assess patient's need for assistive devices and provide as appropriate  - Encourage maximum independence but intervene and supervise when necessary  - Involve family in performance of ADLs  - Assess for home care needs following discharge   - Consider OT consult to assist with ADL evaluation and planning for discharge  - Provide patient education as appropriate  Outcome: Progressing  Goal: Maintains/Returns to pre admission functional level  Description: INTERVENTIONS:  - Perform AM-PAC 6 Click Basic Mobility/ Daily Activity assessment daily.  - Set and communicate daily mobility goal to care team and patient/family/caregiver.   - Collaborate with rehabilitation services on mobility goals if consulted  - Ambulate patient 5 times a day  - Out of bed to chair 3 times a day   - Out of bed for meals 3 times a day  - Out of bed for toileting  - Record patient progress and toleration of activity level   Outcome: Progressing     Problem: DISCHARGE PLANNING  Goal: Discharge to home or other facility with appropriate resources  Description: INTERVENTIONS:  - Identify barriers to discharge w/patient and caregiver  - Arrange for needed discharge resources and transportation as appropriate  - Identify discharge learning needs (meds, wound care, etc.)  - Arrange for interpretive services to assist at discharge as needed  - Refer to Case Management Department for coordinating discharge planning if the patient needs post-hospital services based on physician/advanced practitioner order or complex needs related to functional status, cognitive ability, or social support system  Outcome: Progressing     Problem: Knowledge Deficit  Goal: Patient/family/caregiver demonstrates  understanding of disease process, treatment plan, medications, and discharge instructions  Description: Complete learning assessment and assess knowledge base.  Interventions:  - Provide teaching at level of understanding  - Provide teaching via preferred learning methods  Outcome: Progressing     Problem: GASTROINTESTINAL - ADULT  Goal: Minimal or absence of nausea and/or vomiting  Description: INTERVENTIONS:  - Administer IV fluids if ordered to ensure adequate hydration  - Maintain NPO status until nausea and vomiting are resolved  - Nasogastric tube if ordered  - Administer ordered antiemetic medications as needed  - Provide nonpharmacologic comfort measures as appropriate  - Advance diet as tolerated, if ordered  - Consider nutrition services referral to assist patient with adequate nutrition and appropriate food choices  Outcome: Progressing  Goal: Maintains or returns to baseline bowel function  Description: INTERVENTIONS:  - Assess bowel function  - Encourage oral fluids to ensure adequate hydration  - Administer IV fluids if ordered to ensure adequate hydration  - Administer ordered medications as needed  - Encourage mobilization and activity  - Consider nutritional services referral to assist patient with adequate nutrition and appropriate food choices  Outcome: Progressing  Goal: Maintains adequate nutritional intake  Description: INTERVENTIONS:  - Monitor percentage of each meal consumed  - Identify factors contributing to decreased intake, treat as appropriate  - Assist with meals as needed  - Monitor I&O, weight, and lab values if indicated  - Obtain nutrition services referral as needed  Outcome: Progressing

## 2025-01-08 NOTE — CASE MANAGEMENT
Case Management Assessment & Discharge Planning Note    Patient name Becky Mccord  Location [facility] /E4 -* MRN [id number]  : 1985 Date 2025       Current Admission Date: 2025  Current Admission Diagnosis:Partial small bowel obstruction (HCC)   Patient Active Problem List    Diagnosis Date Noted Date Diagnosed    Partial small bowel obstruction (HCC) 2025     Gastroenteritis 2025     Encounter for well adult exam with abnormal findings 2024     Acute flank pain 2024     Bilateral carpal tunnel syndrome 2023     Nasal congestion 10/20/2023     Blood in stool, miranda 2023     Herpes gingivostomatitis 2023     Discoloration of nail 2023     Strain of left trapezius muscle 2023     Atypical nevi 2023     Amenorrhea 2023     Mass of upper outer quadrant of right breast 2023     S/P cholecystectomy 06/15/2022     Acute calculous cholecystitis 06/10/2022     Acute exacerbation of chronic low back pain 2022     Chronic diarrhea 2022     Breast pain 2021     Immunization refused 2021     Neck pain 2021     Abnormal vaginal bleeding 2021     Other dysphagia 2021     Generalized anxiety disorder 2021     Other chest pain 2021     EKG, abnormal 2021     Situational anxiety 2021     Menorrhagia with regular cycle 03/15/2021     Thyroid nodule 03/15/2021     Vertigo 2021     Diarrhea 2020     Gastric outlet obstruction 2020     SOB (shortness of breath) on exertion 2020     COVID-19 virus infection 2020     Morbid obesity with BMI of 50.0-59.9, adult (HCC) 2020     Pelvic pain 2020     Hiatal hernia 08/10/2020     Iron deficiency anemia secondary to inadequate dietary iron intake 2020     B12 deficiency 2020     History of Jaycee-en-Y gastric bypass 2018     Postsurgical malabsorption 2018      Hyperinsulinemia 10/27/2016     Mixed hyperlipidemia 10/04/2016     Vitamin D deficiency 12/09/2015     Acute duodenal ulcer 11/09/2015     Anemia 11/09/2015     Primary hypertension 11/04/2015       LOS (days): 0  Geometric Mean LOS (GMLOS) (days):   Days to GMLOS:     OBJECTIVE:    Risk of Unplanned Readmission Score: 11.45         Current admission status: Inpatient       Preferred Pharmacy:   [id number] [place], [place] - [address]  [address] [facility] [address]  Phone: [phone] Fax: [phone]    [id number] [place], [place] - [address] [facility]  [address] [facility] [address]  Phone: [phone] Fax: [phone]    Primary Care Provider: William Collins MD    Primary Insurance: [employer]  Secondary Insurance:     ASSESSMENT:  Active Health Care Proxies    There are no active Health Care Proxies on file.                      Patient Information  Admitted from:: Home  Mental Status: Alert  During Assessment patient was accompanied by: Not accompanied during assessment  Primary Caregiver: Self  Support Systems: Self, Spouse/significant other, Friend, Family members  County of Residence: [place]  [place] [facility] do you live in[place]  Type of Current Residence: Apartment  Floor Level: 2  Upon entering residence, is there a bedroom on the main floor (no further steps)?: Yes  Upon entering residence, is there a bathroom on the main floor (no further steps)?: Yes  Living Arrangements: Lives w/ Spouse/significant other, Lives w/ Children    Activities of Daily Living Prior to Admission  Functional Status: Independent  Completes ADLs independently?: Yes  Ambulates independently?: Yes         Patient Information Continued  Does patient have prescription coverage?: Yes  Does patient receive dialysis treatments?: No         Means of Transportation  Means of Transport to Appts:: Family transport          DISCHARGE DETAILS:                                                     Treatment Team Recommendation: Home  Discharge Destination Plan:: Home  Transport at Discharge : Family                                      Additional Comments: Pt was discharged to home with no needs.

## 2025-01-08 NOTE — ASSESSMENT & PLAN NOTE
CT A/P:   Abnormal dilated fluid-filled loops of proximal small bowel within the left upper abdomen which gradually taper to normal caliber distally  Several loops of nondilated fluid-filled small bowel also noted throughout the abdomen and pelvis as well as liquid stool within the colon  Positive sick contact. Son at home but with milder illness. Daughter without any symptoms

## 2025-01-09 ENCOUNTER — TELEPHONE (OUTPATIENT)
Age: 40
End: 2025-01-09

## 2025-01-09 ENCOUNTER — TRANSITIONAL CARE MANAGEMENT (OUTPATIENT)
Dept: FAMILY MEDICINE CLINIC | Facility: CLINIC | Age: 40
End: 2025-01-09

## 2025-01-09 NOTE — UTILIZATION REVIEW
NOTIFICATION OF INPATIENT ADMISSION   AUTHORIZATION REQUEST   SERVICING FACILITY:   [place] [address]  Tax ID: [id number]  NPI: [id number] ATTENDING PROVIDER:  Attending Name and NPI#: Miguel Corbin Md [[id number]]  Address: [address] [place] [address]  Phone: [phone]     ADMISSION INFORMATION:  Place of Service: Inpatient [facility]  Place of Service Code: 21  Inpatient Admission Date/Time: 1/8/25  9:07 AM  Discharge Date/Time: 1/8/2025  5:58 PM  Admitting Diagnosis Code/Description:  Vomiting [R11.10]  Nausea and vomiting [R11.2]  Partial small bowel obstruction (HCC) [K56.600]  Vomiting and diarrhea [R11.10, R19.7]     UTILIZATION REVIEW CONTACT:  Jenni Restrepo, Utilization   Network Utilization Review Department  Phone: [phone]  Fax [phone]  Email: [email or web address]  Contact for approvals/pending authorizations, clinical reviews, and discharge.     PHYSICIAN ADVISORY SERVICES:  Medical Necessity Denial & Dpez-op-Bkvr Review  Phone: [phone]  Fax: [phone]  Email: [email or web address]     DISCHARGE SUPPORT TEAM:  For Patients Discharge Needs & Updates  Phone: [phone] opt. 2 Fax: [phone]  Email: [email or web address]

## 2025-01-09 NOTE — TELEPHONE ENCOUNTER
----- Message from Ba Alas MD sent at 1/8/2025  1:40 PM EST -----  Hey this is ba GI fellow, this pt presented to [facility] for diarrhea and should be discharged soon. Can we please get her set up for outpt follow up in 2-4 weeks?    Thanks,    Ba

## 2025-01-09 NOTE — UTILIZATION REVIEW
NOTIFICATION OF ADMISSION DISCHARGE   This is a Notification of Discharge from [facility]. Please be advised that this patient has been discharge from our facility. Below you will find the admission and discharge date and time including the patient’s disposition.   UTILIZATION REVIEW CONTACT:  Krista Chen  Utilization   Network Utilization Review Department  Phone: [phone] x carefully listen to the prompts. All voicemails are confidential.  Email: [email or web address]     ADMISSION INFORMATION  PRESENTATION DATE: 1/5/2025 11:45 PM  OBERVATION ADMISSION DATE: 01/06/2025 0445  INPATIENT ADMISSION DATE: 1/8/25  9:07 AM   DISCHARGE DATE: 1/8/2025  5:58 PM   DISPOSITION:Home/Self Care    Network Utilization Review Department  ATTENTION: Please call with any questions or concerns to [phone] and carefully listen to the prompts so that you are directed to the right person. All voicemails are confidential.   For Discharge needs, contact Care Management DC Support Team at [phone] opt. 2  Send all requests for admission clinical reviews, approved or denied determinations and any other requests to dedicated fax number below belonging to the campus where the patient is receiving treatment. List of dedicated fax numbers for the Facilities:  FACILITY NAME UR FAX NUMBER   ADMISSION DENIALS (Administrative/Medical Necessity) [phone]   DISCHARGE SUPPORT TEAM ([facility]) [phone]   PARENT CHILD HEALTH (Maternity/NICU/Pediatrics) [phone]   [facility] [phone]   General acute hospital [phone]   [facility] [phone]   [facility] [phone]   [facility] [phone]   [facility] [phone]   [facility] [phone]   [facility]  [phone]   [facility] [phone]   [facility] [phone]   [facility] [phone]   [facility] [phone]

## 2025-01-09 NOTE — UTILIZATION REVIEW
Continued Stay Review    Date: 1/09                          Current Patient Class: Inpatient  Current Level of Care: ms    HPI:39 y.o. female initially presented to ER on  1/5  due to abdominal pain, emesis and diarrhea. CT abdomen and pelvis was performed with findings concerning for early/partial small bowel obstruction, ileus, versus gastroenteritis.     1/06   Admitted OBS  status,  MS  Level of care    Performed   Gastrografin challenge with contrast noted in the colon, diagnostic laparoscopy without evidence of internal hernia, and unremarkable EGD.  Continues to have significant diarrhea  (pt reports x4)       IVF  @ 100 cc/hr    IV zofran x1.   Po hernandez x3     NPO     1/07    Continues to have diarrhea, C. Diff negative. Enteric stool panel pending. GI consulted. Maintain IV hydration for now  (isolyte @  100 cc/hr)   Advance to baratric maintenance diet.     IV Dilaudid x1 ,  hernandez po x2     reported  diarrhea stools x11     1/08 CHANGED to INPATIENT  STATUS   MS   Level of care    as pt care has required    >48 hrs.    C. difficile and stool enteric panel are negative.   1/8  per GI Consult:   Suspect her symptoms are likely related to a viral gastroenteritis given the acute onset but otherwise negative workup including diagnostic laparoscopy with no evidence of internal hernia. Her diarrhea symptoms are improving today. We will add a fecal calprotectin to her stool studies to complete her workup. Continue diet as tolerated     1/08  dc to home       Vital Signs (last 3 days) before discharge       Date/Time Temp Pulse Resp BP MAP (mmHg) SpO2 Calculated FIO2 (%) - Nasal Cannula Nasal Cannula O2 Flow Rate (L/min) O2 Device Connor Coma Scale Score Pain    01/08/25 1454 97 °F (36.1 °C) 74 18 131/87 -- 99 % -- -- None (Room air) -- --    01/08/25 0818 -- 75 18 145/91 113 99 % -- -- None (Room air) 15 No Pain    01/07/25 2358 97.3 °F (36.3 °C) 74 18 122/77 95 98 % -- -- None (Room air) -- --    01/07/25 2355 --  -- -- -- -- -- -- -- -- -- 6    01/07/25 1930 -- -- -- -- -- -- -- -- None (Room air) 15 2    01/07/25 1549 -- -- -- -- -- -- -- -- -- -- 6    01/07/25 1532 97.5 °F (36.4 °C) 82 18 136/92 110 98 % -- -- None (Room air) -- --    01/06/25 1733 -- -- -- -- -- -- -- -- -- -- 8 01/06/25 1724 98 °F (36.7 °C) 105 17 145/86 -- 96 % -- -- None (Room air) -- 8    01/06/25 1622 -- -- -- -- -- -- -- -- -- -- 5    01/06/25 0845 -- -- -- -- -- -- -- -- -- -- 6    01/06/25 0622 -- -- -- -- -- -- -- -- -- -- 5    01/06/25 0540 -- 91 18 129/79 -- 100 % -- -- None (Room air) -- 5       Weight (last 2 days) before discharge       Date/Time Weight    01/06/25 2038 129 (284.39)            Pertinent Labs/Diagnostic Results:   Radiology:  XR abdomen 1 view kub   Final Interpretation by Messi Garcia MD (01/07 1334)      Contrast has reached the colon. Persistently dilated small bowel loops throughout the abdomen.               Workstation performed: [id number]         XR abdomen 1 view kub   Final Interpretation by Kaveh Aviles MD (01/06 1300)      Redemonstration of dilated small bowel loops within the upper abdomen.               Workstation performed: [id number]         CT abdomen pelvis with contrast   Final Interpretation by Deepak Garner MD (01/06 1798)      Abnormal dilated fluid-filled loops of proximal small bowel within the left upper abdomen which gradually taper to normal caliber distally. Several loops of nondilated fluid-filled small bowel are also noted throughout the abdomen and pelvis as well as    liquid stool within the colon. The patient is status post gastric bypass surgery with only a small amount of the administered oral contrast located within the stomach remnant and proximal small bowel. Differential considerations include early/partial    small bowel obstruction, ileus versus gastroenteritis. Surgical consultation is recommended.      No free air or free fluid.               I personally  discussed this study with Elayne Burnett on 1/6/2025 3:24 AM.            Workstation performed: [id number]           Cardiology:  ECG 12 lead   Final Result by Khoi Avendano DO (01/06 2942)   Normal sinus rhythm   Cannot rule out Inferior infarct , age undetermined   Abnormal ECG   When compared with ECG of 10-Dec-2023 02:47,   Borderline criteria for Inferior infarct are now Present   Nonspecific T wave abnormality now evident in Anterior leads   Nonspecific T wave abnormality, improved in Lateral leads   Confirmed by Khoi Avendano ([id number]) on 1/6/2025 1:02:47 PM        GI:  No orders to display           Results from last 7 days   Lab Units 01/08/25 0557 01/07/25 0448 01/06/25  0017   WBC Thousand/uL 8.05 7.41 10.46*   HEMOGLOBIN g/dL 9.3* 9.8* 11.5   HEMATOCRIT % 29.5* 31.0* 36.6   PLATELETS Thousands/uL 286 306 343   TOTAL NEUT ABS Thousands/µL  --  6.20  --          Results from last 7 days   Lab Units 01/08/25 0557 01/07/25 0448 01/06/25 0123 01/06/25  0017   SODIUM mmol/L 140 138 135 131*   POTASSIUM mmol/L 3.8 4.4 3.8 6.1*   CHLORIDE mmol/L 108 109* 108 105   CO2 mmol/L 28 25 21 20*   ANION GAP mmol/L 4 4 6 6   BUN mg/dL 9 10 22 21   CREATININE mg/dL 0.63 0.56* 0.79 0.77   EGFR ml/min/1.73sq m 114 118 95 98   CALCIUM mg/dL 7.8* 8.3* 8.0* 8.0*   MAGNESIUM mg/dL 1.8* 2.1  --   --      Results from last 7 days   Lab Units 01/08/25 0557 01/06/25  0017   AST U/L 25 42*   ALT U/L 54* 20   ALK PHOS U/L 75 86   TOTAL PROTEIN g/dL 6.1* 8.1   ALBUMIN g/dL 3.4* 4.3   TOTAL BILIRUBIN mg/dL 0.19* 0.48         Results from last 7 days   Lab Units 01/08/25 0557 01/07/25 0448 01/06/25 0123 01/06/25  0017   GLUCOSE RANDOM mg/dL 91 108 128 152*       Results from last 7 days   Lab Units 01/06/25  0330   LACTIC ACID mmol/L 0.6     Results from last 7 days   Lab Units 01/06/25  0017   LIPASE u/L 8*     Results from last 7 days   Lab Units 01/06/25  1041   C DIFF TOXIN B BY PCR  Negative     Results from last 7 days    Lab Units 01/06/25  1041   SALMONELLA SP PCR  Negative   SHIGELLA SP/ENTEROINVASIVE E. COLI (EIEC)  Negative   CAMPYLOBACTER SP (JEJUNI AND COLI)  Negative   SHIGA TOXIN 1/SHIGA TOXIN 2  Negative   Network Utilization Review Department  ATTENTION: Please call with any questions or concerns to [phone] and carefully listen to the prompts so that you are directed to the right person. All voicemails are confidential.   For Discharge needs, contact Care Management DC Support Team at [phone] opt. 2  Send all requests for admission clinical reviews, approved or denied determinations and any other requests to dedicated fax number below belonging to the campus where the patient is receiving treatment. List of dedicated fax numbers for the Facilities:  FACILITY NAME UR FAX NUMBER   ADMISSION DENIALS (Administrative/Medical Necessity) [phone]   DISCHARGE SUPPORT TEAM (NETWORK) [phone]   PARENT CHILD HEALTH (Maternity/NICU/Pediatrics) [phone]   [facility] [phone]   [facility] [phone]   [facility] [phone]   [facility] [phone]   [facility] [phone]   [facility] [phone]   [facility] [phone]   [facility] [phone]   [facility] [phone]   [facility] [phone]   [facility] [phone]   [facility] [phone]

## 2025-01-10 LAB — CALPROTECTIN STL-MCNC: 376 ΜG/G

## 2025-01-15 ENCOUNTER — TELEMEDICINE (OUTPATIENT)
Dept: FAMILY MEDICINE CLINIC | Facility: CLINIC | Age: 40
End: 2025-01-15
Payer: COMMERCIAL

## 2025-01-15 DIAGNOSIS — E21.1 SECONDARY NON-RENAL HYPERPARATHYROIDISM (HCC): ICD-10-CM

## 2025-01-15 DIAGNOSIS — K56.600 PARTIAL SMALL BOWEL OBSTRUCTION (HCC): Primary | ICD-10-CM

## 2025-01-15 DIAGNOSIS — R19.7 DIARRHEA, UNSPECIFIED TYPE: ICD-10-CM

## 2025-01-15 DIAGNOSIS — K92.1: ICD-10-CM

## 2025-01-15 DIAGNOSIS — R42 DIZZINESS: ICD-10-CM

## 2025-01-15 PROCEDURE — 99495 TRANSJ CARE MGMT MOD F2F 14D: CPT | Performed by: FAMILY MEDICINE

## 2025-01-15 NOTE — ASSESSMENT & PLAN NOTE
Status post recent hospitalization from January 50 January 8 evaluated by bariatric surgeon she had laparoscopic diagnostic with intraoperative EGD no significant finding patient advance diet as tolerated she will follow-up with the GI as outpatient  Orders:  •  CBC and differential; Future  •  Ferritin; Future  •  Iron; Future  •  Occult Blood, Fecal Immunochemical; Future  •  Vitamin B12; Future  •  Clostridium difficile toxin by PCR with EIA; Future

## 2025-01-15 NOTE — ASSESSMENT & PLAN NOTE
Chronic during recent hospitalization calcium level is low patient has history of bariatric surgery possible secondary to absorption recommend to take calcium and vitamin D supplement  Orders:  •  CBC and differential; Future  •  Ferritin; Future  •  Iron; Future  •  Occult Blood, Fecal Immunochemical; Future  •  Vitamin B12; Future  •  Clostridium difficile toxin by PCR with EIA; Future   You can access the FollowMyHealth Patient Portal offered by [facility] by registering at the following website: [email or web address]. By joining [employer]’s FollowMyHealth portal, you will also be able to view your health information using other applications (apps) compatible with our system.

## 2025-01-15 NOTE — PROGRESS NOTES
Virtual TCM Visit:    Verification of patient location:    Patient is located at Home in the following state in which I hold an active license [place]    Assessment & Plan  Partial small bowel obstruction (HCC)  Status post recent hospitalization from January 50 January 8 evaluated by bariatric surgeon she had laparoscopic diagnostic with intraoperative EGD no significant finding patient advance diet as tolerated she will follow-up with the GI as outpatient  Orders:  •  CBC and differential; Future  •  Ferritin; Future  •  Iron; Future  •  Occult Blood, Fecal Immunochemical; Future  •  Vitamin B12; Future  •  Clostridium difficile toxin by PCR with EIA; Future    Secondary non-renal hyperparathyroidism (HCC)  Chronic during recent hospitalization calcium level is low patient has history of bariatric surgery possible secondary to absorption recommend to take calcium and vitamin D supplement  Orders:  •  CBC and differential; Future  •  Ferritin; Future  •  Iron; Future  •  Occult Blood, Fecal Immunochemical; Future  •  Vitamin B12; Future  •  Clostridium difficile toxin by PCR with EIA; Future    Diarrhea, unspecified type  Status post recent hospitalization evaluated by GI recommend to be treated as supportive treatment will follow-up with her as outpatient C. difficile was negative today patient  denies any diarrhea but she been having blood in the stool recommend to repeat C. difficile  Orders:  •  CBC and differential; Future  •  Ferritin; Future  •  Iron; Future  •  Occult Blood, Fecal Immunochemical; Future  •  Vitamin B12; Future  •  Clostridium difficile toxin by PCR with EIA; Future    Dizziness  Symptomatic possible secondary to volume depletion patient recently had a diarrhea and not good oral intake recommend keep well hydration monitor blood pressure avoid sudden movement fall risk review  Orders:  •  CBC and differential; Future  •  Ferritin; Future  •  Iron; Future  •  Occult Blood, Fecal Immunochemical;  Future  •  Vitamin B12; Future  •  Clostridium difficile toxin by PCR with EIA; Future    Blood in stool, miranda  Status post recent hospitalization C. difficile in the hospital is negative recommend to repeated and patient will follow-up with the GI  Orders:  •  CBC and differential; Future  •  Ferritin; Future  •  Iron; Future  •  Occult Blood, Fecal Immunochemical; Future  •  Vitamin B12; Future  •  Clostridium difficile toxin by PCR with EIA; Future         Encounter provider William Collins MD     Provider located at [facility] PRIMARY [facility]  [address]  [address]  [place] [id number] [phone]    After connecting through [email or web address], the patient was identified by name and date of birth. Becky Mccord was informed that this is a telemedicine visit and that the visit is being conducted through the Epic Embedded platform. She agrees to proceed..  My office door was closed. No one else was in the room.  She acknowledged consent and understanding of privacy and security of the video platform. The patient has agreed to participate and understands they can discontinue the visit at any time.    Patient is aware this is a billable service.    History of Present Illness     Transitional Care Management Review:   Becky Mccord is a 39 y.o. female here for TCM follow up.    During the TCM phone call patient stated:  TCM Call     Date and time call was made  1/9/2025  8:58 AM    Hospital care reviewed  Records reviewed    Patient was hospitialized at  [facility]    Date of Admission  01/05/25    Date of discharge  01/08/25    Diagnosis  Partial Bowel Obstruction    Disposition  Home    Were the patients medications reviewed and updated  No    Current Symptoms  Middle abdominal pain    Middle abdominal pain severity  Mild    Middle abdominal pain onset  Ongoing      TCM Call     Post hospital issues  None    Should patient be enrolled  in anticoag monitoring?  No    Scheduled for follow up?  Yes    Referrals needed  scheduled 12/29/2020    Did you obtain your prescribed medications  Yes    Do you need help managing your prescriptions or medications  No    Is transportation to your appointment needed  No    I have advised the patient to call PCP with any new or worsening symptoms  reyna guaman    Living Arrangements  Family members    Support System  Family    The type of support provided  Emotional; Physical    Do you have social support  Yes, as much as I need    Are you recieving any outpatient services  No    Are you recieving home care services  No    Are you using any community resources  No    Current waiver services  No    Have you fallen in the last 12 months  No    Interperter language line needed  No    Counseling  Patient    Comments  scheduled 12/29/2020        HPI    Patient history of bariatric surgery went to emergency room and January 5 for abdomen pain diarrhea and vomiting patient had a CAT scan of the abdomen and was concern for partial small bowel obstruction versus gastroenteritis been evaluated by bariatric surgeon during hospitalization and she had diagnostic laparoscopy with intraoperative EGD no evidence of hernia EGD unremarkable patient was cleared to advance diet as tolerated and discharged home also secondary to the diarrhea GI was consulted who recommend supportive treatment and recommend to be evaluated as outpatient patient was stable to discharge in January 8 I reviewed the patient hospital record test result and medication review patient today felt dizzy light headache and she noticed sometimes had some blood in the stool no vomiting no diarrhea no fever no diet change  Review of Systems   Constitutional:  Negative for chills and fever.   HENT:  Negative for ear pain and sore throat.    Eyes:  Negative for pain and visual disturbance.   Respiratory:  Negative for cough and shortness of breath.     Cardiovascular:  Negative for chest pain and palpitations.   Gastrointestinal:  Positive for blood in stool. Negative for abdominal pain, constipation, diarrhea, nausea and vomiting.   Genitourinary:  Negative for dysuria and hematuria.   Musculoskeletal:  Negative for arthralgias and back pain.   Skin:  Negative for color change and rash.   Neurological:  Positive for dizziness. Negative for seizures and syncope.   All other systems reviewed and are negative.    Objective   LMP 12/20/2024 (Exact Date)     Physical Exam  Constitutional:       General: She is not in acute distress.     Appearance: She is well-developed. She is not ill-appearing, toxic-appearing or diaphoretic.   HENT:      Head: Normocephalic.      Nose: Nose normal. No congestion or rhinorrhea.      Mouth/Throat:      Mouth: Mucous membranes are moist.      Pharynx: No oropharyngeal exudate or posterior oropharyngeal erythema.   Eyes:      General:         Right eye: No discharge.         Left eye: No discharge.      Conjunctiva/sclera: Conjunctivae normal.   Neck:      Vascular: No JVD.      Comments: No grossly visible mass   Pulmonary:      Effort: Pulmonary effort is normal. No respiratory distress.      Breath sounds: No stridor. No wheezing.   Abdominal:      General: There is no distension.      Comments: Patient state her abdomen and a soft she also state no tender  Patient deny any visible or palpable bulging to suggest hernia  I was able to visualize abdomen and there is no change in the color no distension no visible mass   Musculoskeletal:         General: Normal range of motion.      Cervical back: Normal range of motion and neck supple.   Skin:     Findings: No erythema or rash.   Neurological:      Mental Status: She is alert and oriented to person, place, and time.   Psychiatric:         Mood and Affect: Mood normal.     Medications have been reviewed by provider in current encounter      William Collins MD      VIRTUAL VISIT  DISCLAIMER    Becky Mccord verbally agrees to participate in Virtual Care Services. Pt is aware that Virtual Care Services could be limited without vital signs or the ability to perform a full hands-on physical exam. Becky Mccord understands she or the provider may request at any time to terminate the video visit and request the patient to seek care or treatment in person.

## 2025-01-15 NOTE — ASSESSMENT & PLAN NOTE
Status post recent hospitalization evaluated by GI recommend to be treated as supportive treatment will follow-up with her as outpatient C. difficile was negative today patient  denies any diarrhea but she been having blood in the stool recommend to repeat C. difficile  Orders:  •  CBC and differential; Future  •  Ferritin; Future  •  Iron; Future  •  Occult Blood, Fecal Immunochemical; Future  •  Vitamin B12; Future  •  Clostridium difficile toxin by PCR with EIA; Future

## 2025-01-15 NOTE — ASSESSMENT & PLAN NOTE
Status post recent hospitalization C. difficile in the hospital is negative recommend to repeated and patient will follow-up with the GI  Orders:  •  CBC and differential; Future  •  Ferritin; Future  •  Iron; Future  •  Occult Blood, Fecal Immunochemical; Future  •  Vitamin B12; Future  •  Clostridium difficile toxin by PCR with EIA; Future

## 2025-02-05 PROBLEM — K52.9 GASTROENTERITIS: Status: RESOLVED | Noted: 2025-01-06 | Resolved: 2025-02-05

## 2025-02-14 ENCOUNTER — TELEPHONE (OUTPATIENT)
Dept: FAMILY MEDICINE CLINIC | Facility: CLINIC | Age: 40
End: 2025-02-14

## 2025-05-05 ENCOUNTER — TELEPHONE (OUTPATIENT)
Dept: BARIATRICS | Facility: CLINIC | Age: 40
End: 2025-05-05

## 2025-05-05 NOTE — TELEPHONE ENCOUNTER
----- Message from Marry ANTHONY sent at 2025 10:27 AM EDT -----  Regardin year f/u appointment  Please contact this revision patient to schedule 4 year f/u appointment with our office. Note- there are appointments in EPIC chart with [facility]. If patient no longer wishes to follow with our program please document that patient states this when speaking with patient. Thank you.

## 2025-06-15 ENCOUNTER — APPOINTMENT (EMERGENCY)
Dept: CT IMAGING | Facility: HOSPITAL | Age: 40
End: 2025-06-15
Payer: COMMERCIAL

## 2025-06-15 ENCOUNTER — HOSPITAL ENCOUNTER (INPATIENT)
Facility: HOSPITAL | Age: 40
LOS: 1 days | Discharge: LEFT BEFORE DISCHARGE | End: 2025-06-16
Attending: EMERGENCY MEDICINE | Admitting: INTERNAL MEDICINE
Payer: COMMERCIAL

## 2025-06-15 DIAGNOSIS — R20.0 NUMBNESS AND TINGLING: ICD-10-CM

## 2025-06-15 DIAGNOSIS — F41.8 SITUATIONAL ANXIETY: ICD-10-CM

## 2025-06-15 DIAGNOSIS — I63.9 CVA (CEREBRAL VASCULAR ACCIDENT) (HCC): Primary | ICD-10-CM

## 2025-06-15 DIAGNOSIS — R29.90 STROKE-LIKE SYMPTOMS: ICD-10-CM

## 2025-06-15 DIAGNOSIS — R20.2 NUMBNESS AND TINGLING: ICD-10-CM

## 2025-06-15 PROBLEM — R07.9 CHEST PAIN IN ADULT: Status: ACTIVE | Noted: 2025-06-15

## 2025-06-15 LAB
2HR DELTA HS TROPONIN: 2 NG/L
4HR DELTA HS TROPONIN: 2 NG/L
ANION GAP SERPL CALCULATED.3IONS-SCNC: 5 MMOL/L (ref 4–13)
APTT PPP: 27 SECONDS (ref 23–34)
BUN SERPL-MCNC: 11 MG/DL (ref 5–25)
CALCIUM SERPL-MCNC: 8.7 MG/DL (ref 8.4–10.2)
CARDIAC TROPONIN I PNL SERPL HS: 5 NG/L (ref ?–50)
CARDIAC TROPONIN I PNL SERPL HS: 7 NG/L (ref ?–50)
CARDIAC TROPONIN I PNL SERPL HS: 7 NG/L (ref ?–50)
CHLORIDE SERPL-SCNC: 109 MMOL/L (ref 96–108)
CO2 SERPL-SCNC: 23 MMOL/L (ref 21–32)
CREAT SERPL-MCNC: 0.59 MG/DL (ref 0.6–1.3)
ERYTHROCYTE [DISTWIDTH] IN BLOOD BY AUTOMATED COUNT: 16.7 % (ref 11.6–15.1)
GFR SERPL CREATININE-BSD FRML MDRD: 115 ML/MIN/1.73SQ M
GLUCOSE SERPL-MCNC: 110 MG/DL (ref 65–140)
GLUCOSE SERPL-MCNC: 94 MG/DL (ref 65–140)
HCT VFR BLD AUTO: 31.3 % (ref 34.8–46.1)
HGB BLD-MCNC: 9.4 G/DL (ref 11.5–15.4)
INR PPP: 1.03 (ref 0.85–1.19)
MAGNESIUM SERPL-MCNC: 1.9 MG/DL (ref 1.9–2.7)
MCH RBC QN AUTO: 23.3 PG (ref 26.8–34.3)
MCHC RBC AUTO-ENTMCNC: 30 G/DL (ref 31.4–37.4)
MCV RBC AUTO: 78 FL (ref 82–98)
PLATELET # BLD AUTO: 424 THOUSANDS/UL (ref 149–390)
PMV BLD AUTO: 9.6 FL (ref 8.9–12.7)
POTASSIUM SERPL-SCNC: 3.7 MMOL/L (ref 3.5–5.3)
PROTHROMBIN TIME: 13.7 SECONDS (ref 12.3–15)
RBC # BLD AUTO: 4.03 MILLION/UL (ref 3.81–5.12)
SODIUM SERPL-SCNC: 137 MMOL/L (ref 135–147)
TSH SERPL DL<=0.05 MIU/L-ACNC: 1.47 UIU/ML (ref 0.45–4.5)
WBC # BLD AUTO: 9.81 THOUSAND/UL (ref 4.31–10.16)

## 2025-06-15 PROCEDURE — 84443 ASSAY THYROID STIM HORMONE: CPT

## 2025-06-15 PROCEDURE — 99285 EMERGENCY DEPT VISIT HI MDM: CPT

## 2025-06-15 PROCEDURE — 93005 ELECTROCARDIOGRAM TRACING: CPT

## 2025-06-15 PROCEDURE — 85027 COMPLETE CBC AUTOMATED: CPT | Performed by: EMERGENCY MEDICINE

## 2025-06-15 PROCEDURE — 70498 CT ANGIOGRAPHY NECK: CPT

## 2025-06-15 PROCEDURE — 85610 PROTHROMBIN TIME: CPT | Performed by: EMERGENCY MEDICINE

## 2025-06-15 PROCEDURE — 99245 OFF/OP CONSLTJ NEW/EST HI 55: CPT | Performed by: PSYCHIATRY & NEUROLOGY

## 2025-06-15 PROCEDURE — 99223 1ST HOSP IP/OBS HIGH 75: CPT | Performed by: INTERNAL MEDICINE

## 2025-06-15 PROCEDURE — 83735 ASSAY OF MAGNESIUM: CPT

## 2025-06-15 PROCEDURE — 85730 THROMBOPLASTIN TIME PARTIAL: CPT | Performed by: EMERGENCY MEDICINE

## 2025-06-15 PROCEDURE — 96365 THER/PROPH/DIAG IV INF INIT: CPT

## 2025-06-15 PROCEDURE — 96375 TX/PRO/DX INJ NEW DRUG ADDON: CPT

## 2025-06-15 PROCEDURE — 96368 THER/DIAG CONCURRENT INF: CPT

## 2025-06-15 PROCEDURE — 80048 BASIC METABOLIC PNL TOTAL CA: CPT | Performed by: EMERGENCY MEDICINE

## 2025-06-15 PROCEDURE — 84484 ASSAY OF TROPONIN QUANT: CPT | Performed by: EMERGENCY MEDICINE

## 2025-06-15 PROCEDURE — 70496 CT ANGIOGRAPHY HEAD: CPT

## 2025-06-15 PROCEDURE — 36415 COLL VENOUS BLD VENIPUNCTURE: CPT

## 2025-06-15 PROCEDURE — 82948 REAGENT STRIP/BLOOD GLUCOSE: CPT

## 2025-06-15 RX ORDER — LORAZEPAM 2 MG/ML
1 INJECTION INTRAMUSCULAR ONCE AS NEEDED
Status: DISCONTINUED | OUTPATIENT
Start: 2025-06-15 | End: 2025-06-16 | Stop reason: HOSPADM

## 2025-06-15 RX ORDER — FERROUS SULFATE 325(65) MG
325 TABLET ORAL 2 TIMES DAILY WITH MEALS
Status: DISCONTINUED | OUTPATIENT
Start: 2025-06-15 | End: 2025-06-16 | Stop reason: HOSPADM

## 2025-06-15 RX ORDER — HEPARIN SODIUM 5000 [USP'U]/ML
5000 INJECTION, SOLUTION INTRAVENOUS; SUBCUTANEOUS EVERY 8 HOURS SCHEDULED
Status: DISCONTINUED | OUTPATIENT
Start: 2025-06-15 | End: 2025-06-16 | Stop reason: HOSPADM

## 2025-06-15 RX ORDER — ZINC SULFATE 50(220)MG
220 CAPSULE ORAL DAILY
Status: DISCONTINUED | OUTPATIENT
Start: 2025-06-16 | End: 2025-06-16 | Stop reason: HOSPADM

## 2025-06-15 RX ORDER — DIPHENHYDRAMINE HYDROCHLORIDE 50 MG/ML
25 INJECTION, SOLUTION INTRAMUSCULAR; INTRAVENOUS ONCE
Status: COMPLETED | OUTPATIENT
Start: 2025-06-15 | End: 2025-06-15

## 2025-06-15 RX ORDER — ATORVASTATIN CALCIUM 40 MG/1
40 TABLET, FILM COATED ORAL EVERY EVENING
Status: DISCONTINUED | OUTPATIENT
Start: 2025-06-15 | End: 2025-06-16 | Stop reason: HOSPADM

## 2025-06-15 RX ORDER — CLOPIDOGREL BISULFATE 75 MG/1
75 TABLET ORAL DAILY
Status: DISCONTINUED | OUTPATIENT
Start: 2025-06-16 | End: 2025-06-16 | Stop reason: HOSPADM

## 2025-06-15 RX ORDER — CLOPIDOGREL BISULFATE 75 MG/1
600 TABLET ORAL ONCE
Status: COMPLETED | OUTPATIENT
Start: 2025-06-15 | End: 2025-06-15

## 2025-06-15 RX ORDER — ASCORBIC ACID 500 MG
500 TABLET ORAL DAILY
Status: DISCONTINUED | OUTPATIENT
Start: 2025-06-15 | End: 2025-06-16 | Stop reason: HOSPADM

## 2025-06-15 RX ORDER — ACETAMINOPHEN 10 MG/ML
1000 INJECTION, SOLUTION INTRAVENOUS ONCE
Status: COMPLETED | OUTPATIENT
Start: 2025-06-15 | End: 2025-06-15

## 2025-06-15 RX ORDER — ONDANSETRON 2 MG/ML
4 INJECTION INTRAMUSCULAR; INTRAVENOUS EVERY 4 HOURS PRN
Status: DISCONTINUED | OUTPATIENT
Start: 2025-06-15 | End: 2025-06-16 | Stop reason: HOSPADM

## 2025-06-15 RX ORDER — ASPIRIN 81 MG/1
81 TABLET ORAL DAILY
Status: DISCONTINUED | OUTPATIENT
Start: 2025-06-16 | End: 2025-06-16 | Stop reason: HOSPADM

## 2025-06-15 RX ORDER — METOCLOPRAMIDE HYDROCHLORIDE 5 MG/ML
10 INJECTION INTRAMUSCULAR; INTRAVENOUS ONCE
Status: COMPLETED | OUTPATIENT
Start: 2025-06-15 | End: 2025-06-15

## 2025-06-15 RX ORDER — MAGNESIUM SULFATE HEPTAHYDRATE 40 MG/ML
2 INJECTION, SOLUTION INTRAVENOUS ONCE
Status: COMPLETED | OUTPATIENT
Start: 2025-06-15 | End: 2025-06-15

## 2025-06-15 RX ORDER — ASPIRIN 81 MG/1
81 TABLET, CHEWABLE ORAL DAILY
Status: DISCONTINUED | OUTPATIENT
Start: 2025-06-15 | End: 2025-06-15

## 2025-06-15 RX ORDER — AMLODIPINE BESYLATE 10 MG/1
10 TABLET ORAL DAILY
Status: DISCONTINUED | OUTPATIENT
Start: 2025-06-15 | End: 2025-06-16 | Stop reason: HOSPADM

## 2025-06-15 RX ORDER — ASPIRIN 81 MG/1
324 TABLET, CHEWABLE ORAL ONCE
Status: COMPLETED | OUTPATIENT
Start: 2025-06-15 | End: 2025-06-15

## 2025-06-15 RX ORDER — ALBUTEROL SULFATE 90 UG/1
2 INHALANT RESPIRATORY (INHALATION) EVERY 4 HOURS PRN
Status: DISCONTINUED | OUTPATIENT
Start: 2025-06-15 | End: 2025-06-16 | Stop reason: HOSPADM

## 2025-06-15 RX ORDER — LOSARTAN POTASSIUM 25 MG/1
25 TABLET ORAL DAILY
Status: DISCONTINUED | OUTPATIENT
Start: 2025-06-16 | End: 2025-06-16 | Stop reason: HOSPADM

## 2025-06-15 RX ORDER — ACETAMINOPHEN 325 MG/1
650 TABLET ORAL EVERY 4 HOURS PRN
Status: DISCONTINUED | OUTPATIENT
Start: 2025-06-15 | End: 2025-06-16 | Stop reason: HOSPADM

## 2025-06-15 RX ADMIN — MAGNESIUM SULFATE HEPTAHYDRATE 2 G: 40 INJECTION, SOLUTION INTRAVENOUS at 13:25

## 2025-06-15 RX ADMIN — SODIUM CHLORIDE, SODIUM LACTATE, POTASSIUM CHLORIDE, AND CALCIUM CHLORIDE 1000 ML: .6; .31; .03; .02 INJECTION, SOLUTION INTRAVENOUS at 13:25

## 2025-06-15 RX ADMIN — CYANOCOBALAMIN TAB 500 MCG 1000 MCG: 500 TAB at 16:23

## 2025-06-15 RX ADMIN — ATORVASTATIN CALCIUM 40 MG: 40 TABLET, FILM COATED ORAL at 16:24

## 2025-06-15 RX ADMIN — ASPIRIN 81 MG CHEWABLE TABLET 324 MG: 81 TABLET CHEWABLE at 13:37

## 2025-06-15 RX ADMIN — DIPHENHYDRAMINE HYDROCHLORIDE 25 MG: 50 INJECTION, SOLUTION INTRAMUSCULAR; INTRAVENOUS at 13:25

## 2025-06-15 RX ADMIN — OXYCODONE HYDROCHLORIDE AND ACETAMINOPHEN 500 MG: 500 TABLET ORAL at 16:24

## 2025-06-15 RX ADMIN — ACETAMINOPHEN 1000 MG: 10 INJECTION INTRAVENOUS at 13:25

## 2025-06-15 RX ADMIN — IOHEXOL 85 ML: 350 INJECTION, SOLUTION INTRAVENOUS at 12:53

## 2025-06-15 RX ADMIN — AMLODIPINE BESYLATE 10 MG: 10 TABLET ORAL at 16:24

## 2025-06-15 RX ADMIN — FERROUS SULFATE TAB 325 MG (65 MG ELEMENTAL FE) 325 MG: 325 (65 FE) TAB at 16:24

## 2025-06-15 RX ADMIN — CLOPIDOGREL BISULFATE 600 MG: 75 TABLET, FILM COATED ORAL at 13:48

## 2025-06-15 RX ADMIN — HEPARIN SODIUM 5000 UNITS: 5000 INJECTION INTRAVENOUS; SUBCUTANEOUS at 21:32

## 2025-06-15 RX ADMIN — METOCLOPRAMIDE 10 MG: 5 INJECTION, SOLUTION INTRAMUSCULAR; INTRAVENOUS at 13:25

## 2025-06-15 NOTE — ASSESSMENT & PLAN NOTE
Midline chest pain with heaviness cardiac enzymes negative  Follow-up on echocardiogram for stroke workup    Lab Results   Component Value Date    HSTNI0 5 06/15/2025    HSTNI2 7 06/15/2025

## 2025-06-15 NOTE — ASSESSMENT & PLAN NOTE
History of hypertension and obesity status post bariatric surgery who presented to the hospital for chest pain and strokelike symptoms  Was cooking today when she developed chest pain and right-sided numbness along with headache  Seen by neurology in the ED for stroke alert  Recommended DAPT with MRI and echocardiogram/stroke pathway

## 2025-06-15 NOTE — ED PROVIDER NOTES
Time reflects when diagnosis was documented in both MDM as applicable and the Disposition within this note       Time User Action Codes Description Comment    6/15/2025 12:42 PM Martha Goetz Add [I63.9] CVA (cerebral vascular accident) (HCC)     6/15/2025 12:43 PM Martha Goetz Add [R20.0,  R20.2] Numbness and tingling           ED Disposition       ED Disposition   Admit    Condition   Stable    Date/Time   Sun Collin 15, 2025  2:25 PM    Comment   Case was discussed with GERMAINE and the patient's admission status was agreed to be Admission Status: inpatient status to the service of Dr. Henriquez .               Assessment & Plan       Medical Decision Making  At the time of admission, the patient is in no acute distress. I discussed with the patient and family the diagnosis, treatment plan, and plan for admission; they were given the opportunity to ask questions and verbalized understanding. They agree with plan.      Amount and/or Complexity of Data Reviewed  Labs: ordered. Decision-making details documented in ED Course.  Radiology: ordered. Decision-making details documented in ED Course.    Risk  OTC drugs.  Prescription drug management.  Decision regarding hospitalization.        ED Course as of 06/15/25 1436   Sun Collin 15, 2025   1308 CTA stroke alert (head/neck)  Unremarkable CTA neck and brain.   1309 CT stroke alert brain  No acute intracranial abnormality.   1325 Hemoglobin(!): 9.4  9.3 on 1/8   1331 Per neuro- admit on stroke pathway, load with 325 mg ASA, 600 mg plavix, treat headache   1426 Pt admitted to [facility]       Medications   magnesium sulfate 2 g/50 mL IVPB (premix) 2 g (2 g Intravenous New Bag 6/15/25 1325)   iohexol (OMNIPAQUE) 350 MG/ML injection (MULTI-DOSE) 100 mL (85 mL Intravenous Given 6/15/25 1253)   acetaminophen (Ofirmev) injection 1,000 mg (1,000 mg Intravenous New Bag 6/15/25 1325)   lactated ringers bolus 1,000 mL (1,000 mL Intravenous New Bag 6/15/25 1325)   metoclopramide (REGLAN) injection 10  mg (10 mg Intravenous Given 6/15/25 1325)   diphenhydrAMINE (BENADRYL) injection 25 mg (25 mg Intravenous Given 6/15/25 1325)   aspirin chewable tablet 324 mg (324 mg Oral Given 6/15/25 1337)   clopidogrel (PLAVIX) tablet 600 mg (600 mg Oral Given 6/15/25 1348)       ED Risk Strat Scores                    No data recorded        SBIRT 22yo+      Flowsheet Row Most Recent Value   Initial Alcohol Screen: US AUDIT-C     1. How often do you have a drink containing alcohol? 0 Filed at: 06/15/2025 1311   2. How many drinks containing alcohol do you have on a typical day you are drinking?  0 Filed at: 06/15/2025 1311   3b. FEMALE Any Age, or MALE 65+: How often do you have 4 or more drinks on one occassion? 0 Filed at: 06/15/2025 1311   Audit-C Score 0 Filed at: 06/15/2025 1311   JUAREZ: How many times in the past year have you...    Used an illegal drug or used a prescription medication for non-medical reasons? Never Filed at: 06/15/2025 1311                            History of Present Illness       Chief Complaint   Patient presents with    Numbness     Right arm and leg weakness with tingling in right hand and mouth beginning while cooking at 1145. Patient also reports pressure and chest with palpations.        Past Medical History[1]   Past Surgical History[2]   Family History[3]   Social History[4]   E-Cigarette/Vaping    E-Cigarette Use Never User       E-Cigarette/Vaping Substances    Nicotine No     THC No     CBD No     Flavoring No     Other No     Unknown No       I have reviewed and agree with the history as documented.     40 YOF with PMH anemia, HTN, HLD, bariatric surgery presents today with complaint of right arm and leg weakness and tingling that started around 1145 today. States she started with chest pressure in the midsternal chest and had palpitations as well. Palpitations described as heart racing. Felt nauseous as well. Also reports since arriving she is starting to develop a headache. Frontal in  nature, worsened with light.         Review of Systems   Eyes:  Positive for photophobia. Negative for visual disturbance.   Respiratory:  Positive for chest tightness.    Cardiovascular:  Positive for chest pain and palpitations.   Gastrointestinal:  Positive for nausea. Negative for abdominal pain and vomiting.   Neurological:  Positive for weakness, numbness and headaches. Negative for dizziness and light-headedness.           Objective       ED Triage Vitals   Temperature Pulse Blood Pressure Respirations SpO2 Patient Position - Orthostatic VS   06/15/25 1237 06/15/25 1245 06/15/25 1245 06/15/25 1245 06/15/25 1245 06/15/25 1245   (!) 97 °F (36.1 °C) 92 159/87 20 100 % Sitting      Temp Source Heart Rate Source BP Location FiO2 (%) Pain Score    06/15/25 1237 06/15/25 1245 06/15/25 1245 -- 06/15/25 1345    Temporal Monitor Right arm  No Pain      Vitals      Date and Time Temp Pulse SpO2 Resp BP Pain Score FACES Pain Rating User   06/15/25 1400 -- 67 100 % 22 147/91 -- -- SANJU   06/15/25 1345 -- 86 99 % 16 140/63 No Pain -- TMS   06/15/25 1330 -- 69 100 % 22 152/83 -- --    06/15/25 1315 -- 73 100 % 19 130/65 -- -- SANJU   06/15/25 1300 -- 81 100 % 18 143/65 -- -- SANJU   06/15/25 1245 -- 92 100 % 20 159/87 -- -- KG   06/15/25 1245 97 °F (36.1 °C) -- -- -- -- -- -- SANJU   06/15/25 1237 97 °F (36.1 °C) -- -- -- -- -- -- KG            Physical Exam  Vitals and nursing note reviewed.   Constitutional:       General: She is not in acute distress.     Appearance: Normal appearance. She is well-developed. She is obese.   HENT:      Head: Normocephalic and atraumatic.     Eyes:      Conjunctiva/sclera: Conjunctivae normal.       Cardiovascular:      Rate and Rhythm: Normal rate and regular rhythm.      Heart sounds: No murmur heard.  Pulmonary:      Effort: Pulmonary effort is normal. No respiratory distress.      Breath sounds: Normal breath sounds.     Musculoskeletal:         General: No swelling. Normal range of motion.       Cervical back: Normal range of motion and neck supple.     Skin:     General: Skin is warm and dry.     Neurological:      Mental Status: She is alert.      Cranial Nerves: Cranial nerves 2-12 are intact. No dysarthria.      Sensory: Sensory deficit present.      Motor: Weakness present.      Coordination: Finger-Nose-Finger Test normal.      Comments: Slight weakness on right arm with  strength.   Pt reporting decreased sensation in upper and lower right extremity    Psychiatric:         Mood and Affect: Mood normal.         Behavior: Behavior normal.         Results Reviewed       Procedure Component Value Units Date/Time    Magnesium [[id number]]  (Normal) Collected: 06/15/25 1241    Lab Status: Final result Specimen: Blood from Arm, Left Updated: 06/15/25 1424     Magnesium 1.9 mg/dL     TSH, 3rd generation with Free T4 reflex [[id number]]  (Normal) Collected: 06/15/25 1241    Lab Status: Final result Specimen: Blood from Arm, Left Updated: 06/15/25 1421     TSH 3RD GENERATION 1.471 uIU/mL     HS Troponin I 2hr [[id number]]     Lab Status: No result Specimen: Blood     HS Troponin 0hr (reflex protocol) [[id number]]  (Normal) Collected: 06/15/25 1241    Lab Status: Final result Specimen: Blood from Arm, Left Updated: 06/15/25 1317     hs TnI 0hr 5 ng/L     Basic metabolic panel [[id number]]  (Abnormal) Collected: 06/15/25 1241    Lab Status: Final result Specimen: Blood from Arm, Left Updated: 06/15/25 1314     Sodium 137 mmol/L      Potassium 3.7 mmol/L      Chloride 109 mmol/L      CO2 23 mmol/L      ANION GAP 5 mmol/L      BUN 11 mg/dL      Creatinine 0.59 mg/dL      Glucose 94 mg/dL      Calcium 8.7 mg/dL      eGFR 115 ml/min/1.73sq m     Narrative:      National Kidney Disease Foundation guidelines for Chronic Kidney Disease (CKD):     Stage 1 with normal or high GFR (GFR > 90 mL/min/1.73 square meters)    Stage 2 Mild CKD (GFR = 60-89 mL/min/1.73 square meters)    Stage 3A Moderate CKD (GFR = 45-59  mL/min/1.73 square meters)    Stage 3B Moderate CKD (GFR = 30-44 mL/min/1.73 square meters)    Stage 4 Severe CKD (GFR = 15-29 mL/min/1.73 square meters)    Stage 5 End Stage CKD (GFR <15 mL/min/1.73 square meters)  Note: GFR calculation is accurate only with a steady state creatinine    Protime-INR [[id number]]  (Normal) Collected: 06/15/25 1241    Lab Status: Final result Specimen: Blood from Arm, Left Updated: 06/15/25 1307     Protime 13.7 seconds      INR 1.03    Narrative:      INR Therapeutic Range    Indication                                             INR Range      Atrial Fibrillation                                               2.0-3.0  Hypercoagulable State                                    2.0.2.3  Left Ventricular Asist Device                            2.0-3.0  Mechanical Heart Valve                                  -    Aortic(with afib, MI, embolism, HF, LA enlargement,    and/or coagulopathy)                                     2.0-3.0 (2.5-3.5)     Mitral                                                             2.5-3.5  Prosthetic/Bioprosthetic Heart Valve               2.0-3.0  Venous thromboembolism (VTE: VT, PE        2.0-3.0    APTT [[id number]]  (Normal) Collected: 06/15/25 1241    Lab Status: Final result Specimen: Blood from Arm, Left Updated: 06/15/25 1307     PTT 27 seconds     CBC and Platelet [[id number]]  (Abnormal) Collected: 06/15/25 1241    Lab Status: Final result Specimen: Blood from Arm, Left Updated: 06/15/25 1252     WBC 9.81 Thousand/uL      RBC 4.03 Million/uL      Hemoglobin 9.4 g/dL      Hematocrit 31.3 %      MCV 78 fL      MCH 23.3 pg      MCHC 30.0 g/dL      RDW 16.7 %      Platelets 424 Thousands/uL      MPV 9.6 fL     Fingerstick Glucose (POCT) [[id number]]  (Normal) Collected: 06/15/25 1244    Lab Status: Final result Specimen: Blood Updated: 06/15/25 1245     POC Glucose 110 mg/dl             CTA stroke alert (head/neck)   Final Interpretation by Nicolas Peña  DO Ayde (06/15 1307)      Unremarkable CTA neck and brain.                  I reported these results to Dr. Pollack via HIPAA compliant secure electronic messaging on 6/15/2025 1:01 PM.      Workstation performed: [id number]         CT stroke alert brain   Final Interpretation by Nicolas Messer DO (06/15 0686)      No acute intracranial abnormality.         I reported these results to Dr. Pollack via HIPAA compliant secure electronic messaging on 6/15/2025 12:54 PM.      Workstation performed: [id number]             Procedures    ED Medication and Procedure Management   Prior to Admission Medications   Prescriptions Last Dose Informant Patient Reported? Taking?   Calcium Ascorbate (VITAMIN C) 500 mg tablet  Self Yes No   Sig: Take 500 mg by mouth daily   FeroSul 325 (65 Fe) MG tablet  Self No No   Sig: TAKE 1 TABLET BY MOUTH TWICE DAILY WITH MEALS   Multiple Vitamin (MULTIVITAMIN) tablet  Self Yes No   Sig: Take 1 tablet by mouth daily   Zinc 100 MG TABS  Self Yes No   Sig: Take by mouth   albuterol (2.5 mg/3 mL) 0.083 % nebulizer solution  Self No No   Sig: TAKE 3 ML VIA NEBULIZER ROUTE EVERY 6 HOURS AS NEEDED FOR WHEEZING OR SHORTNESS OF BREATH   albuterol (PROVENTIL HFA,VENTOLIN HFA) 90 mcg/act inhaler  Self No No   Sig: INHALE 2 PUFFS BY MOUTH EVERY 4 HOURS AS NEEDED FOR WHEEZING   amLODIPine (NORVASC) 10 mg tablet  Self Yes No   Sig: Take 10 mg by mouth daily   cyanocobalamin (VITAMIN B-12) 1000 MCG tablet  Self No No   Sig: Take 1 tablet (1,000 mcg total) by mouth daily   ergocalciferol (VITAMIN D2) 50,000 units  Self No No   Sig: Take 1 capsule (50,000 Units total) by mouth 2 (two) times a week   losartan (COZAAR) 100 MG tablet  Self Yes No   Sig: Take 25 mg by mouth daily      Facility-Administered Medications Last Administration Doses Remaining   cyanocobalamin injection 1,000 mcg 11/20/2023  8:59 AM    cyanocobalamin injection 1,000 mcg 11/27/2023  8:55 AM         Patient's Medications   Discharge  Prescriptions    No medications on file     No discharge procedures on file.  ED SEPSIS DOCUMENTATION   Time reflects when diagnosis was documented in both MDM as applicable and the Disposition within this note       Time User Action Codes Description Comment    6/15/2025 12:42 PM Cyruswaqas Martha Add [I63.9] CVA (cerebral vascular accident) (HCC)     6/15/2025 12:43 PM Bishnu Martha Add [R20.0,  R20.2] Numbness and tingling                      [1]   Past Medical History:  Diagnosis Date    Anemia     BMI 36.0-36.9,adult 12/08/2020    Cough 12/21/2020    COVID-19 virus infection 12/23/2020    Disease of thyroid gland     Dizziness     due to anemia, last episode August 2020, no falls    Drop in hemoglobin 12/08/2020    Fatigue     History of transfusion 2016    anemic - loss of blood due to bleeding ulcers, no reaction    Hyperlipidemia     Hypertension     Multiple gastric ulcers     Obesity     11/14/16    RUQ pain 06/09/2022    Severe obesity (BMI 35.0-39.9) with comorbidity (HCC) 12/23/2020    Sore throat 10/27/2021    Thyroid disease     took synthroid but not currently taking    Thyroid mass 03/15/2021     A CT scan of the neck incidental finding 1.7 cm of the right thyroid    Ulcer    [2]   Past Surgical History:  Procedure Laterality Date    CHOLECYSTECTOMY      CHOLECYSTECTOMY LAPAROSCOPIC N/A 06/10/2022    Procedure: CHOLECYSTECTOMY LAPAROSCOPIC;  Surgeon: Dameon Iverson MD;  Location:  MAIN OR;  Service: General    GASTRIC BYPASS  2014    GASTRIC BYPASS LAPAROSCOPIC N/A 12/15/2020    Procedure: Robotic lysis of adhesions, small-bowel resection, partial gastrectomy, paraesophageal hernia repair, bilateral truncal vagotomy; Robotic gastrojejunostomy anastomosis with intraop endoscopy;  Surgeon: Arianna Lyons MD;  Location: [facility];  Service: Bariatrics    CA LAPS ABD PRTM&OMENTUM DX W/WO SPEC BR/WA SPX N/A 1/6/2025    Procedure: LAPAROSCOPY DIAGNOSTIC WITH INTRAOPERATIVE EGD;  Surgeon: Arianna Lyons MD;   Location: [facility];  Service: Bariatrics    LITO-EN-Y PROCEDURE  2016    Gastric Bypass by Dr. Gay;Pre-Op Weight 339.6,nw    TUBAL LIGATION Bilateral         WISDOM TOOTH EXTRACTION     [3]   Family History  Problem Relation Name Age of Onset    Asthma Mother Mama     Coronary artery disease Mother Mama     Diabetes Mother Mama         MELLITUS    Hyperlipidemia Mother Mama     Hypertension Mother Mama     Aneurysm Mother Mama         2018 just diagnosed with two    No Known Problems Father           when she was 3 months old    No Known Problems Sister yaniara     No Known Problems Daughter Ama     No Known Problems Daughter Yelianis     Aneurysm Maternal Grandmother      Aneurysm Maternal Grandfather            of a ruptured abdominal aneurysm    No Known Problems Paternal Grandmother      No Known Problems Paternal Grandfather      No Known Problems Maternal Aunt Martha     No Known Problems Maternal Aunt William     No Known Problems Maternal Aunt Sharad    [4]   Social History  Tobacco Use    Smoking status: Former     Current packs/day: 0.00     Average packs/day: 0.3 packs/day for 19.2 years (4.8 ttl pk-yrs)     Types: Cigarettes     Start date: 2004     Quit date: 9/10/2023     Years since quittin.7     Passive exposure: Never    Smokeless tobacco: Former    Tobacco comments:     Started again 3/2023, 1 cig per day   Vaping Use    Vaping status: Never Used   Substance Use Topics    Alcohol use: No    Drug use: No        Jeison Foote PA-C  06/15/25 [id number]

## 2025-06-15 NOTE — PLAN OF CARE
Problem: PAIN - ADULT  Goal: Verbalizes/displays adequate comfort level or baseline comfort level  Description: Interventions:  - Encourage patient to monitor pain and request assistance  - Assess pain using appropriate pain scale  - Administer analgesics as ordered based on type and severity of pain and evaluate response  - Implement non-pharmacological measures as appropriate and evaluate response  - Consider cultural and social influences on pain and pain management  - Notify physician/advanced practitioner if interventions unsuccessful or patient reports new pain  - Educate patient/family on pain management process including their role and importance of  reporting pain   - Provide non-pharmacologic/complimentary pain relief interventions  Outcome: Progressing     Problem: INFECTION - ADULT  Goal: Absence or prevention of progression during hospitalization  Description: INTERVENTIONS:  - Assess and monitor for signs and symptoms of infection  - Monitor lab/diagnostic results  - Monitor all insertion sites, i.e. indwelling lines, tubes, and drains  - Monitor endotracheal if appropriate and nasal secretions for changes in amount and color  - [place] appropriate cooling/warming therapies per order  - Administer medications as ordered  - Instruct and encourage patient and family to use good hand hygiene technique  - Identify and instruct in appropriate isolation precautions for identified infection/condition  Outcome: Progressing  Goal: Absence of fever/infection during neutropenic period  Description: INTERVENTIONS:  - Monitor WBC  - Perform strict hand hygiene  - Limit to healthy visitors only  - No plants, dried, fresh or silk flowers with simons in patient room  Outcome: Progressing     Problem: SAFETY ADULT  Goal: Patient will remain free of falls  Description: INTERVENTIONS:  - Educate patient/family on patient safety including physical limitations  - Instruct patient to call for assistance with activity   -  Consider consulting OT/PT to assist with strengthening/mobility based on AM PAC & JH-HLM score  - Consult OT/PT to assist with strengthening/mobility   - Keep Call bell within reach  - Keep bed low and locked with side rails adjusted as appropriate  - Keep care items and personal belongings within reach  - Initiate and maintain comfort rounds  - Make Fall Risk Sign visible to staff  - Offer Toileting every  Hours, in advance of need  - Initiate/Maintain alarm  - Obtain necessary fall risk management equipment:   - Apply yellow socks and bracelet for high fall risk patients  - Consider moving patient to room near nurses station  Outcome: Progressing  Goal: Maintain or return to baseline ADL function  Description: INTERVENTIONS:  -  Assess patient's ability to carry out ADLs; assess patient's baseline for ADL function and identify physical deficits which impact ability to perform ADLs (bathing, care of mouth/teeth, toileting, grooming, dressing, etc.)  - Assess/evaluate cause of self-care deficits   - Assess range of motion  - Assess patient's mobility; develop plan if impaired  - Assess patient's need for assistive devices and provide as appropriate  - Encourage maximum independence but intervene and supervise when necessary  - Involve family in performance of ADLs  - Assess for home care needs following discharge   - Consider OT consult to assist with ADL evaluation and planning for discharge  - Provide patient education as appropriate  - Monitor functional capacity and physical performance, use of AM PAC & JH-HLM   - Monitor gait, balance and fatigue with ambulation    Outcome: Progressing  Goal: Maintains/Returns to pre admission functional level  Description: INTERVENTIONS:  - Perform AM-PAC 6 Click Basic Mobility/ Daily Activity assessment daily.  - Set and communicate daily mobility goal to care team and patient/family/caregiver.   - Collaborate with rehabilitation services on mobility goals if consulted  -  Perform Range of Motion  times a day.  - Reposition patient every  hours.  - Dangle patient  times a day  - Stand patient  times a day  - Ambulate patient  times a day  - Out of bed to chair  times a day   - Out of bed for meals  times a day  - Out of bed for toileting  - Record patient progress and toleration of activity level   Outcome: Progressing     Problem: DISCHARGE PLANNING  Goal: Discharge to home or other facility with appropriate resources  Description: INTERVENTIONS:  - Identify barriers to discharge w/patient and caregiver  - Arrange for needed discharge resources and transportation as appropriate  - Identify discharge learning needs (meds, wound care, etc.)  - Arrange for interpretive services to assist at discharge as needed  - Refer to Case Management Department for coordinating discharge planning if the patient needs post-hospital services based on physician/advanced practitioner order or complex needs related to functional status, cognitive ability, or social support system  Outcome: Progressing     Problem: Knowledge Deficit  Goal: Patient/family/caregiver demonstrates understanding of disease process, treatment plan, medications, and discharge instructions  Description: Complete learning assessment and assess knowledge base.  Interventions:  - Provide teaching at level of understanding  - Provide teaching via preferred learning methods  Outcome: Progressing     Problem: Neurological Deficit  Goal: Neurological status is stable or improving  Description: Interventions:  - Monitor and assess patient's level of consciousness, motor function, sensory function, and level of assistance needed for ADLs.   - Monitor and report changes from baseline. Collaborate with interdisciplinary team to initiate plan and implement interventions as ordered.   - Provide and maintain a safe environment.  - Consider seizure precautions.  - Consider fall precautions.  - Consider aspiration precautions.  - Consider  bleeding precautions.  Outcome: Progressing     Problem: NEUROSENSORY - ADULT  Goal: Achieves stable or improved neurological status  Description: INTERVENTIONS  - Monitor and report changes in neurological status  - Monitor vital signs such as temperature, blood pressure, glucose, and any other labs ordered   - Initiate measures to prevent increased intracranial pressure  - Monitor for seizure activity and implement precautions if appropriate      Outcome: Progressing  Goal: Achieves maximal functionality and self care  Description: INTERVENTIONS  - Monitor swallowing and airway patency with patient fatigue and changes in neurological status  - Encourage and assist patient to increase activity and self care.   - Encourage visually impaired, hearing impaired and aphasic patients to use assistive/communication devices  Outcome: Progressing     Problem: Activity Intolerance/Impaired Mobility  Goal: Mobility/activity is maintained at optimum level for patient  Description: Interventions:  - Assess and monitor patient  barriers to mobility and need for assistive/adaptive devices.  - Assess patient's emotional response to limitations.  - Collaborate with interdisciplinary team and initiate plans and interventions as ordered.  - Encourage independent activity per ability.  - Maintain proper body alignment.  - Perform active/passive rom as tolerated/ordered.  - Plan activities to conserve energy.  - Turn patient as appropriate  Outcome: Progressing

## 2025-06-15 NOTE — ASSESSMENT & PLAN NOTE
41 y/o F with HTN, HLD, and prior transient neurologic events with headache and unilateral L-sided numbness and/or weakness that presents with chest tightness, nausea, bifrontal headache, and R-sided numbness. Overall low suspicion for minor stroke/TIA with consideration for either referred symptoms or a migrainous phenomenon. That said, she does have vascular risk factors and this is not stereotypical of her prior spells.    -continue neurochecks; notify with changes  -HCT reviewed - no evidence of acute ischemia/hemorrhage  -CTA reviewed - no significant vascular abnormality  -obtain MRI brain w/o contrast  -obtain TTE  -telemetry  -load with 325mg ASA and 600mg Plavix; continue DAPT for now with plan to potentially discontinue once MRI performed and evaluation completed  -start Atorvastatin 40mg daily  -permissive HTN for now unless pt returns to baseline  -check lipid panel/A1c  -cardiac work-up as per primary  -may treat headache if it becomes worse; in the past pt has responded well to typical headache/migraine regimens in the ED however would avoid triptans/DHE  -will follow

## 2025-06-15 NOTE — H&P
H&P - Hospitalist   Name: Becky Zhao 40 y.o. female I MRN: [id number]  Unit/Bed#: ED-13 I Date of Admission: 6/15/2025   Date of Service: 6/15/2025 I Hospital Day: 0     Assessment & Plan  Stroke-like symptoms  History of hypertension and obesity status post bariatric surgery who presented to the hospital for chest pain and strokelike symptoms  Was cooking today when she developed chest pain and right-sided numbness along with headache  Seen by neurology in the ED for stroke alert  Recommended DAPT with MRI and echocardiogram/stroke pathway  Primary hypertension  Continue amlodipine and losartan  History of Jaycee-en-Y gastric bypass  Continue maintenance supplements  Morbid obesity with BMI of 50.0-59.9, adult (HCC)  Body mass index is 51.19 kg/m².  Situational anxiety  Will order lorazepam with MRI  Chest pain in adult  Midline chest pain with heaviness cardiac enzymes negative  Follow-up on echocardiogram for stroke workup    Lab Results   Component Value Date    HSTNI0 5 06/15/2025    HSTNI2 7 06/15/2025       VTE Pharmacologic Prophylaxis: VTE Score: 3 Moderate Risk (Score 3-4) - Pharmacological DVT Prophylaxis Ordered: heparin.  Code Status: Level 1 - Full Code   Discussion with family:     Anticipated Length of Stay: Patient will be admitted on an inpatient basis with an anticipated length of stay of greater than 2 midnights secondary to strokelike symptoms and chest pain.    Chief Complaint:     Numbness (Right arm and leg weakness with tingling in right hand and mouth beginning while cooking at 1145. Patient also reports pressure and chest with palpations. )    History of Present Illness  Becky Zhao is a 40 y.o. female with a PMH of obesity status post bariatric surgery and hypertension who presents with chest pain and strokelike symptoms.  Patient states that she was in a standing position cooking when she developed midline chest and abdominal pain.  Almost immediately she had  left-sided numbness with a headache.  She came to the hospital where she was a stroke alert and seen by neurology.  On exam her symptoms have improved.  She is being admitted on stroke pathway.  Positive nausea but no vomiting or diarrhea.  No fevers chills.    Review of Systems   Constitutional:  Negative for chills and fever.   HENT:  Negative for facial swelling and trouble swallowing.    Eyes:  Negative for visual disturbance.   Respiratory:  Negative for shortness of breath.    Cardiovascular:  Positive for chest pain. Negative for palpitations.   Gastrointestinal:  Positive for abdominal pain (Epigastric) and nausea. Negative for abdominal distention, diarrhea and vomiting.   Genitourinary:  Negative for hematuria.   Musculoskeletal:  Positive for myalgias. Negative for back pain.   Skin:  Negative for rash.   Neurological:  Positive for numbness. Negative for facial asymmetry.   Psychiatric/Behavioral:  The patient is not nervous/anxious.    All other systems reviewed and are negative.      Past Medical and Surgical History:   Past Medical History[1]  Past Surgical History[2]  Meds/Allergies:  Allergies: Allergies[3]  Prior to Admission Medications   Prescriptions Last Dose Informant Patient Reported? Taking?   Calcium Ascorbate (VITAMIN C) 500 mg tablet  Self Yes No   Sig: Take 500 mg by mouth daily   FeroSul 325 (65 Fe) MG tablet  Self No No   Sig: TAKE 1 TABLET BY MOUTH TWICE DAILY WITH MEALS   Multiple Vitamin (MULTIVITAMIN) tablet  Self Yes No   Sig: Take 1 tablet by mouth daily   Zinc 100 MG TABS  Self Yes No   Sig: Take by mouth   albuterol (2.5 mg/3 mL) 0.083 % nebulizer solution  Self No No   Sig: TAKE 3 ML VIA NEBULIZER ROUTE EVERY 6 HOURS AS NEEDED FOR WHEEZING OR SHORTNESS OF BREATH   albuterol (PROVENTIL HFA,VENTOLIN HFA) 90 mcg/act inhaler  Self No No   Sig: INHALE 2 PUFFS BY MOUTH EVERY 4 HOURS AS NEEDED FOR WHEEZING   amLODIPine (NORVASC) 10 mg tablet  Self Yes No   Sig: Take 10 mg by mouth  daily   cyanocobalamin (VITAMIN B-12) 1000 MCG tablet  Self No No   Sig: Take 1 tablet (1,000 mcg total) by mouth daily   ergocalciferol (VITAMIN D2) 50,000 units  Self No No   Sig: Take 1 capsule (50,000 Units total) by mouth 2 (two) times a week   losartan (COZAAR) 100 MG tablet  Self Yes No   Sig: Take 25 mg by mouth daily      Facility-Administered Medications Last Administration Doses Remaining   cyanocobalamin injection 1,000 mcg 2023  8:59 AM         Social History:     Social History     Socioeconomic History    Marital status: /Civil Union     Spouse name: Not on file    Number of children: Not on file    Years of education: Not on file    Highest education level: Not on file   Occupational History    Not on file   Tobacco Use    Smoking status: Former     Current packs/day: 0.00     Average packs/day: 0.3 packs/day for 19.2 years (4.8 ttl pk-yrs)     Types: Cigarettes     Start date: 2004     Quit date: 9/10/2023     Years since quittin.7     Passive exposure: Never    Smokeless tobacco: Former    Tobacco comments:     Started again 3/2023, 1 cig per day   Vaping Use    Vaping status: Never Used   Substance and Sexual Activity    Alcohol use: No    Drug use: No    Sexual activity: Yes     Partners: Male     Birth control/protection: Female Sterilization   Other Topics Concern    Not on file   Social History Narrative    fhx not asked in triage     Social Drivers of Health     Financial Resource Strain: Low Risk  (2024)    Received from [facility]    Overall Financial Resource Strain (CARDIA)     Difficulty of Paying Living Expenses: Not hard at all   Food Insecurity: No Food Insecurity (2025)    Nursing - Inadequate Food Risk Classification     Worried About Running Out of Food in the Last Year: Not on file     Ran Out of Food in the Last Year: Not on file     Ran Out of Food in the Last Year: Never true   Transportation Needs: No Transportation Needs  (1/6/2025)    Nursing - Transportation Risk Classification     Lack of Transportation: Not on file     Lack of Transportation: No   Physical Activity: Sufficiently Active (3/15/2021)    Exercise Vital Sign     Days of Exercise per Week: 7 days     Minutes of Exercise per Session: 30 min   Stress: No Stress Concern Present (5/24/2024)    Received from [facility]    [employer] - Occupational Stress Questionnaire     Feeling of Stress : Not at all   Social Connections: Feeling Socially Integrated (5/24/2024)    Received from [facility]    OASIS : Social Isolation     How often do you feel lonely or isolated from those around you?: Never   Intimate Partner Violence: Unknown (1/6/2025)    Nursing IPS     Feels Physically and Emotionally Safe: Not on file     Physically Hurt by Someone: Not on file     Humiliated or Emotionally Abused by Someone: Not on file     Physically Hurt by Someone: No     Hurt or Threatened by Someone: No   Housing Stability: Unknown (1/6/2025)    Nursing: Inadequate Housing Risk Classification     Has Housing: Not on file     Worried About Losing Housing: Not on file     Unable to Get Utilities: Not on file     Unable to Pay for Housing in the Last Year: No     Has Housing: No     Patient Pre-hospital Living Situation: Home  Patient Pre-hospital Level of Mobility: walks  Patient Pre-hospital Diet Restrictions:     Objective   Vitals:   Blood Pressure: 161/96 (06/15/25 1430)  Pulse: 66 (06/15/25 1430)  Temperature: (!) 97 °F (36.1 °C) (06/15/25 1245)  Temp Source: Oral (06/15/25 1245)  Respirations: 18 (06/15/25 1430)  Weight - Scale: 129 kg (284 lb 6.3 oz) (06/15/25 1245)  SpO2: 99 % (06/15/25 1430)    Physical Exam  Vitals reviewed.   Constitutional:       General: She is not in acute distress.     Appearance: Normal appearance. She is obese.   HENT:      Head: Atraumatic.     Eyes:      General: No scleral icterus.      Extraocular Movements: Extraocular movements intact.       Cardiovascular:      Rate and Rhythm: Regular rhythm.      Heart sounds:      No gallop.   Pulmonary:      Effort: No respiratory distress.      Breath sounds: No stridor. Decreased breath sounds present. No wheezing.   Abdominal:      General: Bowel sounds are normal.      Palpations: Abdomen is soft.      Tenderness: There is no guarding or rebound.     Musculoskeletal:         General: No tenderness or deformity.      Cervical back: Normal range of motion.     Skin:     General: Skin is warm.      Coloration: Skin is not jaundiced.     Neurological:      General: No focal deficit present.      Mental Status: She is alert.      Motor: No weakness.     Psychiatric:         Mood and Affect: Mood normal.         Additional Data:   Lab Results: I have reviewed the following results:  Results from last 7 days   Lab Units 06/15/25  1241   WBC Thousand/uL 9.81   HEMOGLOBIN g/dL 9.4*   HEMATOCRIT % 31.3*   PLATELETS Thousands/uL 424*     Results from last 7 days   Lab Units 06/15/25  1241   SODIUM mmol/L 137   POTASSIUM mmol/L 3.7   CHLORIDE mmol/L 109*   CO2 mmol/L 23   ANION GAP mmol/L 5   BUN mg/dL 11   CREATININE mg/dL 0.59*   CALCIUM mg/dL 8.7   EGFR ml/min/1.73sq m 115   GLUCOSE RANDOM mg/dL 94     Results from last 7 days   Lab Units 06/15/25  1241   INR  1.03         Results from last 7 days   Lab Units 06/15/25  1241   HS TNI 0HR ng/L 5                                  Lines/Drains  Invasive Devices       Peripheral Intravenous Line  Duration             Peripheral IV 06/15/25 Left Antecubital <1 day                    Imaging:   Personally reviewed the following image studies in PACS and associated radiology reports:  CTA stroke alert (head/neck)  Result Date: 6/15/2025  Impression: Unremarkable CTA neck and brain. I reported these results to Dr. Pollack via HIPAA compliant secure electronic messaging on 6/15/2025 1:01 PM. Workstation performed: [id number]      CT stroke alert brain  Result Date: 6/15/2025  Impression: No acute intracranial abnormality. I reported these results to Dr. Pollack via HIPAA compliant secure electronic messaging on 6/15/2025 12:54 PM. Workstation performed: [id number]       EKG, Pathology, and Other Studies Reviewed on Admission:   EKG  Result Date: 06/15/25  Personally reviewed strips with impression of: Normal sinus rhythm 70 bpm    Administrative Statements   Reviewed outpatient PCP bariatric records     ** Please Note: This note has been constructed using a voice recognition system. **         [1]   Past Medical History:  Diagnosis Date    Anemia     BMI 36.0-36.9,adult 12/08/2020    Cough 12/21/2020    COVID-19 virus infection 12/23/2020    Disease of thyroid gland     Dizziness     due to anemia, last episode August 2020, no falls    Drop in hemoglobin 12/08/2020    Fatigue     History of transfusion 2016    anemic - loss of blood due to bleeding ulcers, no reaction    Hyperlipidemia     Hypertension     Multiple gastric ulcers     Obesity     11/14/16    RUQ pain 06/09/2022    Severe obesity (BMI 35.0-39.9) with comorbidity (HCC) 12/23/2020    Sore throat 10/27/2021    Thyroid disease     took synthroid but not currently taking    Thyroid mass 03/15/2021     A CT scan of the neck incidental finding 1.7 cm of the right thyroid    Ulcer    [2]   Past Surgical History:  Procedure Laterality Date    CHOLECYSTECTOMY      CHOLECYSTECTOMY LAPAROSCOPIC N/A 06/10/2022    Procedure: CHOLECYSTECTOMY LAPAROSCOPIC;  Surgeon: Dameon Iverson MD;  Location:  MAIN OR;  Service: General    GASTRIC BYPASS  2014    GASTRIC BYPASS LAPAROSCOPIC N/A 12/15/2020    Procedure: Robotic lysis of adhesions, small-bowel resection, partial gastrectomy, paraesophageal hernia repair, bilateral truncal vagotomy; Robotic gastrojejunostomy anastomosis with intraop endoscopy;  Surgeon: Arianna Lyons MD;  Location: [facility];  Service: Bariatrics    NH LAPS ABD  PRTM&OMENTUM DX W/WO SPEC BR/WA SPX N/A 1/6/2025    Procedure: LAPAROSCOPY DIAGNOSTIC WITH INTRAOPERATIVE EGD;  Surgeon: Arianna Lyons MD;  Location: [facility];  Service: Bariatrics    LITO-EN-Y PROCEDURE  11/14/2016    Gastric Bypass by Dr. Gay;Pre-Op Weight 339.6,nw    TUBAL LIGATION Bilateral     2010    WISDOM TOOTH EXTRACTION     [3]   Allergies  Allergen Reactions    Bude Oil - Food Allergy Shortness Of Breath    Venofer [Iron Sucrose]      Chest pressure after 1st dose. Tachycardia and blurred vision after 8 minutes of dose #2.     B-12 [Cyanocobalamin] Palpitations and Facial Swelling

## 2025-06-15 NOTE — CONSULTS
Consultation - Neurology   Name: Becky Zhao 40 y.o. female I MRN: [id number]  Unit/Bed#: ED-13 I Date of Admission: 6/15/2025   Date of Service: 6/15/2025 I Hospital Day: 0   Consult to Neurology  Consult performed by: Irwin Pollack DO  Consult ordered by: Dana Duarte DO        Physician Requesting Evaluation: Dana Duarte*   Reason for Evaluation / Principal Problem: stroke alert    Assessment & Plan  Stroke-like symptoms  41 y/o F with HTN, HLD, and prior transient neurologic events with headache and unilateral L-sided numbness and/or weakness that presents with chest tightness, nausea, bifrontal headache, and R-sided numbness. Overall low suspicion for minor stroke/TIA with consideration for either referred symptoms or a migrainous phenomenon. That said, she does have vascular risk factors and this is not stereotypical of her prior spells.    -continue neurochecks; notify with changes  -HCT reviewed - no evidence of acute ischemia/hemorrhage  -CTA reviewed - no significant vascular abnormality  -obtain MRI brain w/o contrast  -obtain TTE  -telemetry  -load with 325mg ASA and 600mg Plavix; continue DAPT for now with plan to potentially discontinue once MRI performed and evaluation completed  -start Atorvastatin 40mg daily  -permissive HTN for now unless pt returns to baseline  -check lipid panel/A1c  -cardiac work-up as per primary  -may treat headache if it becomes worse; in the past pt has responded well to typical headache/migraine regimens in the ED however would avoid triptans/DHE  -will follow    Thrombolytic Decision: Patient not a candidate. Symptoms resolved/clearly non disabling.    Recommendations for outpatient neurological follow up have yet to be determined.      Hx and PE limited by: N/A  Patient last known well: 11:45am  Stroke alert called: 12:41pm  Neurology time of arrival: 12:45pm    HPI: Becky Zhao is a 40 y.o. female with HTN, HLD, and  anemia who presents with report of chest tightness, nausea, and RLE distal numbness. Stroke alert was called by RN in triage. Pt reports LKN 11:45am while she was cooking and then suddenly noted tightness in her chest, nausea, and numbness/paresthesias in her distal forearm and hand on the R. Now she feels like there may be numbness of her upper lip as well. She initially did not report headache but now feels like a headache is coming on, which she describes as bifrontal and achy. NIHSS 1 on exam for distal RUE and proximal RLE numbness; otherwise no deficits noted. She states she has had similar in the past with unilateral numbness and headache - she admits the headache feels the same as in the past however the numbness would always occur on the L and now was on the R. In record review, in 2020 and 2021 she had 3 presentations with unilateral numbness and/or weakness (always on the L) with repeat CTAs that were unremarkable and an MRI with the latest presentation that was negative. She has never seen a neurologist for these and does not feel she has frequent headaches.    HCT and CTA were personally reviewed, which were unremarkable. She was not considered a TNK candidate as she had mild, nondisabling deficit.    Review of Systems   Respiratory:  Positive for chest tightness.    Cardiovascular:  Positive for palpitations.   Gastrointestinal:  Positive for nausea.   Neurological:  Positive for numbness and headaches.   All other systems reviewed and are negative.    Medical History Review: I have reviewed the patient's PMH, PSH, Social History, Family History, Meds, and Allergies   Historical Information   Past Medical History[1]  Past Surgical History[2]  Social History[3]  E-Cigarette/Vaping    E-Cigarette Use Never User      E-Cigarette/Vaping Substances    Nicotine No     THC No     CBD No     Flavoring No     Other No     Unknown No      Family History[4]  Social History[5]    Current Facility-Administered  Medications:     cyanocobalamin injection 1,000 mcg, Q30 Days    cyanocobalamin injection 1,000 mcg, Q30 Days  Prior to Admission Medications   Prescriptions Last Dose Informant Patient Reported? Taking?   Calcium Ascorbate (VITAMIN C) 500 mg tablet  Self Yes No   Sig: Take 500 mg by mouth daily   FeroSul 325 (65 Fe) MG tablet  Self No No   Sig: TAKE 1 TABLET BY MOUTH TWICE DAILY WITH MEALS   Multiple Vitamin (MULTIVITAMIN) tablet  Self Yes No   Sig: Take 1 tablet by mouth daily   Zinc 100 MG TABS  Self Yes No   Sig: Take by mouth   albuterol (2.5 mg/3 mL) 0.083 % nebulizer solution  Self No No   Sig: TAKE 3 ML VIA NEBULIZER ROUTE EVERY 6 HOURS AS NEEDED FOR WHEEZING OR SHORTNESS OF BREATH   albuterol (PROVENTIL HFA,VENTOLIN HFA) 90 mcg/act inhaler  Self No No   Sig: INHALE 2 PUFFS BY MOUTH EVERY 4 HOURS AS NEEDED FOR WHEEZING   amLODIPine (NORVASC) 10 mg tablet  Self Yes No   Sig: Take 10 mg by mouth daily   cyanocobalamin (VITAMIN B-12) 1000 MCG tablet  Self No No   Sig: Take 1 tablet (1,000 mcg total) by mouth daily   ergocalciferol (VITAMIN D2) 50,000 units  Self No No   Sig: Take 1 capsule (50,000 Units total) by mouth 2 (two) times a week   losartan (COZAAR) 100 MG tablet  Self Yes No   Sig: Take 25 mg by mouth daily      Facility-Administered Medications Last Administration Doses Remaining   cyanocobalamin injection 1,000 mcg 11/20/2023  8:59 AM    cyanocobalamin injection 1,000 mcg 11/27/2023  8:55 AM         Thatcher oil - food allergy, Venofer [iron sucrose], and B-12 [cyanocobalamin]    Objective :  Temp:  [97 °F (36.1 °C)] 97 °F (36.1 °C)  HR:  [81-92] 81  BP: (143-159)/(65-87) 143/65  Resp:  [18-20] 18  SpO2:  [100 %] 100 %  O2 Device: None (Room air)    Physical Exam  Constitutional:       Appearance: She is well-developed.   HENT:      Head: Normocephalic and atraumatic.     Eyes:      Extraocular Movements: Extraocular movements intact.      Pupils: Pupils are equal, round, and reactive to light.        Cardiovascular:      Rate and Rhythm: Normal rate and regular rhythm.      Heart sounds: Normal heart sounds.   Pulmonary:      Effort: Pulmonary effort is normal.      Breath sounds: Normal breath sounds.   Abdominal:      General: Bowel sounds are normal.      Palpations: Abdomen is soft.     Musculoskeletal:      Cervical back: Normal range of motion and neck supple.     Neurological:      Mental Status: She is alert and oriented to person, place, and time.      Coordination: Coordination is intact.      Deep Tendon Reflexes: Reflexes are normal and symmetric.     Psychiatric:         Speech: Speech normal.     Neurological Exam  Mental Status  Alert. Oriented to person, place and time. Oriented to person, place, and time. Speech is normal. Language is fluent with no aphasia. Attention and concentration are normal.    Cranial Nerves  CN II: Visual fields full to confrontation.  CN III, IV, VI: Extraocular movements intact bilaterally. Pupils equal round and reactive to light bilaterally.  CN V: Facial sensation is normal.  CN VII: Full and symmetric facial movement.  CN VIII: Hearing is normal.  CN XII: Tongue midline without atrophy or fasciculations.    Motor   Strength is 5/5 in all four extremities except as noted.  4+/5 proximally in both uppers and lowers due to poor effort, no asymmetry.    Sensory  Reports patchy diminished sensation to LT/PP in RUE distally and RLE proximally.    Reflexes  Deep tendon reflexes are 2+ and symmetric in all four extremities.    Coordination    Finger-to-nose, rapid alternating movements and heel-to-shin normal bilaterally without dysmetria.    NIHSS:  1a.Level of Consciousness: 0 = Alert   1b. LOC Questions: 0 = Answers both correctly   1c. LOC Commands: 0 = Obeys both correctly   2. Best Gaze: 0 = Normal   3. Visual: 0 = No visual field loss   4. Facial Palsy: 0=Normal symmetric movement   5a. Motor Right Arm: 0=No drift, limb holds 90 (or 45) degrees for full 10  seconds   5b. Motor Left Arm: 0=No drift, limb holds 90 (or 45) degrees for full 10 seconds   6a. Motor Right Le=No drift, limb holds 90 (or 45) degrees for full 10 seconds   6b. Motor Left Le=No drift, limb holds 90 (or 45) degrees for full 10 seconds   7. Limb Ataxia:  0=Absent   8. Sensory: 1=Mild to moderate sensory loss; patient feels pinprick is less sharp or is dull on the affected side; there is a loss of superficial pain with pinprick but patient is aware She is being touched   9. Best Language:  0=No aphasia, normal   10. Dysarthria: 0=Normal articulation   11. Extinction and Inattention (formerly Neglect): 0=No abnormality   Total Score: 1     Time NIHSS was completed: 1:00pm    Modified Steph Score:  0 (No baseline symptoms/disability)      Lab Results: I have reviewed the following results:CBC/BMP:   .     06/15/25  1241   WBC 9.81   HGB 9.4*   HCT 31.3*   *    , PTT/INR:  .     06/15/25  1241   PTT 27   INR 1.03        Imaging Results Review: I personally reviewed the following image studies/reports in PACS and discussed pertinent findings with Radiology: CT head and CT angio. My interpretation of the radiology images/reports is: as noted.  Other Study Results Review: No additional pertinent studies reviewed.    VTE Prophylaxis: VTE covered by:    None       Administrative Statements   Critical Care Time Statement: Upon my evaluation, this patient had a high probability of imminent or life-threatening deterioration due to acute ischemic stroke, which required my direct attention, intervention, and personal management.  I spent a total of 30 minutes directly providing critical care services, including evaluating for the presence of life-threatening injuries or illnesses, management of organ system failure(s) , complex medical decision making (to support/prevent further life-threatening deterioration)., and interpretation of acute imaging. This time is exclusive of procedures, teaching,  family meetings, and any prior time recorded by providers other than myself.         [1]   Past Medical History:  Diagnosis Date    Anemia     BMI 36.0-36.9,adult 12/08/2020    Cough 12/21/2020    COVID-19 virus infection 12/23/2020    Disease of thyroid gland     Dizziness     due to anemia, last episode August 2020, no falls    Drop in hemoglobin 12/08/2020    Fatigue     History of transfusion 2016    anemic - loss of blood due to bleeding ulcers, no reaction    Hyperlipidemia     Hypertension     Multiple gastric ulcers     Obesity     11/14/16    RUQ pain 06/09/2022    Severe obesity (BMI 35.0-39.9) with comorbidity (HCC) 12/23/2020    Sore throat 10/27/2021    Thyroid disease     took synthroid but not currently taking    Thyroid mass 03/15/2021     A CT scan of the neck incidental finding 1.7 cm of the right thyroid    Ulcer    [2]   Past Surgical History:  Procedure Laterality Date    CHOLECYSTECTOMY      CHOLECYSTECTOMY LAPAROSCOPIC N/A 06/10/2022    Procedure: CHOLECYSTECTOMY LAPAROSCOPIC;  Surgeon: Dameon Iverson MD;  Location:  MAIN OR;  Service: General    GASTRIC BYPASS  2014    GASTRIC BYPASS LAPAROSCOPIC N/A 12/15/2020    Procedure: Robotic lysis of adhesions, small-bowel resection, partial gastrectomy, paraesophageal hernia repair, bilateral truncal vagotomy; Robotic gastrojejunostomy anastomosis with intraop endoscopy;  Surgeon: Arianna Lyons MD;  Location: [facility];  Service: Bariatrics    AR LAPS ABD PRTM&OMENTUM DX W/WO SPEC BR/WA SPX N/A 1/6/2025    Procedure: LAPAROSCOPY DIAGNOSTIC WITH INTRAOPERATIVE EGD;  Surgeon: Arianna Lyons MD;  Location: [facility];  Service: Bariatrics    LITO-EN-Y PROCEDURE  11/14/2016    Gastric Bypass by Dr. Gay;Pre-Op Weight 339.6,nw    TUBAL LIGATION Bilateral     2010    WISDOM TOOTH EXTRACTION     [3]   Social History  Tobacco Use    Smoking status: Former     Current packs/day: 0.00     Average packs/day: 0.3 packs/day for 19.2 years (4.8 ttl pk-yrs)      Types: Cigarettes     Start date: 2004     Quit date: 9/10/2023     Years since quittin.7     Passive exposure: Never    Smokeless tobacco: Former    Tobacco comments:     Started again 3/2023, 1 cig per day   Vaping Use    Vaping status: Never Used   Substance and Sexual Activity    Alcohol use: No    Drug use: No    Sexual activity: Yes     Partners: Male     Birth control/protection: Female Sterilization   [4]   Family History  Problem Relation Name Age of Onset    Asthma Mother Mama     Coronary artery disease Mother Mama     Diabetes Mother Mama         MELLITUS    Hyperlipidemia Mother Mama     Hypertension Mother Mama     Aneurysm Mother Mama         2018 just diagnosed with two    No Known Problems Father           when she was 3 months old    No Known Problems Sister yaniara     No Known Problems Daughter Ama     No Known Problems Daughter Yelianis     Aneurysm Maternal Grandmother      Aneurysm Maternal Grandfather            of a ruptured abdominal aneurysm    No Known Problems Paternal Grandmother      No Known Problems Paternal Grandfather      No Known Problems Maternal Aunt Martha     No Known Problems Maternal Aunt Fouzial     No Known Problems Maternal Aunt Tamal    [5]   Social History  Tobacco Use    Smoking status: Former     Current packs/day: 0.00     Average packs/day: 0.3 packs/day for 19.2 years (4.8 ttl pk-yrs)     Types: Cigarettes     Start date: 2004     Quit date: 9/10/2023     Years since quittin.7     Passive exposure: Never    Smokeless tobacco: Former    Tobacco comments:     Started again 3/2023, 1 cig per day   Vaping Use    Vaping status: Never Used   Substance and Sexual Activity    Alcohol use: No    Drug use: No    Sexual activity: Yes     Partners: Male     Birth control/protection: Female Sterilization

## 2025-06-16 ENCOUNTER — APPOINTMENT (INPATIENT)
Dept: NON INVASIVE DIAGNOSTICS | Facility: HOSPITAL | Age: 40
End: 2025-06-16
Payer: COMMERCIAL

## 2025-06-16 ENCOUNTER — TRANSITIONAL CARE MANAGEMENT (OUTPATIENT)
Dept: FAMILY MEDICINE CLINIC | Facility: CLINIC | Age: 40
End: 2025-06-16

## 2025-06-16 VITALS
RESPIRATION RATE: 18 BRPM | HEIGHT: 63 IN | DIASTOLIC BLOOD PRESSURE: 80 MMHG | OXYGEN SATURATION: 94 % | HEART RATE: 86 BPM | WEIGHT: 284.39 LBS | TEMPERATURE: 97.8 F | BODY MASS INDEX: 50.39 KG/M2 | SYSTOLIC BLOOD PRESSURE: 162 MMHG

## 2025-06-16 PROBLEM — R29.90 STROKE-LIKE SYMPTOMS: Status: RESOLVED | Noted: 2025-06-15 | Resolved: 2025-06-16

## 2025-06-16 PROBLEM — R07.9 CHEST PAIN IN ADULT: Status: RESOLVED | Noted: 2025-06-15 | Resolved: 2025-06-16

## 2025-06-16 LAB
ALBUMIN SERPL BCG-MCNC: 3.8 G/DL (ref 3.5–5)
ALP SERPL-CCNC: 90 U/L (ref 34–104)
ALT SERPL W P-5'-P-CCNC: 12 U/L (ref 7–52)
ANION GAP SERPL CALCULATED.3IONS-SCNC: 5 MMOL/L (ref 4–13)
AORTIC ROOT: 3.1 CM
AORTIC VALVE MEAN VELOCITY: 14.7 M/S
ASCENDING AORTA: 3.5 CM
AST SERPL W P-5'-P-CCNC: 16 U/L (ref 13–39)
ATRIAL RATE: 70 BPM
ATRIAL RATE: 97 BPM
AV AREA BY CONTINUOUS VTI: 3 CM2
AV AREA PEAK VELOCITY: 2.2 CM2
AV LVOT MEAN GRADIENT: 7 MMHG
AV LVOT PEAK GRADIENT: 10 MMHG
AV MEAN PRESS GRAD SYS DOP V1V2: 10 MMHG
AV ORIFICE AREA US: 2.98 CM2
AV PEAK GRADIENT: 17 MMHG
AV VELOCITY RATIO: 0.86
AV VMAX SYS DOP: 2.06 M/S
BILIRUB SERPL-MCNC: 0.36 MG/DL (ref 0.2–1)
BSA FOR ECHO PROCEDURE: 2.23 M2
BUN SERPL-MCNC: 6 MG/DL (ref 5–25)
CALCIUM SERPL-MCNC: 8.7 MG/DL (ref 8.4–10.2)
CARDIAC TROPONIN I PNL SERPL HS: 5 NG/L (ref 8–18)
CHLORIDE SERPL-SCNC: 106 MMOL/L (ref 96–108)
CHOLEST SERPL-MCNC: 144 MG/DL (ref ?–200)
CO2 SERPL-SCNC: 24 MMOL/L (ref 21–32)
CREAT SERPL-MCNC: 0.52 MG/DL (ref 0.6–1.3)
DOP CALC AO VTI: 41.64 CM
DOP CALC LVOT AREA: 3.46 CM2
DOP CALC LVOT CARDIAC INDEX: 4.43 L/MIN/M2
DOP CALC LVOT CARDIAC OUTPUT: 9.88 L/MIN
DOP CALC LVOT DIAMETER: 2.1 CM
DOP CALC LVOT PEAK VEL VTI: 35.8 CM
DOP CALC LVOT PEAK VEL: 1.61 M/S
DOP CALC LVOT STROKE INDEX: 54.7 ML/M2
DOP CALC LVOT STROKE VOLUME: 123.93
E WAVE DECELERATION TIME: 199 MS
E/A RATIO: 1.73
ERYTHROCYTE [DISTWIDTH] IN BLOOD BY AUTOMATED COUNT: 16.5 % (ref 11.6–15.1)
EST. AVERAGE GLUCOSE BLD GHB EST-MCNC: 128 MG/DL
FRACTIONAL SHORTENING: 38 (ref 28–44)
GFR SERPL CREATININE-BSD FRML MDRD: 120 ML/MIN/1.73SQ M
GLUCOSE SERPL-MCNC: 98 MG/DL (ref 65–140)
HBA1C MFR BLD: 6.1 %
HCT VFR BLD AUTO: 30.3 % (ref 34.8–46.1)
HDLC SERPL-MCNC: 38 MG/DL
HGB BLD-MCNC: 9 G/DL (ref 11.5–15.4)
INTERVENTRICULAR SEPTUM IN DIASTOLE (PARASTERNAL SHORT AXIS VIEW): 1.1 CM
INTERVENTRICULAR SEPTUM: 1.1 CM (ref 0.6–1.1)
LAAS-AP2: 29.9 CM2
LAAS-AP4: 29.1 CM2
LDLC SERPL CALC-MCNC: 72 MG/DL (ref 0–100)
LEFT ATRIUM SIZE: 4.4 CM
LEFT ATRIUM VOLUME (MOD BIPLANE): 109 ML
LEFT ATRIUM VOLUME INDEX (MOD BIPLANE): 48.9 ML/M2
LEFT INTERNAL DIMENSION IN SYSTOLE: 3.2 CM (ref 2.1–4)
LEFT VENTRICULAR INTERNAL DIMENSION IN DIASTOLE: 5.2 CM (ref 3.5–6)
LEFT VENTRICULAR POSTERIOR WALL IN END DIASTOLE: 1.2 CM
LEFT VENTRICULAR STROKE VOLUME: 89 ML
LV EF US.2D.A4C+ESTIMATED: 67 %
LVSV (TEICH): 89 ML
MCH RBC QN AUTO: 23 PG (ref 26.8–34.3)
MCHC RBC AUTO-ENTMCNC: 29.7 G/DL (ref 31.4–37.4)
MCV RBC AUTO: 77 FL (ref 82–98)
MV E'TISSUE VEL-LAT: 15 CM/S
MV E'TISSUE VEL-SEP: 11 CM/S
MV PEAK A VEL: 0.67 M/S
MV PEAK E VEL: 116 CM/S
MV STENOSIS PRESSURE HALF TIME: 58 MS
MV VALVE AREA P 1/2 METHOD: 3.79
P AXIS: 22 DEGREES
P AXIS: 45 DEGREES
PLATELET # BLD AUTO: 410 THOUSANDS/UL (ref 149–390)
PMV BLD AUTO: 10.1 FL (ref 8.9–12.7)
POTASSIUM SERPL-SCNC: 4.1 MMOL/L (ref 3.5–5.3)
PR INTERVAL: 122 MS
PR INTERVAL: 128 MS
PROT SERPL-MCNC: 7.3 G/DL (ref 6.4–8.4)
QRS AXIS: 35 DEGREES
QRS AXIS: 48 DEGREES
QRSD INTERVAL: 76 MS
QRSD INTERVAL: 84 MS
QT INTERVAL: 350 MS
QT INTERVAL: 416 MS
QTC INTERVAL: 444 MS
QTC INTERVAL: 449 MS
RBC # BLD AUTO: 3.92 MILLION/UL (ref 3.81–5.12)
RIGHT ATRIAL 2D VOLUME: 47 ML
RIGHT ATRIUM AREA SYSTOLE A4C: 17.7 CM2
RIGHT VENTRICLE ID DIMENSION: 4.7 CM
SL CV LEFT ATRIUM LENGTH A2C: 6.4 CM
SL CV LV EF: 67
SL CV PED ECHO LEFT VENTRICLE DIASTOLIC VOLUME (MOD BIPLANE) 2D: 129 ML
SL CV PED ECHO LEFT VENTRICLE SYSTOLIC VOLUME (MOD BIPLANE) 2D: 40 ML
SODIUM SERPL-SCNC: 135 MMOL/L (ref 135–147)
T WAVE AXIS: 19 DEGREES
T WAVE AXIS: 29 DEGREES
TRICUSPID ANNULAR PLANE SYSTOLIC EXCURSION: 3.1 CM
TRIGL SERPL-MCNC: 169 MG/DL (ref ?–150)
VENTRICULAR RATE: 70 BPM
VENTRICULAR RATE: 97 BPM
WBC # BLD AUTO: 8.42 THOUSAND/UL (ref 4.31–10.16)

## 2025-06-16 PROCEDURE — 80053 COMPREHEN METABOLIC PANEL: CPT | Performed by: INTERNAL MEDICINE

## 2025-06-16 PROCEDURE — 80061 LIPID PANEL: CPT | Performed by: INTERNAL MEDICINE

## 2025-06-16 PROCEDURE — 93010 ELECTROCARDIOGRAM REPORT: CPT | Performed by: INTERNAL MEDICINE

## 2025-06-16 PROCEDURE — 93306 TTE W/DOPPLER COMPLETE: CPT | Performed by: INTERNAL MEDICINE

## 2025-06-16 PROCEDURE — 84484 ASSAY OF TROPONIN QUANT: CPT | Performed by: INTERNAL MEDICINE

## 2025-06-16 PROCEDURE — 83036 HEMOGLOBIN GLYCOSYLATED A1C: CPT | Performed by: INTERNAL MEDICINE

## 2025-06-16 PROCEDURE — 85027 COMPLETE CBC AUTOMATED: CPT | Performed by: INTERNAL MEDICINE

## 2025-06-16 PROCEDURE — 97165 OT EVAL LOW COMPLEX 30 MIN: CPT

## 2025-06-16 PROCEDURE — 99239 HOSP IP/OBS DSCHRG MGMT >30: CPT | Performed by: PHYSICIAN ASSISTANT

## 2025-06-16 PROCEDURE — 93306 TTE W/DOPPLER COMPLETE: CPT

## 2025-06-16 RX ORDER — CLOPIDOGREL BISULFATE 75 MG/1
75 TABLET ORAL DAILY
Qty: 30 TABLET | Refills: 0 | Status: SHIPPED | OUTPATIENT
Start: 2025-06-17

## 2025-06-16 RX ORDER — ATORVASTATIN CALCIUM 40 MG/1
40 TABLET, FILM COATED ORAL EVERY EVENING
Qty: 30 TABLET | Refills: 0 | Status: SHIPPED | OUTPATIENT
Start: 2025-06-16

## 2025-06-16 RX ORDER — ASPIRIN 81 MG/1
81 TABLET ORAL DAILY
Qty: 30 TABLET | Refills: 0 | Status: SHIPPED | OUTPATIENT
Start: 2025-06-17

## 2025-06-16 RX ADMIN — CYANOCOBALAMIN TAB 500 MCG 1000 MCG: 500 TAB at 08:12

## 2025-06-16 RX ADMIN — B-COMPLEX W/ C & FOLIC ACID TAB 1 TABLET: TAB at 08:12

## 2025-06-16 RX ADMIN — AMLODIPINE BESYLATE 10 MG: 10 TABLET ORAL at 08:13

## 2025-06-16 RX ADMIN — CLOPIDOGREL BISULFATE 75 MG: 75 TABLET, FILM COATED ORAL at 08:12

## 2025-06-16 RX ADMIN — HEPARIN SODIUM 5000 UNITS: 5000 INJECTION INTRAVENOUS; SUBCUTANEOUS at 06:29

## 2025-06-16 RX ADMIN — Medication 3 MG: at 01:30

## 2025-06-16 RX ADMIN — LOSARTAN POTASSIUM 25 MG: 25 TABLET, FILM COATED ORAL at 08:13

## 2025-06-16 RX ADMIN — ACETAMINOPHEN 650 MG: 325 TABLET, FILM COATED ORAL at 09:21

## 2025-06-16 RX ADMIN — OXYCODONE HYDROCHLORIDE AND ACETAMINOPHEN 500 MG: 500 TABLET ORAL at 08:12

## 2025-06-16 RX ADMIN — ASPIRIN 81 MG: 81 TABLET, COATED ORAL at 08:12

## 2025-06-16 RX ADMIN — ZINC SULFATE 220 MG (50 MG) CAPSULE 220 MG: CAPSULE at 08:13

## 2025-06-16 RX ADMIN — FERROUS SULFATE TAB 325 MG (65 MG ELEMENTAL FE) 325 MG: 325 (65 FE) TAB at 08:12

## 2025-06-16 NOTE — DISCHARGE SUMMARY
Discharge Summary - Hospitalist   Name: Becky Zhao 40 y.o. female I MRN: [id number]  Unit/Bed#: E4 -01 I Date of Admission: 6/15/2025   Date of Service: 6/16/2025 I Hospital Day: 1     Assessment & Plan  Stroke-like symptoms (Resolved: 6/16/2025)  History of hypertension and obesity status post bariatric surgery who presented to the hospital for chest pain and strokelike symptoms  Was cooking today when she developed chest pain and right-sided numbness along with headache  Seen by neurology in the ED for stroke alert  Recommended DAPT with MRI and echocardiogram/stroke pathway  Unable to obtain MRI as patient is leaving AMA.  Thoroughly discussed with patient return precautions, risk versus benefits.  Continue daily baby aspirin, daily Plavix, daily Lipitor.  Ambulatory referral to neurology placed  Chest pain in adult (Resolved: 6/16/2025)  Midline chest pain with heaviness cardiac enzymes negative  Echo obtained 6/16   EF 67%.  Normal diastolic function.  Mild to moderate asymmetry hypertrophy of the septal wall.  Left atrium mildly dilated.  Lab Results   Component Value Date    HSTNI0 5 06/15/2025    HSTNI2 7 06/15/2025    HSTNI4 7 06/15/2025    HSTNI 5 (L) 06/16/2025     Primary hypertension  Continue amlodipine and losartan  History of Jaycee-en-Y gastric bypass  Continue maintenance supplements     Medical Problems       Resolved Problems  Date Reviewed: 1/15/2025          Resolved    * (Principal) Stroke-like symptoms 6/16/2025     Resolved by  Mary Kay Patton PA-C    Chest pain in adult 6/16/2025     Resolved by  Mary Kay Patton PA-C        Discharging Physician / Practitioner: Mary Kay Patton PA-C  PCP: William Collins MD  Admission Date:   Admission Orders (From admission, onward)       Ordered        06/15/25 1426  INPATIENT ADMISSION  Once                          Discharge Date: 06/16/25    Next Steps for Physician/AP Assuming Care:  Medication reconciliation  Neurology outpatient referral  Follow-up when  hemoglobin A1c    Test Results Pending at Discharge (will require follow up):  Hemoglobin A1c  MRI unable to be obtained as patient is leaving AMA  Recommend outpatient MRI brain after following up with neurology in the office    Medication Changes for Discharge & Rationale:   Baby aspirin daily  Plavix 75 mg daily  Lipitor 40 mg daily  See after visit summary for reconciled discharge medications provided to patient and/or family.     Consultations During Hospital Stay:  Neurology    Procedures Performed:   None    Significant Findings / Test Results:   CTA neck and brain-unremarkable  CT head-no acute intracranial abnormality  Transthoracic echocardiogram-EF 67%, normal diastolic function, mild to moderate asymmetry of the septal wall, mildly dilated left atrium  MRI unable to be obtained as patient discharged AMA    Incidental Findings:   None    Hospital Course:   Becky Zhao is a 40 y.o. female patient who originally presented to the hospital on 6/15/2025 due to chest pain and strokelike symptoms.  On admission patient stated she was standing while cooking when she developed midline chest and abdominal pain, almost immediately she had right-sided numbness with a headache.  ED initiated stroke alert, neurology consulted.  Imaging relatively unremarkable.  Neurology recommended patient start dual antiplatelet therapy and high intensity statin.  MRI was ordered and pending, but patient wanted to go home due to resolution of symptoms.  Patient has been discharged AGAINST MEDICAL ADVICE.  Thoroughly discussed return precautions with patient, risk versus benefits of leaving AMA.  Patient is agreeable to seek emergency care again, if there are new changes.  AMA paperwork signed.    Please see above list of diagnoses and related plan for additional information.     Discharge Day Visit / Exam:   Subjective: Seen at bedside, with family present.  Denies chest pain, shortness of breath, headache.  Still  "endorsing mild numbness along right pinky.  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  Vitals: Blood Pressure: 162/80 (06/16/25 1034)  Pulse: 86 (06/16/25 1034)  Temperature: 97.8 °F (36.6 °C) (06/16/25 1034)  Temp Source: Temporal (06/16/25 1034)  Respirations: 18 (06/16/25 1034)  Height: 5' 2.5\" (158.8 cm) (06/16/25 0819)  Weight - Scale: 129 kg (284 lb 6.3 oz) (06/16/25 0819)  SpO2: 94 % (06/16/25 1034)  Physical Exam  Constitutional:       General: She is not in acute distress.     Appearance: She is not ill-appearing, toxic-appearing or diaphoretic.   HENT:      Head: Normocephalic and atraumatic.      Nose: Nose normal.      Mouth/Throat:      Pharynx: Oropharynx is clear.     Eyes:      Conjunctiva/sclera: Conjunctivae normal.       Cardiovascular:      Rate and Rhythm: Normal rate and regular rhythm.      Heart sounds: No murmur heard.     No friction rub. No gallop.   Pulmonary:      Effort: Pulmonary effort is normal. No respiratory distress.   Abdominal:      General: Abdomen is flat. There is no distension.     Musculoskeletal:         General: No swelling, tenderness, deformity or signs of injury.      Cervical back: Normal range of motion.     Skin:     General: Skin is warm and dry.      Coloration: Skin is not jaundiced or pale.     Neurological:      Mental Status: She is alert and oriented to person, place, and time. Mental status is at baseline.      Cranial Nerves: No dysarthria or facial asymmetry.      Sensory: Sensation is intact.      Motor: No weakness, tremor, seizure activity or pronator drift.      Coordination: Coordination is intact.      Gait: Gait is intact.     Psychiatric:         Mood and Affect: Mood normal.         Behavior: Behavior normal.        Discussion with Family: Updated  (daughter) at bedside.    Discharge instructions/Information to patient and family:   See after visit summary for information provided to " patient and family.      Provisions for Follow-Up Care:  See after visit summary for information related to follow-up care and any pertinent home health orders.      Mobility at time of Discharge:   Basic Mobility Inpatient Raw Score: 24  JH-HLM Goal: 8: Walk 250 feet or more  JH-HLM Achieved: 8: Walk 250 feet ot more  HLM Goal achieved. Continue to encourage appropriate mobility.     Disposition:   Home    Planned Readmission: N/A    Administrative Statements   Discharge Statement:  I have spent a total time of 45 microtic minutes in caring for this patient on the day of the visit/encounter. .    **Please Note: This note may have been constructed using a voice recognition system**

## 2025-06-16 NOTE — ASSESSMENT & PLAN NOTE
History of hypertension and obesity status post bariatric surgery who presented to the hospital for chest pain and strokelike symptoms  Was cooking today when she developed chest pain and right-sided numbness along with headache  Seen by neurology in the ED for stroke alert  Recommended DAPT with MRI and echocardiogram/stroke pathway  Unable to obtain MRI as patient is leaving AMA.  Thoroughly discussed with patient return precautions, risk versus benefits.  Continue daily baby aspirin, daily Plavix, daily Lipitor.  Ambulatory referral to neurology placed

## 2025-06-16 NOTE — DISCHARGE INSTR - AVS FIRST PAGE
Dear Becky Zhao,     It was our pleasure to care for you here at [facility].  It is our hope that we were always able to meet and exceed the expected standards for your care during your stay.  You were hospitalized due to stroke-like symptoms .  You were cared for on the 4th floor under the service of Mary Kay Patton PA-C with the [facility] who covers for your primary care physician (PCP), William Collins MD, while you were hospitalized.  If you have any questions or concerns related to this hospitalization, you may contact us at .  For follow up, we recommend that you follow up with your primary care physician.  Please review this entire discharge summary as additional general instructions may be provided later as well.  However, at this time we provide for you here, the most important instructions / recommendations at discharge:     Take baby aspirin everyday  Take clopidogrel 75mg everyday   Take atorvastatin 40mg every night   Seek emergency care if new-onset weakness, balance issues, dizziness, slurred speech, facial droop occurs   Schedule appointment with your PCP in one week       Sincerely,     Mary Kay Patton PA-C

## 2025-06-16 NOTE — OCCUPATIONAL THERAPY NOTE
"    Occupational Therapy Evaluation     Patient Name: Becky Zhao  Today's Date: 6/16/2025  Problem List  Active Problems:    Primary hypertension    History of Jaycee-en-Y gastric bypass    Morbid obesity with BMI of 50.0-59.9, adult (HCC)    Situational anxiety    Past Medical History  Past Medical History[1]  Past Surgical History  Past Surgical History[2]        06/16/25 0908   OT Last Visit   OT Visit Date 06/16/25   Note Type   Note type Evaluation   Pain Assessment   Pain Assessment Tool 0-10   Pain Score No Pain   Restrictions/Precautions   Weight Bearing Precautions Per Order No   Home Living   Type of Home Apartment   Home Layout One level;Able to live on main level with bedroom/bathroom;Stairs to enter with rails  (5 TESHA)   Bathroom Shower/Tub Walk-in shower   Bathroom Toilet Standard   Prior Function   Level of [place] Independent with ADLs;Independent with functional mobility;Independent with IADLS   Lives With Spouse  (and 2 teenage children)   Receives Help From Family   IADLs Independent with driving;Independent with meal prep;Independent with medication management   Falls in the last 6 months 0   Vocational Full time employment  ()   Lifestyle   Reciprocal Relationships    General   Family/Caregiver Present No   Subjective   Subjective \"My pinky is just numb\"   ADL   Where Assessed Edge of bed   Eating Assistance 7  Independent   Grooming Assistance 7  Independent   UB Bathing Assistance 7  Independent   LB Bathing Assistance 7  Independent   UB Dressing Assistance 7  Independent   LB Dressing Assistance 7  Independent   Toileting Assistance  7  Independent   Bed Mobility   Supine to Sit 7  Independent   Transfers   Sit to Stand 7  Independent   Stand to Sit 7  Independent   Functional Mobility   Functional Mobility 7  Independent   Additional Comments Pt denied concerns about going home from a functional standpoint and doing steps at home   Balance   Static Sitting " Normal   Dynamic Sitting Normal   Static Standing Good   Dynamic Standing Fair +   Ambulatory Fair +   Activity Tolerance   Activity Tolerance Patient tolerated treatment well   Medical Staff Made Aware PT   Nurse Made Aware yes   RUE Assessment   RUE Assessment WFL   LUE Assessment   LUE Assessment WFL   Hand Function   Gross Motor Coordination Functional   Fine Motor Coordination Functional   Hand Function Comments continues to endorse numbness in R 5th digit however improved since admission   Vision-Basic Assessment   Current Vision   (Wear glasses when working)   Vision - Complex Assessment   Ocular Range of Motion Intact   Psychosocial   Psychosocial (WDL) WDL   Perception   Inattention/Neglect Appears intact   Motor Planning Appears intact   Cognition   Overall Cognitive Status WFL   Arousal/Participation Alert;Responsive;Cooperative   Attention Within functional limits   Orientation Level Oriented X4   Memory Within functional limits   Following Commands Follows all commands and directions without difficulty   Assessment   Limitation Decreased sensation  (Pt reports have numbness in her R fifth digit)   Prognosis Good   Assessment Patient is a 40 y.o. female seen for OT evaluation s/p admit to [facility] on 6/15/2025 with stroke-like symptoms and chest pain - presented with R sided weakness. Co-morbidities affecting patient's functional performance at time of evaluation include: s/p shahriar-en-Y gastric bypass, chest pain, HTN, anxiety, obesity. Patient demonstrates the following deficits impacting occupational performance: impaired sensation, and increased body habitus  .Patient lives with  and 2 teenage children in an apartment with 5 TESHA. Has a walk-in shower and standard toilet. PTA, patient was (I) with ADLs, was (I) with IADLs, and was (I) with functional transfers/mobility. OT order placed to assess patient's ADLs, cognitive status, functional mobility and performance in order to maximize  safety and independence while making d/c recommendations that are most appropriate for the patient. Patient has activity order that includes Activity as tolerated, OOB to chair. Personal factors impacting patient at time of initial evaluation include: TESHA home environment. At time of eval patient's cognitive status was A&Ox4. Patient's current functional status is: Independent UB ADLs Independent LB ADLs, Independent toileting, Independent transfers/functional mobility.  From OT, patient does not require post-acute OT rehabilitation needs as pt demonstrates baseline independence and denies functional concerns. Patient was left seated EOB with alarm on and all needs within reach.   Goals   Patient Goals To go home   Plan   OT Treatment Day 0   OT Frequency Eval only   Discharge Recommendation   Rehab Resource Intensity Level, OT No post-acute rehabilitation needs   AM-PAC Daily Activity Inpatient   Lower Body Dressing 4   Bathing 4   Toileting 4   Upper Body Dressing 4   Grooming 4   Eating 4   Daily Activity Raw Score 24   Daily Activity Standardized Score (Calc for Raw Score >=11) 57.54   AM-PAC Applied Cognition Inpatient   Following a Speech/Presentation 4   Understanding Ordinary Conversation 4   Taking Medications 4   Remembering Where Things Are Placed or Put Away 4   Remembering List of 4-5 Errands 4   Taking Care of Complicated Tasks 4   Applied Cognition Raw Score 24   Applied Cognition Standardized Score 62.21       Jacqueline Mays         [1]   Past Medical History:  Diagnosis Date    Anemia     BMI 36.0-36.9,adult 12/08/2020    Cough 12/21/2020    COVID-19 virus infection 12/23/2020    Disease of thyroid gland     Dizziness     due to anemia, last episode August 2020, no falls    Drop in hemoglobin 12/08/2020    Fatigue     History of transfusion 2016    anemic - loss of blood due to bleeding ulcers, no reaction    Hyperlipidemia     Hypertension     Multiple gastric ulcers     Obesity     11/14/16     RUQ pain 06/09/2022    Severe obesity (BMI 35.0-39.9) with comorbidity (HCC) 12/23/2020    Sore throat 10/27/2021    Thyroid disease     took synthroid but not currently taking    Thyroid mass 03/15/2021     A CT scan of the neck incidental finding 1.7 cm of the right thyroid    Ulcer    [2]   Past Surgical History:  Procedure Laterality Date    CHOLECYSTECTOMY      CHOLECYSTECTOMY LAPAROSCOPIC N/A 06/10/2022    Procedure: CHOLECYSTECTOMY LAPAROSCOPIC;  Surgeon: Dameon Iverson MD;  Location:  MAIN OR;  Service: General    GASTRIC BYPASS  2014    GASTRIC BYPASS LAPAROSCOPIC N/A 12/15/2020    Procedure: Robotic lysis of adhesions, small-bowel resection, partial gastrectomy, paraesophageal hernia repair, bilateral truncal vagotomy; Robotic gastrojejunostomy anastomosis with intraop endoscopy;  Surgeon: Arianna Lyons MD;  Location: [facility];  Service: Bariatrics    TN LAPS ABD PRTM&OMENTUM DX W/WO SPEC BR/WA SPX N/A 1/6/2025    Procedure: LAPAROSCOPY DIAGNOSTIC WITH INTRAOPERATIVE EGD;  Surgeon: Arianna Lyons MD;  Location: [facility];  Service: Bariatrics    LITO-EN-Y PROCEDURE  11/14/2016    Gastric Bypass by Dr. Gay;Pre-Op Weight 339.6,nw    TUBAL LIGATION Bilateral     2010    WISDOM TOOTH EXTRACTION

## 2025-06-16 NOTE — PLAN OF CARE
Problem: PAIN - ADULT  Goal: Verbalizes/displays adequate comfort level or baseline comfort level  Description: Interventions:  - Encourage patient to monitor pain and request assistance  - Assess pain using appropriate pain scale  - Administer analgesics as ordered based on type and severity of pain and evaluate response  - Implement non-pharmacological measures as appropriate and evaluate response  - Consider cultural and social influences on pain and pain management  - Notify physician/advanced practitioner if interventions unsuccessful or patient reports new pain  - Educate patient/family on pain management process including their role and importance of  reporting pain   - Provide non-pharmacologic/complimentary pain relief interventions  Outcome: Progressing     Problem: INFECTION - ADULT  Goal: Absence or prevention of progression during hospitalization  Description: INTERVENTIONS:  - Assess and monitor for signs and symptoms of infection  - Monitor lab/diagnostic results  - Monitor all insertion sites, i.e. indwelling lines, tubes, and drains  - Monitor endotracheal if appropriate and nasal secretions for changes in amount and color  - [place] appropriate cooling/warming therapies per order  - Administer medications as ordered  - Instruct and encourage patient and family to use good hand hygiene technique  - Identify and instruct in appropriate isolation precautions for identified infection/condition  Outcome: Progressing  Goal: Absence of fever/infection during neutropenic period  Description: INTERVENTIONS:  - Monitor WBC  - Perform strict hand hygiene  - Limit to healthy visitors only  - No plants, dried, fresh or silk flowers with simons in patient room  Outcome: Progressing     Problem: SAFETY ADULT  Goal: Patient will remain free of falls  Description: INTERVENTIONS:  - Educate patient/family on patient safety including physical limitations  - Instruct patient to call for assistance with activity   -  Consider consulting OT/PT to assist with strengthening/mobility based on AM PAC & JH-HLM score  - Consult OT/PT to assist with strengthening/mobility   - Keep Call bell within reach  - Keep bed low and locked with side rails adjusted as appropriate  - Keep care items and personal belongings within reach  - Initiate and maintain comfort rounds  - Make Fall Risk Sign visible to staff  - Offer Toileting every 2 Hours, in advance of need  - Initiate/Maintain bed alarm  - Obtain necessary fall risk management equipment:   - Apply yellow socks and bracelet for high fall risk patients  - Consider moving patient to room near nurses station  Outcome: Progressing  Goal: Maintain or return to baseline ADL function  Description: INTERVENTIONS:  -  Assess patient's ability to carry out ADLs; assess patient's baseline for ADL function and identify physical deficits which impact ability to perform ADLs (bathing, care of mouth/teeth, toileting, grooming, dressing, etc.)  - Assess/evaluate cause of self-care deficits   - Assess range of motion  - Assess patient's mobility; develop plan if impaired  - Assess patient's need for assistive devices and provide as appropriate  - Encourage maximum independence but intervene and supervise when necessary  - Involve family in performance of ADLs  - Assess for home care needs following discharge   - Consider OT consult to assist with ADL evaluation and planning for discharge  - Provide patient education as appropriate  - Monitor functional capacity and physical performance, use of AM PAC & JH-HLM   - Monitor gait, balance and fatigue with ambulation    Outcome: Progressing  Goal: Maintains/Returns to pre admission functional level  Description: INTERVENTIONS:  - Perform AM-PAC 6 Click Basic Mobility/ Daily Activity assessment daily.  - Set and communicate daily mobility goal to care team and patient/family/caregiver.   - Collaborate with rehabilitation services on mobility goals if  consulted  - Perform Range of Motion 3 times a day.  - Reposition patient every 2 hours.  - Dangle patient 3 times a day  - Stand patient 3 times a day  - Ambulate patient 3 times a day  - Out of bed to chair 3 times a day   - Out of bed for meals 3 times a day  - Out of bed for toileting  - Record patient progress and toleration of activity level   Outcome: Progressing     Problem: DISCHARGE PLANNING  Goal: Discharge to home or other facility with appropriate resources  Description: INTERVENTIONS:  - Identify barriers to discharge w/patient and caregiver  - Arrange for needed discharge resources and transportation as appropriate  - Identify discharge learning needs (meds, wound care, etc.)  - Arrange for interpretive services to assist at discharge as needed  - Refer to Case Management Department for coordinating discharge planning if the patient needs post-hospital services based on physician/advanced practitioner order or complex needs related to functional status, cognitive ability, or social support system  Outcome: Progressing     Problem: Knowledge Deficit  Goal: Patient/family/caregiver demonstrates understanding of disease process, treatment plan, medications, and discharge instructions  Description: Complete learning assessment and assess knowledge base.  Interventions:  - Provide teaching at level of understanding  - Provide teaching via preferred learning methods  Outcome: Progressing     Problem: NEUROSENSORY - ADULT  Goal: Achieves stable or improved neurological status  Description: INTERVENTIONS  - Monitor and report changes in neurological status  - Monitor vital signs such as temperature, blood pressure, glucose, and any other labs ordered   - Initiate measures to prevent increased intracranial pressure  - Monitor for seizure activity and implement precautions if appropriate      Outcome: Progressing  Goal: Achieves maximal functionality and self care  Description: INTERVENTIONS  - Monitor  swallowing and airway patency with patient fatigue and changes in neurological status  - Encourage and assist patient to increase activity and self care.   - Encourage visually impaired, hearing impaired and aphasic patients to use assistive/communication devices  Outcome: Progressing     Problem: Neurological Deficit  Goal: Neurological status is stable or improving  Description: Interventions:  - Monitor and assess patient's level of consciousness, motor function, sensory function, and level of assistance needed for ADLs.   - Monitor and report changes from baseline. Collaborate with interdisciplinary team to initiate plan and implement interventions as ordered.   - Provide and maintain a safe environment.  - Consider seizure precautions.  - Consider fall precautions.  - Consider aspiration precautions.  - Consider bleeding precautions.  Outcome: Progressing     Problem: Activity Intolerance/Impaired Mobility  Goal: Mobility/activity is maintained at optimum level for patient  Description: Interventions:  - Assess and monitor patient  barriers to mobility and need for assistive/adaptive devices.  - Assess patient's emotional response to limitations.  - Collaborate with interdisciplinary team and initiate plans and interventions as ordered.  - Encourage independent activity per ability.  - Maintain proper body alignment.  - Perform active/passive rom as tolerated/ordered.  - Plan activities to conserve energy.  - Turn patient as appropriate  Outcome: Progressing

## 2025-06-16 NOTE — ASSESSMENT & PLAN NOTE
Midline chest pain with heaviness cardiac enzymes negative  Echo obtained 6/16   EF 67%.  Normal diastolic function.  Mild to moderate asymmetry hypertrophy of the septal wall.  Left atrium mildly dilated.  Lab Results   Component Value Date    HSTNI0 5 06/15/2025    HSTNI2 7 06/15/2025    HSTNI4 7 06/15/2025    HSTNI 5 (L) 06/16/2025

## 2025-06-16 NOTE — UTILIZATION REVIEW
Initial Clinical Review    Admission: Date/Time/Statement:   Admission Orders (From admission, onward)       Ordered        06/15/25 1426  INPATIENT ADMISSION  Once                          Orders Placed This Encounter   Procedures    INPATIENT ADMISSION     Standing Status:   Standing     Number of Occurrences:   1     Level of Care:   Med Surg [16]     Estimated length of stay:   More than 2 Midnights     Certification:   I certify that inpatient services are medically necessary for this patient for a duration of greater than two midnights. See H&P and MD Progress Notes for additional information about the patient's course of treatment.     ED Arrival Information       Expected   -    Arrival   6/15/2025 12:32    Acuity   Emergent              Means of arrival   Walk-In    Escorted by   Family Member    Service   Hospitalist    Admission type   Emergency              Arrival complaint   Chest Pain             Chief Complaint   Patient presents with    Numbness     Right arm and leg weakness with tingling in right hand and mouth beginning while cooking at 1145. Patient also reports pressure and chest with palpations.        Initial Presentation: 40 y.o. female presents to the ED from home with c/o midline CP, abd pain, L side numbness w/ headache.  She was a stroke alert. PMH: obesity s/p bariatric surgery, HTN.  In the ED VS WNL.  Treated with IV Tylenol, IV fluids x 1L, IV Mag, IV Reglan, IV Benadryl, ASA, Plavix.  Labs - WNL.  ECG - NSR.  Imaging - negative for CVA.  MRI Brain pending.    On exam symptoms improving, decreased breath sounds, nausea.  Admitted to INPATIENT status with Stroke like symptoms, situational anxiety, CP in adult - PLAN: neuro consult, MRI Brain, DAPT, Lorazepam with MRI, Echo, neuro checks.      Anticipated Length of Stay/Certification Statement: Patient will be admitted on an inpatient basis with an anticipated length of stay of greater than 2 midnights secondary to strokelike symptoms  and chest pain.     6/15 Neuro Consult - low suspicion for minor stroke/TIA, poss migraine, + vascular risk factors and is is not stereotypical of her prior spells. Neurochecks, MRI Brain, TTE, tele, DAPT, statin, permissive HTN.     Date: 6/16   Day 2:    Stroke like symptoms, situational anxiety, CP in adult - waiting for MRI Brain. VS stable.  Pt is independent with mobility.  Pt signed out AMA today.        ED Treatment-Medication Administration from 06/15/2025 1232 to 06/15/2025 1537         Date/Time Order Dose Route Action     06/15/2025 1253 iohexol (OMNIPAQUE) 350 MG/ML injection (MULTI-DOSE) 100 mL 85 mL Intravenous Given     06/15/2025 1325 acetaminophen (Ofirmev) injection 1,000 mg 1,000 mg Intravenous New Bag     06/15/2025 1325 lactated ringers bolus 1,000 mL 1,000 mL Intravenous New Bag     06/15/2025 1325 magnesium sulfate 2 g/50 mL IVPB (premix) 2 g 2 g Intravenous New Bag     06/15/2025 1325 metoclopramide (REGLAN) injection 10 mg 10 mg Intravenous Given     06/15/2025 1325 diphenhydrAMINE (BENADRYL) injection 25 mg 25 mg Intravenous Given     06/15/2025 1337 aspirin chewable tablet 324 mg 324 mg Oral Given     06/15/2025 1348 clopidogrel (PLAVIX) tablet 600 mg 600 mg Oral Given            Scheduled Medications:  amLODIPine, 10 mg, Oral, Daily  ascorbic acid, 500 mg, Oral, Daily  aspirin, 81 mg, Oral, Daily  atorvastatin, 40 mg, Oral, QPM  clopidogrel, 75 mg, Oral, Daily  cyanocobalamin, 1,000 mcg, Oral, Daily  ferrous sulfate, 325 mg, Oral, BID With Meals  heparin (porcine), 5,000 Units, Subcutaneous, Q8H BABATUNDE  losartan, 25 mg, Oral, Daily  multivitamin stress formula, 1 tablet, Oral, Daily  zinc sulfate, 220 mg, Oral, Daily      Continuous IV Infusions:     PRN Meds:  acetaminophen, 650 mg, Oral, Q4H PRN - x 1 6/16  albuterol, 2 puff, Inhalation, Q4H PRN  LORazepam, 1 mg, Intravenous, Once PRN  melatonin, 3 mg, Oral, HS PRN - x 1 6/16  ondansetron, 4 mg, Intravenous, Q4H PRN      ED Triage Vitals    Temperature Pulse Respirations Blood Pressure SpO2 Pain Score   06/15/25 1237 06/15/25 1245 06/15/25 1245 06/15/25 1245 06/15/25 1245 06/15/25 1345   (!) 97 °F (36.1 °C) 92 20 159/87 100 % No Pain     Weight (last 2 days)       Date/Time Weight    06/16/25 0819 129 (284.39)    06/15/25 1245 129 (284.39)    06/15/25 1237 129 (284.39)            Vital Signs (last 3 days)       Date/Time Temp Pulse Resp BP MAP (mmHg) SpO2 O2 Device Patient Position - Orthostatic VS Connor Coma Scale Score Pain    06/16/25 1130 -- -- -- -- -- -- -- -- 15 --    06/16/25 1034 97.8 °F (36.6 °C) 86 18 162/80 108 94 % None (Room air) Lying -- --    06/16/25 0925 -- -- -- -- -- -- -- -- -- No Pain    06/16/25 0819 97.6 °F (36.4 °C) 77 18 132/77 -- -- -- -- -- --    06/16/25 0730 -- -- -- -- -- -- -- -- 15 No Pain    06/16/25 0713 97.6 °F (36.4 °C) 77 18 132/77 98 98 % None (Room air) Lying -- --    06/16/25 0300 -- -- -- -- -- -- -- -- 15 --    06/16/25 0250 97.5 °F (36.4 °C) 68 18 126/79 98 100 % -- Lying -- --    06/16/25 0100 -- -- -- -- -- -- -- -- 15 --    06/16/25 0050 97.2 °F (36.2 °C) 76 18 142/65 94 -- -- Lying -- --    06/15/25 2300 -- -- -- -- -- -- -- -- 15 --    06/15/25 2250 -- 72 18 139/80 104 -- -- Lying -- --    06/15/25 2100 -- -- -- -- -- -- -- -- 15 No Pain    06/15/25 2050 -- 82 -- 159/78 112 100 % -- Lying -- --    06/15/25 1900 -- -- -- -- -- -- -- -- 15 --    06/15/25 1850 -- 82 18 151/63 90 99 % None (Room air) Lying -- --    06/15/25 1800 -- -- -- -- -- -- -- -- 15 --    06/15/25 1750 -- 87 18 154/74 101 100 % None (Room air) Sitting -- --    06/15/25 1700 -- -- -- -- -- -- -- -- 15 --    06/15/25 1650 -- 67 18 141/68 98 100 % None (Room air) Lying -- --    06/15/25 1614 -- -- -- -- -- -- -- -- -- No Pain    06/15/25 1600 -- -- -- -- -- -- -- -- 15 --    06/15/25 1550 98 °F (36.7 °C) 71 18 142/83 107 100 % None (Room air) Lying -- --    06/15/25 1430 -- 66 18 161/96 122 99 % None (Room air) Lying 15 --    06/15/25  1400 -- 67 22 147/91 114 100 % None (Room air) Lying 15 --    06/15/25 1345 -- 86 16 140/63 90 99 % None (Room air) Lying 15 No Pain    06/15/25 1330 -- 69 22 152/83 100 100 % None (Room air) Lying 15 --    06/15/25 1315 -- 73 19 130/65 93 100 % None (Room air) Lying -- --    06/15/25 1300 -- 81 18 143/65 -- 100 % None (Room air) Lying 15 --    06/15/25 1245 97 °F (36.1 °C) 92 20 159/87 -- 100 % None (Room air) Sitting 15 --    06/15/25 1237 97 °F (36.1 °C) -- -- -- -- -- -- -- -- --              Pertinent Labs/Diagnostic Test Results:   Radiology:  CTA stroke alert (head/neck)   Final Interpretation by Nicolas Messer DO (06/15 1301)      Unremarkable CTA neck and brain.                  I reported these results to Dr. Pollack via HIPAA compliant secure electronic messaging on 6/15/2025 1:01 PM.      Workstation performed: [id number]         CT stroke alert brain   Final Interpretation by Nicolas Messer DO (06/15 1254)      No acute intracranial abnormality.         I reported these results to Dr. Pollack via HIPAA compliant secure electronic messaging on 6/15/2025 12:54 PM.      Workstation performed: [id number]           Cardiology:  Echo complete w/ contrast if indicated   Final Result by Khoi Avendano DO (06/16 1035)        Left Ventricle: Left ventricular cavity size is normal. Wall thickness    is increased. There is mild to moderate asymmetric hypertrophy of the    septal wall. The left ventricular ejection fraction is 67%. Systolic    function is normal. Wall motion is normal. Diastolic function is normal.     Left Atrium: The atrium is mildly dilated.         ECG 12 lead   Final Result by Miguel Lozano MD (06/16 9392)   Normal sinus rhythm with sinus arrhythmia   Normal ECG   When compared with ECG of 15-Collin-2025 12:42, (unconfirmed)   No significant change was found   Confirmed by Miguel Lozano ([id number]) on 6/16/2025 7:29:47 AM      ECG 12 lead   Final Result by Miguel Lozano MD (06/16 7721)    Normal sinus rhythm   Normal ECG   When compared with ECG of 06-Jan-2025 00:37,   Borderline criteria for Inferior infarct are no longer Present   Nonspecific T wave abnormality, improved in Inferior leads   Confirmed by Miguel Lozano ([id number]) on 6/16/2025 7:21:05 AM        GI:  No orders to display           Results from last 7 days   Lab Units 06/16/25  0546 06/15/25  1241   WBC Thousand/uL 8.42 9.81   HEMOGLOBIN g/dL 9.0* 9.4*   HEMATOCRIT % 30.3* 31.3*   PLATELETS Thousands/uL 410* 424*         Results from last 7 days   Lab Units 06/16/25  0546 06/15/25  1241   SODIUM mmol/L 135 137   POTASSIUM mmol/L 4.1 3.7   CHLORIDE mmol/L 106 109*   CO2 mmol/L 24 23   ANION GAP mmol/L 5 5   BUN mg/dL 6 11   CREATININE mg/dL 0.52* 0.59*   EGFR ml/min/1.73sq m 120 115   CALCIUM mg/dL 8.7 8.7   MAGNESIUM mg/dL  --  1.9     Results from last 7 days   Lab Units 06/16/25  0546   AST U/L 16   ALT U/L 12   ALK PHOS U/L 90   TOTAL PROTEIN g/dL 7.3   ALBUMIN g/dL 3.8   TOTAL BILIRUBIN mg/dL 0.36     Results from last 7 days   Lab Units 06/15/25  1244   POC GLUCOSE mg/dl 110     Results from last 7 days   Lab Units 06/16/25  0546 06/15/25  1241   GLUCOSE RANDOM mg/dL 98 94         Results from last 7 days   Lab Units 06/15/25  1754 06/15/25  1456 06/15/25  1241   HS TNI 0HR ng/L  --   --  5   HS TNI 2HR ng/L  --  7  --    HSTNI D2 ng/L  --  2  --    HS TNI 4HR ng/L 7  --   --    HSTNI D4 ng/L 2  --   --          Results from last 7 days   Lab Units 06/15/25  1241   PROTIME seconds 13.7   INR  1.03   PTT seconds 27     Results from last 7 days   Lab Units 06/15/25  1241   TSH 3RD GENERATON uIU/mL 1.471     Past Medical History[1]  Present on Admission:   (Resolved) Stroke-like symptoms   Primary hypertension   Situational anxiety      Admitting Diagnosis: Chest pain [R07.9]  Numbness and tingling [R20.0, R20.2]  CVA (cerebral vascular accident) (HCC) [I63.9]  Situational anxiety [F41.8]  Stroke-like symptoms [R29.90]  Age/Sex: 40 y.o.  female    Network Utilization Review Department  ATTENTION: Please call with any questions or concerns to [phone] and carefully listen to the prompts so that you are directed to the right person. All voicemails are confidential.   For Discharge needs, contact Care Management DC Support Team at [phone] opt. [phone]  Send all requests for admission clinical reviews, approved or denied determinations and any other requests to dedicated fax number below belonging to the campus where the patient is receiving treatment. List of dedicated fax numbers for the Facilities:  FACILITY NAME UR FAX NUMBER   ADMISSION DENIALS (Administrative/Medical Necessity) [phone]   DISCHARGE SUPPORT TEAM (NETWORK) [phone]   PARENT CHILD HEALTH (Maternity/NICU/Pediatrics) [phone]   [facility] [phone]   [facility] [phone]   [facility] [phone]   [facility] [phone]   [facility] [phone]   [facility] [phone]   [facility] [phone]   [facility] [phone]   [facility] [phone]   [facility] [phone]   [facility] [phone]   [facility] [phone]              [1]   Past Medical History:  Diagnosis Date    Anemia     BMI 36.0-36.9,adult 12/08/2020    Cough 12/21/2020    COVID-19 virus infection 12/23/2020    Disease of thyroid gland     Dizziness     due to anemia, last episode August 2020, no falls    Drop in hemoglobin 12/08/2020    Fatigue     History of transfusion 2016    anemic - loss of blood due to bleeding ulcers, no reaction    Hyperlipidemia     Hypertension     Multiple gastric ulcers     Obesity     11/14/16    RUQ pain 06/09/2022     Severe obesity (BMI 35.0-39.9) with comorbidity (HCC) 12/23/2020    Sore throat 10/27/2021    Thyroid disease     took synthroid but not currently taking    Thyroid mass 03/15/2021     A CT scan of the neck incidental finding 1.7 cm of the right thyroid    Ulcer

## 2025-06-16 NOTE — PHYSICAL THERAPY NOTE
PHYSICAL THERAPY SCREEN          Patient Name: Becky Zhao  Today's Date: 6/16/2025 06/16/25 1130   PT Last Visit   PT Visit Date 06/16/25   Note Type   Note type Screen   Additional Comments Consult received. Nsg am-pac 24. per OT evaluation pt is functioning at indep level w no acute PT needs. will sign off.     Roxy Waters, PT

## 2025-06-16 NOTE — NURSING NOTE
Patient signed the AMA form with the [facility] PA.  RN removed IV and telemetry from patient.  RN went to give paper work but the patient left the floor.

## 2025-06-16 NOTE — CASE MANAGEMENT
Case Management Assessment & Discharge Planning Note    Patient name Becky Zhao  Location [facility] /[facility] -* MRN [id number]  : 1985 Date 2025       Current Admission Date: 6/15/2025  Current Admission Diagnosis:Stroke-like symptoms   Patient Active Problem List    Diagnosis Date Noted    Stroke-like symptoms 06/15/2025    Chest pain in adult 06/15/2025    Partial small bowel obstruction (HCC) 2025    Secondary non-renal hyperparathyroidism (HCC) 10/14/2024    Encounter for well adult exam with abnormal findings 2024    Acute flank pain 2024    Bilateral carpal tunnel syndrome 2023    Nasal congestion 10/20/2023    Blood in stool, miranda 2023    Herpes gingivostomatitis 2023    Discoloration of nail 2023    Strain of left trapezius muscle 2023    Atypical nevi 2023    Amenorrhea 2023    Mass of upper outer quadrant of right breast 2023    S/P cholecystectomy 06/15/2022    Acute calculous cholecystitis 06/10/2022    Acute exacerbation of chronic low back pain 2022    Chronic diarrhea 2022    Breast pain 2021    Immunization refused 2021    Neck pain 2021    Abnormal vaginal bleeding 2021    Other dysphagia 2021    Generalized anxiety disorder 2021    Other chest pain 2021    EKG, abnormal 2021    Situational anxiety 2021    Menorrhagia with regular cycle 03/15/2021    Thyroid nodule 03/15/2021    Vertigo 2021    Diarrhea 2020    Gastric outlet obstruction 2020    SOB (shortness of breath) on exertion 2020    COVID-19 virus infection 2020    Morbid obesity with BMI of 50.0-59.9, adult (HCC) 2020    Pelvic pain 2020    Hiatal hernia 08/10/2020    Iron deficiency anemia secondary to inadequate dietary iron intake 2020    B12 deficiency 2020    History of Jaycee-en-Y gastric bypass 2018     Postsurgical malabsorption 11/06/2018    Hyperinsulinemia 10/27/2016    Mixed hyperlipidemia 10/04/2016    Vitamin D deficiency 12/09/2015    Acute duodenal ulcer 11/09/2015    Anemia 11/09/2015    Primary hypertension 11/04/2015    Family history of diabetes mellitus 04/10/2007      LOS (days): 1  Geometric Mean LOS (GMLOS) (days):   Days to GMLOS:     OBJECTIVE:    Risk of Unplanned Readmission Score: 12.81         Current admission status: Inpatient  Referral Reason: Stroke    Preferred Pharmacy:   [id number] [place], [place] - [address]  [address] [facility] [address]  Phone: [phone] Fax: [phone]    [id number] [place] - [address] [facility]  [address] [facility] [address]  Phone: [phone] Fax: [phone]    Primary Care Provider: William Collins MD    Primary Insurance: [employer]  Secondary Insurance:     ASSESSMENT:  Active Health Care Proxies    There are no active Health Care Proxies on file.       Advance Directives  Does patient have a Health Care POA?: No  Was patient offered paperwork?: Yes  Does patient currently have a Health Care decision maker?: Yes, please see Health Care Proxy section  Does patient have Advance Directives?: No  Was patient offered paperwork?: Yes         Readmission Root Cause  30 Day Readmission: No    Patient Information  Admitted from:: Home  Mental Status: Alert  During Assessment patient was accompanied by: Not accompanied during assessment  Assessment information provided by:: Patient  Primary Caregiver: Self  Support Systems: Self, Spouse/significant other, Family members, Friend  County of Residence: [place]  [facility] you [facility]  Home entry access options. Select all that apply.: Stairs  Number of steps to enter home.: 7  Do the steps have railings?: Yes  Type of Current Residence: Apartment  Floor Level: 2  Upon entering residence, is there a bedroom on the main floor (no  further steps)?: Yes  Upon entering residence, is there a bathroom on the main floor (no further steps)?: Yes  Living Arrangements: Lives w/ Spouse/significant other, Lives w/ Children  Is patient a ?: No    Activities of Daily Living Prior to Admission  Functional Status: Independent  Completes ADLs independently?: Yes  Ambulates independently?: Yes  Does patient use assisted devices?: No  Does patient currently own DME?: No  Does patient have a history of Outpatient Therapy (PT/OT)?: No  Does the patient have a history of Short-Term Rehab?: No  Does patient have a history of HHC?: No  Does patient currently have HHC?: No         Patient Information Continued  Income Source: Employed  Does patient have prescription coverage?: Yes  Can the patient afford their medications and any related supplies (such as glucometers or test strips)?: Yes  Does patient receive dialysis treatments?: No  Does patient have a history of substance abuse?: No  Does patient have a history of Mental Health Diagnosis?: Yes (Anxiety)  Is patient receiving treatment for mental health?: No. Patient declined treatment information.         Means of Transportation  Means of Transport to Appts:: Drives Self          DISCHARGE DETAILS:    Discharge planning discussed with:: Patient and   Freedom of Choice: Yes  Comments - Freedom of Choice: No identified needs  CM contacted family/caregiver?: Yes  Were Treatment Team discharge recommendations reviewed with patient/caregiver?: Yes  Did patient/caregiver verbalize understanding of patient care needs?: Yes  Were patient/caregiver advised of the risks associated with not following Treatment Team discharge recommendations?: Yes    Contacts  Patient Contacts:  and daughter at bedside  Relationship to Patient:: Family  Contact Method: In Person  Reason/Outcome: Discharge Planning, Continuity of Care    Requested Home Health Care         Is the patient interested in HHC at discharge?:  No    DME Referral Provided  Referral made for DME?: No         Would you like to participate in our [facility] service program?  : No - Declined    Treatment Team Recommendation: Home  Expected Discharge Disposition: Home or Self Care  Additional Discharge Dispositions: Home or Self Care  Transport at Discharge : Family           CM met with patient, patient's spouse, and daughter at bedside to introduce self and role with DC planning.  Patient resides with her  and spouse in a second floor apartment.  Patient was independent PTA, she drives and does not utilize any DME.  Patient does not anticipate any CM needs at this time, CM department remains available.

## 2025-06-17 ENCOUNTER — OFFICE VISIT (OUTPATIENT)
Dept: FAMILY MEDICINE CLINIC | Facility: CLINIC | Age: 40
End: 2025-06-17
Payer: COMMERCIAL

## 2025-06-17 ENCOUNTER — TELEPHONE (OUTPATIENT)
Dept: FAMILY MEDICINE CLINIC | Facility: CLINIC | Age: 40
End: 2025-06-17

## 2025-06-17 VITALS
WEIGHT: 281 LBS | DIASTOLIC BLOOD PRESSURE: 110 MMHG | SYSTOLIC BLOOD PRESSURE: 140 MMHG | HEART RATE: 80 BPM | TEMPERATURE: 97.3 F | HEIGHT: 63 IN | BODY MASS INDEX: 49.79 KG/M2 | OXYGEN SATURATION: 98 %

## 2025-06-17 DIAGNOSIS — R79.89 HIGH SERUM PARATHYROID HORMONE (PTH): ICD-10-CM

## 2025-06-17 DIAGNOSIS — R07.89 OTHER CHEST PAIN: ICD-10-CM

## 2025-06-17 DIAGNOSIS — K91.2 POSTSURGICAL MALABSORPTION: ICD-10-CM

## 2025-06-17 DIAGNOSIS — E66.01 MORBID OBESITY WITH BMI OF 50.0-59.9, ADULT (HCC): Chronic | ICD-10-CM

## 2025-06-17 DIAGNOSIS — I10 PRIMARY HYPERTENSION: ICD-10-CM

## 2025-06-17 DIAGNOSIS — E55.9 VITAMIN D DEFICIENCY: ICD-10-CM

## 2025-06-17 DIAGNOSIS — K52.9 CHRONIC DIARRHEA: ICD-10-CM

## 2025-06-17 DIAGNOSIS — Z98.84 BARIATRIC SURGERY STATUS: ICD-10-CM

## 2025-06-17 DIAGNOSIS — R29.90 STROKE-LIKE SYMPTOMS: Primary | ICD-10-CM

## 2025-06-17 PROBLEM — K80.00 ACUTE CALCULOUS CHOLECYSTITIS: Status: RESOLVED | Noted: 2022-06-10 | Resolved: 2025-06-17

## 2025-06-17 PROBLEM — Z00.01 ENCOUNTER FOR WELL ADULT EXAM WITH ABNORMAL FINDINGS: Status: RESOLVED | Noted: 2024-02-06 | Resolved: 2025-06-17

## 2025-06-17 PROBLEM — U07.1 COVID-19 VIRUS INFECTION: Status: RESOLVED | Noted: 2020-12-23 | Resolved: 2025-06-17

## 2025-06-17 PROBLEM — N93.9 ABNORMAL VAGINAL BLEEDING: Status: RESOLVED | Noted: 2021-09-20 | Resolved: 2025-06-17

## 2025-06-17 PROBLEM — R94.31 EKG, ABNORMAL: Status: RESOLVED | Noted: 2021-05-13 | Resolved: 2025-06-17

## 2025-06-17 PROBLEM — Z90.49 S/P CHOLECYSTECTOMY: Status: RESOLVED | Noted: 2022-06-15 | Resolved: 2025-06-17

## 2025-06-17 PROBLEM — R19.7 DIARRHEA: Status: RESOLVED | Noted: 2020-12-30 | Resolved: 2025-06-17

## 2025-06-17 PROCEDURE — 99496 TRANSJ CARE MGMT HIGH F2F 7D: CPT | Performed by: FAMILY MEDICINE

## 2025-06-17 RX ORDER — METOPROLOL SUCCINATE 25 MG/1
25 TABLET, EXTENDED RELEASE ORAL DAILY
Qty: 90 TABLET | Refills: 1 | Status: SHIPPED | OUTPATIENT
Start: 2025-06-17

## 2025-06-17 RX ORDER — ERGOCALCIFEROL 1.25 MG/1
50000 CAPSULE, LIQUID FILLED ORAL WEEKLY
Qty: 12 CAPSULE | Refills: 0 | Status: SHIPPED | OUTPATIENT
Start: 2025-06-17

## 2025-06-17 RX ORDER — ERGOCALCIFEROL 1.25 MG/1
50000 CAPSULE, LIQUID FILLED ORAL 2 TIMES WEEKLY
Qty: 24 CAPSULE | Refills: 0 | Status: SHIPPED | OUTPATIENT
Start: 2025-06-19 | End: 2025-06-17 | Stop reason: SDUPTHER

## 2025-06-17 NOTE — PROGRESS NOTES
Transition of Care Visit:  Name: Becky Zhao      : 1985      MRN: [id number]  Encounter Provider: Dorcas De La Cruz DO  Encounter Date: 2025   Encounter department: [facility]    Assessment & Plan  Stroke-like symptoms  Patient still with the headaches and elevated BP and the chest pain She is still feeling tired and weak but not as she was in hospital I want her to see neurology I want to get the MRI done Patient to start the metoprolol and followup in 2 weeks   Orders:    MRI brain w wo contrast; Future    Ambulatory Referral to Neurology; Future    Other chest pain  ECG is stable Will start with the metoprolol and followup   Orders:    Ambulatory Referral to Cardiology; Future    POCT ECG    metoprolol succinate (TOPROL-XL) 25 mg 24 hr tablet; Take 1 tablet (25 mg total) by mouth daily    Primary hypertension  Continue norvasc and lossartan  Orders:    metoprolol succinate (TOPROL-XL) 25 mg 24 hr tablet; Take 1 tablet (25 mg total) by mouth daily    Morbid obesity with BMI of 50.0-59.9, adult (HCC)  Heart healthydiet and followup bariatrics          Chronic diarrhea  Followup with the bariatric team       Vitamin D deficiency  Restart the vitamin D  Orders:    ergocalciferol (VITAMIN D2) 50,000 units; Take 1 capsule (50,000 Units total) by mouth once a week    Bariatric surgery status         Postsurgical malabsorption         High serum parathyroid hormone (PTH)              History of Present Illness     Transitional Care Management Review:   Becky Zhao is a 40 y.o. female here for TCM follow up.     During the TCM phone call patient stated:  TCM Call (since 6/3/2025)       Date and time call was made  2025  7:50 AM    Hospital care reviewed  Records reviewed    Patient [facility]ial[facility]    Date of Admission  06/15/25    Date of discharge  25    Diagnosis  stroke like symptoms    Disposition  Home    Were the  patients medications reviewed and updated  No    Current Symptoms  None          TCM Call (since 6/3/2025)       Post hospital issues  None    Scheduled for follow up?  Yes    Did you obtain your prescribed medications  Yes    Do you need help managing your prescriptions or medications  No    Is transportation to your appointment needed  No    I have advised the patient to call PCP with any new or worsening symptoms  reyna guaman    Living Arrangements  Family members    Support System  Family    The type of support provided  Emotional; Physical    Do you have social support  Yes, as much as I need          Patient is here for follow up of hospital stay patient was cooking dinner and developed numbness in the right arm headache and chest pain Patient went to ER Patient had CT and CTA done Normal She had ECG done and ECHO Patient was ruled out for myocardial infarct Patient seen by neurology Patient continued with headache and chest pain the whole time she was in hospital She did not feel the nurses were listening to her and nothing was being done about her continue chest pain patient decided to sign out against medical advise She continues with same headache and chest pain The numbness and overall weakness is better Patient refuses to go to the hosptial Her blood pressure continues to be high She has appt with the bariatric team on 6/23/2025  Patient is taking medications as she was directed to by the ER/ hospital team       Review of Systems   Constitutional:  Positive for fatigue. Negative for activity change, appetite change and fever.   Respiratory:  Negative for cough, shortness of breath and wheezing.    Cardiovascular:  Positive for chest pain and palpitations. Negative for leg swelling.        Chest pressure made worse when she put pressure on the area   Gastrointestinal:  Positive for diarrhea.   Skin:  Negative for rash.   Neurological:  Positive for headaches. Negative for dizziness, tremors,  "syncope, speech difficulty, weakness, light-headedness and numbness.   Psychiatric/Behavioral:  Negative for decreased concentration.      Objective   BP (!) 140/110   Pulse 80   Temp (!) 97.3 °F (36.3 °C)   Ht 5' 2.5\" (1.588 m)   Wt 127 kg (281 lb)   LMP 05/06/2025 (Exact Date)   SpO2 98%   BMI 50.58 kg/m²     Physical Exam  Vitals and nursing note reviewed.   Constitutional:       Appearance: She is obese.   HENT:      Head: Normocephalic.      Right Ear: Tympanic membrane and external ear normal.      Left Ear: Tympanic membrane and external ear normal.     Cardiovascular:      Rate and Rhythm: Normal rate and regular rhythm.      Heart sounds: Normal heart sounds.      Comments: Pain chest wall to palpation mid sternum  Pulmonary:      Effort: Pulmonary effort is normal.      Breath sounds: Normal breath sounds.     Neurological:      General: No focal deficit present.      Mental Status: She is alert and oriented to person, place, and time.      Cranial Nerves: No cranial nerve deficit.      Sensory: No sensory deficit.      Motor: No weakness.      Gait: Gait normal.     Psychiatric:         Mood and Affect: Mood normal.         Behavior: Behavior normal.         Thought Content: Thought content normal.         Judgment: Judgment normal.       Medications have been reviewed by provider in current encounter      "

## 2025-06-17 NOTE — ASSESSMENT & PLAN NOTE
Restart the vitamin D  Orders:    ergocalciferol (VITAMIN D2) 50,000 units; Take 1 capsule (50,000 Units total) by mouth once a week

## 2025-06-17 NOTE — UTILIZATION REVIEW
NOTIFICATION OF ADMISSION DISCHARGE   This is a Notification of Discharge from [facility]. Please be advised that this patient has been discharge from our facility. Below you will find the admission and discharge date and time including the patient’s disposition.   UTILIZATION REVIEW CONTACT:  Utilization Review Assistants  Network Utilization Review Department  Phone: [phone] x carefully listen to the prompts. All voicemails are confidential.  Email: [email or web address]     ADMISSION INFORMATION  PRESENTATION DATE: 6/15/2025 12:37 PM  OBERVATION ADMISSION DATE: N/A  INPATIENT ADMISSION DATE: 6/15/25  2:26 PM   DISCHARGE DATE: 6/16/2025  2:56 PM   DISPOSITION:Left against medical advice or discontinued care    Network Utilization Review Department  ATTENTION: Please call with any questions or concerns to [phone] and carefully listen to the prompts so that you are directed to the right person. All voicemails are confidential.   For Discharge needs, contact Care Management DC Support Team at [phone] opt. 2  Send all requests for admission clinical reviews, approved or denied determinations and any other requests to dedicated fax number below belonging to the campus where the patient is receiving treatment. List of dedicated fax numbers for the Facilities:  FACILITY NAME UR FAX NUMBER   ADMISSION DENIALS (Administrative/Medical Necessity) [phone]   DISCHARGE SUPPORT TEAM ([facility]) [phone]   PARENT CHILD HEALTH (Maternity/NICU/Pediatrics) [phone]   [facility] [phone]   [facility] [phone]   [facility] [phone]   [facility] [phone]   [facility] [phone]   [facility] [phone]   [facility] [phone]   [facility]  [place] [phone]   [facility] [phone]   [facility] [phone]   [facility] [phone]   [facility] [phone]

## 2025-06-17 NOTE — ASSESSMENT & PLAN NOTE
Patient still with the headaches and elevated BP and the chest pain She is still feeling tired and weak but not as she was in hospital I want her to see neurology I want to get the MRI done Patient to start the metoprolol and followup in 2 weeks   Orders:    MRI brain w wo contrast; Future    Ambulatory Referral to Neurology; Future

## 2025-06-17 NOTE — ASSESSMENT & PLAN NOTE
ECG is stable Will start with the metoprolol and followup   Orders:    Ambulatory Referral to Cardiology; Future    POCT ECG    metoprolol succinate (TOPROL-XL) 25 mg 24 hr tablet; Take 1 tablet (25 mg total) by mouth daily

## 2025-06-17 NOTE — TELEPHONE ENCOUNTER
Left message regarding appointment made thru my chart with Dr De La Cruz today at 3 pm contact office if she would like to get in with Dr Collins we can accommodate if needed however keep appointment with Dr De La Cruz for tcm

## 2025-06-17 NOTE — ASSESSMENT & PLAN NOTE
Continue norvasc and lossartan  Orders:    metoprolol succinate (TOPROL-XL) 25 mg 24 hr tablet; Take 1 tablet (25 mg total) by mouth daily

## 2025-06-18 LAB — ECG INTERP DURING EX: NORMAL MS

## 2025-06-18 PROCEDURE — 93000 ELECTROCARDIOGRAM COMPLETE: CPT | Performed by: FAMILY MEDICINE

## 2025-06-19 ENCOUNTER — TELEPHONE (OUTPATIENT)
Age: 40
End: 2025-06-19

## 2025-06-19 DIAGNOSIS — R06.2 WHEEZING: ICD-10-CM

## 2025-06-19 NOTE — TELEPHONE ENCOUNTER
I called and spoke with the patient and made her aware, she will require an updated note, will send through my chart.

## 2025-06-19 NOTE — TELEPHONE ENCOUNTER
Patient called stating she had been seen in the office on 06/17 for stroke like symptoms and was cleared to return on 06/18 but patient isnt feeling very well and just wants to make sure she was cleared to return without any restrictions . Please advise and return patient call .

## 2025-06-19 NOTE — LETTER
June 19, 2025     Patient: Becky Zhao  YOB: 1985        To Whom it May Concern:    Becky Zhao is under my professional care. Becky may return to work on 6/18/2025  with no lifting greater than 10 lbs for 1 week.     If you have any questions or concerns, please don't hesitate to call.         Sincerely,        Odalys Harris PA-C       CC: No Recipients

## 2025-06-19 NOTE — TELEPHONE ENCOUNTER
I called and spoke with the patient and made her aware Dr. De La Cruz's letter from her visit did state she may return to work on 6/18. I asked if anything has changed since that visit and she stated she is still continuing to have the chest pain it is not like it was when she went to the ER but it is still present and she is experiencing shortness of breath at times and has noticed some vision changes but is not sure if is related as she is to being wearing classes all of the time and has not been. No available appt today or tomorrow for follow up so I did recommend UC or ER, she stated if her symptoms worsen and she feels she needs to go to the ER she will go. At this time she will monitor her symptoms.

## 2025-06-20 RX ORDER — ALBUTEROL SULFATE 0.83 MG/ML
SOLUTION RESPIRATORY (INHALATION)
Qty: 180 ML | Refills: 2 | Status: SHIPPED | OUTPATIENT
Start: 2025-06-20

## 2025-06-20 NOTE — TELEPHONE ENCOUNTER
Requested medication(s) are due for refill today: If no, explain: prescribed for covid in 2023  **If antibiotic or given during sick visit, contact patient to discuss current symptoms.   **Confirm prescribing provider    LOV:  6/17/25  **If longer then 1 year, contact patient to schedule annual PRIOR to refilling. Once scheduled, adjust refill for 30 days, no refills.  **Update CareEverywhere to confirm not being seen elsewhere    NOV:  7/15/25    Is patient due for annual visit: Yes, describe: scheduled  **If future appointment, adjust to annual/follow up.  ** No appointment call to schedule annual/follow up.    Route to PCP, unless PCP no longer here, then physician they are seeing next.

## 2025-06-23 ENCOUNTER — TELEPHONE (OUTPATIENT)
Dept: BARIATRICS | Facility: CLINIC | Age: 40
End: 2025-06-23

## 2025-06-23 NOTE — TELEPHONE ENCOUNTER
Contacted pt regarding rescheduling missed appt on 6/23/25 at 1 pm with [facility]. Pt r/s 9/10/25 at 9 am with L[facility]a.

## (undated) DEVICE — BETHLEHEM UNIVERSAL MINOR GEN: Brand: CARDINAL HEALTH

## (undated) DEVICE — TROCAR: Brand: KII FIOS FIRST ENTRY

## (undated) DEVICE — SUT MONOCRYL 4-0 PS-2 27 IN Y426H

## (undated) DEVICE — NEEDLE SPINAL18G X 3.5 IN QUINCKE

## (undated) DEVICE — PENCIL ELECTROSURG E-Z CLEAN -0035H

## (undated) DEVICE — SINGLE PORT MANIFOLD: Brand: NEPTUNE 2

## (undated) DEVICE — STERILE POLYISOPRENE POWDER-FREE SURGICAL GLOVES: Brand: PROTEXIS

## (undated) DEVICE — GLOVE SRG BIOGEL 8

## (undated) DEVICE — TROCAR: Brand: KII® SLEEVE

## (undated) DEVICE — LIGAMAX 5 MM ENDOSCOPIC MULTIPLE CLIP APPLIER: Brand: LIGAMAX

## (undated) DEVICE — TIBURON LAPAROSCOPIC ABDOMINAL DRAPE: Brand: CONVERTORS

## (undated) DEVICE — [HIGH FLOW INSUFFLATOR,  DO NOT USE IF PACKAGE IS DAMAGED,  KEEP DRY,  KEEP AWAY FROM SUNLIGHT,  PROTECT FROM HEAT AND RADIOACTIVE SOURCES.]: Brand: PNEUMOSURE

## (undated) DEVICE — SURGICAL GOWN, XL SMARTSLEEVE: Brand: CONVERTORS

## (undated) DEVICE — REDUCER: Brand: ENDOWRIST

## (undated) DEVICE — STANDARD SURGICAL GOWN, L: Brand: CONVERTORS

## (undated) DEVICE — Device: Brand: DEFENDO AIR/WATER/SUCTION AND BIOPSY VALVE

## (undated) DEVICE — ALLENTOWN LAP CHOLE APP PACK: Brand: CARDINAL HEALTH

## (undated) DEVICE — TUBING SUCTION 5MM X 12 FT

## (undated) DEVICE — 3M™ STERI-STRIP™ REINFORCED ADHESIVE SKIN CLOSURES, R1547, 1/2 IN X 4 IN (12 MM X 100 MM), 6 STRIPS/ENVELOPE: Brand: 3M™ STERI-STRIP™

## (undated) DEVICE — ABSORBABLE WOUND CLOSURE DEVICE: Brand: V-LOC 90

## (undated) DEVICE — VIOLET BRAIDED (POLYGLACTIN 910), SYNTHETIC ABSORBABLE SUTURE: Brand: COATED VICRYL

## (undated) DEVICE — VESSEL SEALER EXTEND: Brand: ENDOWRIST

## (undated) DEVICE — EXOFIN PRECISION PEN HIGH VISCOSITY TOPICAL SKIN ADHESIVE: Brand: EXOFIN PRECISION PEN, 1G

## (undated) DEVICE — CHLORAPREP HI-LITE 26ML ORANGE

## (undated) DEVICE — SWABSTCK, BENZOIN TINCTURE, 1/PK, STRL: Brand: APLICARE

## (undated) DEVICE — NEEDLE BLUNT 18 G X 1 1/2IN

## (undated) DEVICE — CANNULA SEAL

## (undated) DEVICE — ADHESIVE SKIN CLSR DERMABOND NX

## (undated) DEVICE — SPINAL NEEDLE 22 G X 2 1/2

## (undated) DEVICE — DECANTER: Brand: UNBRANDED

## (undated) DEVICE — SPECIMEN CONTAINER STERILE PEEL PACK

## (undated) DEVICE — SYRINGE 30ML LL

## (undated) DEVICE — SCD SEQUENTIAL COMPRESSION COMFORT SLEEVE MEDIUM KNEE LENGTH: Brand: KENDALL SCD

## (undated) DEVICE — MONOPOLAR CURVED SCISSORS: Brand: ENDOWRIST

## (undated) DEVICE — DRAPE TOWEL: Brand: CONVERTORS

## (undated) DEVICE — COTTON TIP APPLICTOR 2 PK

## (undated) DEVICE — TIP COVER ACCESSORY

## (undated) DEVICE — PBT NON ABSORBABLE WOUND CLOSURE DEVICE: Brand: V-LOC

## (undated) DEVICE — MICRO HVTSA, 0.5G AND HVTSA SOURCEMARK PRODUCT CODE M1206 AND M1206-01: Brand: EXOFIN MICRO HVTSA, 0.5G

## (undated) DEVICE — GLOVE SRG BIOGEL 7.5

## (undated) DEVICE — TUBING SMOKE EVAC W/FILTRATION DEVICE PLUMEPORT ACTIV

## (undated) DEVICE — BLADELESS OBTURATOR: Brand: WECK VISTA

## (undated) DEVICE — ASTOUND STANDARD SURGICAL GOWN, XL: Brand: CONVERTORS

## (undated) DEVICE — ELECTRODE LAP J HOOK E-Z CLEAN 33CM-0021

## (undated) DEVICE — INSUFFLATION NEEDLE TO ESTABLISH PNEUMOPERITONEUM.: Brand: INSUFFLATION NEEDLE

## (undated) DEVICE — TUBING SET W. FILTER, GAS FLOW 30 L/MIN: Brand: N.A.

## (undated) DEVICE — INTENDED FOR TISSUE SEPARATION, AND OTHER PROCEDURES THAT REQUIRE A SHARP SURGICAL BLADE TO PUNCTURE OR CUT.: Brand: BARD-PARKER SAFETY BLADES SIZE 11, STERILE

## (undated) DEVICE — 2000CC GUARDIAN II: Brand: GUARDIAN

## (undated) DEVICE — IRRIG ENDO FLO TUBING

## (undated) DEVICE — VISIGI 3D®  CALIBRATION SYSTEM  SIZE 36FR BYPASS/SHORT: Brand: BOEHRINGER® VISIGI 3D®  CALIBRATION SYSTEM  SIZE 36FR BYPASS/SHORT

## (undated) DEVICE — STAPLER 60: Brand: SUREFORM

## (undated) DEVICE — ARM DRAPE

## (undated) DEVICE — COLUMN DRAPE

## (undated) DEVICE — DRAPE EQUIPMENT RF WAND

## (undated) DEVICE — SYRINGE 20ML LL

## (undated) DEVICE — ENDOPATH PNEUMONEEDLE INSUFFLATION NEEDLES WITH LUER LOCK CONNECTORS 120MM: Brand: ENDOPATH

## (undated) DEVICE — INTENDED FOR TISSUE SEPARATION, AND OTHER PROCEDURES THAT REQUIRE A SHARP SURGICAL BLADE TO PUNCTURE OR CUT.: Brand: BARD-PARKER SAFETY BLADES SIZE 15, STERILE

## (undated) DEVICE — PACK PBDS LAP CHOLE RF

## (undated) DEVICE — WEBRIL 6 IN UNSTERILE

## (undated) DEVICE — 3000CC GUARDIAN II: Brand: GUARDIAN

## (undated) DEVICE — BAG DECANTER

## (undated) DEVICE — DISPOSABLE OR TOWEL: Brand: CARDINAL HEALTH

## (undated) DEVICE — MEGA SUTURECUT ND: Brand: ENDOWRIST

## (undated) DEVICE — STAPLER CANNULA SEAL: Brand: ENDOWRIST

## (undated) DEVICE — URETERAL CATHETER ADAPTOR TIP

## (undated) DEVICE — ENDOPOUCH RETRIEVER SPECIMEN RETRIEVAL BAGS: Brand: ENDOPOUCH RETRIEVER

## (undated) DEVICE — STAPLER 60 RELOAD BLACK: Brand: SUREFORM

## (undated) DEVICE — ANTI-FOG SOLUTION WITH FOAM PAD: Brand: DEVON

## (undated) DEVICE — 10 MM BABCOCKS WITH RATCHET HANDLES: Brand: ENDOPATH

## (undated) DEVICE — STAPLER 60 RELOAD WHITE: Brand: SUREFORM

## (undated) DEVICE — SUT VICRYL 0 UR-6 27 IN J603H

## (undated) DEVICE — PLUMEPEN PRO 10FT

## (undated) DEVICE — LAPAROSCOPIC TROCAR SLEEVE/SINGLE USE: Brand: KII® SLEEVE

## (undated) DEVICE — ENDOPATH 5MM CURVED SCISSORS WITH MONOPOLAR CAUTERY: Brand: ENDOPATH

## (undated) DEVICE — TRAVELKIT CONTAINS FIRST STEP KIT (200ML EP-4 KIT) AND SOILED SCOPE BAG - 1 KIT: Brand: TRAVELKIT CONTAINS FIRST STEP KIT AND SOILED SCOPE BAG

## (undated) DEVICE — CADIERE FORCEPS: Brand: ENDOWRIST